# Patient Record
Sex: MALE | Race: WHITE | Employment: FULL TIME | URBAN - METROPOLITAN AREA
[De-identification: names, ages, dates, MRNs, and addresses within clinical notes are randomized per-mention and may not be internally consistent; named-entity substitution may affect disease eponyms.]

---

## 2018-06-18 ENCOUNTER — APPOINTMENT (OUTPATIENT)
Dept: GENERAL RADIOLOGY | Age: 48
End: 2018-06-18
Payer: COMMERCIAL

## 2018-06-18 ENCOUNTER — HOSPITAL ENCOUNTER (EMERGENCY)
Age: 48
Discharge: HOME | End: 2018-06-18
Payer: COMMERCIAL

## 2018-06-18 VITALS
OXYGEN SATURATION: 97 % | DIASTOLIC BLOOD PRESSURE: 107 MMHG | WEIGHT: 225 LBS | SYSTOLIC BLOOD PRESSURE: 147 MMHG | HEART RATE: 83 BPM | RESPIRATION RATE: 16 BRPM | BODY MASS INDEX: 34.21 KG/M2 | TEMPERATURE: 98.6 F

## 2018-06-18 DIAGNOSIS — R20.2 TINGLING: ICD-10-CM

## 2018-06-18 DIAGNOSIS — M79.641 HAND PAIN, RIGHT: Primary | ICD-10-CM

## 2018-06-18 DIAGNOSIS — M25.531 RIGHT WRIST PAIN: ICD-10-CM

## 2018-06-18 PROCEDURE — 73110 X-RAY EXAM OF WRIST: CPT

## 2018-06-18 PROCEDURE — L3931 WHFO NONTORSION JOINT PREFAB: HCPCS

## 2018-06-18 PROCEDURE — 73130 X-RAY EXAM OF HAND: CPT

## 2018-06-18 PROCEDURE — 99212 OFFICE O/P EST SF 10 MIN: CPT

## 2018-06-18 RX ORDER — IBUPROFEN 800 MG/1
800 TABLET ORAL EVERY 8 HOURS PRN
Qty: 21 TABLET | Refills: 0 | Status: SHIPPED | OUTPATIENT
Start: 2018-06-18 | End: 2018-06-25

## 2018-06-18 ASSESSMENT — PAIN DESCRIPTION - LOCATION: LOCATION: WRIST

## 2018-06-18 ASSESSMENT — PAIN SCALES - GENERAL: PAINLEVEL_OUTOF10: 7

## 2018-06-18 ASSESSMENT — PAIN DESCRIPTION - PAIN TYPE: TYPE: ACUTE PAIN

## 2018-06-18 ASSESSMENT — PAIN DESCRIPTION - ORIENTATION: ORIENTATION: RIGHT

## 2018-06-18 ASSESSMENT — PAIN DESCRIPTION - DESCRIPTORS: DESCRIPTORS: ACHING

## 2018-08-20 ENCOUNTER — HOSPITAL ENCOUNTER (OUTPATIENT)
Dept: PSYCHIATRY | Age: 48
Setting detail: THERAPIES SERIES
Discharge: HOME | End: 2018-08-20
Payer: COMMERCIAL

## 2018-08-20 VITALS — DIASTOLIC BLOOD PRESSURE: 88 MMHG | SYSTOLIC BLOOD PRESSURE: 138 MMHG | HEART RATE: 22 BPM

## 2018-08-20 PROCEDURE — 90791 PSYCH DIAGNOSTIC EVALUATION: CPT

## 2018-08-20 ASSESSMENT — LIFESTYLE VARIABLES: HISTORY_ALCOHOL_USE: YES

## 2022-11-13 ENCOUNTER — HOSPITAL ENCOUNTER (INPATIENT)
Age: 52
LOS: 4 days | Discharge: HOME | DRG: 964 | End: 2022-11-17
Attending: EMERGENCY MEDICINE | Admitting: SURGERY
Payer: COMMERCIAL

## 2022-11-13 ENCOUNTER — APPOINTMENT (OUTPATIENT)
Dept: CT IMAGING | Age: 52
DRG: 964 | End: 2022-11-13
Payer: COMMERCIAL

## 2022-11-13 ENCOUNTER — APPOINTMENT (OUTPATIENT)
Dept: GENERAL RADIOLOGY | Age: 52
DRG: 964 | End: 2022-11-13
Payer: COMMERCIAL

## 2022-11-13 DIAGNOSIS — S37.012A HEMATOMA OF LEFT KIDNEY, INITIAL ENCOUNTER: ICD-10-CM

## 2022-11-13 DIAGNOSIS — S22.42XA CLOSED FRACTURE OF MULTIPLE RIBS OF LEFT SIDE, INITIAL ENCOUNTER: Primary | ICD-10-CM

## 2022-11-13 PROBLEM — S22.49XA MULTIPLE RIB FRACTURES INVOLVING FOUR OR MORE RIBS: Status: ACTIVE | Noted: 2022-11-13

## 2022-11-13 LAB
ABO/RH: NORMAL
ANION GAP SERPL CALCULATED.3IONS-SCNC: 13 MMOL/L (ref 7–16)
ANTIBODY SCREEN: NORMAL
BACTERIA: ABNORMAL /HPF
BASOPHILS ABSOLUTE: 0.08 E9/L (ref 0–0.2)
BASOPHILS RELATIVE PERCENT: 1.1 % (ref 0–2)
BILIRUBIN URINE: NEGATIVE
BLOOD, URINE: ABNORMAL
BUN BLDV-MCNC: 14 MG/DL (ref 6–20)
CALCIUM SERPL-MCNC: 9 MG/DL (ref 8.6–10.2)
CHLORIDE BLD-SCNC: 109 MMOL/L (ref 98–107)
CLARITY: CLEAR
CO2: 20 MMOL/L (ref 22–29)
COLOR: YELLOW
CREAT SERPL-MCNC: 1.1 MG/DL (ref 0.7–1.2)
EOSINOPHILS ABSOLUTE: 0.21 E9/L (ref 0.05–0.5)
EOSINOPHILS RELATIVE PERCENT: 2.8 % (ref 0–6)
EPITHELIAL CELLS, UA: ABNORMAL /HPF
GFR SERPL CREATININE-BSD FRML MDRD: >60 ML/MIN/1.73
GLUCOSE BLD-MCNC: 100 MG/DL (ref 74–99)
GLUCOSE URINE: NEGATIVE MG/DL
HCT VFR BLD CALC: 39.3 % (ref 37–54)
HEMOGLOBIN: 12.6 G/DL (ref 12.5–16.5)
IMMATURE GRANULOCYTES #: 0.07 E9/L
IMMATURE GRANULOCYTES %: 0.9 % (ref 0–5)
KETONES, URINE: NEGATIVE MG/DL
LEUKOCYTE ESTERASE, URINE: NEGATIVE
LYMPHOCYTES ABSOLUTE: 2.59 E9/L (ref 1.5–4)
LYMPHOCYTES RELATIVE PERCENT: 35 % (ref 20–42)
MCH RBC QN AUTO: 31.6 PG (ref 26–35)
MCHC RBC AUTO-ENTMCNC: 32.1 % (ref 32–34.5)
MCV RBC AUTO: 98.5 FL (ref 80–99.9)
MONOCYTES ABSOLUTE: 0.5 E9/L (ref 0.1–0.95)
MONOCYTES RELATIVE PERCENT: 6.7 % (ref 2–12)
NEUTROPHILS ABSOLUTE: 3.96 E9/L (ref 1.8–7.3)
NEUTROPHILS RELATIVE PERCENT: 53.5 % (ref 43–80)
NITRITE, URINE: NEGATIVE
PDW BLD-RTO: 13.9 FL (ref 11.5–15)
PH UA: 5.5 (ref 5–9)
PLATELET # BLD: 272 E9/L (ref 130–450)
PMV BLD AUTO: 9.2 FL (ref 7–12)
POTASSIUM SERPL-SCNC: 4 MMOL/L (ref 3.5–5)
PROTEIN UA: ABNORMAL MG/DL
RBC # BLD: 3.99 E12/L (ref 3.8–5.8)
RBC UA: ABNORMAL /HPF (ref 0–2)
SODIUM BLD-SCNC: 142 MMOL/L (ref 132–146)
SPECIFIC GRAVITY UA: 1.01 (ref 1–1.03)
UROBILINOGEN, URINE: 0.2 E.U./DL
WBC # BLD: 7.4 E9/L (ref 4.5–11.5)
WBC UA: ABNORMAL /HPF (ref 0–5)

## 2022-11-13 PROCEDURE — A4216 STERILE WATER/SALINE, 10 ML: HCPCS | Performed by: SURGERY

## 2022-11-13 PROCEDURE — 96374 THER/PROPH/DIAG INJ IV PUSH: CPT

## 2022-11-13 PROCEDURE — 85025 COMPLETE CBC W/AUTO DIFF WBC: CPT

## 2022-11-13 PROCEDURE — 36415 COLL VENOUS BLD VENIPUNCTURE: CPT

## 2022-11-13 PROCEDURE — 71045 X-RAY EXAM CHEST 1 VIEW: CPT

## 2022-11-13 PROCEDURE — 99285 EMERGENCY DEPT VISIT HI MDM: CPT

## 2022-11-13 PROCEDURE — 86850 RBC ANTIBODY SCREEN: CPT

## 2022-11-13 PROCEDURE — 1200000000 HC SEMI PRIVATE

## 2022-11-13 PROCEDURE — 6370000000 HC RX 637 (ALT 250 FOR IP): Performed by: SURGERY

## 2022-11-13 PROCEDURE — 86900 BLOOD TYPING SEROLOGIC ABO: CPT

## 2022-11-13 PROCEDURE — 80048 BASIC METABOLIC PNL TOTAL CA: CPT

## 2022-11-13 PROCEDURE — C9113 INJ PANTOPRAZOLE SODIUM, VIA: HCPCS | Performed by: SURGERY

## 2022-11-13 PROCEDURE — 71260 CT THORAX DX C+: CPT

## 2022-11-13 PROCEDURE — 2580000003 HC RX 258: Performed by: SURGERY

## 2022-11-13 PROCEDURE — 74177 CT ABD & PELVIS W/CONTRAST: CPT

## 2022-11-13 PROCEDURE — 81001 URINALYSIS AUTO W/SCOPE: CPT

## 2022-11-13 PROCEDURE — 6360000004 HC RX CONTRAST MEDICATION: Performed by: RADIOLOGY

## 2022-11-13 PROCEDURE — 86901 BLOOD TYPING SEROLOGIC RH(D): CPT

## 2022-11-13 PROCEDURE — 96375 TX/PRO/DX INJ NEW DRUG ADDON: CPT

## 2022-11-13 PROCEDURE — 6360000002 HC RX W HCPCS: Performed by: SURGERY

## 2022-11-13 PROCEDURE — 6360000002 HC RX W HCPCS: Performed by: EMERGENCY MEDICINE

## 2022-11-13 RX ORDER — MORPHINE SULFATE 10 MG/ML
8 INJECTION, SOLUTION INTRAMUSCULAR; INTRAVENOUS ONCE
Status: COMPLETED | OUTPATIENT
Start: 2022-11-13 | End: 2022-11-13

## 2022-11-13 RX ORDER — SODIUM CHLORIDE 9 MG/ML
INJECTION, SOLUTION INTRAVENOUS PRN
Status: DISCONTINUED | OUTPATIENT
Start: 2022-11-13 | End: 2022-11-17 | Stop reason: HOSPADM

## 2022-11-13 RX ORDER — METHOCARBAMOL 500 MG/1
750 TABLET, FILM COATED ORAL 4 TIMES DAILY
Status: DISCONTINUED | OUTPATIENT
Start: 2022-11-13 | End: 2022-11-15

## 2022-11-13 RX ORDER — LIDOCAINE 4 G/G
1 PATCH TOPICAL DAILY
Status: DISCONTINUED | OUTPATIENT
Start: 2022-11-13 | End: 2022-11-17 | Stop reason: HOSPADM

## 2022-11-13 RX ORDER — OXYCODONE HYDROCHLORIDE 5 MG/1
5 TABLET ORAL EVERY 4 HOURS PRN
Status: DISCONTINUED | OUTPATIENT
Start: 2022-11-13 | End: 2022-11-17 | Stop reason: HOSPADM

## 2022-11-13 RX ORDER — ONDANSETRON 2 MG/ML
4 INJECTION INTRAMUSCULAR; INTRAVENOUS EVERY 6 HOURS PRN
Status: DISCONTINUED | OUTPATIENT
Start: 2022-11-13 | End: 2022-11-17 | Stop reason: HOSPADM

## 2022-11-13 RX ORDER — ONDANSETRON 4 MG/1
4 TABLET, ORALLY DISINTEGRATING ORAL EVERY 8 HOURS PRN
Status: DISCONTINUED | OUTPATIENT
Start: 2022-11-13 | End: 2022-11-17 | Stop reason: HOSPADM

## 2022-11-13 RX ORDER — SODIUM CHLORIDE 9 MG/ML
INJECTION, SOLUTION INTRAVENOUS CONTINUOUS
Status: DISCONTINUED | OUTPATIENT
Start: 2022-11-13 | End: 2022-11-16

## 2022-11-13 RX ORDER — ACETAMINOPHEN 325 MG/1
650 TABLET ORAL EVERY 6 HOURS
Status: DISCONTINUED | OUTPATIENT
Start: 2022-11-13 | End: 2022-11-17 | Stop reason: HOSPADM

## 2022-11-13 RX ORDER — MORPHINE SULFATE 2 MG/ML
2 INJECTION, SOLUTION INTRAMUSCULAR; INTRAVENOUS
Status: DISCONTINUED | OUTPATIENT
Start: 2022-11-13 | End: 2022-11-17 | Stop reason: HOSPADM

## 2022-11-13 RX ORDER — SODIUM CHLORIDE 0.9 % (FLUSH) 0.9 %
5-40 SYRINGE (ML) INJECTION PRN
Status: DISCONTINUED | OUTPATIENT
Start: 2022-11-13 | End: 2022-11-17 | Stop reason: HOSPADM

## 2022-11-13 RX ORDER — ENOXAPARIN SODIUM 100 MG/ML
30 INJECTION SUBCUTANEOUS 2 TIMES DAILY
Status: DISCONTINUED | OUTPATIENT
Start: 2022-11-13 | End: 2022-11-17 | Stop reason: HOSPADM

## 2022-11-13 RX ORDER — FENTANYL CITRATE 50 UG/ML
100 INJECTION, SOLUTION INTRAMUSCULAR; INTRAVENOUS ONCE
Status: COMPLETED | OUTPATIENT
Start: 2022-11-13 | End: 2022-11-13

## 2022-11-13 RX ORDER — MORPHINE SULFATE 4 MG/ML
4 INJECTION, SOLUTION INTRAMUSCULAR; INTRAVENOUS
Status: DISCONTINUED | OUTPATIENT
Start: 2022-11-13 | End: 2022-11-17 | Stop reason: HOSPADM

## 2022-11-13 RX ORDER — SODIUM CHLORIDE 0.9 % (FLUSH) 0.9 %
5-40 SYRINGE (ML) INJECTION EVERY 12 HOURS SCHEDULED
Status: DISCONTINUED | OUTPATIENT
Start: 2022-11-13 | End: 2022-11-17 | Stop reason: HOSPADM

## 2022-11-13 RX ORDER — HYDROMORPHONE HYDROCHLORIDE 1 MG/ML
1 INJECTION, SOLUTION INTRAMUSCULAR; INTRAVENOUS; SUBCUTANEOUS ONCE
Status: COMPLETED | OUTPATIENT
Start: 2022-11-13 | End: 2022-11-13

## 2022-11-13 RX ORDER — OXYCODONE HYDROCHLORIDE 5 MG/1
10 TABLET ORAL EVERY 4 HOURS PRN
Status: DISCONTINUED | OUTPATIENT
Start: 2022-11-13 | End: 2022-11-17 | Stop reason: HOSPADM

## 2022-11-13 RX ADMIN — IOPAMIDOL 75 ML: 755 INJECTION, SOLUTION INTRAVENOUS at 16:15

## 2022-11-13 RX ADMIN — SODIUM CHLORIDE: 9 INJECTION, SOLUTION INTRAVENOUS at 18:56

## 2022-11-13 RX ADMIN — HYDROMORPHONE HYDROCHLORIDE 1 MG: 1 INJECTION, SOLUTION INTRAMUSCULAR; INTRAVENOUS; SUBCUTANEOUS at 17:43

## 2022-11-13 RX ADMIN — ENOXAPARIN SODIUM 30 MG: 100 INJECTION SUBCUTANEOUS at 22:05

## 2022-11-13 RX ADMIN — METHOCARBAMOL 750 MG: 750 TABLET ORAL at 21:47

## 2022-11-13 RX ADMIN — PANTOPRAZOLE SODIUM 40 MG: 40 INJECTION, POWDER, FOR SOLUTION INTRAVENOUS at 18:50

## 2022-11-13 RX ADMIN — FENTANYL CITRATE 100 MCG: 50 INJECTION INTRAMUSCULAR; INTRAVENOUS at 14:55

## 2022-11-13 RX ADMIN — MORPHINE SULFATE 8 MG: 10 INJECTION, SOLUTION INTRAMUSCULAR; INTRAVENOUS at 16:30

## 2022-11-13 RX ADMIN — OXYCODONE 5 MG: 5 TABLET ORAL at 22:18

## 2022-11-13 RX ADMIN — MORPHINE SULFATE 4 MG: 4 INJECTION, SOLUTION INTRAMUSCULAR; INTRAVENOUS at 20:53

## 2022-11-13 RX ADMIN — ACETAMINOPHEN 650 MG: 325 TABLET ORAL at 18:50

## 2022-11-13 RX ADMIN — OXYCODONE 10 MG: 5 TABLET ORAL at 18:53

## 2022-11-13 ASSESSMENT — ENCOUNTER SYMPTOMS
NAUSEA: 0
SHORTNESS OF BREATH: 0
ABDOMINAL PAIN: 0
COLOR CHANGE: 0
COUGH: 0
BACK PAIN: 0

## 2022-11-13 ASSESSMENT — PAIN DESCRIPTION - DESCRIPTORS
DESCRIPTORS: ACHING
DESCRIPTORS: ACHING

## 2022-11-13 ASSESSMENT — PAIN SCALES - GENERAL
PAINLEVEL_OUTOF10: 8
PAINLEVEL_OUTOF10: 10
PAINLEVEL_OUTOF10: 8
PAINLEVEL_OUTOF10: 7
PAINLEVEL_OUTOF10: 5
PAINLEVEL_OUTOF10: 9
PAINLEVEL_OUTOF10: 8

## 2022-11-13 ASSESSMENT — PAIN DESCRIPTION - LOCATION
LOCATION: RIB CAGE

## 2022-11-13 ASSESSMENT — PAIN DESCRIPTION - ORIENTATION
ORIENTATION: LEFT

## 2022-11-13 NOTE — ED PROVIDER NOTES
Presents to the ED for evaluation of a fall that occurred this afternoon. Patient fell down approximately 8-10 steps. When he fell he was landing on the left side of his chest wall. Denies hitting his head. Denies neck or back pain. Complains of left-sided chest wall pain. No loss of consciousness. Patient not on anticoagulants. States pain is worse with any movement and deep breathing. Has not had anything recently for pain control. Pain is severe in severity. Patient denies any abdominal pain. Review of Systems   Constitutional:  Negative for diaphoresis. Eyes:  Negative for visual disturbance. Respiratory:  Negative for cough and shortness of breath. Cardiovascular:  Positive for chest pain. Gastrointestinal:  Negative for abdominal pain and nausea. Musculoskeletal:  Negative for back pain and neck pain. Skin:  Negative for color change, pallor and wound. Neurological:  Negative for dizziness, syncope, light-headedness, numbness and headaches. Hematological:  Does not bruise/bleed easily. Psychiatric/Behavioral:  Negative for confusion. Physical Exam  Vitals and nursing note reviewed. Constitutional:       General: He is not in acute distress. Appearance: He is well-developed. He is not diaphoretic. Comments: Patient appears uncomfortable secondary to pain but is in no distress. HENT:      Head: Normocephalic. Comments: No craniofacial trauma noted. Eyes:      Conjunctiva/sclera: Conjunctivae normal.   Cardiovascular:      Rate and Rhythm: Normal rate and regular rhythm. Heart sounds: Normal heart sounds. No murmur heard. Pulmonary:      Effort: Pulmonary effort is normal. No respiratory distress. Breath sounds: Normal breath sounds. No wheezing or rales. Chest:      Chest wall: Tenderness (She has chest wall tenderness on the left lateral ribs with bony crepitance appreciated. No paradoxical chest movement.) present.    Abdominal: General: Bowel sounds are normal.      Palpations: Abdomen is soft. Tenderness: There is abdominal tenderness (Mild tenderness in the upper abdomen. No overlying erythema or ecchymosis.). There is no guarding or rebound. Musculoskeletal:         General: No tenderness or deformity. Cervical back: Normal range of motion and neck supple. No tenderness (No midline cervical spine tenderness. ). Skin:     General: Skin is warm and dry. Neurological:      Mental Status: He is alert and oriented to person, place, and time. Procedures     MDM  Patient presents to the ED for evaluation of a fall that occurred at home. Fell down approximately 10 stairs. On exam he has significant tenderness along the lateral aspect of the left chest wall. Bony crepitance as well. Breath sounds present on both sides. No craniofacial trauma noted. Labs and imaging were assessed. BMP was unremarkable showing no evidence of electro abnormalities or renal insufficiency. CBC showed no evidence of anemia or leukocytosis. CT of the chest was obtained which showed a grade 1 injury to the left kidney with subcapsular hematoma. Multiple left-sided rib fractures. No significant pneumothorax. Trace left hemothorax with mild pulmonary contusion. Large area of ecchymosis involving the subcutaneous tissue of the left buttock and flank. I did consult trauma surgery and they plan on admitting him for further treatment and evaluation. Patient is achieving pain relief with IV pain medicine in the ED. ED Course as of 11/13/22 1816   Valeria Corral Nov 13, 2022   1627 Patient requesting something more for pain. Additional pain meds ordered and will reassess. [MS]   1710 Has experienced mild improvement in pain with the morphine. States the pain is tolerable. His SMI is approximately 750. He had just received the morphine a few minutes ago. We will give this 1 more time to take effect.   If does not further improve will give additional pain meds and reassess. I have placed a call to trauma surgery. [MS]   [phone] Patient is requesting something more for pain. Will order additional pain meds. [MS]   1816 Patient is much more pain controlled on the Dilaudid. His [address] repeat is 1250. [MS]      ED Course User Index  [MS] Savannah Alonso, DO       --------------------------------------------- PAST HISTORY ---------------------------------------------  Past Medical History:  has a past medical history of Hypertension and Kidney stone. Past Surgical History:  has a past surgical history that includes Gastric bypass surgery. Social History:  reports that he has never smoked. He does not have any smokeless tobacco history on file. He reports that he does not drink alcohol and does not use drugs. Family History: family history is not on file. The patients home medications have been reviewed.     Allergies: Pcn [penicillins]    -------------------------------------------------- RESULTS -------------------------------------------------    LABS:  Results for orders placed or performed during the hospital encounter of 11/13/22   BMP   Result Value Ref Range    Sodium 142 132 - 146 mmol/L    Potassium 4.0 3.5 - 5.0 mmol/L    Chloride 109 (H) 98 - 107 mmol/L    CO2 20 (L) 22 - 29 mmol/L    Anion Gap 13 7 - 16 mmol/L    Glucose 100 (H) 74 - 99 mg/dL    BUN 14 6 - 20 mg/dL    Creatinine 1.1 0.7 - 1.2 mg/dL    Est, Glom Filt Rate >60 >=60 mL/min/1.73    Calcium 9.0 8.6 - 10.2 mg/dL   CBC with Auto Differential   Result Value Ref Range    WBC 7.4 4.5 - 11.5 E9/L    RBC 3.99 3.80 - 5.80 E12/L    Hemoglobin 12.6 12.5 - 16.5 g/dL    Hematocrit 39.3 37.0 - 54.0 %    MCV 98.5 80.0 - 99.9 fL    MCH 31.6 26.0 - 35.0 pg    MCHC 32.1 32.0 - 34.5 %    RDW 13.9 11.5 - 15.0 fL    Platelets 263 224 - 669 E9/L    MPV 9.2 7.0 - 12.0 fL    Neutrophils % 53.5 43.0 - 80.0 %    Immature Granulocytes % 0.9 0.0 - 5.0 %    Lymphocytes % 35.0 20.0 - 42.0 % Monocytes % 6.7 2.0 - 12.0 %    Eosinophils % 2.8 0.0 - 6.0 %    Basophils % 1.1 0.0 - 2.0 %    Neutrophils Absolute 3.96 1.80 - 7.30 E9/L    Immature Granulocytes # 0.07 E9/L    Lymphocytes Absolute 2.59 1.50 - 4.00 E9/L    Monocytes Absolute 0.50 0.10 - 0.95 E9/L    Eosinophils Absolute 0.21 0.05 - 0.50 E9/L    Basophils Absolute 0.08 0.00 - 0.20 E9/L   Urinalysis   Result Value Ref Range    Color, UA Yellow Straw/Yellow    Clarity, UA Clear Clear    Glucose, Ur Negative Negative mg/dL    Bilirubin Urine Negative Negative    Ketones, Urine Negative Negative mg/dL    Specific Gravity, UA 1.010 1.005 - 1.030    Blood, Urine SMALL (A) Negative    pH, UA 5.5 5.0 - 9.0    Protein, UA TRACE Negative mg/dL    Urobilinogen, Urine 0.2 <2.0 E.U./dL    Nitrite, Urine Negative Negative    Leukocyte Esterase, Urine Negative Negative   Microscopic Urinalysis   Result Value Ref Range    WBC, UA 0-1 0 - 5 /HPF    RBC, UA 0-1 0 - 2 /HPF    Epithelial Cells, UA RARE /HPF    Bacteria, UA RARE (A) None Seen /HPF       RADIOLOGY:  CT ABDOMEN PELVIS W IV CONTRAST Additional Contrast? None   Final Result   1. AST grade 1 injury of the left kidney with subcapsular hematoma. There   appears to be some hemorrhage into a pre-existing upper pole cyst, enlarging   the cyst.  No evidence of renal parenchymal laceration. 2.  Multiple left-sided rib fractures at least 1 of the rib fractures is   segmental.  No significant pneumothorax. Trace left hemothorax and mild   pulmonary contusion. 3.  Large area of ecchymosis involving the subcutaneous tissues of the left   buttock and left flank. No underlying pelvic or hip fractures. CT CHEST W CONTRAST   Final Result   1. AST grade 1 injury of the left kidney with subcapsular hematoma. There   appears to be some hemorrhage into a pre-existing upper pole cyst, enlarging   the cyst.  No evidence of renal parenchymal laceration.       2.  Multiple left-sided rib fractures at least 1 of the rib fractures is   segmental.  No significant pneumothorax. Trace left hemothorax and mild   pulmonary contusion. 3.  Large area of ecchymosis involving the subcutaneous tissues of the left   buttock and left flank. No underlying pelvic or hip fractures. XR CHEST PORTABLE   Final Result   No acute process. ------------------------- NURSING NOTES AND VITALS REVIEWED ---------------------------  Date / Time Roomed:  11/13/2022  2:39 PM  ED Bed Assignment:  04/04    The nursing notes within the ED encounter and vital signs as below have been reviewed. Patient Vitals for the past 24 hrs:   BP Temp Temp src Pulse Resp SpO2 Weight   11/13/22 1739 (!) 172/103 -- -- 97 28 93 % --   11/13/22 1645 (!) 174/102 -- -- 100 29 92 % --   11/13/22 1621 (!) 220/112 -- -- 95 28 92 % --   11/13/22 1445 (!) 203/116 97.8 °F (36.6 °C) Infrared 89 26 94 % 225 lb (102.1 kg)       Oxygen Saturation Interpretation: Normal    ------------------------------------------ PROGRESS NOTES ------------------------------------------  Re-evaluation(s):  Refer to ED course above. Counseling:  I have spoken with the patient and discussed todays results, in addition to providing specific details for the plan of care and counseling regarding the diagnosis and prognosis. Their questions are answered at this time and they are agreeable with the plan of admission.    --------------------------------- ADDITIONAL PROVIDER NOTES ---------------------------------  Consultations:  Time: 7052. Spoke with Dr. Suzy Mcmahon (trauma service). Discussed case. He will admit the patient.   This patient's ED course included: a personal history and physicial examination, re-evaluation prior to disposition, multiple bedside re-evaluations, IV medications, cardiac monitoring, continuous pulse oximetry, and complex medical decision making and emergency management      Critical care:  Please note that the withdrawal or failure to initiate urgent interventions for this patient would likely result in a life threatening deterioration or permanent disability. Systems at risk for deterioration include: Multiple rib fractures and kidney hematoma requiring trauma consult and admission for further treatment evaluation. Accordingly this patient received 31 minutes of critical care time, excluding separately billable procedures. This patient has remained hemodynamically stable during their ED course. Diagnosis:  1. Closed fracture of multiple ribs of left side, initial encounter    2. Hematoma of left kidney, initial encounter        Disposition:  Patient's disposition: Admit to med/surg floor  Patient's condition is fair.              Eladio Nielson,   11/13/22 1817

## 2022-11-13 NOTE — Clinical Note
Discharge Plan[de-identified] Other/[facility])   Telemetry/Cardiac Monitoring Required?: No

## 2022-11-14 LAB
ANION GAP SERPL CALCULATED.3IONS-SCNC: 14 MMOL/L (ref 7–16)
BASOPHILS ABSOLUTE: 0 E9/L (ref 0–0.2)
BASOPHILS ABSOLUTE: 0.02 E9/L (ref 0–0.2)
BASOPHILS ABSOLUTE: 0.03 E9/L (ref 0–0.2)
BASOPHILS ABSOLUTE: 0.03 E9/L (ref 0–0.2)
BASOPHILS RELATIVE PERCENT: 0.3 % (ref 0–2)
BASOPHILS RELATIVE PERCENT: 0.3 % (ref 0–2)
BASOPHILS RELATIVE PERCENT: 0.4 % (ref 0–2)
BASOPHILS RELATIVE PERCENT: 0.4 % (ref 0–2)
BUN BLDV-MCNC: 15 MG/DL (ref 6–20)
CALCIUM SERPL-MCNC: 7.7 MG/DL (ref 8.6–10.2)
CHLORIDE BLD-SCNC: 106 MMOL/L (ref 98–107)
CO2: 19 MMOL/L (ref 22–29)
CREAT SERPL-MCNC: 0.8 MG/DL (ref 0.7–1.2)
EOSINOPHILS ABSOLUTE: 0 E9/L (ref 0.05–0.5)
EOSINOPHILS ABSOLUTE: 0.02 E9/L (ref 0.05–0.5)
EOSINOPHILS ABSOLUTE: 0.04 E9/L (ref 0.05–0.5)
EOSINOPHILS ABSOLUTE: 0.09 E9/L (ref 0.05–0.5)
EOSINOPHILS RELATIVE PERCENT: 0 % (ref 0–6)
EOSINOPHILS RELATIVE PERCENT: 0.3 % (ref 0–6)
EOSINOPHILS RELATIVE PERCENT: 0.5 % (ref 0–6)
EOSINOPHILS RELATIVE PERCENT: 0.9 % (ref 0–6)
GFR SERPL CREATININE-BSD FRML MDRD: >60 ML/MIN/1.73
GLUCOSE BLD-MCNC: 84 MG/DL (ref 74–99)
HCT VFR BLD CALC: 32.4 % (ref 37–54)
HCT VFR BLD CALC: 32.7 % (ref 37–54)
HCT VFR BLD CALC: 33.8 % (ref 37–54)
HCT VFR BLD CALC: 33.8 % (ref 37–54)
HEMOGLOBIN: 10.5 G/DL (ref 12.5–16.5)
HEMOGLOBIN: 10.6 G/DL (ref 12.5–16.5)
HEMOGLOBIN: 11 G/DL (ref 12.5–16.5)
HEMOGLOBIN: 11.2 G/DL (ref 12.5–16.5)
IMMATURE GRANULOCYTES #: 0.02 E9/L
IMMATURE GRANULOCYTES #: 0.02 E9/L
IMMATURE GRANULOCYTES #: 0.03 E9/L
IMMATURE GRANULOCYTES %: 0.3 % (ref 0–5)
LYMPHOCYTES ABSOLUTE: 0.5 E9/L (ref 1.5–4)
LYMPHOCYTES ABSOLUTE: 0.73 E9/L (ref 1.5–4)
LYMPHOCYTES ABSOLUTE: 0.75 E9/L (ref 1.5–4)
LYMPHOCYTES ABSOLUTE: 1.04 E9/L (ref 1.5–4)
LYMPHOCYTES RELATIVE PERCENT: 10.9 % (ref 20–42)
LYMPHOCYTES RELATIVE PERCENT: 6.1 % (ref 20–42)
LYMPHOCYTES RELATIVE PERCENT: 9.1 % (ref 20–42)
LYMPHOCYTES RELATIVE PERCENT: 9.7 % (ref 20–42)
MCH RBC QN AUTO: 31.9 PG (ref 26–35)
MCH RBC QN AUTO: 32 PG (ref 26–35)
MCH RBC QN AUTO: 32 PG (ref 26–35)
MCH RBC QN AUTO: 32.2 PG (ref 26–35)
MCHC RBC AUTO-ENTMCNC: 32.1 % (ref 32–34.5)
MCHC RBC AUTO-ENTMCNC: 32.5 % (ref 32–34.5)
MCHC RBC AUTO-ENTMCNC: 32.7 % (ref 32–34.5)
MCHC RBC AUTO-ENTMCNC: 33.1 % (ref 32–34.5)
MCV RBC AUTO: 96.6 FL (ref 80–99.9)
MCV RBC AUTO: 98.3 FL (ref 80–99.9)
MCV RBC AUTO: 98.5 FL (ref 80–99.9)
MCV RBC AUTO: 99.4 FL (ref 80–99.9)
MONOCYTES ABSOLUTE: 0.76 E9/L (ref 0.1–0.95)
MONOCYTES ABSOLUTE: 1 E9/L (ref 0.1–0.95)
MONOCYTES ABSOLUTE: 1.12 E9/L (ref 0.1–0.95)
MONOCYTES ABSOLUTE: 1.22 E9/L (ref 0.1–0.95)
MONOCYTES RELATIVE PERCENT: 12.8 % (ref 2–12)
MONOCYTES RELATIVE PERCENT: 12.9 % (ref 2–12)
MONOCYTES RELATIVE PERCENT: 14 % (ref 2–12)
MONOCYTES RELATIVE PERCENT: 8.8 % (ref 2–12)
NEUTROPHILS ABSOLUTE: 5.92 E9/L (ref 1.8–7.3)
NEUTROPHILS ABSOLUTE: 6.06 E9/L (ref 1.8–7.3)
NEUTROPHILS ABSOLUTE: 7.14 E9/L (ref 1.8–7.3)
NEUTROPHILS ABSOLUTE: 7.15 E9/L (ref 1.8–7.3)
NEUTROPHILS RELATIVE PERCENT: 74.8 % (ref 43–80)
NEUTROPHILS RELATIVE PERCENT: 75.8 % (ref 43–80)
NEUTROPHILS RELATIVE PERCENT: 76.4 % (ref 43–80)
NEUTROPHILS RELATIVE PERCENT: 85.1 % (ref 43–80)
OVALOCYTES: ABNORMAL
PDW BLD-RTO: 13.7 FL (ref 11.5–15)
PDW BLD-RTO: 13.9 FL (ref 11.5–15)
PDW BLD-RTO: 13.9 FL (ref 11.5–15)
PDW BLD-RTO: 14 FL (ref 11.5–15)
PLATELET # BLD: 195 E9/L (ref 130–450)
PLATELET # BLD: 196 E9/L (ref 130–450)
PLATELET # BLD: 198 E9/L (ref 130–450)
PLATELET # BLD: 201 E9/L (ref 130–450)
PMV BLD AUTO: 9.3 FL (ref 7–12)
PMV BLD AUTO: 9.3 FL (ref 7–12)
PMV BLD AUTO: 9.5 FL (ref 7–12)
PMV BLD AUTO: 9.7 FL (ref 7–12)
POIKILOCYTES: ABNORMAL
POLYCHROMASIA: ABNORMAL
POTASSIUM REFLEX MAGNESIUM: 4.1 MMOL/L (ref 3.5–5)
RBC # BLD: 3.29 E12/L (ref 3.8–5.8)
RBC # BLD: 3.29 E12/L (ref 3.8–5.8)
RBC # BLD: 3.44 E12/L (ref 3.8–5.8)
RBC # BLD: 3.5 E12/L (ref 3.8–5.8)
SODIUM BLD-SCNC: 139 MMOL/L (ref 132–146)
TEAR DROP CELLS: ABNORMAL
WBC # BLD: 7.7 E9/L (ref 4.5–11.5)
WBC # BLD: 8 E9/L (ref 4.5–11.5)
WBC # BLD: 8.4 E9/L (ref 4.5–11.5)
WBC # BLD: 9.6 E9/L (ref 4.5–11.5)

## 2022-11-14 PROCEDURE — 2500000003 HC RX 250 WO HCPCS: Performed by: SURGERY

## 2022-11-14 PROCEDURE — 80048 BASIC METABOLIC PNL TOTAL CA: CPT

## 2022-11-14 PROCEDURE — 2580000003 HC RX 258: Performed by: SURGERY

## 2022-11-14 PROCEDURE — 6370000000 HC RX 637 (ALT 250 FOR IP): Performed by: SURGERY

## 2022-11-14 PROCEDURE — A4216 STERILE WATER/SALINE, 10 ML: HCPCS | Performed by: SURGERY

## 2022-11-14 PROCEDURE — 1200000000 HC SEMI PRIVATE

## 2022-11-14 PROCEDURE — C9113 INJ PANTOPRAZOLE SODIUM, VIA: HCPCS | Performed by: SURGERY

## 2022-11-14 PROCEDURE — 6370000000 HC RX 637 (ALT 250 FOR IP): Performed by: INTERNAL MEDICINE

## 2022-11-14 PROCEDURE — 99223 1ST HOSP IP/OBS HIGH 75: CPT | Performed by: SURGERY

## 2022-11-14 PROCEDURE — 6360000002 HC RX W HCPCS: Performed by: SURGERY

## 2022-11-14 PROCEDURE — 36415 COLL VENOUS BLD VENIPUNCTURE: CPT

## 2022-11-14 PROCEDURE — 85025 COMPLETE CBC W/AUTO DIFF WBC: CPT

## 2022-11-14 RX ORDER — LABETALOL HYDROCHLORIDE 5 MG/ML
10 INJECTION, SOLUTION INTRAVENOUS EVERY 30 MIN PRN
Status: DISCONTINUED | OUTPATIENT
Start: 2022-11-14 | End: 2022-11-17 | Stop reason: HOSPADM

## 2022-11-14 RX ORDER — LISINOPRIL 10 MG/1
10 TABLET ORAL DAILY
Status: DISCONTINUED | OUTPATIENT
Start: 2022-11-14 | End: 2022-11-15

## 2022-11-14 RX ORDER — HYDROCHLOROTHIAZIDE 25 MG/1
25 TABLET ORAL DAILY
Status: DISCONTINUED | OUTPATIENT
Start: 2022-11-14 | End: 2022-11-17 | Stop reason: HOSPADM

## 2022-11-14 RX ORDER — AMLODIPINE BESYLATE 5 MG/1
5 TABLET ORAL DAILY
Status: DISCONTINUED | OUTPATIENT
Start: 2022-11-14 | End: 2022-11-17 | Stop reason: HOSPADM

## 2022-11-14 RX ORDER — HYDRALAZINE HYDROCHLORIDE 20 MG/ML
10 INJECTION INTRAMUSCULAR; INTRAVENOUS EVERY 30 MIN PRN
Status: DISCONTINUED | OUTPATIENT
Start: 2022-11-14 | End: 2022-11-17 | Stop reason: HOSPADM

## 2022-11-14 RX ADMIN — HYDROCHLOROTHIAZIDE 25 MG: 25 TABLET ORAL at 15:03

## 2022-11-14 RX ADMIN — METHOCARBAMOL 750 MG: 750 TABLET ORAL at 08:38

## 2022-11-14 RX ADMIN — MORPHINE SULFATE 4 MG: 4 INJECTION, SOLUTION INTRAMUSCULAR; INTRAVENOUS at 15:05

## 2022-11-14 RX ADMIN — SODIUM CHLORIDE: 9 INJECTION, SOLUTION INTRAVENOUS at 02:41

## 2022-11-14 RX ADMIN — ONDANSETRON 4 MG: 4 TABLET, ORALLY DISINTEGRATING ORAL at 17:18

## 2022-11-14 RX ADMIN — MORPHINE SULFATE 4 MG: 4 INJECTION, SOLUTION INTRAMUSCULAR; INTRAVENOUS at 00:47

## 2022-11-14 RX ADMIN — AMLODIPINE BESYLATE 5 MG: 5 TABLET ORAL at 14:59

## 2022-11-14 RX ADMIN — ENOXAPARIN SODIUM 30 MG: 100 INJECTION SUBCUTANEOUS at 08:35

## 2022-11-14 RX ADMIN — MORPHINE SULFATE 2 MG: 2 INJECTION, SOLUTION INTRAMUSCULAR; INTRAVENOUS at 08:53

## 2022-11-14 RX ADMIN — ACETAMINOPHEN 650 MG: 325 TABLET ORAL at 12:37

## 2022-11-14 RX ADMIN — OXYCODONE 10 MG: 5 TABLET ORAL at 11:22

## 2022-11-14 RX ADMIN — SODIUM CHLORIDE: 9 INJECTION, SOLUTION INTRAVENOUS at 08:23

## 2022-11-14 RX ADMIN — MORPHINE SULFATE 4 MG: 4 INJECTION, SOLUTION INTRAMUSCULAR; INTRAVENOUS at 17:13

## 2022-11-14 RX ADMIN — OXYCODONE 10 MG: 5 TABLET ORAL at 20:51

## 2022-11-14 RX ADMIN — LABETALOL HYDROCHLORIDE 10 MG: 5 INJECTION, SOLUTION INTRAVENOUS at 08:05

## 2022-11-14 RX ADMIN — MORPHINE SULFATE 4 MG: 4 INJECTION, SOLUTION INTRAMUSCULAR; INTRAVENOUS at 04:35

## 2022-11-14 RX ADMIN — Medication 10 ML: at 00:20

## 2022-11-14 RX ADMIN — SODIUM CHLORIDE, PRESERVATIVE FREE 10 ML: 5 INJECTION INTRAVENOUS at 15:07

## 2022-11-14 RX ADMIN — LABETALOL HYDROCHLORIDE 10 MG: 5 INJECTION, SOLUTION INTRAVENOUS at 08:56

## 2022-11-14 RX ADMIN — ACETAMINOPHEN 650 MG: 325 TABLET ORAL at 00:19

## 2022-11-14 RX ADMIN — ONDANSETRON 4 MG: 4 TABLET, ORALLY DISINTEGRATING ORAL at 00:51

## 2022-11-14 RX ADMIN — METHOCARBAMOL 750 MG: 750 TABLET ORAL at 12:38

## 2022-11-14 RX ADMIN — OXYCODONE 10 MG: 5 TABLET ORAL at 02:39

## 2022-11-14 RX ADMIN — LABETALOL HYDROCHLORIDE 10 MG: 5 INJECTION, SOLUTION INTRAVENOUS at 07:26

## 2022-11-14 RX ADMIN — PANTOPRAZOLE SODIUM 40 MG: 40 INJECTION, POWDER, FOR SOLUTION INTRAVENOUS at 08:36

## 2022-11-14 RX ADMIN — LABETALOL HYDROCHLORIDE 10 MG: 5 INJECTION, SOLUTION INTRAVENOUS at 06:45

## 2022-11-14 RX ADMIN — LISINOPRIL 10 MG: 10 TABLET ORAL at 15:05

## 2022-11-14 RX ADMIN — ACETAMINOPHEN 650 MG: 325 TABLET ORAL at 06:40

## 2022-11-14 RX ADMIN — OXYCODONE 10 MG: 5 TABLET ORAL at 06:39

## 2022-11-14 RX ADMIN — ENOXAPARIN SODIUM 30 MG: 100 INJECTION SUBCUTANEOUS at 20:52

## 2022-11-14 RX ADMIN — METHOCARBAMOL 750 MG: 750 TABLET ORAL at 22:35

## 2022-11-14 RX ADMIN — Medication 10 ML: at 08:55

## 2022-11-14 RX ADMIN — METHOCARBAMOL 750 MG: 750 TABLET ORAL at 18:33

## 2022-11-14 ASSESSMENT — PAIN - FUNCTIONAL ASSESSMENT
PAIN_FUNCTIONAL_ASSESSMENT: ACTIVITIES ARE NOT PREVENTED
PAIN_FUNCTIONAL_ASSESSMENT: PREVENTS OR INTERFERES SOME ACTIVE ACTIVITIES AND ADLS
PAIN_FUNCTIONAL_ASSESSMENT: ACTIVITIES ARE NOT PREVENTED

## 2022-11-14 ASSESSMENT — PAIN SCALES - GENERAL
PAINLEVEL_OUTOF10: 7
PAINLEVEL_OUTOF10: 8
PAINLEVEL_OUTOF10: 10
PAINLEVEL_OUTOF10: 9
PAINLEVEL_OUTOF10: 8
PAINLEVEL_OUTOF10: 7
PAINLEVEL_OUTOF10: 7
PAINLEVEL_OUTOF10: 10
PAINLEVEL_OUTOF10: 6
PAINLEVEL_OUTOF10: 7
PAINLEVEL_OUTOF10: 5
PAINLEVEL_OUTOF10: 9
PAINLEVEL_OUTOF10: 9

## 2022-11-14 ASSESSMENT — PAIN DESCRIPTION - DESCRIPTORS
DESCRIPTORS: ACHING;CRAMPING
DESCRIPTORS: POUNDING;THROBBING;SHOOTING
DESCRIPTORS: ACHING;DISCOMFORT;SPASM;SHARP
DESCRIPTORS: POUNDING;SHOOTING
DESCRIPTORS: POUNDING;THROBBING;SHOOTING
DESCRIPTORS: ACHING;PRESSURE;SPASM
DESCRIPTORS: ACHING;DISCOMFORT;SORE;SPASM
DESCRIPTORS: ACHING;DISCOMFORT;SORE;SPASM
DESCRIPTORS: ACHING;SHOOTING;SORE;SHARP
DESCRIPTORS: ACHING

## 2022-11-14 ASSESSMENT — PAIN DESCRIPTION - PAIN TYPE: TYPE: ACUTE PAIN

## 2022-11-14 ASSESSMENT — PAIN DESCRIPTION - LOCATION
LOCATION: GENERALIZED;RIB CAGE
LOCATION: RIB CAGE
LOCATION: CHEST;RIB CAGE
LOCATION: GENERALIZED;CHEST;RIB CAGE
LOCATION: GENERALIZED
LOCATION: GENERALIZED;RIB CAGE
LOCATION: GENERALIZED;CHEST;RIB CAGE
LOCATION: GENERALIZED;CHEST

## 2022-11-14 ASSESSMENT — PAIN DESCRIPTION - ORIENTATION
ORIENTATION: LEFT
ORIENTATION: LEFT
ORIENTATION: RIGHT;LEFT
ORIENTATION: LEFT
ORIENTATION: RIGHT;LEFT
ORIENTATION: LEFT

## 2022-11-14 ASSESSMENT — LIFESTYLE VARIABLES
HOW MANY STANDARD DRINKS CONTAINING ALCOHOL DO YOU HAVE ON A TYPICAL DAY: PATIENT DOES NOT DRINK
HOW OFTEN DO YOU HAVE A DRINK CONTAINING ALCOHOL: NEVER

## 2022-11-14 ASSESSMENT — PAIN DESCRIPTION - FREQUENCY: FREQUENCY: INTERMITTENT

## 2022-11-14 ASSESSMENT — PAIN DESCRIPTION - ONSET: ONSET: ON-GOING

## 2022-11-14 ASSESSMENT — PAIN DESCRIPTION - DIRECTION: RADIATING_TOWARDS: RT CHEST

## 2022-11-14 NOTE — ED NOTES
Pt BP elevated over the course of the evening. Decreasing with pain relief. Provider aware and no intervention at this time. Will be re-evaluated. Pt alert and oriented times 4 with only complaint being pain. Interventions with gentle repositioning and medication.       Tonia Carl RN  11/14/22 3633

## 2022-11-14 NOTE — H&P
TRAUMA HISTORY & PHYSICAL  Surgical Resident/Advance Practice Nurse  11/14/2022  7:29 AM    PRIMARY SURVEY    CHIEF COMPLAINT:  Trauma consult. Fall down 10 steps yesterday afternoon. He denied hitting his head or complaining of loss of consciousness. He was found to have some left-sided chest pain. He had a CT scan of her chest which showed 2 through 10 rib fractures along with a grade 1 left kidney injury    Was found to be hypertensive overnight no other new complaints on exam.    AIRWAY:   Airway Normal  EMS ETT Absent  Noisy respirations Absent  Retractions: Absent  Vomiting/bleeding: Absent      BREATHING:    Midaxillary breath sound left:  Normal  Midaxillary breath sound right:  Normal    Cough sound intensity:  fair   FiO2:  Room air    SMI 1000 mL.       CIRCULATION:   Femerol pulse intensity: Strong  Palpebral conjunctiva: Red    Vitals:    11/14/22 0723   BP: (!) 222/120   Pulse: 86   Resp: 20   Temp: 98 °F (36.7 °C)   SpO2: 92%       Vitals:    11/14/22 0202 11/14/22 0303 11/14/22 0501 11/14/22 0723   BP: (!) 199/106 (!) 202/116 (!) 203/108 (!) 222/120   Pulse: 98 97 96 86   Resp: 16 17 17 20   Temp:    98 °F (36.7 °C)   TempSrc:    Oral   SpO2:  90% 91% 92%   Weight:            FAST EXAM: Deferred    Central Nervous System    GCS Initial 15 minutes   Eye  Motor  Verbal 4 - Opens eyes on own  6 - Follows simple motor commands  5 - Alert and oriented 4 - Opens eyes on own  6 - Follows simple motor commands  5 - Alert and oriented     Neuromuscular blockade: No  Pupil size:  Left 4 mm    Right 4 mm  Pupil reaction: Yes    Wiggles fingers: Left Yes Right Yes  Wiggles toes: Left Yes   Right Yes    Hand grasp:   Left  Present      Right  Present  Plantar flexion: Left  Present      Right   Present    Loss of consciousness:  No    History Obtained From:  Patient & EMS  Private Medical Doctor: Hamida Frausto DO      Pre-exisiting Medical History:  yes    Conditions: HTN    Medications: none    Allergies: penicillins? Social History:   Tobacco use:  positive for approximately 1/2 packs per day. Patient advised to quit smoking  Alcohol use:  social drinker  Illicit drug use:  no history of illicit drug use    Past Surgical History:  open gastric bypass    Anticoagulant use: None  Antiplatelet use:   None    NSAID use in last 72 hours: no  Taken PCN in past:  no  Last food/drink: yesterday  Last tetanus: unknown    Family History:   No family history of anesthesia complications    Complaints:   Head:  None  Neck:   None  Chest:   Moderate  Back:   None  Abdomen:   None  Extremities:   None  Comments:     Review of systems:  All negative unless otherwise noted. SECONDARY SURVEY  Head/scalp: Atraumatic    Face: Atraumatic    Eyes/ears/nose: Atraumatic    Pharynx/mouth: Atraumatic    Neck: Atraumatic     Cervical spine tenderness:   Cervical collar not indicated  Pain:  none  ROM:  Full    Chest wall:  L sided chest TTP    Heart:  Regular rate & rhythm, HTN    Abdomen: Atraumatic. Soft ND, mild LUQ tenderness  Tenderness:  none    Pelvis: Atraumatic  Tenderness: none    Thoracolumbar spine: Atraumatic  Tenderness:  none    Extremities:   Sensory normal  Motor normal    Distal Pulses  Left arm normal  Right arm normal  Left leg normal  Right leg normal    Capillary refill  Left arm normal  Right arm normal  Left leg normal  Right leg normal      In the event of Emergency Blood Transfusion:  Due to the critical condition of this patient, I request the immediate release of blood products for emergency transfusion secondary to shock. I understand the increased risks incurred by the lack of complete transfusion testing.       Radiology: Chest Xray , CT Chest , and CT Abdomen     Consultations: Urology, IM    Admission/Diagnosis: fall down steps with rib fx and kidney lac    Plan of Treatment:  Monitor H/H  Labetalol/hydralazine as needed for hypertension  Appreciate medicine assistance with blood pressure control  Appreciate urology recommendations  Diet as tolerated    Plan discussed with Dr. Sheldon Quesada    at 11/14/2022 on 7:29 AM    Electronically signed by Lin Burch MD on 11/14/2022 at 7:29 AM     General Surgery Progress Note  [facility]    Patient's Name/Date of Birth: Escobar Perera / 1970    Date: November 14, 2022     Surgeon: Sheldon Quesada MD    Chief Complaint: Trauma, fall, multiple left-sided rib fractures. Left renal injury. Patient Active Problem List   Diagnosis    Multiple rib fractures involving four or more ribs    Closed fracture of multiple ribs of left side, initial encounter       Subjective: HPI as above. No other complaints today. Objective:  BP (!) 185/92   Pulse 85   Temp 97.9 °F (36.6 °C) (Oral)   Resp 18   Ht 5' 8\" (1.727 m)   Wt 188 lb (85.3 kg)   SpO2 94%   BMI 28.59 kg/m²   Labs:  Recent Labs     11/14/22  0115 11/14/22  0527 11/14/22  1156   WBC 8.4 7.7 8.0   HGB 10.6* 10.5* 11.0*   HCT 32.4* 32.7* 33.8*     Lab Results   Component Value Date    CREATININE 0.8 11/14/2022    BUN 15 11/14/2022     11/14/2022    K 4.1 11/14/2022     11/14/2022    CO2 19 (L) 11/14/2022     No results for input(s): LIPASE, AMYLASE in the last 72 hours.   CBC with Differential:    Lab Results   Component Value Date/Time    WBC 8.0 11/14/2022 11:56 AM    RBC 3.44 11/14/2022 11:56 AM    HGB 11.0 11/14/2022 11:56 AM    HCT 33.8 11/14/2022 11:56 AM     11/14/2022 11:56 AM    MCV 98.3 11/14/2022 11:56 AM    MCH 32.0 11/14/2022 11:56 AM    MCHC 32.5 11/14/2022 11:56 AM    RDW 13.9 11/14/2022 11:56 AM    LYMPHOPCT 9.1 11/14/2022 11:56 AM    MONOPCT 14.0 11/14/2022 11:56 AM    BASOPCT 0.3 11/14/2022 11:56 AM    MONOSABS 1.12 11/14/2022 11:56 AM    LYMPHSABS 0.73 11/14/2022 11:56 AM    EOSABS 0.04 11/14/2022 11:56 AM    BASOSABS 0.02 11/14/2022 11:56 AM     CMP:    Lab Results   Component Value Date/Time     11/14/2022 05:27 AM    K 4.1 11/14/2022 05:27 AM  11/14/2022 05:27 AM    CO2 19 11/14/2022 05:27 AM    BUN 15 11/14/2022 05:27 AM    CREATININE 0.8 11/14/2022 05:27 AM    GFRAA >60 01/21/2015 04:30 AM    LABGLOM >60 11/14/2022 05:27 AM    GLUCOSE 84 11/14/2022 05:27 AM    PROT 7.4 01/21/2015 04:30 AM    LABALBU 4.4 01/21/2015 04:30 AM    CALCIUM 7.7 11/14/2022 05:27 AM    BILITOT 0.4 01/21/2015 04:30 AM    ALKPHOS 63 01/21/2015 04:30 AM    AST 19 01/21/2015 04:30 AM    ALT 17 01/21/2015 04:30 AM       General appearance:  NAD  Head: NCAT, PERRLA, EOMI, red conjunctiva  Neck: supple, no masses  Lungs: Nonlabored, equal chest rise bilateral.  Left chest wall tenderness  Heart: Reg rate  Abdomen: soft, nondistended, tender mild left flank  Skin; no lesions  Gu: no cva tenderness  Extremities: extremities normal, atraumatic, no cyanosis or edema      Assessment/Plan:  Rayna Beaver is a 47 y.o. male status post fall down multiple steps with 2 through 8 left-sided rib fractures, left kidney laceration    Monitor hemoglobin and transfuse as needed  Urology following, recommendations appreciated  Diet as tolerated  Pain control  Deep breathing exercises      Neema Acuña MD  11/14/2022  2:53 PM

## 2022-11-14 NOTE — CONSULTS
11/14/2022 9:52 AM  Pranay Gaines  [id number]     Chief Complaint:    Left renal subcapsular hematoma s/p fall      History of Present Illness: The patient is a 47 y.o. male patient who presented to the hospital yesterday after falling down 10 steps. He was found to have left rib fractures and a left renal subcapsular hematoma for which we were asked to evaluate. General surgery is following at this time as well. Currently complains of left sided rib pain. No gross hematuria. Has a history of kidney stones that have been managed by Dr. Oma Conklin in the past.       Past Medical History:   Diagnosis Date    Hypertension     Kidney stone          Past Surgical History:   Procedure Laterality Date    GASTRIC BYPASS SURGERY         Medications Prior to Admission:    No medications prior to admission. Allergies:    Pcn [penicillins]    Social History:    reports that he has never smoked. He does not have any smokeless tobacco history on file. He reports that he does not drink alcohol and does not use drugs. Family History:   Non-contributory to this Urological problem  family history is not on file. Review of Systems:  Constitutional: No fever or chills   Respiratory: negative for cough and hemoptysis  Cardiovascular: left sided chest pain/rib pain   Gastrointestinal: negative for abdominal pain, diarrhea, nausea and vomiting   Derm: negative for rash and skin lesion(s)  Neurological: negative for seizures and tremors  Musculoskeletal: Negative    Psychiatric: Negative   : As above in the HPI, otherwise negative  All other reviews are negative    Physical Exam:     Vitals:  BP (!) 231/115   Pulse 85   Temp 97.9 °F (36.6 °C) (Oral)   Resp 20   Ht 5' 8\" (1.727 m)   Wt 188 lb (85.3 kg)   SpO2 94%   BMI 28.59 kg/m²     General:  Awake, alert, oriented X 3. No apparent distress. HEENT:  Normocephalic, atraumatic. Lungs:  Respirations symmetric and non-labored.   Abdomen:  soft, nontender, no masses  Extremities:  No clubbing, cyanosis, or edema  Skin:  Warm and dry, no open lesions or rashes  Neuro: There are no motor or sensory deficits in the 4 quadrant extremities   Rectal: deferred  Genitourinary:  no louis     Labs:     Recent Labs     11/13/22  1500 11/14/22  0115 11/14/22  0527   WBC 7.4 8.4 7.7   RBC 3.99 3.29* 3.29*   HGB 12.6 10.6* 10.5*   HCT 39.3 32.4* 32.7*   MCV 98.5 98.5 99.4   MCH 31.6 32.2 31.9   MCHC 32.1 32.7 32.1   RDW 13.9 13.9 14.0    196 201   MPV 9.2 9.3 9.5         Recent Labs     11/13/22  1500 11/14/22  0527   CREATININE 1.1 0.8       No results found for: PSA    Imaging:   Narrative   EXAMINATION:   CT OF THE ABDOMEN AND PELVIS WITH CONTRAST; CT OF THE CHEST WITH CONTRAST   11/13/2022 4:15 pm       TECHNIQUE:   CT of the abdomen and pelvis was performed with the administration of   intravenous contrast. Multiplanar reformatted images are provided for review. Automated exposure control, iterative reconstruction, and/or weight based   adjustment of the mA/kV was utilized to reduce the radiation dose to as low   as reasonably achievable.; CT of the chest was performed with the   administration of intravenous contrast. Multiplanar reformatted images are   provided for review. Automated exposure control, iterative reconstruction,   and/or weight based adjustment of the mA/kV was utilized to reduce the   radiation dose to as low as reasonably achievable. COMPARISON:   None. HISTORY:   ORDERING SYSTEM PROVIDED HISTORY: fall   TECHNOLOGIST PROVIDED HISTORY:   Additional Contrast?->None   Reason for exam:->fall   Decision Support Exception - unselect if not a suspected or confirmed   emergency medical condition->Emergency Medical Condition (MA)       FINDINGS:   CT CHEST:       Mediastinum:  No evidence of mediastinal, hilar or axillary lymphadenopathy. Lungs: No evidence of pneumothorax. Trace left pleural effusion.    Ground-glass opacity in the left lung base may represent pulmonary contusion. Right lung is generally clear. No endobronchial lesions. Bones/soft tissues: Slightly displaced fractures of the lateral 2nd 3rd ribs. Segmental fracture, minimally displaced involving the 4th rib. Mildly   comminuted fractures of the lateral 5th through 8 ribs. Sternum appears   intact. No acute thoracic spine fractures detected. Canal appears patent. CT ABDOMEN AND PELVIS:       Organs: Liver and spleen are normal in size without focal lesion. There is   interposition of bowel anterior to the right hepatic lobe. Postop changes   are present at the GE junction. There findings compatible with gastric   bypass. Pancreas appears normal.  The adrenal glands are normal.  Right   kidney is normal.  Subcapsular and perinephric hematoma on the left. Hemorrhage appears to have extended into a previously visualized upper pole   cyst of the left kidney, enlarging the cyst.  The cystic component with mild   dependent hemorrhage measures 10 x 9 x 10 cm. The subcapsular perinephric   hematoma has a maximal thickness of 18 mm. No evidence of renal parenchymal   fracture. AST grade 1 injury. GI/Bowel: No evidence of bowel obstruction. No evidence of mesenteric edema. No free air or free fluid detected. Normal bowel enhancement. Pelvis: No free pelvic fluid. Urinary bladder and prostate gland appear   normal.  No evidence of muscular or pelvic hematoma. Peritoneum/Retroperitoneum: No retroperitoneal mass or adenopathy. Aorta is   nonaneurysmal.  Aorta appears intact. Bones/Soft Tissues: Prominent ecchymosis involving the subcutaneous tissues   of the left flank and buttock region. No underlying muscular hematoma. No   acute osseous abnormality. Impression   1. AST grade 1 injury of the left kidney with subcapsular hematoma.   There   appears to be some hemorrhage into a pre-existing upper pole cyst, enlarging   the cyst. No evidence of renal parenchymal laceration. 2.  Multiple left-sided rib fractures at least 1 of the rib fractures is   segmental.  No significant pneumothorax. Trace left hemothorax and mild   pulmonary contusion. 3.  Large area of ecchymosis involving the subcutaneous tissues of the left   buttock and left flank. No underlying pelvic or hip fractures       Assessment/plan:  Left subcapsular renal hematoma   Left upper pole renal cyst     Creatinine stable  Continue to watch the hemoglobin   Transfusions per primary   CT imaging reviewed, large upper pole left renal cyst and left renal subcapsular hematoma noted  Hold on acute interventions currently. Should he become hemodynamically unstable or require 4 or more units of PRBCs then embolization of the left kidney could be considered with interventional radiology.  Currently stable with no plans for embolization at this time   Will cont to monitor         Electronically signed by COLTON Jalloh CNP on 11/14/2022 at 9:52 AM  [facility]   Agree wojose alejandro abpve assess,emt amd Lei Soulier

## 2022-11-14 NOTE — CONSULTS
Department of Internal Medicine      HISTORY OF PRESENT ILLNESS:      The patient is a 47 y.o. male who presents with having fallen down about 10 steps at home. Patient denies any problem with any dizziness, chest pain, palpitation or extremity weakness associated with the fall. The fall apparently occurred yesterday afternoon. Patient came to the ED because of left-sided chest pain. Patient has pain in his chest with deep inspiration. Patient having significant amount of pain. Patient's BMP essentially normal with a WBC 7.4 and hemoglobin 12.6 on admission. Urinalysis showed a small amount of blood and rare bacteria. Blood pressure that was elevated 203/116 in the ED initially. Heart rate is stable with O2 sat ranging 94-81% on room air at rest.  CT of the chest and abdomen showed AST grade 1 injury of the left kidney with subcapsular hematoma, multiple left-sided rib fractures with no significant pneumothorax with a trace left hemothorax and mild pulmonary contusion. There is a large amount of ecchymosis in the subcutaneous tissue left buttocks and left flank. Patient was admitted by trauma surgery and consult with urology and IM.     Past Medical History:    Past Medical History:   Diagnosis Date    Hypertension     Kidney stone      Past Surgical History:    Past Surgical History:   Procedure Laterality Date    GASTRIC BYPASS SURGERY         Medications Prior to Admission:    @  Prior to Admission medications    Not on File       Allergies:  Pcn [penicillins]    Social History:   Social History     Socioeconomic History    Marital status:      Spouse name: Not on file    Number of children: Not on file    Years of education: Not on file    Highest education level: Not on file   Occupational History    Not on file   Tobacco Use    Smoking status: Never    Smokeless tobacco: Not on file   Substance and Sexual Activity    Alcohol use: No    Drug use: No    Sexual activity: Not on file   Other Topics Concern    Not on file   Social History Narrative    Not on file     Social Determinants of Health     Financial Resource Strain: Not on file   Food Insecurity: Not on file   Transportation Needs: Not on file   Physical Activity: Not on file   Stress: Not on file   Social Connections: Not on file   Intimate Partner Violence: Not on file   Housing Stability: Not on file       Family History:   History reviewed. No pertinent family history. REVIEW OF SYSTEMS:    Gen: Patient denies any lightheadedness or dizziness. No LOC or syncope. No fevers or chills. HEENT: No earache, sore throat or nasal congestion. Resp: Denies cough, hemoptysis or sputum production. Cardiac: Denies chest pain, SOB, diaphoresis or palpitations. GI: No nausea, vomiting, diarrhea or constipation. No melena or hematochezia. : No urinary complaints, dysuria, hematuria or frequency. MSK: + Left-sided chest and pelvic pain. No extremity weakness, paralysis or paresthesias. PHYSICAL EXAM:    Vitals:  BP (!) 185/92   Pulse 85   Temp 97.9 °F (36.6 °C) (Oral)   Resp 18   Ht 5' 8\" (1.727 m)   Wt 188 lb (85.3 kg)   SpO2 94%   BMI 28.59 kg/m²     General:  This is a 47 y.o. yo male who is alert and oriented in moderate distress secondary to above symptoms. HEENT:  Head is normocephalic and atraumatic, PERRLA, EOMI, mucus membranes moist with no pharyngeal erythema or exudate. Neck:  Supple with no carotid bruits, JVD or thyromegaly.   No cervical adenopathy  CV:  Regular rate and rhythm, no murmurs  Lungs: Coarse breath sounds left chest to auscultation bilaterally with no wheezes, rales or rhonchi  Abdomen:  Soft, nontender, nondistended, bowel sounds present  Extremities:  No edema, peripheral pulses intact bilaterally  Neuro:  Cranial nerves II-XII grossly intact; motor and sensory function intact with no focal deficits  Skin:  + Ecchymosis and edema over left buttocks and left flank and rib      DATA:  CBC with Differential:    Lab Results   Component Value Date/Time    WBC 8.0 11/14/2022 11:56 AM    RBC 3.44 11/14/2022 11:56 AM    HGB 11.0 11/14/2022 11:56 AM    HCT 33.8 11/14/2022 11:56 AM     11/14/2022 11:56 AM    MCV 98.3 11/14/2022 11:56 AM    MCH 32.0 11/14/2022 11:56 AM    MCHC 32.5 11/14/2022 11:56 AM    RDW 13.9 11/14/2022 11:56 AM    LYMPHOPCT 9.1 11/14/2022 11:56 AM    MONOPCT 14.0 11/14/2022 11:56 AM    BASOPCT 0.3 11/14/2022 11:56 AM    MONOSABS 1.12 11/14/2022 11:56 AM    LYMPHSABS 0.73 11/14/2022 11:56 AM    EOSABS 0.04 11/14/2022 11:56 AM    BASOSABS 0.02 11/14/2022 11:56 AM     CMP:    Lab Results   Component Value Date/Time     11/14/2022 05:27 AM    K 4.1 11/14/2022 05:27 AM     11/14/2022 05:27 AM    CO2 19 11/14/2022 05:27 AM    BUN 15 11/14/2022 05:27 AM    CREATININE 0.8 11/14/2022 05:27 AM    GFRAA >60 01/21/2015 04:30 AM    LABGLOM >60 11/14/2022 05:27 AM    GLUCOSE 84 11/14/2022 05:27 AM    PROT 7.4 01/21/2015 04:30 AM    LABALBU 4.4 01/21/2015 04:30 AM    CALCIUM 7.7 11/14/2022 05:27 AM    BILITOT 0.4 01/21/2015 04:30 AM    ALKPHOS 63 01/21/2015 04:30 AM    AST 19 01/21/2015 04:30 AM    ALT 17 01/21/2015 04:30 AM     Magnesium:  No results found for: MG  Phosphorus:  No results found for: PHOS  PT/INR:  No results found for: PROTIME, INR  Troponin:  No results found for: TROPONINI  U/A:    Lab Results   Component Value Date/Time    COLORU Yellow 11/13/2022 05:20 PM    PROTEINU TRACE 11/13/2022 05:20 PM    PHUR 5.5 11/13/2022 05:20 PM    WBCUA 0-1 11/13/2022 05:20 PM    RBCUA 0-1 11/13/2022 05:20 PM    BACTERIA RARE 11/13/2022 05:20 PM    CLARITYU Clear 11/13/2022 05:20 PM    SPECGRAV 1.010 11/13/2022 05:20 PM    LEUKOCYTESUR Negative 11/13/2022 05:20 PM    UROBILINOGEN 0.2 11/13/2022 05:20 PM    BILIRUBINUR Negative 11/13/2022 05:20 PM    BLOODU SMALL 11/13/2022 05:20 PM    GLUCOSEU Negative 11/13/2022 05:20 PM     ABG:  No results found for: PH, PCO2, PO2, HCO3, BE, THGB, TCO2, O2SAT  HgBA1c:  No results found for: LABA1C  FLP:  No results found for: TRIG, HDL, LDLCALC, LDLDIRECT, LABVLDL  TSH:  No results found for: TSH  IRON:  No results found for: IRON  LIPASE:    Lab Results   Component Value Date/Time    LIPASE 78 01/21/2015 04:30 AM       ASSESSMENT AND PLAN:      Patient Active Problem List    Diagnosis Date Noted    Multiple rib fractures involving four or more ribs 11/13/2022    Closed fracture of multiple ribs of left side, initial encounter 11/13/2022     Impression:  1. Grade 1 injury left kidney with subcapsular hematoma  2. Multiple left-sided rib fractures  3. Trace left hemothorax mild pulmonary contusion  4. Hypertension-uncontrolled on admission  5. Hx Fall    Plan:  Patient admitted to telemetry floor per trauma surgery  Home medications reviewed  Monitor heart rate, blood pressure, O2 saturation  Muscular skeletal pain meds for general surgery. Lisinopril 10 mg p.o. daily  Hydrochlorothiazide . mg daily  Labetalol 10 mg IV push every 4 hours as needed for systolic greater than 160 or diastolic greater than 905    CMP, CBC in a.m.     Lori Lawson DO, D.O.  11/14/2022  2:01 PM

## 2022-11-15 LAB
ALBUMIN SERPL-MCNC: 3.7 G/DL (ref 3.5–5.2)
ALP BLD-CCNC: 45 U/L (ref 40–129)
ALT SERPL-CCNC: 40 U/L (ref 0–40)
ANION GAP SERPL CALCULATED.3IONS-SCNC: 14 MMOL/L (ref 7–16)
AST SERPL-CCNC: 36 U/L (ref 0–39)
BASOPHILS ABSOLUTE: 0.02 E9/L (ref 0–0.2)
BASOPHILS ABSOLUTE: 0.02 E9/L (ref 0–0.2)
BASOPHILS ABSOLUTE: 0.03 E9/L (ref 0–0.2)
BASOPHILS ABSOLUTE: 0.03 E9/L (ref 0–0.2)
BASOPHILS RELATIVE PERCENT: 0.2 % (ref 0–2)
BASOPHILS RELATIVE PERCENT: 0.2 % (ref 0–2)
BASOPHILS RELATIVE PERCENT: 0.3 % (ref 0–2)
BASOPHILS RELATIVE PERCENT: 0.3 % (ref 0–2)
BILIRUB SERPL-MCNC: 0.6 MG/DL (ref 0–1.2)
BUN BLDV-MCNC: 9 MG/DL (ref 6–20)
CALCIUM SERPL-MCNC: 8.7 MG/DL (ref 8.6–10.2)
CHLORIDE BLD-SCNC: 99 MMOL/L (ref 98–107)
CO2: 22 MMOL/L (ref 22–29)
CREAT SERPL-MCNC: 0.7 MG/DL (ref 0.7–1.2)
EOSINOPHILS ABSOLUTE: 0.05 E9/L (ref 0.05–0.5)
EOSINOPHILS ABSOLUTE: 0.08 E9/L (ref 0.05–0.5)
EOSINOPHILS ABSOLUTE: 0.09 E9/L (ref 0.05–0.5)
EOSINOPHILS ABSOLUTE: 0.1 E9/L (ref 0.05–0.5)
EOSINOPHILS RELATIVE PERCENT: 0.5 % (ref 0–6)
EOSINOPHILS RELATIVE PERCENT: 0.8 % (ref 0–6)
EOSINOPHILS RELATIVE PERCENT: 1 % (ref 0–6)
EOSINOPHILS RELATIVE PERCENT: 1 % (ref 0–6)
GFR SERPL CREATININE-BSD FRML MDRD: >60 ML/MIN/1.73
GLUCOSE BLD-MCNC: 112 MG/DL (ref 74–99)
HCT VFR BLD CALC: 34.8 % (ref 37–54)
HCT VFR BLD CALC: 35.6 % (ref 37–54)
HCT VFR BLD CALC: 35.7 % (ref 37–54)
HCT VFR BLD CALC: 35.8 % (ref 37–54)
HEMOGLOBIN: 11.5 G/DL (ref 12.5–16.5)
HEMOGLOBIN: 11.5 G/DL (ref 12.5–16.5)
HEMOGLOBIN: 11.6 G/DL (ref 12.5–16.5)
HEMOGLOBIN: 11.8 G/DL (ref 12.5–16.5)
IMMATURE GRANULOCYTES #: 0.04 E9/L
IMMATURE GRANULOCYTES #: 0.05 E9/L
IMMATURE GRANULOCYTES #: 0.05 E9/L
IMMATURE GRANULOCYTES #: 0.06 E9/L
IMMATURE GRANULOCYTES %: 0.4 % (ref 0–5)
IMMATURE GRANULOCYTES %: 0.5 % (ref 0–5)
IMMATURE GRANULOCYTES %: 0.5 % (ref 0–5)
IMMATURE GRANULOCYTES %: 0.6 % (ref 0–5)
LYMPHOCYTES ABSOLUTE: 0.82 E9/L (ref 1.5–4)
LYMPHOCYTES ABSOLUTE: 0.87 E9/L (ref 1.5–4)
LYMPHOCYTES ABSOLUTE: 0.97 E9/L (ref 1.5–4)
LYMPHOCYTES ABSOLUTE: 1.11 E9/L (ref 1.5–4)
LYMPHOCYTES RELATIVE PERCENT: 11.3 % (ref 20–42)
LYMPHOCYTES RELATIVE PERCENT: 8.5 % (ref 20–42)
LYMPHOCYTES RELATIVE PERCENT: 9.2 % (ref 20–42)
LYMPHOCYTES RELATIVE PERCENT: 9.7 % (ref 20–42)
MCH RBC QN AUTO: 31.3 PG (ref 26–35)
MCH RBC QN AUTO: 31.7 PG (ref 26–35)
MCH RBC QN AUTO: 31.8 PG (ref 26–35)
MCH RBC QN AUTO: 31.9 PG (ref 26–35)
MCHC RBC AUTO-ENTMCNC: 32.1 % (ref 32–34.5)
MCHC RBC AUTO-ENTMCNC: 32.5 % (ref 32–34.5)
MCHC RBC AUTO-ENTMCNC: 33 % (ref 32–34.5)
MCHC RBC AUTO-ENTMCNC: 33.1 % (ref 32–34.5)
MCV RBC AUTO: 96 FL (ref 80–99.9)
MCV RBC AUTO: 96.4 FL (ref 80–99.9)
MCV RBC AUTO: 97.3 FL (ref 80–99.9)
MCV RBC AUTO: 97.5 FL (ref 80–99.9)
MONOCYTES ABSOLUTE: 0.99 E9/L (ref 0.1–0.95)
MONOCYTES ABSOLUTE: 1.21 E9/L (ref 0.1–0.95)
MONOCYTES ABSOLUTE: 1.22 E9/L (ref 0.1–0.95)
MONOCYTES ABSOLUTE: 1.4 E9/L (ref 0.1–0.95)
MONOCYTES RELATIVE PERCENT: 10.3 % (ref 2–12)
MONOCYTES RELATIVE PERCENT: 12.1 % (ref 2–12)
MONOCYTES RELATIVE PERCENT: 12.9 % (ref 2–12)
MONOCYTES RELATIVE PERCENT: 14.2 % (ref 2–12)
NEUTROPHILS ABSOLUTE: 7.17 E9/L (ref 1.8–7.3)
NEUTROPHILS ABSOLUTE: 7.17 E9/L (ref 1.8–7.3)
NEUTROPHILS ABSOLUTE: 7.63 E9/L (ref 1.8–7.3)
NEUTROPHILS ABSOLUTE: 7.69 E9/L (ref 1.8–7.3)
NEUTROPHILS RELATIVE PERCENT: 72.9 % (ref 43–80)
NEUTROPHILS RELATIVE PERCENT: 76.1 % (ref 43–80)
NEUTROPHILS RELATIVE PERCENT: 76.7 % (ref 43–80)
NEUTROPHILS RELATIVE PERCENT: 79.8 % (ref 43–80)
PDW BLD-RTO: 13.6 FL (ref 11.5–15)
PDW BLD-RTO: 13.7 FL (ref 11.5–15)
PDW BLD-RTO: 13.7 FL (ref 11.5–15)
PDW BLD-RTO: 13.8 FL (ref 11.5–15)
PLATELET # BLD: 207 E9/L (ref 130–450)
PLATELET # BLD: 211 E9/L (ref 130–450)
PLATELET # BLD: 213 E9/L (ref 130–450)
PLATELET # BLD: 223 E9/L (ref 130–450)
PMV BLD AUTO: 10 FL (ref 7–12)
PMV BLD AUTO: 9.5 FL (ref 7–12)
PMV BLD AUTO: 9.6 FL (ref 7–12)
PMV BLD AUTO: 9.6 FL (ref 7–12)
POTASSIUM SERPL-SCNC: 3.7 MMOL/L (ref 3.5–5)
RBC # BLD: 3.61 E12/L (ref 3.8–5.8)
RBC # BLD: 3.66 E12/L (ref 3.8–5.8)
RBC # BLD: 3.68 E12/L (ref 3.8–5.8)
RBC # BLD: 3.71 E12/L (ref 3.8–5.8)
SODIUM BLD-SCNC: 135 MMOL/L (ref 132–146)
TOTAL PROTEIN: 6.3 G/DL (ref 6.4–8.3)
WBC # BLD: 10 E9/L (ref 4.5–11.5)
WBC # BLD: 9.4 E9/L (ref 4.5–11.5)
WBC # BLD: 9.6 E9/L (ref 4.5–11.5)
WBC # BLD: 9.8 E9/L (ref 4.5–11.5)

## 2022-11-15 PROCEDURE — 2580000003 HC RX 258: Performed by: SURGERY

## 2022-11-15 PROCEDURE — C9113 INJ PANTOPRAZOLE SODIUM, VIA: HCPCS | Performed by: SURGERY

## 2022-11-15 PROCEDURE — 6360000002 HC RX W HCPCS: Performed by: SURGERY

## 2022-11-15 PROCEDURE — 99232 SBSQ HOSP IP/OBS MODERATE 35: CPT | Performed by: SURGERY

## 2022-11-15 PROCEDURE — 6370000000 HC RX 637 (ALT 250 FOR IP): Performed by: SURGERY

## 2022-11-15 PROCEDURE — 2500000003 HC RX 250 WO HCPCS: Performed by: SURGERY

## 2022-11-15 PROCEDURE — 6370000000 HC RX 637 (ALT 250 FOR IP): Performed by: STUDENT IN AN ORGANIZED HEALTH CARE EDUCATION/TRAINING PROGRAM

## 2022-11-15 PROCEDURE — 6370000000 HC RX 637 (ALT 250 FOR IP): Performed by: INTERNAL MEDICINE

## 2022-11-15 PROCEDURE — A4216 STERILE WATER/SALINE, 10 ML: HCPCS | Performed by: SURGERY

## 2022-11-15 PROCEDURE — 85025 COMPLETE CBC W/AUTO DIFF WBC: CPT

## 2022-11-15 PROCEDURE — 94667 MNPJ CHEST WALL 1ST: CPT

## 2022-11-15 PROCEDURE — 36415 COLL VENOUS BLD VENIPUNCTURE: CPT

## 2022-11-15 PROCEDURE — 1200000000 HC SEMI PRIVATE

## 2022-11-15 PROCEDURE — 80053 COMPREHEN METABOLIC PANEL: CPT

## 2022-11-15 RX ORDER — METOPROLOL SUCCINATE 25 MG/1
25 TABLET, EXTENDED RELEASE ORAL DAILY
Status: DISCONTINUED | OUTPATIENT
Start: 2022-11-15 | End: 2022-11-17 | Stop reason: HOSPADM

## 2022-11-15 RX ORDER — METHOCARBAMOL 500 MG/1
1000 TABLET, FILM COATED ORAL 4 TIMES DAILY
Status: DISCONTINUED | OUTPATIENT
Start: 2022-11-15 | End: 2022-11-17 | Stop reason: HOSPADM

## 2022-11-15 RX ORDER — LISINOPRIL 20 MG/1
20 TABLET ORAL DAILY
Status: DISCONTINUED | OUTPATIENT
Start: 2022-11-16 | End: 2022-11-17 | Stop reason: HOSPADM

## 2022-11-15 RX ORDER — IPRATROPIUM BROMIDE AND ALBUTEROL SULFATE 2.5; .5 MG/3ML; MG/3ML
1 SOLUTION RESPIRATORY (INHALATION) EVERY 4 HOURS PRN
Status: DISCONTINUED | OUTPATIENT
Start: 2022-11-15 | End: 2022-11-17 | Stop reason: HOSPADM

## 2022-11-15 RX ADMIN — LABETALOL HYDROCHLORIDE 10 MG: 5 INJECTION, SOLUTION INTRAVENOUS at 00:23

## 2022-11-15 RX ADMIN — OXYCODONE 5 MG: 5 TABLET ORAL at 17:53

## 2022-11-15 RX ADMIN — PANTOPRAZOLE SODIUM 40 MG: 40 INJECTION, POWDER, FOR SOLUTION INTRAVENOUS at 08:12

## 2022-11-15 RX ADMIN — MORPHINE SULFATE 4 MG: 4 INJECTION, SOLUTION INTRAMUSCULAR; INTRAVENOUS at 10:55

## 2022-11-15 RX ADMIN — ACETAMINOPHEN 650 MG: 325 TABLET ORAL at 06:47

## 2022-11-15 RX ADMIN — MORPHINE SULFATE 4 MG: 4 INJECTION, SOLUTION INTRAMUSCULAR; INTRAVENOUS at 20:07

## 2022-11-15 RX ADMIN — OXYCODONE 10 MG: 5 TABLET ORAL at 12:41

## 2022-11-15 RX ADMIN — ACETAMINOPHEN 650 MG: 325 TABLET ORAL at 00:23

## 2022-11-15 RX ADMIN — MORPHINE SULFATE 4 MG: 4 INJECTION, SOLUTION INTRAMUSCULAR; INTRAVENOUS at 14:10

## 2022-11-15 RX ADMIN — METHOCARBAMOL 1000 MG: 750 TABLET ORAL at 17:53

## 2022-11-15 RX ADMIN — MORPHINE SULFATE 4 MG: 4 INJECTION, SOLUTION INTRAMUSCULAR; INTRAVENOUS at 05:34

## 2022-11-15 RX ADMIN — MORPHINE SULFATE 4 MG: 4 INJECTION, SOLUTION INTRAMUSCULAR; INTRAVENOUS at 16:08

## 2022-11-15 RX ADMIN — ENOXAPARIN SODIUM 30 MG: 100 INJECTION SUBCUTANEOUS at 20:06

## 2022-11-15 RX ADMIN — METHOCARBAMOL 1000 MG: 750 TABLET ORAL at 12:41

## 2022-11-15 RX ADMIN — SODIUM CHLORIDE: 9 INJECTION, SOLUTION INTRAVENOUS at 00:25

## 2022-11-15 RX ADMIN — MORPHINE SULFATE 4 MG: 4 INJECTION, SOLUTION INTRAMUSCULAR; INTRAVENOUS at 08:13

## 2022-11-15 RX ADMIN — MORPHINE SULFATE 4 MG: 4 INJECTION, SOLUTION INTRAMUSCULAR; INTRAVENOUS at 00:23

## 2022-11-15 RX ADMIN — LISINOPRIL 10 MG: 10 TABLET ORAL at 08:12

## 2022-11-15 RX ADMIN — HYDROCHLOROTHIAZIDE 25 MG: 25 TABLET ORAL at 08:12

## 2022-11-15 RX ADMIN — METOPROLOL SUCCINATE 25 MG: 25 TABLET, EXTENDED RELEASE ORAL at 11:47

## 2022-11-15 RX ADMIN — Medication 10 ML: at 08:14

## 2022-11-15 RX ADMIN — METHOCARBAMOL 1000 MG: 750 TABLET ORAL at 20:13

## 2022-11-15 RX ADMIN — OXYCODONE 10 MG: 5 TABLET ORAL at 01:33

## 2022-11-15 RX ADMIN — METHOCARBAMOL 1000 MG: 750 TABLET ORAL at 08:17

## 2022-11-15 RX ADMIN — OXYCODONE 10 MG: 5 TABLET ORAL at 22:34

## 2022-11-15 RX ADMIN — OXYCODONE 10 MG: 5 TABLET ORAL at 06:47

## 2022-11-15 RX ADMIN — ENOXAPARIN SODIUM 30 MG: 100 INJECTION SUBCUTANEOUS at 08:12

## 2022-11-15 RX ADMIN — AMLODIPINE BESYLATE 5 MG: 5 TABLET ORAL at 08:12

## 2022-11-15 ASSESSMENT — PAIN DESCRIPTION - ORIENTATION
ORIENTATION: LEFT

## 2022-11-15 ASSESSMENT — PAIN SCALES - GENERAL
PAINLEVEL_OUTOF10: 9
PAINLEVEL_OUTOF10: 8
PAINLEVEL_OUTOF10: 9
PAINLEVEL_OUTOF10: 10
PAINLEVEL_OUTOF10: 8
PAINLEVEL_OUTOF10: 9
PAINLEVEL_OUTOF10: 10
PAINLEVEL_OUTOF10: 8
PAINLEVEL_OUTOF10: 10

## 2022-11-15 ASSESSMENT — PAIN - FUNCTIONAL ASSESSMENT: PAIN_FUNCTIONAL_ASSESSMENT: PREVENTS OR INTERFERES WITH MANY ACTIVE NOT PASSIVE ACTIVITIES

## 2022-11-15 ASSESSMENT — PAIN DESCRIPTION - FREQUENCY: FREQUENCY: CONTINUOUS

## 2022-11-15 ASSESSMENT — PAIN DESCRIPTION - PAIN TYPE
TYPE: ACUTE PAIN
TYPE: ACUTE PAIN

## 2022-11-15 ASSESSMENT — PAIN DESCRIPTION - LOCATION
LOCATION: RIB CAGE
LOCATION: OTHER (COMMENT)
LOCATION: CHEST
LOCATION: RIB CAGE

## 2022-11-15 ASSESSMENT — PAIN DESCRIPTION - DESCRIPTORS
DESCRIPTORS: SHARP;STABBING
DESCRIPTORS: ACHING
DESCRIPTORS: SHARP;PATIENT UNABLE TO DESCRIBE
DESCRIPTORS: THROBBING;STABBING;SHOOTING
DESCRIPTORS: ACHING;THROBBING;SHARP;SHOOTING

## 2022-11-15 ASSESSMENT — PAIN DESCRIPTION - ONSET: ONSET: ON-GOING

## 2022-11-15 NOTE — PROGRESS NOTES
Department of Internal Medicine      HISTORY OF PRESENT ILLNESS:      The patient is a 47 y.o. male who presents with having fallen down about 10 steps at home. Patient denies any problem with any dizziness, chest pain, palpitation or extremity weakness associated with the fall. The fall apparently occurred yesterday afternoon. Patient came to the ED because of left-sided chest pain. Patient has pain in his chest with deep inspiration. Patient having significant amount of pain. Patient's BMP essentially normal with a WBC 7.4 and hemoglobin 12.6 on admission. Urinalysis showed a small amount of blood and rare bacteria. Blood pressure that was elevated 203/116 in the ED initially. Heart rate is stable with O2 sat ranging 94-81% on room air at rest.  CT of the chest and abdomen showed AST grade 1 injury of the left kidney with subcapsular hematoma, multiple left-sided rib fractures with no significant pneumothorax with a trace left hemothorax and mild pulmonary contusion. There is a large amount of ecchymosis in the subcutaneous tissue left buttocks and left flank. Patient was admitted by trauma surgery and consult with urology and IM.    11/15/2022  Patient seen examined on telemetry floor. Patient still complaining of significant rib pain. Patient denies any headaches, blurred vision, paresthesias. BUN/creatinine was 9/0.7 normal electrolytes. Liver enzymes normal WBC 9.4 and hemoglobin 11.6. Temperature 98.7 with heart rate of 92 and blood pressure ranging 160//106. O2 sat 90% on room air at rest.  We are adjusting blood pressure medication which is unsure as a lot of the blood pressure changes are related to the patient's pain. We also checked O2 saturation on room air with activity.     Past Medical History:    Past Medical History:   Diagnosis Date    Hypertension     Kidney stone      Past Surgical History:    Past Surgical History:   Procedure Laterality Date    GASTRIC BYPASS SURGERY Medications Prior to Admission:    @  Prior to Admission medications    Not on File       Allergies:  Pcn [penicillins]    Social History:   Social History     Socioeconomic History    Marital status:      Spouse name: Not on file    Number of children: Not on file    Years of education: Not on file    Highest education level: Not on file   Occupational History    Not on file   Tobacco Use    Smoking status: Never    Smokeless tobacco: Not on file   Substance and Sexual Activity    Alcohol use: No    Drug use: No    Sexual activity: Not on file   Other Topics Concern    Not on file   Social History Narrative    Not on file     Social Determinants of Health     Financial Resource Strain: Not on file   Food Insecurity: Not on file   Transportation Needs: Not on file   Physical Activity: Not on file   Stress: Not on file   Social Connections: Not on file   Intimate Partner Violence: Not on file   Housing Stability: Not on file       Family History:   History reviewed. No pertinent family history. REVIEW OF SYSTEMS:    Gen: Patient denies any lightheadedness or dizziness. No LOC or syncope. No fevers or chills. HEENT: No earache, sore throat or nasal congestion. Resp: Denies cough, hemoptysis or sputum production. Cardiac: Denies chest pain, SOB, diaphoresis or palpitations. GI: No nausea, vomiting, diarrhea or constipation. No melena or hematochezia. : No urinary complaints, dysuria, hematuria or frequency. MSK: + Left-sided chest and pelvic pain. No extremity weakness, paralysis or paresthesias. PHYSICAL EXAM:    Vitals:  BP (!) 175/106   Pulse 92   Temp 98.7 °F (37.1 °C) (Oral)   Resp 22   Ht 5' 8\" (1.727 m)   Wt 188 lb (85.3 kg)   SpO2 90%   BMI 28.59 kg/m²     General:  This is a 47 y.o. yo male who is alert and oriented in moderate distress secondary to above symptoms.   HEENT:  Head is normocephalic and atraumatic, PERRLA, EOMI, mucus membranes moist with no pharyngeal erythema or exudate. Neck:  Supple with no carotid bruits, JVD or thyromegaly.   No cervical adenopathy  CV:  Regular rate and rhythm, no murmurs  Lungs: Coarse breath sounds left chest to auscultation bilaterally with no wheezes, rales or rhonchi  Abdomen:  Soft, nontender, nondistended, bowel sounds present  Extremities:  No edema, peripheral pulses intact bilaterally  Neuro:  Cranial nerves II-XII grossly intact; motor and sensory function intact with no focal deficits  Skin:  + Ecchymosis and edema over left buttocks and left flank and rib      DATA:  CBC with Differential:    Lab Results   Component Value Date/Time    WBC 9.4 11/15/2022 08:19 AM    RBC 3.66 11/15/2022 08:19 AM    HGB 11.6 11/15/2022 08:19 AM    HCT 35.7 11/15/2022 08:19 AM     11/15/2022 08:19 AM    MCV 97.5 11/15/2022 08:19 AM    MCH 31.7 11/15/2022 08:19 AM    MCHC 32.5 11/15/2022 08:19 AM    RDW 13.8 11/15/2022 08:19 AM    LYMPHOPCT 9.2 11/15/2022 08:19 AM    MONOPCT 12.9 11/15/2022 08:19 AM    BASOPCT 0.3 11/15/2022 08:19 AM    MONOSABS 1.22 11/15/2022 08:19 AM    LYMPHSABS 0.87 11/15/2022 08:19 AM    EOSABS 0.09 11/15/2022 08:19 AM    BASOSABS 0.03 11/15/2022 08:19 AM     CMP:    Lab Results   Component Value Date/Time     11/15/2022 08:19 AM    K 3.7 11/15/2022 08:19 AM    K 4.1 11/14/2022 05:27 AM    CL 99 11/15/2022 08:19 AM    CO2 22 11/15/2022 08:19 AM    BUN 9 11/15/2022 08:19 AM    CREATININE 0.7 11/15/2022 08:19 AM    GFRAA >60 01/21/2015 04:30 AM    LABGLOM >60 11/15/2022 08:19 AM    GLUCOSE 112 11/15/2022 08:19 AM    PROT 6.3 11/15/2022 08:19 AM    LABALBU 3.7 11/15/2022 08:19 AM    CALCIUM 8.7 11/15/2022 08:19 AM    BILITOT 0.6 11/15/2022 08:19 AM    ALKPHOS 45 11/15/2022 08:19 AM    AST 36 11/15/2022 08:19 AM    ALT 40 11/15/2022 08:19 AM     Magnesium:  No results found for: MG  Phosphorus:  No results found for: PHOS  PT/INR:  No results found for: PROTIME, INR  Troponin:  No results found for: TROPONINI  U/A:    Lab Results   Component Value Date/Time    COLORU Yellow 11/13/2022 05:20 PM    PROTEINU TRACE 11/13/2022 05:20 PM    PHUR 5.5 11/13/2022 05:20 PM    WBCUA 0-1 11/13/2022 05:20 PM    RBCUA 0-1 11/13/2022 05:20 PM    BACTERIA RARE 11/13/2022 05:20 PM    CLARITYU Clear 11/13/2022 05:20 PM    SPECGRAV 1.010 11/13/2022 05:20 PM    LEUKOCYTESUR Negative 11/13/2022 05:20 PM    UROBILINOGEN 0.2 11/13/2022 05:20 PM    BILIRUBINUR Negative 11/13/2022 05:20 PM    BLOODU SMALL 11/13/2022 05:20 PM    GLUCOSEU Negative 11/13/2022 05:20 PM     ABG:  No results found for: PH, PCO2, PO2, HCO3, BE, THGB, TCO2, O2SAT  HgBA1c:  No results found for: LABA1C  FLP:  No results found for: TRIG, HDL, LDLCALC, LDLDIRECT, LABVLDL  TSH:  No results found for: TSH  IRON:  No results found for: IRON  LIPASE:    Lab Results   Component Value Date/Time    LIPASE 78 01/21/2015 04:30 AM       ASSESSMENT AND PLAN:      Patient Active Problem List    Diagnosis Date Noted    Multiple rib fractures involving four or more ribs 11/13/2022    Closed fracture of multiple ribs of left side, initial encounter 11/13/2022     Impression:  1. Grade 1 injury left kidney with subcapsular hematoma  2. Multiple left-sided rib fractures  3. Trace left hemothorax mild pulmonary contusion  4. Hypertension-uncontrolled on admission  5. Hx Fall    Plan:  Patient admitted to telemetry floor per trauma surgery  Home medications reviewed  Monitor heart rate, blood pressure, O2 saturation  Muscular skeletal pain meds for general surgery. Lisinopril 10 mg p.o. daily  Hydrochlorothiazide 12.5 mg daily  Labetalol 10 mg IV push every 4 hours as needed for systolic greater than 504 or diastolic greater than 230    O2 saturation on room air at rest and with activity  Toprol-XL 25 mg p.o. daily  Amlodipine 5 mg p.o. daily    CMP, CBC in a.m.     Charity Anderson DO, D.O.  11/15/2022  11:17 AM

## 2022-11-15 NOTE — PROGRESS NOTES
GENERAL SURGERY  DAILY PROGRESS NOTE  11/15/2022    Chief Complaint   Patient presents with    Fall     Missed a step, hitting landing at bottom-approx 8 steps deformity left ribs 10/10 pain       Subjective:  Pain control. [address] about 1000. Tolerating diet passing gas no bowel movements. Urine output adequate without any signs of blood    Objective:  BP (!) 175/106   Pulse 92   Temp 98.7 °F (37.1 °C) (Oral)   Resp 22   Ht 5' 8\" (1.727 m)   Wt 188 lb (85.3 kg)   SpO2 90%   BMI 28.59 kg/m²     GENERAL:  Laying in bed, awake, alert, cooperative, no apparent distress  HEAD: Normocephalic  EYES: No sclera icterus, pupils equal  LUNGS: Mild increased work of breathing secondary to pain, SMI 1000  CARDIOVASCULAR:  RR  ABDOMEN:  Soft, non-tender, non-distended  EXTREMITIES: No edema or swelling  SKIN: Warm and dry    Assessment/Plan:  47 y.o. male with a fall down 10 steps with 8 rib fractures and a grade 1 kidney injury    Multimodal pain control  Continue diet  PT OT  Up out of bed and ambulate as able  Continue aggressive pulmonary toileting  Dispo planning    Electronically signed by Olena Fletcher DO on 11/15/2022 at 7:49 AM     TRAUMA HISTORY & PHYSICAL  Surgical Resident/Advance Practice Nurse  11/15/2022  12:56 PM    PRIMARY SURVEY    CHIEF COMPLAINT:  Trauma consult. Fall down 10 steps yesterday afternoon. He denied hitting his head or complaining of loss of consciousness. He was found to have some left-sided chest pain. He had a CT scan of her chest which showed 2 through 10 rib fractures along with a grade 1 left kidney injury    Was found to be hypertensive overnight no other new complaints on exam.    AIRWAY:   Airway Normal  EMS ETT Absent  Noisy respirations Absent  Retractions: Absent  Vomiting/bleeding: Absent      BREATHING:    Midaxillary breath sound left:  Normal  Midaxillary breath sound right:  Normal    Cough sound intensity:  fair   FiO2:  Room air    SMI 1000 mL. CIRCULATION:   Femerol pulse intensity: Strong  Palpebral conjunctiva: Red    Vitals:    11/15/22 1147   BP: (!) 169/86   Pulse: 88   Resp:    Temp:    SpO2:        Vitals:    11/15/22 0534 11/15/22 0604 11/15/22 0715 11/15/22 1147   BP:   (!) 175/106 (!) 169/86   Pulse:   92 88   Resp: 22 22 22    Temp:   98.7 °F (37.1 °C)    TempSrc:   Oral    SpO2:   90%    Weight:       Height:            FAST EXAM: Deferred    Central Nervous System    GCS Initial 15 minutes   Eye  Motor  Verbal 4 - Opens eyes on own  6 - Follows simple motor commands  5 - Alert and oriented 4 - Opens eyes on own  6 - Follows simple motor commands  5 - Alert and oriented     Neuromuscular blockade: No  Pupil size:  Left 4 mm    Right 4 mm  Pupil reaction: Yes    Wiggles fingers: Left Yes Right Yes  Wiggles toes: Left Yes   Right Yes    Hand grasp:   Left  Present      Right  Present  Plantar flexion: Left  Present      Right   Present    Loss of consciousness:  No    History Obtained From:  Patient & EMS  Private Medical Doctor: Brian Posey DO      Pre-exisiting Medical History:  yes    Conditions: HTN    Medications: none    Allergies: penicillins? Social History:   Tobacco use:  positive for approximately 1/2 packs per day. Patient advised to quit smoking  Alcohol use:  social drinker  Illicit drug use:  no history of illicit drug use    Past Surgical History:  open gastric bypass    Anticoagulant use: None  Antiplatelet use:   None    NSAID use in last 72 hours: no  Taken PCN in past:  no  Last food/drink: yesterday  Last tetanus: unknown    Family History:   No family history of anesthesia complications    Complaints:   Head:  None  Neck:   None  Chest:   Moderate  Back:   None  Abdomen:   None  Extremities:   None  Comments:     Review of systems:  All negative unless otherwise noted.         SECONDARY SURVEY  Head/scalp: Atraumatic    Face: Atraumatic    Eyes/ears/nose: Atraumatic    Pharynx/mouth: Atraumatic    Neck: Atraumatic     Cervical spine tenderness:   Cervical collar not indicated  Pain:  none  ROM:  Full    Chest wall:  L sided chest TTP    Heart:  Regular rate & rhythm, HTN    Abdomen: Atraumatic. Soft ND, mild LUQ tenderness  Tenderness:  none    Pelvis: Atraumatic  Tenderness: none    Thoracolumbar spine: Atraumatic  Tenderness:  none    Extremities:   Sensory normal  Motor normal    Distal Pulses  Left arm normal  Right arm normal  Left leg normal  Right leg normal    Capillary refill  Left arm normal  Right arm normal  Left leg normal  Right leg normal      In the event of Emergency Blood Transfusion:  Due to the critical condition of this patient, I request the immediate release of blood products for emergency transfusion secondary to shock. I understand the increased risks incurred by the lack of complete transfusion testing. Radiology: Chest Xray , CT Chest , and CT Abdomen     Consultations: Urology, IM    Admission/Diagnosis: fall down steps with rib fx and kidney lac    Plan of Treatment:  Monitor H/H  Labetalol/hydralazine as needed for hypertension  Appreciate medicine assistance with blood pressure control  Appreciate urology recommendations  Diet as tolerated    Plan discussed with Dr. Violet Shetty    at 11/15/2022 on 12:56 PM    Electronically signed by Sanaz Martinez MD on 11/15/2022 at 12:56 PM     General Surgery Progress Note  [facility]    Patient's Name/Date of Birth: Angela Carreno / 1970  Date: November 15, 2022     Surgeon: Violet Shetty MD    Chief Complaint: Trauma, fall, multiple left-sided rib fractures. Left renal injury. Patient Active Problem List   Diagnosis    Multiple rib fractures involving four or more ribs    Closed fracture of multiple ribs of left side, initial encounter       Subjective: sore today.    Objective:  BP (!) 169/86   Pulse 88   Temp 98.7 °F (37.1 °C) (Oral)   Resp 22   Ht 5' 8\" (1.727 m)   Wt 188 lb (85.3 kg)   SpO2 90%   BMI 28.59 kg/m² Labs:  Recent Labs     11/14/22  1910 11/15/22  0012 11/15/22  0819   WBC 9.6 9.8 9.4   HGB 11.2* 11.8* 11.6*   HCT 33.8* 35.6* 35.7*     Lab Results   Component Value Date    CREATININE 0.7 11/15/2022    BUN 9 11/15/2022     11/15/2022    K 3.7 11/15/2022    CL 99 11/15/2022    CO2 22 11/15/2022     No results for input(s): LIPASE, AMYLASE in the last 72 hours.   CBC with Differential:    Lab Results   Component Value Date/Time    WBC 9.4 11/15/2022 08:19 AM    RBC 3.66 11/15/2022 08:19 AM    HGB 11.6 11/15/2022 08:19 AM    HCT 35.7 11/15/2022 08:19 AM     11/15/2022 08:19 AM    MCV 97.5 11/15/2022 08:19 AM    MCH 31.7 11/15/2022 08:19 AM    MCHC 32.5 11/15/2022 08:19 AM    RDW 13.8 11/15/2022 08:19 AM    LYMPHOPCT 9.2 11/15/2022 08:19 AM    MONOPCT 12.9 11/15/2022 08:19 AM    BASOPCT 0.3 11/15/2022 08:19 AM    MONOSABS 1.22 11/15/2022 08:19 AM    LYMPHSABS 0.87 11/15/2022 08:19 AM    EOSABS 0.09 11/15/2022 08:19 AM    BASOSABS 0.03 11/15/2022 08:19 AM     CMP:    Lab Results   Component Value Date/Time     11/15/2022 08:19 AM    K 3.7 11/15/2022 08:19 AM    K 4.1 11/14/2022 05:27 AM    CL 99 11/15/2022 08:19 AM    CO2 22 11/15/2022 08:19 AM    BUN 9 11/15/2022 08:19 AM    CREATININE 0.7 11/15/2022 08:19 AM    GFRAA >60 01/21/2015 04:30 AM    LABGLOM >60 11/15/2022 08:19 AM    GLUCOSE 112 11/15/2022 08:19 AM    PROT 6.3 11/15/2022 08:19 AM    LABALBU 3.7 11/15/2022 08:19 AM    CALCIUM 8.7 11/15/2022 08:19 AM    BILITOT 0.6 11/15/2022 08:19 AM    ALKPHOS 45 11/15/2022 08:19 AM    AST 36 11/15/2022 08:19 AM    ALT 40 11/15/2022 08:19 AM       General appearance:  NAD  Head: NCAT, PERRLA, EOMI, red conjunctiva  Neck: supple, no masses  Lungs: Nonlabored, equal chest rise bilateral.  Left chest wall tenderness  Heart: Reg rate  Abdomen: soft, nondistended, tender mild left flank  Skin; no lesions  Gu: no cva tenderness  Extremities: extremities normal, atraumatic, no cyanosis or edema      Assessment/Plan:  Marcy Duke is a 47 y.o. male status post fall down multiple steps with 2 through 8 left-sided rib fractures, left kidney laceration    Monitor hemoglobin - Urology following, recommendations appreciated  Diet as tolerated  Pain control  Deep breathing exercises   Lidoderm patch  PT/OT      Sophia Anders MD  11/15/2022  12:56 PM

## 2022-11-15 NOTE — PROGRESS NOTES
11/15/2022 11:16 AM  Bartolo Blount  [id number]    Subjective:    Complains of left sided rib pain   No flank pain   No gross hematuria   No family present     Review of Systems  Constitutional: No fever or chills   Respiratory: negative for cough and hemoptysis  Cardiovascular: negative for chest pain and dyspnea  Gastrointestinal: negative for abdominal pain, diarrhea, nausea and vomiting   : See above  Derm: negative for rash and skin lesion(s)  Neurological: negative for seizures and tremors  Musculoskeletal: Negative    Psychiatric: Negative   All other reviews are negative      Scheduled Meds:   methocarbamol  1,000 mg Oral 4x Daily    lisinopril  10 mg Oral Daily    amLODIPine  5 mg Oral Daily    hydroCHLOROthiazide  25 mg Oral Daily    sodium chloride flush  5-40 mL IntraVENous 2 times per day    enoxaparin  30 mg SubCUTAneous BID    acetaminophen  650 mg Oral Q6H    pantoprazole (PROTONIX) 40 mg injection  40 mg IntraVENous Daily    lidocaine  1 patch TransDERmal Daily       Objective:  Vitals:    11/15/22 0715   BP: (!) 175/106   Pulse: 92   Resp: 22   Temp: 98.7 °F (37.1 °C)   SpO2: 90%         Allergies: Pcn [penicillins]    General Appearance: alert and oriented to person, place and time and in no acute distress  Skin: no rash or erythema  Head: normocephalic and atraumatic  Pulmonary/Chest: normal air movement, no respiratory distress  Abdomen: soft, non-tender, non-distended  Genitourinary: no louis   Extremities: no cyanosis, clubbing or edema         Labs:     Recent Labs     11/15/22  0819      K 3.7   CL 99   CO2 22   BUN 9   CREATININE 0.7   GLUCOSE 112*   CALCIUM 8.7       Lab Results   Component Value Date/Time    HGB 11.6 11/15/2022 08:19 AM    HCT 35.7 11/15/2022 08:19 AM       No results found for: PSA      Assessment/Plan:  Left subcapsular renal hematoma   Left upper pole renal cyst    Hemoglobin remains stable  Has not required blood transfusions   Vitals stable   Hold on any IR procedures such as embolization at this time.  Patient hemodynamically stable   Will need outpatient follow up and repeat imaging in approximately one month   No further acute  interventions at this time   Please call with additional questions or concerns       COLTON Olvera - CNP   MAURICIO  Urology    Agree with above  Seen and examined  Agree with the plan and treatment    [facility], DO

## 2022-11-15 NOTE — CARE COORDINATION
Ss note: 11/15/719124:43 AM No covid testing. Met with pt, he is pleasant, resides with wife Rowe Libman whom works part time. PTA pt works full time, relays he took a fall down steps at his home, fx of ribs. PCP is Dr. Adrien Ariza, pt aware temporary disability forms will need to be filled out by his PCP, he will update his wife on this matter. No hx of HHC or SNF. [facility] in [facility] to obtain medications. Plan is home with wife at discharge.  LISA Lewis

## 2022-11-16 ENCOUNTER — APPOINTMENT (OUTPATIENT)
Dept: GENERAL RADIOLOGY | Age: 52
DRG: 964 | End: 2022-11-16
Payer: COMMERCIAL

## 2022-11-16 LAB
BASOPHILS ABSOLUTE: 0.03 E9/L (ref 0–0.2)
BASOPHILS ABSOLUTE: 0.03 E9/L (ref 0–0.2)
BASOPHILS ABSOLUTE: 0.04 E9/L (ref 0–0.2)
BASOPHILS ABSOLUTE: 0.04 E9/L (ref 0–0.2)
BASOPHILS RELATIVE PERCENT: 0.3 % (ref 0–2)
BASOPHILS RELATIVE PERCENT: 0.4 % (ref 0–2)
EOSINOPHILS ABSOLUTE: 0.06 E9/L (ref 0.05–0.5)
EOSINOPHILS ABSOLUTE: 0.06 E9/L (ref 0.05–0.5)
EOSINOPHILS ABSOLUTE: 0.08 E9/L (ref 0.05–0.5)
EOSINOPHILS ABSOLUTE: 0.1 E9/L (ref 0.05–0.5)
EOSINOPHILS RELATIVE PERCENT: 0.6 % (ref 0–6)
EOSINOPHILS RELATIVE PERCENT: 0.6 % (ref 0–6)
EOSINOPHILS RELATIVE PERCENT: 0.9 % (ref 0–6)
EOSINOPHILS RELATIVE PERCENT: 1.1 % (ref 0–6)
HCT VFR BLD CALC: 34.6 % (ref 37–54)
HCT VFR BLD CALC: 35 % (ref 37–54)
HCT VFR BLD CALC: 35.4 % (ref 37–54)
HCT VFR BLD CALC: 36 % (ref 37–54)
HEMOGLOBIN: 11.3 G/DL (ref 12.5–16.5)
HEMOGLOBIN: 11.4 G/DL (ref 12.5–16.5)
HEMOGLOBIN: 11.5 G/DL (ref 12.5–16.5)
HEMOGLOBIN: 11.9 G/DL (ref 12.5–16.5)
IMMATURE GRANULOCYTES #: 0.04 E9/L
IMMATURE GRANULOCYTES #: 0.07 E9/L
IMMATURE GRANULOCYTES %: 0.4 % (ref 0–5)
IMMATURE GRANULOCYTES %: 0.4 % (ref 0–5)
IMMATURE GRANULOCYTES %: 0.5 % (ref 0–5)
IMMATURE GRANULOCYTES %: 0.7 % (ref 0–5)
LYMPHOCYTES ABSOLUTE: 1.19 E9/L (ref 1.5–4)
LYMPHOCYTES ABSOLUTE: 1.38 E9/L (ref 1.5–4)
LYMPHOCYTES ABSOLUTE: 1.4 E9/L (ref 1.5–4)
LYMPHOCYTES ABSOLUTE: 1.41 E9/L (ref 1.5–4)
LYMPHOCYTES RELATIVE PERCENT: 11.1 % (ref 20–42)
LYMPHOCYTES RELATIVE PERCENT: 13.7 % (ref 20–42)
LYMPHOCYTES RELATIVE PERCENT: 14.9 % (ref 20–42)
LYMPHOCYTES RELATIVE PERCENT: 16.3 % (ref 20–42)
MCH RBC QN AUTO: 31.4 PG (ref 26–35)
MCH RBC QN AUTO: 31.5 PG (ref 26–35)
MCH RBC QN AUTO: 32.1 PG (ref 26–35)
MCH RBC QN AUTO: 32.3 PG (ref 26–35)
MCHC RBC AUTO-ENTMCNC: 32.2 % (ref 32–34.5)
MCHC RBC AUTO-ENTMCNC: 32.3 % (ref 32–34.5)
MCHC RBC AUTO-ENTMCNC: 33.1 % (ref 32–34.5)
MCHC RBC AUTO-ENTMCNC: 33.2 % (ref 32–34.5)
MCV RBC AUTO: 97 FL (ref 80–99.9)
MCV RBC AUTO: 97.2 FL (ref 80–99.9)
MCV RBC AUTO: 97.5 FL (ref 80–99.9)
MCV RBC AUTO: 97.5 FL (ref 80–99.9)
MONOCYTES ABSOLUTE: 1.02 E9/L (ref 0.1–0.95)
MONOCYTES ABSOLUTE: 1.12 E9/L (ref 0.1–0.95)
MONOCYTES ABSOLUTE: 1.25 E9/L (ref 0.1–0.95)
MONOCYTES ABSOLUTE: 1.33 E9/L (ref 0.1–0.95)
MONOCYTES RELATIVE PERCENT: 11.9 % (ref 2–12)
MONOCYTES RELATIVE PERCENT: 12 % (ref 2–12)
MONOCYTES RELATIVE PERCENT: 12.2 % (ref 2–12)
MONOCYTES RELATIVE PERCENT: 12.4 % (ref 2–12)
NEUTROPHILS ABSOLUTE: 5.93 E9/L (ref 1.8–7.3)
NEUTROPHILS ABSOLUTE: 6.69 E9/L (ref 1.8–7.3)
NEUTROPHILS ABSOLUTE: 7.48 E9/L (ref 1.8–7.3)
NEUTROPHILS ABSOLUTE: 8.05 E9/L (ref 1.8–7.3)
NEUTROPHILS RELATIVE PERCENT: 69.9 % (ref 43–80)
NEUTROPHILS RELATIVE PERCENT: 71.3 % (ref 43–80)
NEUTROPHILS RELATIVE PERCENT: 72.8 % (ref 43–80)
NEUTROPHILS RELATIVE PERCENT: 74.8 % (ref 43–80)
PDW BLD-RTO: 13.5 FL (ref 11.5–15)
PDW BLD-RTO: 13.7 FL (ref 11.5–15)
PDW BLD-RTO: 13.7 FL (ref 11.5–15)
PDW BLD-RTO: 13.8 FL (ref 11.5–15)
PLATELET # BLD: 219 E9/L (ref 130–450)
PLATELET # BLD: 225 E9/L (ref 130–450)
PLATELET # BLD: 233 E9/L (ref 130–450)
PLATELET # BLD: 245 E9/L (ref 130–450)
PMV BLD AUTO: 10 FL (ref 7–12)
PMV BLD AUTO: 10.3 FL (ref 7–12)
PMV BLD AUTO: 9.8 FL (ref 7–12)
PMV BLD AUTO: 9.8 FL (ref 7–12)
RBC # BLD: 3.56 E12/L (ref 3.8–5.8)
RBC # BLD: 3.59 E12/L (ref 3.8–5.8)
RBC # BLD: 3.63 E12/L (ref 3.8–5.8)
RBC # BLD: 3.71 E12/L (ref 3.8–5.8)
WBC # BLD: 10.3 E9/L (ref 4.5–11.5)
WBC # BLD: 10.7 E9/L (ref 4.5–11.5)
WBC # BLD: 8.5 E9/L (ref 4.5–11.5)
WBC # BLD: 9.4 E9/L (ref 4.5–11.5)

## 2022-11-16 PROCEDURE — A4216 STERILE WATER/SALINE, 10 ML: HCPCS | Performed by: SURGERY

## 2022-11-16 PROCEDURE — 6370000000 HC RX 637 (ALT 250 FOR IP): Performed by: SURGERY

## 2022-11-16 PROCEDURE — 6360000002 HC RX W HCPCS: Performed by: SURGERY

## 2022-11-16 PROCEDURE — 2500000003 HC RX 250 WO HCPCS: Performed by: SURGERY

## 2022-11-16 PROCEDURE — 1200000000 HC SEMI PRIVATE

## 2022-11-16 PROCEDURE — 6370000000 HC RX 637 (ALT 250 FOR IP): Performed by: INTERNAL MEDICINE

## 2022-11-16 PROCEDURE — 6370000000 HC RX 637 (ALT 250 FOR IP): Performed by: STUDENT IN AN ORGANIZED HEALTH CARE EDUCATION/TRAINING PROGRAM

## 2022-11-16 PROCEDURE — 99232 SBSQ HOSP IP/OBS MODERATE 35: CPT | Performed by: SURGERY

## 2022-11-16 PROCEDURE — 2580000003 HC RX 258: Performed by: SURGERY

## 2022-11-16 PROCEDURE — C9113 INJ PANTOPRAZOLE SODIUM, VIA: HCPCS | Performed by: SURGERY

## 2022-11-16 PROCEDURE — 36415 COLL VENOUS BLD VENIPUNCTURE: CPT

## 2022-11-16 PROCEDURE — 73030 X-RAY EXAM OF SHOULDER: CPT

## 2022-11-16 PROCEDURE — 85025 COMPLETE CBC W/AUTO DIFF WBC: CPT

## 2022-11-16 RX ORDER — POLYETHYLENE GLYCOL 3350 17 G/17G
17 POWDER, FOR SOLUTION ORAL ONCE
Status: COMPLETED | OUTPATIENT
Start: 2022-11-16 | End: 2022-11-16

## 2022-11-16 RX ORDER — DOCUSATE SODIUM 100 MG/1
100 CAPSULE, LIQUID FILLED ORAL 2 TIMES DAILY
Status: DISCONTINUED | OUTPATIENT
Start: 2022-11-16 | End: 2022-11-17 | Stop reason: HOSPADM

## 2022-11-16 RX ORDER — PANTOPRAZOLE SODIUM 40 MG/1
40 TABLET, DELAYED RELEASE ORAL
Status: DISCONTINUED | OUTPATIENT
Start: 2022-11-17 | End: 2022-11-17 | Stop reason: HOSPADM

## 2022-11-16 RX ADMIN — Medication 10 ML: at 08:24

## 2022-11-16 RX ADMIN — ENOXAPARIN SODIUM 30 MG: 100 INJECTION SUBCUTANEOUS at 08:29

## 2022-11-16 RX ADMIN — METOPROLOL SUCCINATE 25 MG: 25 TABLET, EXTENDED RELEASE ORAL at 08:28

## 2022-11-16 RX ADMIN — ACETAMINOPHEN 650 MG: 325 TABLET ORAL at 05:06

## 2022-11-16 RX ADMIN — OXYCODONE 5 MG: 5 TABLET ORAL at 17:50

## 2022-11-16 RX ADMIN — LISINOPRIL 20 MG: 20 TABLET ORAL at 08:30

## 2022-11-16 RX ADMIN — METHOCARBAMOL 1000 MG: 750 TABLET ORAL at 18:06

## 2022-11-16 RX ADMIN — DOCUSATE SODIUM 100 MG: 100 CAPSULE, LIQUID FILLED ORAL at 21:59

## 2022-11-16 RX ADMIN — ACETAMINOPHEN 650 MG: 325 TABLET ORAL at 18:51

## 2022-11-16 RX ADMIN — OXYCODONE 10 MG: 5 TABLET ORAL at 02:50

## 2022-11-16 RX ADMIN — METHOCARBAMOL 1000 MG: 750 TABLET ORAL at 21:59

## 2022-11-16 RX ADMIN — MORPHINE SULFATE 2 MG: 2 INJECTION, SOLUTION INTRAMUSCULAR; INTRAVENOUS at 00:42

## 2022-11-16 RX ADMIN — POLYETHYLENE GLYCOL 3350 17 G: 17 POWDER, FOR SOLUTION ORAL at 14:45

## 2022-11-16 RX ADMIN — ENOXAPARIN SODIUM 30 MG: 100 INJECTION SUBCUTANEOUS at 21:59

## 2022-11-16 RX ADMIN — ACETAMINOPHEN 650 MG: 325 TABLET ORAL at 00:29

## 2022-11-16 RX ADMIN — METHOCARBAMOL 1000 MG: 750 TABLET ORAL at 13:10

## 2022-11-16 RX ADMIN — Medication 10 ML: at 22:00

## 2022-11-16 RX ADMIN — AMLODIPINE BESYLATE 5 MG: 5 TABLET ORAL at 08:30

## 2022-11-16 RX ADMIN — LABETALOL HYDROCHLORIDE 10 MG: 5 INJECTION, SOLUTION INTRAVENOUS at 15:01

## 2022-11-16 RX ADMIN — PANTOPRAZOLE SODIUM 40 MG: 40 INJECTION, POWDER, FOR SOLUTION INTRAVENOUS at 08:24

## 2022-11-16 RX ADMIN — MORPHINE SULFATE 4 MG: 4 INJECTION, SOLUTION INTRAMUSCULAR; INTRAVENOUS at 05:04

## 2022-11-16 RX ADMIN — MORPHINE SULFATE 4 MG: 4 INJECTION, SOLUTION INTRAMUSCULAR; INTRAVENOUS at 08:18

## 2022-11-16 RX ADMIN — METHOCARBAMOL 1000 MG: 750 TABLET ORAL at 08:42

## 2022-11-16 RX ADMIN — DOCUSATE SODIUM 100 MG: 100 CAPSULE, LIQUID FILLED ORAL at 14:45

## 2022-11-16 RX ADMIN — OXYCODONE 10 MG: 5 TABLET ORAL at 13:11

## 2022-11-16 RX ADMIN — HYDRALAZINE HYDROCHLORIDE 10 MG: 20 INJECTION INTRAMUSCULAR; INTRAVENOUS at 00:41

## 2022-11-16 RX ADMIN — HYDROCHLOROTHIAZIDE 25 MG: 25 TABLET ORAL at 08:29

## 2022-11-16 ASSESSMENT — PAIN DESCRIPTION - LOCATION
LOCATION: ARM
LOCATION: RIB CAGE;SHOULDER
LOCATION: SHOULDER;RIB CAGE
LOCATION: RIB CAGE;SHOULDER
LOCATION: BACK;CHEST
LOCATION: CHEST
LOCATION: SHOULDER;RIB CAGE

## 2022-11-16 ASSESSMENT — PAIN - FUNCTIONAL ASSESSMENT
PAIN_FUNCTIONAL_ASSESSMENT: PREVENTS OR INTERFERES SOME ACTIVE ACTIVITIES AND ADLS
PAIN_FUNCTIONAL_ASSESSMENT: PREVENTS OR INTERFERES WITH ALL ACTIVE AND SOME PASSIVE ACTIVITIES
PAIN_FUNCTIONAL_ASSESSMENT: PREVENTS OR INTERFERES WITH ALL ACTIVE AND SOME PASSIVE ACTIVITIES
PAIN_FUNCTIONAL_ASSESSMENT: PREVENTS OR INTERFERES WITH MANY ACTIVE NOT PASSIVE ACTIVITIES
PAIN_FUNCTIONAL_ASSESSMENT: PREVENTS OR INTERFERES SOME ACTIVE ACTIVITIES AND ADLS

## 2022-11-16 ASSESSMENT — PAIN DESCRIPTION - DESCRIPTORS
DESCRIPTORS: SHARP;JABBING
DESCRIPTORS: STABBING
DESCRIPTORS: DISCOMFORT;SHARP;STABBING
DESCRIPTORS: STABBING
DESCRIPTORS: SHARP;STABBING
DESCRIPTORS: STABBING
DESCRIPTORS: STABBING;SHARP;DISCOMFORT
DESCRIPTORS: STABBING

## 2022-11-16 ASSESSMENT — PAIN DESCRIPTION - ONSET
ONSET: ON-GOING
ONSET: ON-GOING

## 2022-11-16 ASSESSMENT — PAIN SCALES - GENERAL
PAINLEVEL_OUTOF10: 5
PAINLEVEL_OUTOF10: 8
PAINLEVEL_OUTOF10: 7
PAINLEVEL_OUTOF10: 9
PAINLEVEL_OUTOF10: 8
PAINLEVEL_OUTOF10: 5
PAINLEVEL_OUTOF10: 7
PAINLEVEL_OUTOF10: 9
PAINLEVEL_OUTOF10: 8

## 2022-11-16 ASSESSMENT — PAIN DESCRIPTION - PAIN TYPE
TYPE: ACUTE PAIN

## 2022-11-16 ASSESSMENT — PAIN DESCRIPTION - FREQUENCY
FREQUENCY: CONTINUOUS

## 2022-11-16 ASSESSMENT — PAIN DESCRIPTION - ORIENTATION
ORIENTATION: LEFT

## 2022-11-16 NOTE — PLAN OF CARE
Problem: Pain  Goal: Verbalizes/displays adequate comfort level or baseline comfort level  Outcome: Progressing  Flowsheets  Taken 11/16/2022 0400 by Funmilayo Saldana RN  Verbalizes/displays adequate comfort level or baseline comfort level:   Encourage patient to monitor pain and request assistance   Assess pain using appropriate pain scale   Administer analgesics based on type and severity of pain and evaluate response   Implement non-pharmacological measures as appropriate and evaluate response   Consider cultural and social influences on pain and pain management   Notify Licensed Independent Practitioner if interventions unsuccessful or patient reports new pain  Taken 11/16/2022 0250 by Funmilayo Saldana RN  Verbalizes/displays adequate comfort level or baseline comfort level:   Encourage patient to monitor pain and request assistance   Assess pain using appropriate pain scale   Administer analgesics based on type and severity of pain and evaluate response   Implement non-pharmacological measures as appropriate and evaluate response   Consider cultural and social influences on pain and pain management   Notify Licensed Independent Practitioner if interventions unsuccessful or patient reports new pain  Taken 11/16/2022 0042 by Funmilayo Saldana RN  Verbalizes/displays adequate comfort level or baseline comfort level:   Encourage patient to monitor pain and request assistance   Assess pain using appropriate pain scale   Administer analgesics based on type and severity of pain and evaluate response   Implement non-pharmacological measures as appropriate and evaluate response   Consider cultural and social influences on pain and pain management   Notify Licensed Independent Practitioner if interventions unsuccessful or patient reports new pain

## 2022-11-16 NOTE — PROGRESS NOTES
Department of Internal Medicine      HISTORY OF PRESENT ILLNESS:      The patient is a 47 y.o. male who presents with having fallen down about 10 steps at home. Patient denies any problem with any dizziness, chest pain, palpitation or extremity weakness associated with the fall. The fall apparently occurred yesterday afternoon. Patient came to the ED because of left-sided chest pain. Patient has pain in his chest with deep inspiration. Patient having significant amount of pain. Patient's BMP essentially normal with a WBC 7.4 and hemoglobin 12.6 on admission. Urinalysis showed a small amount of blood and rare bacteria. Blood pressure that was elevated 203/116 in the ED initially. Heart rate is stable with O2 sat ranging 94-81% on room air at rest.  CT of the chest and abdomen showed AST grade 1 injury of the left kidney with subcapsular hematoma, multiple left-sided rib fractures with no significant pneumothorax with a trace left hemothorax and mild pulmonary contusion. There is a large amount of ecchymosis in the subcutaneous tissue left buttocks and left flank. Patient was admitted by trauma surgery and consult with urology and IM.    11/15/2022  Patient seen examined on telemetry floor. Patient still complaining of significant rib pain. Patient denies any headaches, blurred vision, paresthesias. BUN/creatinine was 9/0.7 normal electrolytes. Liver enzymes normal WBC 9.4 and hemoglobin 11.6. Temperature 98.7 with heart rate of 92 and blood pressure ranging 160//106. O2 sat 90% on room air at rest.  We are adjusting blood pressure medication which is unsure as a lot of the blood pressure changes are related to the patient's pain. We also checked O2 saturation on room air with activity. 11/16/2022  Patient seen examined on telemetry floor. Patient complaining of constipation. Patient still with rib pain. Patient does have exertional dyspnea. WBC is 8.5 and hemoglobin 11.5.   Temperatures 98.9 with heart rate 18 and pulse 91 with blood pressure 138/86. O2 sat 93% on 2 L nasal cannula. Patient to be ambulating the hallway on room air to monitor O2 saturation or desaturation with activity. Past Medical History:    Past Medical History:   Diagnosis Date    Hypertension     Kidney stone      Past Surgical History:    Past Surgical History:   Procedure Laterality Date    GASTRIC BYPASS SURGERY         Medications Prior to Admission:    @  Prior to Admission medications    Not on File       Allergies:  Pcn [penicillins]    Social History:   Social History     Socioeconomic History    Marital status:      Spouse name: Not on file    Number of children: Not on file    Years of education: Not on file    Highest education level: Not on file   Occupational History    Not on file   Tobacco Use    Smoking status: Never    Smokeless tobacco: Not on file   Substance and Sexual Activity    Alcohol use: No    Drug use: No    Sexual activity: Not on file   Other Topics Concern    Not on file   Social History Narrative    Not on file     Social Determinants of Health     Financial Resource Strain: Not on file   Food Insecurity: Not on file   Transportation Needs: Not on file   Physical Activity: Not on file   Stress: Not on file   Social Connections: Not on file   Intimate Partner Violence: Not on file   Housing Stability: Not on file       Family History:   History reviewed. No pertinent family history. REVIEW OF SYSTEMS:    Gen: Patient denies any lightheadedness or dizziness. No LOC or syncope. No fevers or chills. HEENT: No earache, sore throat or nasal congestion. Resp: Denies cough, hemoptysis or sputum production. Cardiac: Denies chest pain, SOB, diaphoresis or palpitations. GI: No nausea, vomiting, diarrhea or constipation. No melena or hematochezia. : No urinary complaints, dysuria, hematuria or frequency. MSK: + Left-sided chest and pelvic pain.   No extremity weakness, paralysis or paresthesias. PHYSICAL EXAM:    Vitals:  /86   Pulse (!) 106   Temp 98.9 °F (37.2 °C) (Oral)   Resp 16   Ht 5' 8\" (1.727 m)   Wt 188 lb (85.3 kg)   SpO2 93%   BMI 28.59 kg/m²     General:  This is a 47 y.o. yo male who is alert and oriented in moderate distress secondary to above symptoms. HEENT:  Head is normocephalic and atraumatic, PERRLA, EOMI, mucus membranes moist with no pharyngeal erythema or exudate. Neck:  Supple with no carotid bruits, JVD or thyromegaly.   No cervical adenopathy  CV:  Regular rate and rhythm, no murmurs  Lungs: Coarse breath sounds left chest to auscultation bilaterally with no wheezes, rales or rhonchi  Abdomen:  Soft, nontender, nondistended, bowel sounds present  Extremities:  No edema, peripheral pulses intact bilaterally  Neuro:  Cranial nerves II-XII grossly intact; motor and sensory function intact with no focal deficits  Skin:  + Ecchymosis and edema over left buttocks and left flank and rib      DATA:  CBC with Differential:    Lab Results   Component Value Date/Time    WBC 8.5 11/16/2022 08:09 AM    RBC 3.56 11/16/2022 08:09 AM    HGB 11.5 11/16/2022 08:09 AM    HCT 34.6 11/16/2022 08:09 AM     11/16/2022 08:09 AM    MCV 97.2 11/16/2022 08:09 AM    MCH 32.3 11/16/2022 08:09 AM    MCHC 33.2 11/16/2022 08:09 AM    RDW 13.7 11/16/2022 08:09 AM    LYMPHOPCT 16.3 11/16/2022 08:09 AM    MONOPCT 12.0 11/16/2022 08:09 AM    BASOPCT 0.4 11/16/2022 08:09 AM    MONOSABS 1.02 11/16/2022 08:09 AM    LYMPHSABS 1.38 11/16/2022 08:09 AM    EOSABS 0.08 11/16/2022 08:09 AM    BASOSABS 0.03 11/16/2022 08:09 AM     CMP:    Lab Results   Component Value Date/Time     11/15/2022 08:19 AM    K 3.7 11/15/2022 08:19 AM    K 4.1 11/14/2022 05:27 AM    CL 99 11/15/2022 08:19 AM    CO2 22 11/15/2022 08:19 AM    BUN 9 11/15/2022 08:19 AM    CREATININE 0.7 11/15/2022 08:19 AM    GFRAA >60 01/21/2015 04:30 AM    LABGLOM >60 11/15/2022 08:19 AM    GLUCOSE 112 11/15/2022 08:19 AM    PROT 6.3 11/15/2022 08:19 AM    LABALBU 3.7 11/15/2022 08:19 AM    CALCIUM 8.7 11/15/2022 08:19 AM    BILITOT 0.6 11/15/2022 08:19 AM    ALKPHOS 45 11/15/2022 08:19 AM    AST 36 11/15/2022 08:19 AM    ALT 40 11/15/2022 08:19 AM     Magnesium:  No results found for: MG  Phosphorus:  No results found for: PHOS  PT/INR:  No results found for: PROTIME, INR  Troponin:  No results found for: TROPONINI  U/A:    Lab Results   Component Value Date/Time    COLORU Yellow 11/13/2022 05:20 PM    PROTEINU TRACE 11/13/2022 05:20 PM    PHUR 5.5 11/13/2022 05:20 PM    WBCUA 0-1 11/13/2022 05:20 PM    RBCUA 0-1 11/13/2022 05:20 PM    BACTERIA RARE 11/13/2022 05:20 PM    CLARITYU Clear 11/13/2022 05:20 PM    SPECGRAV 1.010 11/13/2022 05:20 PM    LEUKOCYTESUR Negative 11/13/2022 05:20 PM    UROBILINOGEN 0.2 11/13/2022 05:20 PM    BILIRUBINUR Negative 11/13/2022 05:20 PM    BLOODU SMALL 11/13/2022 05:20 PM    GLUCOSEU Negative 11/13/2022 05:20 PM     ABG:  No results found for: PH, PCO2, PO2, HCO3, BE, THGB, TCO2, O2SAT  HgBA1c:  No results found for: LABA1C  FLP:  No results found for: TRIG, HDL, LDLCALC, LDLDIRECT, LABVLDL  TSH:  No results found for: TSH  IRON:  No results found for: IRON  LIPASE:    Lab Results   Component Value Date/Time    LIPASE 78 01/21/2015 04:30 AM       ASSESSMENT AND PLAN:      Patient Active Problem List    Diagnosis Date Noted    Multiple rib fractures involving four or more ribs 11/13/2022    Closed fracture of multiple ribs of left side, initial encounter 11/13/2022     Impression:  1. Grade 1 injury left kidney with subcapsular hematoma  2. Multiple left-sided rib fractures  3. Trace left hemothorax mild pulmonary contusion  4. Hypertension-uncontrolled on admission  5.   Hx Fall    Plan:  Patient admitted to telemetry floor per trauma surgery  Home medications reviewed  Monitor heart rate, blood pressure, O2 saturation  Muscular skeletal pain meds for general surgery. Lisinopril 10 mg p.o. daily  Hydrochlorothiazide 12.5 mg daily  Labetalol 10 mg IV push every 4 hours as needed for systolic greater than 917 or diastolic greater than 772    O2 saturation on room air at rest and with activity  Toprol-XL 25 mg p.o. daily  Amlodipine 5 mg p.o. daily    Colace 1 mg twice daily  MiraLAX 17 g p.o. x1 now    CMP, CBC in a.m.     Michael Gatica DO, D.O.  11/16/2022  10:16 AM

## 2022-11-16 NOTE — PROGRESS NOTES
GENERAL SURGERY  DAILY PROGRESS NOTE  11/16/2022    Chief Complaint   Patient presents with    Fall     Missed a step, hitting landing at bottom-approx 8 steps deformity left ribs 10/10 pain       Subjective:  No acute issues overnight. Pain is well controlled. [facility] over 1250. Passing gas tolerating diet. Only complaint is some new left shoulder pain    Objective:  /86   Pulse (!) 106   Temp 98.9 °F (37.2 °C) (Oral)   Resp 16   Ht 5' 8\" (1.727 m)   Wt 188 lb (85.3 kg)   SpO2 93%   BMI 28.59 kg/m²     GENERAL:  Laying in bed, awake, alert, cooperative, no apparent distress  HEAD: Normocephalic  EYES: No sclera icterus, pupils equal  LUNGS: Mild increased work of breathing secondary to pain, SMI 1000  CARDIOVASCULAR:  RR  ABDOMEN:  Soft, non-tender, non-distended  EXTREMITIES: No edema or swelling. Difficulties ambulating left shoulder  SKIN: Warm and dry    Assessment/Plan:  47 y.o. male with a fall down 10 steps with 8 rib fractures and a grade 1 kidney injury    Multimodal pain control  Continue diet  PT OT  Pain with movement of left shoulder most likely due to rib fractures however will get dedicated left shoulder x-rays to rule out trauma to the area  Up out of bed and ambulate as able  Continue aggressive pulmonary toileting  Dispo planning    Electronically signed by Felisha Langford DO on 11/16/2022 at 8:43 AM     GENERAL SURGERY  DAILY PROGRESS NOTE  11/16/2022    Chief Complaint   Patient presents with    Fall     Missed a step, hitting landing at bottom-approx 8 steps deformity left ribs 10/10 pain       Subjective:  Pain control. [facility] about 1000. Tolerating diet passing gas no bowel movements.   Urine output adequate without any signs of blood    Objective:  /68   Pulse 89   Temp 97.9 °F (36.6 °C) (Oral)   Resp 20   Ht 5' 8\" (1.727 m)   Wt 188 lb (85.3 kg)   SpO2 95%   BMI 28.59 kg/m²     GENERAL:  Laying in bed, awake, alert, cooperative, no apparent distress  HEAD: Normocephalic  EYES: No sclera icterus, pupils equal  LUNGS: Mild increased work of breathing secondary to pain, SMI 1000  CARDIOVASCULAR:  RR  ABDOMEN:  Soft, non-tender, non-distended  EXTREMITIES: No edema or swelling  SKIN: Warm and dry    Assessment/Plan:  47 y.o. male with a fall down 10 steps with 8 rib fractures and a grade 1 kidney injury    Multimodal pain control  Continue diet  PT OT  Up out of bed and ambulate as able  Continue aggressive pulmonary toileting  Dispo planning    Electronically signed by Alexandre Painting MD on 11/16/2022 at 6:14 PM     General Surgery Progress Note  [facility]    Patient's Name/Date of Birth: Teresa Torres / 1970  Date: November 16, 2022     Surgeon: Claudia Alejo MD    Chief Complaint: Trauma, fall, multiple left-sided rib fractures. Left renal injury. Patient Active Problem List   Diagnosis    Multiple rib fractures involving four or more ribs    Closed fracture of multiple ribs of left side, initial encounter       Subjective: sore left shoulder      Objective:  /68   Pulse 89   Temp 97.9 °F (36.6 °C) (Oral)   Resp 20   Ht 5' 8\" (1.727 m)   Wt 188 lb (85.3 kg)   SpO2 95%   BMI 28.59 kg/m²   Labs:  Recent Labs     11/16/22  0809 11/16/22  1156 11/16/22  1736   WBC 8.5 10.3 9.4   HGB 11.5* 11.9* 11.3*   HCT 34.6* 36.0* 35.0*     Lab Results   Component Value Date    CREATININE 0.7 11/15/2022    BUN 9 11/15/2022     11/15/2022    K 3.7 11/15/2022    CL 99 11/15/2022    CO2 22 11/15/2022     No results for input(s): LIPASE, AMYLASE in the last 72 hours.   CBC with Differential:    Lab Results   Component Value Date/Time    WBC 9.4 11/16/2022 05:36 PM    RBC 3.59 11/16/2022 05:36 PM    HGB 11.3 11/16/2022 05:36 PM    HCT 35.0 11/16/2022 05:36 PM     11/16/2022 05:36 PM    MCV 97.5 11/16/2022 05:36 PM    MCH 31.5 11/16/2022 05:36 PM    MCHC 32.3 11/16/2022 05:36 PM    RDW 13.5 11/16/2022 05:36 PM    LYMPHOPCT 14.9 11/16/2022 05:36 PM MONOPCT 11.9 11/16/2022 05:36 PM    BASOPCT 0.4 11/16/2022 05:36 PM    MONOSABS 1.12 11/16/2022 05:36 PM    LYMPHSABS 1.40 11/16/2022 05:36 PM    EOSABS 0.10 11/16/2022 05:36 PM    BASOSABS 0.04 11/16/2022 05:36 PM     CMP:    Lab Results   Component Value Date/Time     11/15/2022 08:19 AM    K 3.7 11/15/2022 08:19 AM    K 4.1 11/14/2022 05:27 AM    CL 99 11/15/2022 08:19 AM    CO2 22 11/15/2022 08:19 AM    BUN 9 11/15/2022 08:19 AM    CREATININE 0.7 11/15/2022 08:19 AM    GFRAA >60 01/21/2015 04:30 AM    LABGLOM >60 11/15/2022 08:19 AM    GLUCOSE 112 11/15/2022 08:19 AM    PROT 6.3 11/15/2022 08:19 AM    LABALBU 3.7 11/15/2022 08:19 AM    CALCIUM 8.7 11/15/2022 08:19 AM    BILITOT 0.6 11/15/2022 08:19 AM    ALKPHOS 45 11/15/2022 08:19 AM    AST 36 11/15/2022 08:19 AM    ALT 40 11/15/2022 08:19 AM       General appearance:  NAD  Head: NCAT, PERRLA, EOMI, red conjunctiva  Neck: supple, no masses  Lungs: Nonlabored, equal chest rise bilateral.  Left chest wall tenderness  Heart: Reg rate  Abdomen: soft, nondistended, tender mild left flank  Skin; no lesions  Gu: no cva tenderness  Extremities: Left shoulder tenderness      Assessment/Plan:  Escobar Perera is a 47 y.o. male status post fall down multiple steps with 2 through 8 left-sided rib fractures, left kidney laceration    Left shoulder x-ray noted. Negative. Hemoglobin stable.   Urology follow-up as outpatient  PT OT  Diet as tolerated  Multimodal pain control  Discharge planning      Sharrell Ormond, MD  11/16/2022  6:14 PM

## 2022-11-16 NOTE — PLAN OF CARE
Problem: Pain  Goal: Verbalizes/displays adequate comfort level or baseline comfort level  11/15/2022 2230 by Samaria Buck RN  Outcome: Progressing     Problem: Safety - Adult  Goal: Free from fall injury  11/15/2022 2230 by Samaria Buck RN  Outcome: Progressing     Problem: ABCDS Injury Assessment  Goal: Absence of physical injury  11/15/2022 2230 by Samaria Buck RN  Outcome: Progressing

## 2022-11-17 VITALS
BODY MASS INDEX: 28.49 KG/M2 | HEART RATE: 90 BPM | TEMPERATURE: 98.8 F | OXYGEN SATURATION: 92 % | SYSTOLIC BLOOD PRESSURE: 168 MMHG | DIASTOLIC BLOOD PRESSURE: 95 MMHG | HEIGHT: 68 IN | RESPIRATION RATE: 18 BRPM | WEIGHT: 188 LBS

## 2022-11-17 PROCEDURE — 6370000000 HC RX 637 (ALT 250 FOR IP): Performed by: SURGERY

## 2022-11-17 PROCEDURE — 6370000000 HC RX 637 (ALT 250 FOR IP): Performed by: STUDENT IN AN ORGANIZED HEALTH CARE EDUCATION/TRAINING PROGRAM

## 2022-11-17 PROCEDURE — 2500000003 HC RX 250 WO HCPCS: Performed by: SURGERY

## 2022-11-17 PROCEDURE — 6370000000 HC RX 637 (ALT 250 FOR IP): Performed by: INTERNAL MEDICINE

## 2022-11-17 PROCEDURE — 6360000002 HC RX W HCPCS: Performed by: SURGERY

## 2022-11-17 PROCEDURE — 2580000003 HC RX 258: Performed by: SURGERY

## 2022-11-17 RX ORDER — METHOCARBAMOL 500 MG/1
750 TABLET, FILM COATED ORAL 4 TIMES DAILY
Qty: 60 TABLET | Refills: 0 | Status: SHIPPED | OUTPATIENT
Start: 2022-11-17 | End: 2022-11-27

## 2022-11-17 RX ORDER — METHOCARBAMOL 500 MG/1
750 TABLET, FILM COATED ORAL 4 TIMES DAILY
Qty: 60 TABLET | Refills: 0 | Status: SHIPPED | OUTPATIENT
Start: 2022-11-17 | End: 2022-11-17 | Stop reason: SDUPTHER

## 2022-11-17 RX ORDER — METOPROLOL SUCCINATE 25 MG/1
25 TABLET, EXTENDED RELEASE ORAL DAILY
Qty: 90 TABLET | Refills: 3 | Status: SHIPPED | OUTPATIENT
Start: 2022-11-18

## 2022-11-17 RX ORDER — OXYCODONE HYDROCHLORIDE 5 MG/1
5 TABLET ORAL EVERY 4 HOURS PRN
Qty: 28 TABLET | Refills: 0 | Status: SHIPPED | OUTPATIENT
Start: 2022-11-17 | End: 2022-11-17 | Stop reason: SDUPTHER

## 2022-11-17 RX ORDER — LISINOPRIL AND HYDROCHLOROTHIAZIDE 25; 20 MG/1; MG/1
1 TABLET ORAL DAILY
Qty: 90 TABLET | Refills: 1 | Status: SHIPPED | OUTPATIENT
Start: 2022-11-17

## 2022-11-17 RX ORDER — PSEUDOEPHEDRINE HCL 30 MG
100 TABLET ORAL 2 TIMES DAILY
Qty: 41 CAPSULE | Refills: 0 | Status: SHIPPED | OUTPATIENT
Start: 2022-11-17 | End: 2022-12-08

## 2022-11-17 RX ORDER — LISINOPRIL AND HYDROCHLOROTHIAZIDE 25; 20 MG/1; MG/1
1 TABLET ORAL DAILY
Qty: 90 TABLET | Refills: 1 | Status: SHIPPED | OUTPATIENT
Start: 2022-11-17 | End: 2022-11-17 | Stop reason: SDUPTHER

## 2022-11-17 RX ORDER — PSEUDOEPHEDRINE HCL 30 MG
100 TABLET ORAL 2 TIMES DAILY
Qty: 41 CAPSULE | Refills: 0 | Status: SHIPPED | OUTPATIENT
Start: 2022-11-17 | End: 2022-11-17 | Stop reason: SDUPTHER

## 2022-11-17 RX ORDER — OXYCODONE HYDROCHLORIDE 5 MG/1
5 TABLET ORAL EVERY 4 HOURS PRN
Qty: 28 TABLET | Refills: 0 | Status: SHIPPED | OUTPATIENT
Start: 2022-11-17 | End: 2022-11-24

## 2022-11-17 RX ORDER — METOPROLOL SUCCINATE 25 MG/1
25 TABLET, EXTENDED RELEASE ORAL DAILY
Qty: 90 TABLET | Refills: 3 | Status: SHIPPED | OUTPATIENT
Start: 2022-11-18 | End: 2022-11-17 | Stop reason: SDUPTHER

## 2022-11-17 RX ADMIN — HYDROCHLOROTHIAZIDE 25 MG: 25 TABLET ORAL at 08:16

## 2022-11-17 RX ADMIN — LISINOPRIL 20 MG: 20 TABLET ORAL at 08:16

## 2022-11-17 RX ADMIN — Medication 10 ML: at 08:28

## 2022-11-17 RX ADMIN — METOPROLOL SUCCINATE 25 MG: 25 TABLET, EXTENDED RELEASE ORAL at 08:16

## 2022-11-17 RX ADMIN — OXYCODONE 5 MG: 5 TABLET ORAL at 06:11

## 2022-11-17 RX ADMIN — ACETAMINOPHEN 650 MG: 325 TABLET ORAL at 00:54

## 2022-11-17 RX ADMIN — DOCUSATE SODIUM 100 MG: 100 CAPSULE, LIQUID FILLED ORAL at 08:16

## 2022-11-17 RX ADMIN — AMLODIPINE BESYLATE 5 MG: 5 TABLET ORAL at 08:16

## 2022-11-17 RX ADMIN — OXYCODONE 10 MG: 5 TABLET ORAL at 00:54

## 2022-11-17 RX ADMIN — ACETAMINOPHEN 650 MG: 325 TABLET ORAL at 12:31

## 2022-11-17 RX ADMIN — ACETAMINOPHEN 650 MG: 325 TABLET ORAL at 06:11

## 2022-11-17 RX ADMIN — LABETALOL HYDROCHLORIDE 10 MG: 5 INJECTION, SOLUTION INTRAVENOUS at 01:00

## 2022-11-17 RX ADMIN — ENOXAPARIN SODIUM 30 MG: 100 INJECTION SUBCUTANEOUS at 08:16

## 2022-11-17 RX ADMIN — PANTOPRAZOLE SODIUM 40 MG: 40 TABLET, DELAYED RELEASE ORAL at 06:11

## 2022-11-17 RX ADMIN — OXYCODONE 10 MG: 5 TABLET ORAL at 11:03

## 2022-11-17 RX ADMIN — METHOCARBAMOL 1000 MG: 750 TABLET ORAL at 08:25

## 2022-11-17 RX ADMIN — METHOCARBAMOL 1000 MG: 750 TABLET ORAL at 12:31

## 2022-11-17 ASSESSMENT — PAIN DESCRIPTION - DESCRIPTORS
DESCRIPTORS: ACHING;DISCOMFORT;NAGGING

## 2022-11-17 ASSESSMENT — PAIN - FUNCTIONAL ASSESSMENT: PAIN_FUNCTIONAL_ASSESSMENT: PREVENTS OR INTERFERES SOME ACTIVE ACTIVITIES AND ADLS

## 2022-11-17 ASSESSMENT — PAIN SCALES - GENERAL
PAINLEVEL_OUTOF10: 7
PAINLEVEL_OUTOF10: 9
PAINLEVEL_OUTOF10: 7
PAINLEVEL_OUTOF10: 8

## 2022-11-17 ASSESSMENT — PAIN DESCRIPTION - ORIENTATION
ORIENTATION: LEFT

## 2022-11-17 ASSESSMENT — PAIN DESCRIPTION - LOCATION
LOCATION: RIB CAGE
LOCATION: CHEST
LOCATION: RIB CAGE
LOCATION: CHEST;RIB CAGE

## 2022-11-17 NOTE — PROGRESS NOTES
This nurse ambulated patient on room arm. Patient was 91% on room air at rest. Upon ambulation pt SpO2 increased to 95- 98%. Patient main complaint is pain in left mid chest in ribs.

## 2022-11-17 NOTE — DISCHARGE SUMMARY
Physician Discharge Summary     Patient ID:  Natividad Nieves  [id number]  47 y.o.  1970    Admit date: 11/13/2022    Discharge date and time: No discharge date for patient encounter. Admitting Physician: Fritzi Frankel, MD     Admission Diagnoses: Hematoma of left kidney, initial encounter [S37.012A]  Closed fracture of multiple ribs of left side, initial encounter [S22.42XA]  Multiple rib fractures involving four or more ribs [S22.49XA]    Discharge Diagnoses: Principal Problem:    Multiple rib fractures involving four or more ribs  Active Problems:    Closed fracture of multiple ribs of left side, initial encounter  Resolved Problems:    * No resolved hospital problems. *      Admission Condition: fair    Discharged Condition: stable    Indication for Admission: trauma, fall     Hospital Course/Procedures/Operation/treatments:   Trauma consult. Fall down 10 steps yesterday afternoon. He denied hitting his head or complaining of loss of consciousness. He was found to have some left-sided chest pain. He had a CT scan of her chest which showed 2 through 10 rib fractures along with a grade 1 left kidney injury. Urology evaluated and will see patient outpatient. Pain has improved and SMI improved to be above 1500cc. Continues to have BM and tolerating diet. No issues with UOP. Okay for discharge today 3 days post trauma              Consults:   IP CONSULT TO HOSPITALIST  IP CONSULT TO UROLOGY    Significant Diagnostic Studies:   CT CHEST W CONTRAST    Result Date: 11/13/2022  EXAMINATION: CT OF THE ABDOMEN AND PELVIS WITH CONTRAST; CT OF THE CHEST WITH CONTRAST 11/13/2022 4:15 pm TECHNIQUE: CT of the abdomen and pelvis was performed with the administration of intravenous contrast. Multiplanar reformatted images are provided for review. Automated exposure control, iterative reconstruction, and/or weight based adjustment of the mA/kV was utilized to reduce the radiation dose to as low as reasonably achievable. ; CT of the chest was performed with the administration of intravenous contrast. Multiplanar reformatted images are provided for review. Automated exposure control, iterative reconstruction, and/or weight based adjustment of the mA/kV was utilized to reduce the radiation dose to as low as reasonably achievable. COMPARISON: None. HISTORY: ORDERING SYSTEM PROVIDED HISTORY: fall TECHNOLOGIST PROVIDED HISTORY: Additional Contrast?->None Reason for exam:->fall Decision Support Exception - unselect if not a suspected or confirmed emergency medical condition->Emergency Medical Condition (MA) FINDINGS: CT CHEST: Mediastinum:  No evidence of mediastinal, hilar or axillary lymphadenopathy. Lungs: No evidence of pneumothorax. Trace left pleural effusion. Ground-glass opacity in the left lung base may represent pulmonary contusion. Right lung is generally clear. No endobronchial lesions. Bones/soft tissues: Slightly displaced fractures of the lateral 2nd 3rd ribs. Segmental fracture, minimally displaced involving the 4th rib. Mildly comminuted fractures of the lateral 5th through 8 ribs. Sternum appears intact. No acute thoracic spine fractures detected. Canal appears patent. CT ABDOMEN AND PELVIS: Organs: Liver and spleen are normal in size without focal lesion. There is interposition of bowel anterior to the right hepatic lobe. Postop changes are present at the GE junction. There findings compatible with gastric bypass. Pancreas appears normal.  The adrenal glands are normal.  Right kidney is normal.  Subcapsular and perinephric hematoma on the left. Hemorrhage appears to have extended into a previously visualized upper pole cyst of the left kidney, enlarging the cyst.  The cystic component with mild dependent hemorrhage measures 10 x 9 x 10 cm. The subcapsular perinephric hematoma has a maximal thickness of 18 mm. No evidence of renal parenchymal fracture. AST grade 1 injury.  GI/Bowel: No evidence of bowel obstruction. No evidence of mesenteric edema. No free air or free fluid detected. Normal bowel enhancement. Pelvis: No free pelvic fluid. Urinary bladder and prostate gland appear normal.  No evidence of muscular or pelvic hematoma. Peritoneum/Retroperitoneum: No retroperitoneal mass or adenopathy. Aorta is nonaneurysmal.  Aorta appears intact. Bones/Soft Tissues: Prominent ecchymosis involving the subcutaneous tissues of the left flank and buttock region. No underlying muscular hematoma. No acute osseous abnormality. 1.  AST grade 1 injury of the left kidney with subcapsular hematoma. There appears to be some hemorrhage into a pre-existing upper pole cyst, enlarging the cyst.  No evidence of renal parenchymal laceration. 2.  Multiple left-sided rib fractures at least 1 of the rib fractures is segmental.  No significant pneumothorax. Trace left hemothorax and mild pulmonary contusion. 3.  Large area of ecchymosis involving the subcutaneous tissues of the left buttock and left flank. No underlying pelvic or hip fractures. CT ABDOMEN PELVIS W IV CONTRAST Additional Contrast? None    Result Date: 11/13/2022  EXAMINATION: CT OF THE ABDOMEN AND PELVIS WITH CONTRAST; CT OF THE CHEST WITH CONTRAST 11/13/2022 4:15 pm TECHNIQUE: CT of the abdomen and pelvis was performed with the administration of intravenous contrast. Multiplanar reformatted images are provided for review. Automated exposure control, iterative reconstruction, and/or weight based adjustment of the mA/kV was utilized to reduce the radiation dose to as low as reasonably achievable.; CT of the chest was performed with the administration of intravenous contrast. Multiplanar reformatted images are provided for review. Automated exposure control, iterative reconstruction, and/or weight based adjustment of the mA/kV was utilized to reduce the radiation dose to as low as reasonably achievable. COMPARISON: None.  HISTORY: ORDERING SYSTEM PROVIDED HISTORY: fall TECHNOLOGIST PROVIDED HISTORY: Additional Contrast?->None Reason for exam:->fall Decision Support Exception - unselect if not a suspected or confirmed emergency medical condition->Emergency Medical Condition (MA) FINDINGS: CT CHEST: Mediastinum:  No evidence of mediastinal, hilar or axillary lymphadenopathy. Lungs: No evidence of pneumothorax. Trace left pleural effusion. Ground-glass opacity in the left lung base may represent pulmonary contusion. Right lung is generally clear. No endobronchial lesions. Bones/soft tissues: Slightly displaced fractures of the lateral 2nd 3rd ribs. Segmental fracture, minimally displaced involving the 4th rib. Mildly comminuted fractures of the lateral 5th through 8 ribs. Sternum appears intact. No acute thoracic spine fractures detected. Canal appears patent. CT ABDOMEN AND PELVIS: Organs: Liver and spleen are normal in size without focal lesion. There is interposition of bowel anterior to the right hepatic lobe. Postop changes are present at the GE junction. There findings compatible with gastric bypass. Pancreas appears normal.  The adrenal glands are normal.  Right kidney is normal.  Subcapsular and perinephric hematoma on the left. Hemorrhage appears to have extended into a previously visualized upper pole cyst of the left kidney, enlarging the cyst.  The cystic component with mild dependent hemorrhage measures 10 x 9 x 10 cm. The subcapsular perinephric hematoma has a maximal thickness of 18 mm. No evidence of renal parenchymal fracture. AST grade 1 injury. GI/Bowel: No evidence of bowel obstruction. No evidence of mesenteric edema. No free air or free fluid detected. Normal bowel enhancement. Pelvis: No free pelvic fluid. Urinary bladder and prostate gland appear normal.  No evidence of muscular or pelvic hematoma. Peritoneum/Retroperitoneum: No retroperitoneal mass or adenopathy. Aorta is nonaneurysmal.  Aorta appears intact.  Bones/Soft Tissues: Prominent ecchymosis involving the subcutaneous tissues of the left flank and buttock region. No underlying muscular hematoma. No acute osseous abnormality. 1.  AST grade 1 injury of the left kidney with subcapsular hematoma. There appears to be some hemorrhage into a pre-existing upper pole cyst, enlarging the cyst.  No evidence of renal parenchymal laceration. 2.  Multiple left-sided rib fractures at least 1 of the rib fractures is segmental.  No significant pneumothorax. Trace left hemothorax and mild pulmonary contusion. 3.  Large area of ecchymosis involving the subcutaneous tissues of the left buttock and left flank. No underlying pelvic or hip fractures. XR CHEST PORTABLE    Result Date: 11/13/2022  EXAMINATION: ONE XRAY VIEW OF THE CHEST 11/13/2022 2:59 pm COMPARISON: Previous CT of the chest of 09/29/2006 HISTORY: ORDERING SYSTEM PROVIDED HISTORY: fall left sided pain TECHNOLOGIST PROVIDED HISTORY: Reason for exam:->fall left sided pain FINDINGS: The lungs are without acute focal process. There is no effusion or pneumothorax. The cardiomediastinal silhouette is without acute process. The osseous structures are without acute process. No acute process. Discharge Exam:  GENERAL:  Laying in bed, awake, alert, cooperative, no apparent distress  HEAD: Normocephalic  EYES: No sclera icterus, pupils equal  LUNGS: Mild increased work of breathing secondary to pain, SMI 1000  CARDIOVASCULAR:  RR  ABDOMEN:  Soft, non-tender, non-distended  EXTREMITIES: No edema or swelling. Difficulties ambulating left shoulder  SKIN: Warm and dry    Disposition: home    In process/preliminary results:  Outstanding Order Results       No orders found from 10/15/2022 to 11/14/2022.             Patient Instructions:   Current Discharge Medication List             Details   metoprolol succinate (TOPROL XL) 25 MG extended release tablet Take 1 tablet by mouth daily  Qty: 90 tablet, Refills: 3 lisinopril-hydroCHLOROthiazide (PRINZIDE;ZESTORETIC) 20-25 MG per tablet Take 1 tablet by mouth daily  Qty: 90 tablet, Refills: 1      oxyCODONE (ROXICODONE) 5 MG immediate release tablet Take 1 tablet by mouth every 4 hours as needed for Pain for up to 7 days. Qty: 28 tablet, Refills: 0    Comments: Reduce doses taken as pain becomes manageable  Associated Diagnoses: Closed fracture of multiple ribs of left side, initial encounter      docusate sodium (COLACE, DULCOLAX) 100 MG CAPS Take 100 mg by mouth 2 times daily for 21 days  Qty: 41 capsule, Refills: 0      methocarbamol (ROBAXIN) 500 MG tablet Take 1.5 tablets by mouth 4 times daily for 10 days  Qty: 60 tablet, Refills: 0             Call upon discharge to schedule a follow-up visit with Pedro Harrington/Melia/Brittnee ([facility]) at [address] [place] [address] may be drowsy or lightheaded after receiving sedation or anesthesia. A responsible person should be with you for the next 24 hours. FOLLOW UP: Call office to schedule follow-up appointment in 2 weeks. DIET: Advance your diet as tolerated. If you experience nausea or repeated episodes of vomiting which persist beyond 12-24 hours, notify your doctor. ACTIVITY: Rest today. Increase activity gradually. Recommend walking every day to help with return of bowel function and healing. No heavy lifting or strenuous activity for 4 weeks     SHOWER/BATHING: Okay to shower    WOUND CARE: none    MEDICATIONS: Take as prescribed. Pain from the incision(s) is normal. The pain will vary from day to day and with any changes in your activity level, but it should gradually decrease over time. If you were given a prescription for a narcotic pain medication (percocet, norco, etc) you should NOT drink alcohol, drive, or operate any machinery. Do not take the narcotic medication if you are not having pain.  If your pain is mild, you may take over-the-counter ibuprofen or tylenol for pain as directed, limit total amount of acetaminophen (tylenol) to 3 grams per 24-hour period and please note that NSAIDs (ibuprofen) can cause bleeding or stomach upset. You may experience constipation while taking pain medication, strongly recommended to take over-the-counter stool softeners (Docusate/Colace or Senokot-S) while using pain medications - use as directed on bottle. Okay to resume anticoagulant medication after 24hrs.         SPECIAL INSTRUCTIONS:   Call physician if you have any questions, to schedule a follow-up appointment, and if you notice any of the following:  Fever (temperature over 101ºF) or chills  Persistent nausea, vomiting, or bloating  Severe pain that is not relieved by the prescribed pain medication  Swelling or redness around your incision(s)  No bowel movement and feeling uncomfortable  Significant change in urination or no urine output for 8 hours  Excess bleeding (from the rectum or incision) - more than ½ cup that does not stop      Follow up:   Otf Echevarria MD  [facility]  [address] [facility]    Schedule an appointment as soon as possible for a visit  For routine followup       Signed:  Rosmery Fields DO  11/17/2022  12:38 PM

## 2022-11-17 NOTE — PROGRESS NOTES
Department of Internal Medicine      HISTORY OF PRESENT ILLNESS:      The patient is a 47 y.o. male who presents with having fallen down about 10 steps at home. Patient denies any problem with any dizziness, chest pain, palpitation or extremity weakness associated with the fall. The fall apparently occurred yesterday afternoon. Patient came to the ED because of left-sided chest pain. Patient has pain in his chest with deep inspiration. Patient having significant amount of pain. Patient's BMP essentially normal with a WBC 7.4 and hemoglobin 12.6 on admission. Urinalysis showed a small amount of blood and rare bacteria. Blood pressure that was elevated 203/116 in the ED initially. Heart rate is stable with O2 sat ranging 94-81% on room air at rest.  CT of the chest and abdomen showed AST grade 1 injury of the left kidney with subcapsular hematoma, multiple left-sided rib fractures with no significant pneumothorax with a trace left hemothorax and mild pulmonary contusion. There is a large amount of ecchymosis in the subcutaneous tissue left buttocks and left flank. Patient was admitted by trauma surgery and consult with urology and IM.    11/15/2022  Patient seen examined on telemetry floor. Patient still complaining of significant rib pain. Patient denies any headaches, blurred vision, paresthesias. BUN/creatinine was 9/0.7 normal electrolytes. Liver enzymes normal WBC 9.4 and hemoglobin 11.6. Temperature 98.7 with heart rate of 92 and blood pressure ranging 160//106. O2 sat 90% on room air at rest.  We are adjusting blood pressure medication which is unsure as a lot of the blood pressure changes are related to the patient's pain. We also checked O2 saturation on room air with activity. 11/16/2022  Patient seen examined on telemetry floor. Patient complaining of constipation. Patient still with rib pain. Patient does have exertional dyspnea. WBC is 8.5 and hemoglobin 11.5.   Temperatures 98.9 with heart rate 18 and pulse 91 with blood pressure 138/86. O2 sat 93% on 2 L nasal cannula. Patient to be ambulating the hallway on room air to monitor O2 saturation or desaturation with activity. 11/17/2022  Patient seen examined on telemetry floor. Patient states he feels little bit better today. Patient's appetite is improving. Patient still with significant rib pain. CBC last night had a WBC 9.4 and hemoglobin 11.3. Temperature is 98.8 with heart rate in 90 and blood pressure ranges 129//95. Urine output is fairly good. Patient states he feels comfortable going home. Patient will be discharged home today. Past Medical History:    Past Medical History:   Diagnosis Date    Hypertension     Kidney stone      Past Surgical History:    Past Surgical History:   Procedure Laterality Date    GASTRIC BYPASS SURGERY         Medications Prior to Admission:    @  Prior to Admission medications    Not on File       Allergies:  Pcn [penicillins]    Social History:   Social History     Socioeconomic History    Marital status:      Spouse name: Not on file    Number of children: Not on file    Years of education: Not on file    Highest education level: Not on file   Occupational History    Not on file   Tobacco Use    Smoking status: Never    Smokeless tobacco: Not on file   Substance and Sexual Activity    Alcohol use: No    Drug use: No    Sexual activity: Not on file   Other Topics Concern    Not on file   Social History Narrative    Not on file     Social Determinants of Health     Financial Resource Strain: Not on file   Food Insecurity: Not on file   Transportation Needs: Not on file   Physical Activity: Not on file   Stress: Not on file   Social Connections: Not on file   Intimate Partner Violence: Not on file   Housing Stability: Not on file       Family History:   History reviewed. No pertinent family history.     REVIEW OF SYSTEMS:    Gen: Patient denies any lightheadedness or dizziness. No LOC or syncope. No fevers or chills. HEENT: No earache, sore throat or nasal congestion. Resp: Denies cough, hemoptysis or sputum production. Cardiac: Denies chest pain, SOB, diaphoresis or palpitations. GI: No nausea, vomiting, diarrhea or constipation. No melena or hematochezia. : No urinary complaints, dysuria, hematuria or frequency. MSK: + Left-sided chest and pelvic pain. No extremity weakness, paralysis or paresthesias. PHYSICAL EXAM:    Vitals:  BP (!) 168/95   Pulse 90   Temp 98.8 °F (37.1 °C) (Oral)   Resp 18   Ht 5' 8\" (1.727 m)   Wt 188 lb (85.3 kg)   SpO2 92%   BMI 28.59 kg/m²     General:  This is a 47 y.o. yo male who is alert and oriented in moderate distress secondary to above symptoms. HEENT:  Head is normocephalic and atraumatic, PERRLA, EOMI, mucus membranes moist with no pharyngeal erythema or exudate. Neck:  Supple with no carotid bruits, JVD or thyromegaly.   No cervical adenopathy  CV:  Regular rate and rhythm, no murmurs  Lungs: Coarse breath sounds left chest to auscultation bilaterally with no wheezes, rales or rhonchi  Abdomen:  Soft, nontender, nondistended, bowel sounds present  Extremities:  No edema, peripheral pulses intact bilaterally  Neuro:  Cranial nerves II-XII grossly intact; motor and sensory function intact with no focal deficits  Skin:  + Ecchymosis and edema over left buttocks and left flank and rib      DATA:  CBC with Differential:    Lab Results   Component Value Date/Time    WBC 9.4 11/16/2022 05:36 PM    RBC 3.59 11/16/2022 05:36 PM    HGB 11.3 11/16/2022 05:36 PM    HCT 35.0 11/16/2022 05:36 PM     11/16/2022 05:36 PM    MCV 97.5 11/16/2022 05:36 PM    MCH 31.5 11/16/2022 05:36 PM    MCHC 32.3 11/16/2022 05:36 PM    RDW 13.5 11/16/2022 05:36 PM    LYMPHOPCT 14.9 11/16/2022 05:36 PM    MONOPCT 11.9 11/16/2022 05:36 PM    BASOPCT 0.4 11/16/2022 05:36 PM    MONOSABS 1.12 11/16/2022 05:36 PM    LYMPHSABS 1.40 11/16/2022 05:36 PM    EOSABS 0.10 11/16/2022 05:36 PM    BASOSABS 0.04 11/16/2022 05:36 PM     CMP:    Lab Results   Component Value Date/Time     11/15/2022 08:19 AM    K 3.7 11/15/2022 08:19 AM    K 4.1 11/14/2022 05:27 AM    CL 99 11/15/2022 08:19 AM    CO2 22 11/15/2022 08:19 AM    BUN 9 11/15/2022 08:19 AM    CREATININE 0.7 11/15/2022 08:19 AM    GFRAA >60 01/21/2015 04:30 AM    LABGLOM >60 11/15/2022 08:19 AM    GLUCOSE 112 11/15/2022 08:19 AM    PROT 6.3 11/15/2022 08:19 AM    LABALBU 3.7 11/15/2022 08:19 AM    CALCIUM 8.7 11/15/2022 08:19 AM    BILITOT 0.6 11/15/2022 08:19 AM    ALKPHOS 45 11/15/2022 08:19 AM    AST 36 11/15/2022 08:19 AM    ALT 40 11/15/2022 08:19 AM     Magnesium:  No results found for: MG  Phosphorus:  No results found for: PHOS  PT/INR:  No results found for: PROTIME, INR  Troponin:  No results found for: TROPONINI  U/A:    Lab Results   Component Value Date/Time    COLORU Yellow 11/13/2022 05:20 PM    PROTEINU TRACE 11/13/2022 05:20 PM    PHUR 5.5 11/13/2022 05:20 PM    WBCUA 0-1 11/13/2022 05:20 PM    RBCUA 0-1 11/13/2022 05:20 PM    BACTERIA RARE 11/13/2022 05:20 PM    CLARITYU Clear 11/13/2022 05:20 PM    SPECGRAV 1.010 11/13/2022 05:20 PM    LEUKOCYTESUR Negative 11/13/2022 05:20 PM    UROBILINOGEN 0.2 11/13/2022 05:20 PM    BILIRUBINUR Negative 11/13/2022 05:20 PM    BLOODU SMALL 11/13/2022 05:20 PM    GLUCOSEU Negative 11/13/2022 05:20 PM     ABG:  No results found for: PH, PCO2, PO2, HCO3, BE, THGB, TCO2, O2SAT  HgBA1c:  No results found for: LABA1C  FLP:  No results found for: TRIG, HDL, LDLCALC, LDLDIRECT, LABVLDL  TSH:  No results found for: TSH  IRON:  No results found for: IRON  LIPASE:    Lab Results   Component Value Date/Time    LIPASE 78 01/21/2015 04:30 AM       ASSESSMENT AND PLAN:      Patient Active Problem List    Diagnosis Date Noted    Multiple rib fractures involving four or more ribs 11/13/2022    Closed fracture of multiple ribs of left side, initial encounter 11/13/2022     Impression:  1. Grade 1 injury left kidney with subcapsular hematoma  2. Multiple left-sided rib fractures  3. Trace left hemothorax mild pulmonary contusion  4. Hypertension-uncontrolled on admission  5. Hx Fall  6.   Mild normocytic anemia secondary to #1 and 2    Plan:  Patient is being discharged home today    Prescription for Toprol-XL 25 mg daily  Prescription for Prinzide 20/25 1 daily    Further medications for pain per general surgery    Patient to follow-up with primary care physician in 1 week or as directed    Recommend BMP and H&H in 1 to 2 weeks  Lori Lawson DO, D.O.  11/17/2022  11:18 AM

## 2022-11-17 NOTE — PLAN OF CARE
Problem: Pain  Goal: Verbalizes/displays adequate comfort level or baseline comfort level  11/16/2022 2111 by Marvel Graham RN  Outcome: Progressing     Problem: Safety - Adult  Goal: Free from fall injury  11/16/2022 2111 by Marvel Graham RN  Outcome: Progressing     Problem: ABCDS Injury Assessment  Goal: Absence of physical injury  11/16/2022 2111 by Marvel Graham RN  Outcome: Progressing

## 2022-11-17 NOTE — PROGRESS NOTES
CLINICAL PHARMACY NOTE: MEDS TO BEDS    Total # of Prescriptions Filled: 1   The following medications were delivered to the patient:  Oxy 5    Additional Documentation:   Picked up in pharmacy, Pt. declined  lisinopril-hctz 20-25  Metoprolol succinate er 25 mg  Methocarbamol 500 mg  Docusate sodium 100 mg

## 2022-11-17 NOTE — PROGRESS NOTES
Pt requesting L arm sling this morning. Pt states that the less he moves his left arm, the less his ribs hurts.

## 2022-11-17 NOTE — CARE COORDINATION
Ss note:11/17/202212:27 PM No covid testing. Resides with wife, presents from home after a fall down steps, fx of ribs pt is on room air. Plan is home at discharge.  LISA Larose

## 2022-12-12 ENCOUNTER — HOSPITAL ENCOUNTER (EMERGENCY)
Age: 52
Discharge: HOME | End: 2022-12-12
Payer: COMMERCIAL

## 2022-12-12 ENCOUNTER — APPOINTMENT (OUTPATIENT)
Dept: CT IMAGING | Age: 52
End: 2022-12-12
Payer: COMMERCIAL

## 2022-12-12 VITALS
HEART RATE: 95 BPM | RESPIRATION RATE: 20 BRPM | DIASTOLIC BLOOD PRESSURE: 92 MMHG | SYSTOLIC BLOOD PRESSURE: 152 MMHG | TEMPERATURE: 98 F | OXYGEN SATURATION: 95 %

## 2022-12-12 DIAGNOSIS — S37.012A HEMATOMA OF LEFT KIDNEY, INITIAL ENCOUNTER: ICD-10-CM

## 2022-12-12 DIAGNOSIS — Z87.81 HISTORY OF RIB FRACTURE: ICD-10-CM

## 2022-12-12 DIAGNOSIS — J90 PLEURAL EFFUSION: ICD-10-CM

## 2022-12-12 DIAGNOSIS — R07.89 CHEST WALL PAIN: Primary | ICD-10-CM

## 2022-12-12 DIAGNOSIS — K80.20 CALCULUS OF GALLBLADDER WITHOUT CHOLECYSTITIS WITHOUT OBSTRUCTION: ICD-10-CM

## 2022-12-12 LAB
ALBUMIN SERPL-MCNC: 4.6 G/DL (ref 3.5–5.2)
ALP BLD-CCNC: 83 U/L (ref 40–129)
ALT SERPL-CCNC: 18 U/L (ref 0–40)
ANION GAP SERPL CALCULATED.3IONS-SCNC: 18 MMOL/L (ref 7–16)
AST SERPL-CCNC: 23 U/L (ref 0–39)
BACTERIA: NORMAL /HPF
BASOPHILS ABSOLUTE: 0.04 E9/L (ref 0–0.2)
BASOPHILS RELATIVE PERCENT: 0.5 % (ref 0–2)
BILIRUB SERPL-MCNC: 0.5 MG/DL (ref 0–1.2)
BILIRUBIN URINE: NEGATIVE
BLOOD, URINE: NEGATIVE
BUN BLDV-MCNC: 9 MG/DL (ref 6–20)
CALCIUM SERPL-MCNC: 10.1 MG/DL (ref 8.6–10.2)
CHLORIDE BLD-SCNC: 103 MMOL/L (ref 98–107)
CLARITY: CLEAR
CO2: 19 MMOL/L (ref 22–29)
COLOR: NORMAL
CREAT SERPL-MCNC: 0.8 MG/DL (ref 0.7–1.2)
EOSINOPHILS ABSOLUTE: 0.21 E9/L (ref 0.05–0.5)
EOSINOPHILS RELATIVE PERCENT: 2.4 % (ref 0–6)
GFR SERPL CREATININE-BSD FRML MDRD: >60 ML/MIN/1.73
GLUCOSE BLD-MCNC: 93 MG/DL (ref 74–99)
GLUCOSE URINE: NEGATIVE MG/DL
HCT VFR BLD CALC: 44.3 % (ref 37–54)
HEMOGLOBIN: 14.5 G/DL (ref 12.5–16.5)
IMMATURE GRANULOCYTES #: 0.03 E9/L
IMMATURE GRANULOCYTES %: 0.3 % (ref 0–5)
KETONES, URINE: NEGATIVE MG/DL
LEUKOCYTE ESTERASE, URINE: NEGATIVE
LIPASE: 62 U/L (ref 13–60)
LYMPHOCYTES ABSOLUTE: 2.44 E9/L (ref 1.5–4)
LYMPHOCYTES RELATIVE PERCENT: 28.4 % (ref 20–42)
MCH RBC QN AUTO: 31.3 PG (ref 26–35)
MCHC RBC AUTO-ENTMCNC: 32.7 % (ref 32–34.5)
MCV RBC AUTO: 95.7 FL (ref 80–99.9)
MONOCYTES ABSOLUTE: 0.69 E9/L (ref 0.1–0.95)
MONOCYTES RELATIVE PERCENT: 8 % (ref 2–12)
NEUTROPHILS ABSOLUTE: 5.19 E9/L (ref 1.8–7.3)
NEUTROPHILS RELATIVE PERCENT: 60.4 % (ref 43–80)
NITRITE, URINE: NEGATIVE
PDW BLD-RTO: 13.3 FL (ref 11.5–15)
PH UA: 6 (ref 5–9)
PLATELET # BLD: 333 E9/L (ref 130–450)
PMV BLD AUTO: 9.6 FL (ref 7–12)
POTASSIUM SERPL-SCNC: 4.2 MMOL/L (ref 3.5–5)
PROTEIN UA: NEGATIVE MG/DL
RBC # BLD: 4.63 E12/L (ref 3.8–5.8)
RBC UA: NORMAL /HPF (ref 0–2)
SODIUM BLD-SCNC: 140 MMOL/L (ref 132–146)
SPECIFIC GRAVITY UA: <=1.005 (ref 1–1.03)
TOTAL PROTEIN: 7.9 G/DL (ref 6.4–8.3)
TROPONIN, HIGH SENSITIVITY: 12 NG/L (ref 0–11)
TROPONIN, HIGH SENSITIVITY: 13 NG/L (ref 0–11)
UROBILINOGEN, URINE: 0.2 E.U./DL
WBC # BLD: 8.6 E9/L (ref 4.5–11.5)
WBC UA: NORMAL /HPF (ref 0–5)

## 2022-12-12 PROCEDURE — 6360000002 HC RX W HCPCS: Performed by: PHYSICIAN ASSISTANT

## 2022-12-12 PROCEDURE — 85025 COMPLETE CBC W/AUTO DIFF WBC: CPT

## 2022-12-12 PROCEDURE — 36415 COLL VENOUS BLD VENIPUNCTURE: CPT

## 2022-12-12 PROCEDURE — 81001 URINALYSIS AUTO W/SCOPE: CPT

## 2022-12-12 PROCEDURE — 83690 ASSAY OF LIPASE: CPT

## 2022-12-12 PROCEDURE — 99285 EMERGENCY DEPT VISIT HI MDM: CPT

## 2022-12-12 PROCEDURE — 2580000003 HC RX 258: Performed by: PHYSICIAN ASSISTANT

## 2022-12-12 PROCEDURE — 71275 CT ANGIOGRAPHY CHEST: CPT

## 2022-12-12 PROCEDURE — 96374 THER/PROPH/DIAG INJ IV PUSH: CPT

## 2022-12-12 PROCEDURE — 80053 COMPREHEN METABOLIC PANEL: CPT

## 2022-12-12 PROCEDURE — 74177 CT ABD & PELVIS W/CONTRAST: CPT

## 2022-12-12 PROCEDURE — 6360000004 HC RX CONTRAST MEDICATION: Performed by: RADIOLOGY

## 2022-12-12 PROCEDURE — 84484 ASSAY OF TROPONIN QUANT: CPT

## 2022-12-12 RX ORDER — 0.9 % SODIUM CHLORIDE 0.9 %
1000 INTRAVENOUS SOLUTION INTRAVENOUS ONCE
Status: COMPLETED | OUTPATIENT
Start: 2022-12-12 | End: 2022-12-12

## 2022-12-12 RX ORDER — MORPHINE SULFATE 10 MG/ML
8 INJECTION, SOLUTION INTRAMUSCULAR; INTRAVENOUS ONCE
Status: COMPLETED | OUTPATIENT
Start: 2022-12-12 | End: 2022-12-12

## 2022-12-12 RX ADMIN — IOPAMIDOL 75 ML: 755 INJECTION, SOLUTION INTRAVENOUS at 15:04

## 2022-12-12 RX ADMIN — SODIUM CHLORIDE 1000 ML: 9 INJECTION, SOLUTION INTRAVENOUS at 15:45

## 2022-12-12 RX ADMIN — MORPHINE SULFATE 8 MG: 10 INJECTION, SOLUTION INTRAMUSCULAR; INTRAVENOUS at 15:48

## 2022-12-12 ASSESSMENT — PAIN DESCRIPTION - LOCATION: LOCATION: GENERALIZED

## 2022-12-12 ASSESSMENT — PAIN DESCRIPTION - ORIENTATION: ORIENTATION: LEFT

## 2022-12-12 ASSESSMENT — PAIN SCALES - GENERAL: PAINLEVEL_OUTOF10: 6

## 2022-12-12 NOTE — ED NOTES
Radiology Procedure Waiver   Name: Escobar Perera  : 1970  MRN: [id number]    Date:  22    Time: 2:46 PM EST    Benefits of immediately proceeding with Radiology exam(s) without pre-testing outweigh the risks or are not indicated as specified below and therefore the following is/are being waived:    [] Pregnancy test   [] Patients LMP on-time and regular.   [] Patient had Tubal Ligation or has other Contraception Device. [] Patient  is Menopausal or Premenarcheal.    [] Patient had Full or Partial Hysterectomy. [] Protocol for Iodine allergy    [] MRI Questionnaire     [x] BUN/Creatinine   [] Patient age w/no hx of renal dysfunction. [] Patient on Dialysis. [x] Recent Normal Labs.   Electronically signed by Steve Olivia PA-C on 22 at 2:46 PM EST               Steve Olivia PA-C  22 [id number]

## 2022-12-12 NOTE — ED PROVIDER NOTES
[facility]      Department of Emergency Medicine   ED  Encounter Note  Admit Date/RoomTime: 2022  2:44 PM  ED Room:     NAME: Charles Albarado  : 1970  MRN: [id number]     Chief Complaint:  Other (Came straight from dr Larson Stall office and rule out spleen rupture. Reported to fall down 13 stairs )    History of Present Illness        Charles Albarado is a 47 y.o. old male who presents to the emergency department by private vehicle, for ongoing cramping, sharp pain left sided chest/LUQ with radiation to the back and left shoulder which began 1 month(s) prior to arrival.  There has been no similar episodes in the past.  Patient states that he was seen 1 month ago after he fell down 13 steps and fractures multiple ribs. Since onset the symptoms have been remaining constant. The pain is associated with no additional symptoms. The pain is aggravated by certain movements and pressure on area of discomfort and relieved by nothing. There has been NO shortness of breath, fever, chills, sweating, nausea, vomiting, anorexia, weight loss, dark/black stools, blood in stool, blood in emesis, cloudy urine, urinary frequency, dysuria, hematuria, urinary urgency, urinary incontinence, penile discharge, or scrotal pain. ROS   Pertinent positives and negatives are stated within HPI, all other systems reviewed and are negative. Past Medical History:  has a past medical history of Hypertension and Kidney stone. Surgical History:  has a past surgical history that includes Gastric bypass surgery. Social History:  reports that he has never smoked. He does not have any smokeless tobacco history on file. He reports that he does not drink alcohol and does not use drugs. Family History: family history is not on file.      Allergies: Pcn [penicillins]    Physical Exam   Oxygen Saturation Interpretation: Normal.        ED Triage Vitals [22 1433]   BP Temp Temp Source Heart Rate Resp SpO2 Height Weight   (!) 152/92 98 °F (36.7 °C) Infrared 95 22 95 % -- --       Physical Exam  General Appearance/Constitutional:  Alert, development consistent with age. HEENT:  NC/NT. Airway patent. Neck:  Supple. No lymphadenopathy. Respiratory: Lungs Clear to auscultation and breath sounds equal.  CV:  Regular rate and rhythm. Chest: severe tenderness left sided chest wall  GI:  normal appearing, non-distended with no visible hernias. Bowel sounds: normal bowel sounds. Tenderness: No abdominal tenderness, guarding, rebound, rigidity or pulsatile mass. .           Liver: non-tender. Spleen:  non-tender. Back: CVA Tenderness: No CVA tenderness. Integument:  Normal turgor. Warm, dry, without visible rash, unless noted elsewhere. Neurological:  Orientation age-appropriate. Motor functions intact.     Lab / Imaging Results   (All laboratory and radiology results have been personally reviewed by myself)  Labs:  Results for orders placed or performed during the hospital encounter of 12/12/22   Comprehensive Metabolic Panel   Result Value Ref Range    Sodium 140 132 - 146 mmol/L    Potassium 4.2 3.5 - 5.0 mmol/L    Chloride 103 98 - 107 mmol/L    CO2 19 (L) 22 - 29 mmol/L    Anion Gap 18 (H) 7 - 16 mmol/L    Glucose 93 74 - 99 mg/dL    BUN 9 6 - 20 mg/dL    Creatinine 0.8 0.7 - 1.2 mg/dL    Est, Glom Filt Rate >60 >=60 mL/min/1.73    Calcium 10.1 8.6 - 10.2 mg/dL    Total Protein 7.9 6.4 - 8.3 g/dL    Albumin 4.6 3.5 - 5.2 g/dL    Total Bilirubin 0.5 0.0 - 1.2 mg/dL    Alkaline Phosphatase 83 40 - 129 U/L    ALT 18 0 - 40 U/L    AST 23 0 - 39 U/L   CBC with Auto Differential   Result Value Ref Range    WBC 8.6 4.5 - 11.5 E9/L    RBC 4.63 3.80 - 5.80 E12/L    Hemoglobin 14.5 12.5 - 16.5 g/dL    Hematocrit 44.3 37.0 - 54.0 %    MCV 95.7 80.0 - 99.9 fL    MCH 31.3 26.0 - 35.0 pg    MCHC 32.7 32.0 - 34.5 %    RDW 13.3 11.5 - 15.0 fL    Platelets 446 188 - 677 E9/L    MPV 9.6 7.0 - 12.0 fL Neutrophils % 60.4 43.0 - 80.0 %    Immature Granulocytes % 0.3 0.0 - 5.0 %    Lymphocytes % 28.4 20.0 - 42.0 %    Monocytes % 8.0 2.0 - 12.0 %    Eosinophils % 2.4 0.0 - 6.0 %    Basophils % 0.5 0.0 - 2.0 %    Neutrophils Absolute 5.19 1.80 - 7.30 E9/L    Immature Granulocytes # 0.03 E9/L    Lymphocytes Absolute 2.44 1.50 - 4.00 E9/L    Monocytes Absolute 0.69 0.10 - 0.95 E9/L    Eosinophils Absolute 0.21 0.05 - 0.50 E9/L    Basophils Absolute 0.04 0.00 - 0.20 E9/L   Lipase   Result Value Ref Range    Lipase 62 (H) 13 - 60 U/L   Urinalysis with Microscopic   Result Value Ref Range    Color, UA Straw Straw/Yellow    Clarity, UA Clear Clear    Glucose, Ur Negative Negative mg/dL    Bilirubin Urine Negative Negative    Ketones, Urine Negative Negative mg/dL    Specific Gravity, UA <=1.005 1.005 - 1.030    Blood, Urine Negative Negative    pH, UA 6.0 5.0 - 9.0    Protein, UA Negative Negative mg/dL    Urobilinogen, Urine 0.2 <2.0 E.U./dL    Nitrite, Urine Negative Negative    Leukocyte Esterase, Urine Negative Negative    WBC, UA NONE 0 - 5 /HPF    RBC, UA NONE 0 - 2 /HPF    Bacteria, UA NONE SEEN None Seen /HPF   Troponin   Result Value Ref Range    Troponin, High Sensitivity 12 (H) 0 - 11 ng/L   Troponin   Result Value Ref Range    Troponin, High Sensitivity 13 (H) 0 - 11 ng/L     Imaging: All Radiology results interpreted by Radiologist unless otherwise noted. CT ABDOMEN PELVIS W IV CONTRAST Additional Contrast? None   Final Result   1. No evidence of pulmonary embolism   2. Multiple left rib fractures with trace left pleural effusion that could   represent hemothorax. 3. Dependent opacities in the bilateral lungs may represent atelectasis,   scarring, or contusion. 4. Little change in left renal subcapsular hematoma and upper pole cyst.   5. Cholelithiasis without evidence of acute cholecystitis. CTA CHEST W CONTRAST   Final Result   1. No evidence of pulmonary embolism   2.  Multiple left rib fractures with trace left pleural effusion that could   represent hemothorax. 3. Dependent opacities in the bilateral lungs may represent atelectasis,   scarring, or contusion. 4. Little change in left renal subcapsular hematoma and upper pole cyst.   5. Cholelithiasis without evidence of acute cholecystitis. ED Course / Medical Decision Making     Medications   iopamidol (ISOVUE-370) 76 % injection 75 mL (75 mLs IntraVENous Given 12/12/22 1504)   morphine (PF) injection 8 mg (8 mg IntraVENous Given 12/12/22 1548)   0.9 % sodium chloride bolus (0 mLs IntraVENous Stopped 12/12/22 1620)      Re-Evaluations:  12/12/22      Time: 1600    Patients condition is improving. Results discussed. Will repeat troponin. Time: 1700  Results discussed. Patient is ready to go home. Consultations:             None    Procedures:   none    MDM: Patient presents to the emergency room as recommended by his primary care provider to rule out splenic rupture. CTA chest and CT abdomen pelvis are obtained and did show multiple rib fractures with likely hemothorax and also his left renal hematoma. These are all unchanged from his recent admission. CT also with dependent opacities in bilateral lungs and cholelithiasis. Patient educated on all new results. Patient to follow-up with his general surgeon, Dr. Champ Garcia, and his PCP. Patient states that he does not need anything more for pain other than what he already has. States that he is recovering well following his injury. Strict return precautions were discussed and he should come back immediately for new or worsening symptoms. Plan of Care/Counseling:  Honey Jensen PA-C  reviewed today's visit with the patient in addition to providing specific details for the plan of care and counseling regarding the diagnosis and prognosis. Questions are answered at this time and are agreeable with the plan. Assessment      1. Chest wall pain    2.  History of rib fracture    3. Pleural effusion    4. Hematoma of left kidney, initial encounter    5. Calculus of gallbladder without cholecystitis without obstruction      This patient's ED course included: a personal history and physicial examination, re-evaluation prior to disposition, multiple bedside re-evaluations, and IV medications  This patient has remained hemodynamically stable, remained unchanged, and been closely monitored during their ED course. Plan   Discharged home  Patient condition is good. New Medications     Discharge Medication List as of 12/12/2022  5:09 PM        Electronically signed by Jonathan Ness PA-C   DD: 12/12/22  **This report was transcribed using voice recognition software. Every effort was made to ensure accuracy; however, inadvertent computerized transcription errors may be present.   END OF PROVIDER NOTE        Jonathan Ness PA-C  12/12/22 [id number]

## 2022-12-12 NOTE — Clinical Note
Karly Koroma was seen and treated in our emergency department on 12/12/2022. He may return to work on 12/14/2022. If you have any questions or concerns, please don't hesitate to call.       Brittani Bray PA-C

## 2022-12-28 ENCOUNTER — TELEPHONE (OUTPATIENT)
Dept: SURGERY | Age: 52
End: 2022-12-28

## 2022-12-28 NOTE — TELEPHONE ENCOUNTER
Call placed to schedule follow up with Dr. Esha Crawley. Patient scheduled for 1.3.2023.   Electronically signed by Henrik Concepcion on 12/28/22 at 9:05 AM EST

## 2022-12-28 NOTE — TELEPHONE ENCOUNTER
----- Message from Arjun Sepulveda MD sent at 12/13/2022  4:47 AM EST -----  ER f/u gallbladder  ----- Message -----  From: Automatic Discharge Provider  Sent: 12/12/2022   5:12 PM EST  To: Arjun Sepulveda MD

## 2023-01-11 ENCOUNTER — HOSPITAL ENCOUNTER (OUTPATIENT)
Dept: GENERAL RADIOLOGY | Age: 53
Discharge: HOME | End: 2023-01-13
Payer: COMMERCIAL

## 2023-01-11 ENCOUNTER — HOSPITAL ENCOUNTER (OUTPATIENT)
Age: 53
Discharge: HOME | End: 2023-01-13
Payer: COMMERCIAL

## 2023-01-11 DIAGNOSIS — M84.48XA PATHOLOGIC RIB FRACTURE, INITIAL ENCOUNTER: ICD-10-CM

## 2023-01-11 PROCEDURE — 71100 X-RAY EXAM RIBS UNI 2 VIEWS: CPT

## 2023-01-11 PROCEDURE — 71046 X-RAY EXAM CHEST 2 VIEWS: CPT

## 2024-03-27 ENCOUNTER — HOSPITAL ENCOUNTER (INPATIENT)
Age: 54
LOS: 3 days | Discharge: HOME | End: 2024-03-30
Attending: EMERGENCY MEDICINE | Admitting: SURGERY
Payer: MEDICAID

## 2024-03-27 ENCOUNTER — APPOINTMENT (OUTPATIENT)
Dept: CT IMAGING | Age: 54
End: 2024-03-27
Payer: MEDICAID

## 2024-03-27 ENCOUNTER — APPOINTMENT (OUTPATIENT)
Dept: GENERAL RADIOLOGY | Age: 54
End: 2024-03-27
Payer: MEDICAID

## 2024-03-27 ENCOUNTER — HOSPITAL ENCOUNTER (EMERGENCY)
Age: 54
Discharge: ANOTHER HOSPITAL | End: 2024-03-27
Attending: EMERGENCY MEDICINE
Payer: MEDICAID

## 2024-03-27 VITALS
DIASTOLIC BLOOD PRESSURE: 106 MMHG | RESPIRATION RATE: 28 BRPM | SYSTOLIC BLOOD PRESSURE: 168 MMHG | TEMPERATURE: 99 F | OXYGEN SATURATION: 99 % | HEART RATE: 103 BPM

## 2024-03-27 DIAGNOSIS — I60.9 SUBARACHNOID BLEED (HCC): ICD-10-CM

## 2024-03-27 DIAGNOSIS — I10 ESSENTIAL HYPERTENSION: ICD-10-CM

## 2024-03-27 DIAGNOSIS — F10.929 ACUTE ALCOHOLIC INTOXICATION WITH COMPLICATION (HCC): ICD-10-CM

## 2024-03-27 DIAGNOSIS — S06.4XAA EPIDURAL HEMATOMA (HCC): ICD-10-CM

## 2024-03-27 DIAGNOSIS — S06.5XAA SUBDURAL HEMATOMA (HCC): ICD-10-CM

## 2024-03-27 DIAGNOSIS — S02.91XA CLOSED FRACTURE OF SKULL, UNSPECIFIED BONE, INITIAL ENCOUNTER (HCC): ICD-10-CM
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
## 2024-03-27 DIAGNOSIS — S02.0XXA CLOSED FRACTURE OF PARIETAL BONE, INITIAL ENCOUNTER (HCC): ICD-10-CM

## 2024-03-27 DIAGNOSIS — I60.9 SAH (SUBARACHNOID HEMORRHAGE) (HCC): ICD-10-CM

## 2024-03-27 DIAGNOSIS — S06.4XAA EPIDURAL HEMATOMA (HCC): Primary | ICD-10-CM

## 2024-03-27 PROBLEM — I62.9 INTRACRANIAL BLEED (HCC): Status: ACTIVE | Noted: 2024-03-27

## 2024-03-27 PROBLEM — S06.2X0S: Status: ACTIVE | Noted: 2024-03-27

## 2024-03-27 PROBLEM — S06.2XAA: Status: ACTIVE | Noted: 2024-03-27

## 2024-03-27 PROBLEM — S06.A0XA: Status: ACTIVE | Noted: 2024-03-27

## 2024-03-27 LAB
ALBUMIN SERPL-MCNC: 3.8 G/DL (ref 3.5–5.2)
ALBUMIN SERPL-MCNC: 4.2 G/DL (ref 3.5–5.2)
ALP SERPL-CCNC: 58 U/L (ref 40–129)
ALP SERPL-CCNC: 61 U/L (ref 40–129)
ALT SERPL-CCNC: 27 U/L (ref 0–40)
ALT SERPL-CCNC: 29 U/L (ref 0–40)
AMPHET UR QL SCN: NEGATIVE
ANION GAP SERPL CALCULATED.3IONS-SCNC: 16 MMOL/L (ref 7–16)
ANION GAP SERPL CALCULATED.3IONS-SCNC: 16 MMOL/L (ref 7–16)
APAP SERPL-MCNC: <5 UG/ML (ref 10–30)
AST SERPL-CCNC: 34 U/L (ref 0–39)
AST SERPL-CCNC: 35 U/L (ref 0–39)
BARBITURATES UR QL SCN: NEGATIVE
BASOPHILS # BLD: 0.05 K/UL (ref 0–0.2)
BASOPHILS # BLD: 0.06 K/UL (ref 0–0.2)
BASOPHILS NFR BLD: 1 % (ref 0–2)
BASOPHILS NFR BLD: 1 % (ref 0–2)
BENZODIAZ UR QL: NEGATIVE
BILIRUB DIRECT SERPL-MCNC: <0.2 MG/DL (ref 0–0.3)
BILIRUB INDIRECT SERPL-MCNC: NORMAL MG/DL (ref 0–1)
BILIRUB SERPL-MCNC: 0.2 MG/DL (ref 0–1.2)
BILIRUB SERPL-MCNC: 0.3 MG/DL (ref 0–1.2)
BILIRUB UR QL STRIP: NEGATIVE
BUN SERPL-MCNC: 10 MG/DL (ref 6–20)
BUN SERPL-MCNC: 10 MG/DL (ref 6–20)
BUPRENORPHINE UR QL: NEGATIVE
CALCIUM SERPL-MCNC: 8.3 MG/DL (ref 8.6–10.2)
CALCIUM SERPL-MCNC: 8.8 MG/DL (ref 8.6–10.2)
CANNABINOIDS UR QL SCN: NEGATIVE
CHLORIDE SERPL-SCNC: 105 MMOL/L (ref 98–107)
CHLORIDE SERPL-SCNC: 107 MMOL/L (ref 98–107)
CK SERPL-CCNC: 173 U/L (ref 20–200)
CLARITY UR: CLEAR
CLOT ANGLE.KAOLIN INDUCED BLD RES TEG: 77.4 DEG (ref 53–70)
CO2 SERPL-SCNC: 19 MMOL/L (ref 22–29)
CO2 SERPL-SCNC: 19 MMOL/L (ref 22–29)
COCAINE UR QL SCN: NEGATIVE
COLOR UR: YELLOW
COMMENT: NORMAL
CREAT SERPL-MCNC: 0.9 MG/DL (ref 0.7–1.2)
CREAT SERPL-MCNC: 0.9 MG/DL (ref 0.7–1.2)
EOSINOPHIL # BLD: 0.07 K/UL (ref 0.05–0.5)
EOSINOPHIL # BLD: 0.12 K/UL (ref 0.05–0.5)
EOSINOPHILS RELATIVE PERCENT: 1 % (ref 0–6)
EOSINOPHILS RELATIVE PERCENT: 2 % (ref 0–6)
EPL-TEG: 1.4 % (ref 0–15)
ERYTHROCYTE [DISTWIDTH] IN BLOOD BY AUTOMATED COUNT: 14.6 % (ref 11.5–15)
ERYTHROCYTE [DISTWIDTH] IN BLOOD BY AUTOMATED COUNT: 14.6 % (ref 11.5–15)
ETHANOLAMINE SERPL-MCNC: 345 MG/DL
FENTANYL UR QL: NEGATIVE
G-TEG: 11.3 KDYN/CM2 (ref 4.5–11)
GFR SERPL CREATININE-BSD FRML MDRD: >90 ML/MIN/1.73M2
GFR SERPL CREATININE-BSD FRML MDRD: >90 ML/MIN/1.73M2
GLUCOSE BLD-MCNC: 91 MG/DL (ref 74–99)
GLUCOSE SERPL-MCNC: 88 MG/DL (ref 74–99)
GLUCOSE SERPL-MCNC: 93 MG/DL (ref 74–99)
GLUCOSE UR STRIP-MCNC: NEGATIVE MG/DL
HCT VFR BLD AUTO: 37.7 % (ref 37–54)
HCT VFR BLD AUTO: 41.8 % (ref 37–54)
HGB BLD-MCNC: 12.7 G/DL (ref 12.5–16.5)
HGB BLD-MCNC: 14.2 G/DL (ref 12.5–16.5)
HGB UR QL STRIP.AUTO: NEGATIVE
IMM GRANULOCYTES # BLD AUTO: 0.03 K/UL (ref 0–0.58)
IMM GRANULOCYTES # BLD AUTO: 0.03 K/UL (ref 0–0.58)
IMM GRANULOCYTES NFR BLD: 0 % (ref 0–5)
IMM GRANULOCYTES NFR BLD: 0 % (ref 0–5)
INR PPP: 1
KETONES UR STRIP-MCNC: NEGATIVE MG/DL
KINETICS TEG: 0.9 MIN (ref 1–3)
LEUKOCYTE ESTERASE UR QL STRIP: NEGATIVE
LIPASE SERPL-CCNC: 57 U/L (ref 13–60)
LY30 (LYSIS) TEG: 1.4 % (ref 0–8)
LYMPHOCYTES NFR BLD: 1.58 K/UL (ref 1.5–4)
LYMPHOCYTES NFR BLD: 1.82 K/UL (ref 1.5–4)
LYMPHOCYTES RELATIVE PERCENT: 17 % (ref 20–42)
LYMPHOCYTES RELATIVE PERCENT: 26 % (ref 20–42)
MA (MAX CLOT) TEG: 69.3 MM (ref 50–70)
MAGNESIUM SERPL-MCNC: 2.4 MG/DL (ref 1.6–2.6)
MCH RBC QN AUTO: 30.8 PG (ref 26–35)
MCH RBC QN AUTO: 31.2 PG (ref 26–35)
MCHC RBC AUTO-ENTMCNC: 33.7 G/DL (ref 32–34.5)
MCHC RBC AUTO-ENTMCNC: 34 G/DL (ref 32–34.5)
MCV RBC AUTO: 91.3 FL (ref 80–99.9)
MCV RBC AUTO: 91.9 FL (ref 80–99.9)
METHADONE UR QL: NEGATIVE
MONOCYTES NFR BLD: 0.5 K/UL (ref 0.1–0.95)
MONOCYTES NFR BLD: 0.66 K/UL (ref 0.1–0.95)
MONOCYTES NFR BLD: 7 % (ref 2–12)
MONOCYTES NFR BLD: 7 % (ref 2–12)
NEUTROPHILS NFR BLD: 65 % (ref 43–80)
NEUTROPHILS NFR BLD: 74 % (ref 43–80)
NEUTS SEG NFR BLD: 4.58 K/UL (ref 1.8–7.3)
NEUTS SEG NFR BLD: 6.77 K/UL (ref 1.8–7.3)
NITRITE UR QL STRIP: NEGATIVE
OPIATES UR QL SCN: NEGATIVE
OXYCODONE UR QL SCN: NEGATIVE
PCP UR QL SCN: NEGATIVE
PH UR STRIP: 5.5 [PH] (ref 5–9)
PLATELET # BLD AUTO: 247 K/UL (ref 130–450)
PLATELET # BLD AUTO: 253 K/UL (ref 130–450)
PMV BLD AUTO: 9.6 FL (ref 7–12)
PMV BLD AUTO: 9.8 FL (ref 7–12)
POTASSIUM SERPL-SCNC: 4 MMOL/L (ref 3.5–5)
POTASSIUM SERPL-SCNC: 4.1 MMOL/L (ref 3.5–5)
PROT SERPL-MCNC: 6.7 G/DL (ref 6.4–8.3)
PROT SERPL-MCNC: 7.2 G/DL (ref 6.4–8.3)
PROT UR STRIP-MCNC: NEGATIVE MG/DL
PROTHROMBIN TIME: 10.7 SEC (ref 9.3–12.4)
RBC # BLD AUTO: 4.13 M/UL (ref 3.8–5.8)
RBC # BLD AUTO: 4.55 M/UL (ref 3.8–5.8)
REACTION TIME TEG: 3 MIN (ref 5–10)
SALICYLATES SERPL-MCNC: <0.3 MG/DL (ref 0–30)
SODIUM SERPL-SCNC: 140 MMOL/L (ref 132–146)
SODIUM SERPL-SCNC: 142 MMOL/L (ref 132–146)
SP GR UR STRIP: 1.01 (ref 1–1.03)
TEST INFORMATION: NORMAL
TROPONIN I SERPL HS-MCNC: 18 NG/L (ref 0–11)
UROBILINOGEN UR STRIP-ACNC: 0.2 EU/DL (ref 0–1)
WBC OTHER # BLD: 7.1 K/UL (ref 4.5–11.5)
WBC OTHER # BLD: 9.2 K/UL (ref 4.5–11.5)

## 2024-03-27 PROCEDURE — 85576 BLOOD PLATELET AGGREGATION: CPT

## 2024-03-27 PROCEDURE — 85610 PROTHROMBIN TIME: CPT

## 2024-03-27 PROCEDURE — 82550 ASSAY OF CK (CPK): CPT

## 2024-03-27 PROCEDURE — 6370000000 HC RX 637 (ALT 250 FOR IP)

## 2024-03-27 PROCEDURE — 6360000002 HC RX W HCPCS: Performed by: STUDENT IN AN ORGANIZED HEALTH CARE EDUCATION/TRAINING PROGRAM

## 2024-03-27 PROCEDURE — 85025 COMPLETE CBC W/AUTO DIFF WBC: CPT

## 2024-03-27 PROCEDURE — 82962 GLUCOSE BLOOD TEST: CPT

## 2024-03-27 PROCEDURE — 80143 DRUG ASSAY ACETAMINOPHEN: CPT

## 2024-03-27 PROCEDURE — 72170 X-RAY EXAM OF PELVIS: CPT

## 2024-03-27 PROCEDURE — 2060000000 HC ICU INTERMEDIATE R&B

## 2024-03-27 PROCEDURE — 70450 CT HEAD/BRAIN W/O DYE: CPT

## 2024-03-27 PROCEDURE — 70498 CT ANGIOGRAPHY NECK: CPT

## 2024-03-27 PROCEDURE — 96375 TX/PRO/DX INJ NEW DRUG ADDON: CPT

## 2024-03-27 PROCEDURE — 83735 ASSAY OF MAGNESIUM: CPT

## 2024-03-27 PROCEDURE — 80307 DRUG TEST PRSMV CHEM ANLYZR: CPT

## 2024-03-27 PROCEDURE — 99285 EMERGENCY DEPT VISIT HI MDM: CPT

## 2024-03-27 PROCEDURE — 96365 THER/PROPH/DIAG IV INF INIT: CPT

## 2024-03-27 PROCEDURE — 72125 CT NECK SPINE W/O DYE: CPT

## 2024-03-27 PROCEDURE — 80053 COMPREHEN METABOLIC PANEL: CPT

## 2024-03-27 PROCEDURE — 96366 THER/PROPH/DIAG IV INF ADDON: CPT

## 2024-03-27 PROCEDURE — 85347 COAGULATION TIME ACTIVATED: CPT

## 2024-03-27 PROCEDURE — 80179 DRUG ASSAY SALICYLATE: CPT

## 2024-03-27 PROCEDURE — 93005 ELECTROCARDIOGRAM TRACING: CPT | Performed by: EMERGENCY MEDICINE

## 2024-03-27 PROCEDURE — 85384 FIBRINOGEN ACTIVITY: CPT

## 2024-03-27 PROCEDURE — 0042T CT BRAIN PERFUSION: CPT

## 2024-03-27 PROCEDURE — 84484 ASSAY OF TROPONIN QUANT: CPT

## 2024-03-27 PROCEDURE — 2580000003 HC RX 258: Performed by: EMERGENCY MEDICINE

## 2024-03-27 PROCEDURE — 70496 CT ANGIOGRAPHY HEAD: CPT

## 2024-03-27 PROCEDURE — 71045 X-RAY EXAM CHEST 1 VIEW: CPT

## 2024-03-27 PROCEDURE — 85390 FIBRINOLYSINS SCREEN I&R: CPT

## 2024-03-27 PROCEDURE — 99284 EMERGENCY DEPT VISIT MOD MDM: CPT | Performed by: PSYCHIATRY & NEUROLOGY

## 2024-03-27 PROCEDURE — 6360000004 HC RX CONTRAST MEDICATION: Performed by: RADIOLOGY

## 2024-03-27 PROCEDURE — 81003 URINALYSIS AUTO W/O SCOPE: CPT

## 2024-03-27 PROCEDURE — 6360000002 HC RX W HCPCS: Performed by: EMERGENCY MEDICINE

## 2024-03-27 PROCEDURE — 83690 ASSAY OF LIPASE: CPT

## 2024-03-27 PROCEDURE — G0480 DRUG TEST DEF 1-7 CLASSES: HCPCS

## 2024-03-27 PROCEDURE — 82248 BILIRUBIN DIRECT: CPT

## 2024-03-27 PROCEDURE — 2580000003 HC RX 258

## 2024-03-27 RX ORDER — ACETAMINOPHEN 325 MG/1
650 TABLET ORAL EVERY 6 HOURS
Status: DISCONTINUED | OUTPATIENT
Start: 2024-03-27 | End: 2024-03-29

## 2024-03-27 RX ORDER — ACETAMINOPHEN 325 MG/1
650 TABLET ORAL EVERY 6 HOURS
Status: DISCONTINUED | OUTPATIENT
Start: 2024-03-27 | End: 2024-03-27 | Stop reason: SDUPTHER

## 2024-03-27 RX ORDER — LEVETIRACETAM 500 MG/5ML
1000 INJECTION, SOLUTION, CONCENTRATE INTRAVENOUS ONCE
Status: COMPLETED | OUTPATIENT
Start: 2024-03-27 | End: 2024-03-27

## 2024-03-27 RX ORDER — LEVETIRACETAM 500 MG/1
500 TABLET ORAL 2 TIMES DAILY
Status: DISCONTINUED | OUTPATIENT
Start: 2024-03-28 | End: 2024-03-30 | Stop reason: HOSPADM

## 2024-03-27 RX ORDER — POLYETHYLENE GLYCOL 3350 17 G/17G
17 POWDER, FOR SOLUTION ORAL DAILY
Status: DISCONTINUED | OUTPATIENT
Start: 2024-03-28 | End: 2024-03-30 | Stop reason: HOSPADM

## 2024-03-27 RX ORDER — MULTIVITAMIN WITH IRON
1 TABLET ORAL DAILY
Status: COMPLETED | OUTPATIENT
Start: 2024-03-28 | End: 2024-03-30

## 2024-03-27 RX ORDER — LANOLIN ALCOHOL/MO/W.PET/CERES
100 CREAM (GRAM) TOPICAL DAILY
Status: COMPLETED | OUTPATIENT
Start: 2024-03-28 | End: 2024-03-30

## 2024-03-27 RX ORDER — SODIUM CHLORIDE 0.9 % (FLUSH) 0.9 %
10 SYRINGE (ML) INJECTION PRN
Status: DISCONTINUED | OUTPATIENT
Start: 2024-03-27 | End: 2024-03-30 | Stop reason: HOSPADM

## 2024-03-27 RX ORDER — HYDRALAZINE HYDROCHLORIDE 20 MG/ML
10 INJECTION INTRAMUSCULAR; INTRAVENOUS EVERY 4 HOURS PRN
Status: DISCONTINUED | OUTPATIENT
Start: 2024-03-27 | End: 2024-03-27

## 2024-03-27 RX ORDER — HYDRALAZINE HYDROCHLORIDE 20 MG/ML
10 INJECTION INTRAMUSCULAR; INTRAVENOUS EVERY 30 MIN PRN
Status: DISCONTINUED | OUTPATIENT
Start: 2024-03-27 | End: 2024-03-29

## 2024-03-27 RX ORDER — SODIUM CHLORIDE 9 MG/ML
INJECTION, SOLUTION INTRAVENOUS CONTINUOUS
Status: DISCONTINUED | OUTPATIENT
Start: 2024-03-27 | End: 2024-03-28

## 2024-03-27 RX ORDER — ONDANSETRON 4 MG/1
4 TABLET, ORALLY DISINTEGRATING ORAL EVERY 8 HOURS PRN
Status: DISCONTINUED | OUTPATIENT
Start: 2024-03-27 | End: 2024-03-30 | Stop reason: HOSPADM

## 2024-03-27 RX ORDER — LABETALOL HYDROCHLORIDE 5 MG/ML
10 INJECTION, SOLUTION INTRAVENOUS EVERY 30 MIN PRN
Status: DISCONTINUED | OUTPATIENT
Start: 2024-03-27 | End: 2024-03-29

## 2024-03-27 RX ORDER — NICOTINE 21 MG/24HR
1 PATCH, TRANSDERMAL 24 HOURS TRANSDERMAL DAILY
Status: DISCONTINUED | OUTPATIENT
Start: 2024-03-28 | End: 2024-03-30 | Stop reason: HOSPADM

## 2024-03-27 RX ORDER — LABETALOL HYDROCHLORIDE 5 MG/ML
10 INJECTION, SOLUTION INTRAVENOUS EVERY 4 HOURS PRN
Status: DISCONTINUED | OUTPATIENT
Start: 2024-03-27 | End: 2024-03-27

## 2024-03-27 RX ORDER — FOLIC ACID 1 MG/1
1 TABLET ORAL DAILY
Status: COMPLETED | OUTPATIENT
Start: 2024-03-28 | End: 2024-03-30

## 2024-03-27 RX ORDER — CHLORHEXIDINE GLUCONATE ORAL RINSE 1.2 MG/ML
15 SOLUTION DENTAL 2 TIMES DAILY
Status: DISCONTINUED | OUTPATIENT
Start: 2024-03-27 | End: 2024-03-30 | Stop reason: HOSPADM

## 2024-03-27 RX ORDER — SODIUM CHLORIDE 9 MG/ML
INJECTION, SOLUTION INTRAVENOUS PRN
Status: DISCONTINUED | OUTPATIENT
Start: 2024-03-27 | End: 2024-03-30 | Stop reason: HOSPADM

## 2024-03-27 RX ORDER — CLINDAMYCIN PHOSPHATE 600 MG/50ML
600 INJECTION, SOLUTION INTRAVENOUS ONCE
Status: COMPLETED | OUTPATIENT
Start: 2024-03-27 | End: 2024-03-27

## 2024-03-27 RX ORDER — ONDANSETRON 2 MG/ML
4 INJECTION INTRAMUSCULAR; INTRAVENOUS EVERY 6 HOURS PRN
Status: DISCONTINUED | OUTPATIENT
Start: 2024-03-27 | End: 2024-03-30 | Stop reason: HOSPADM

## 2024-03-27 RX ORDER — SODIUM CHLORIDE 0.9 % (FLUSH) 0.9 %
10 SYRINGE (ML) INJECTION EVERY 12 HOURS SCHEDULED
Status: DISCONTINUED | OUTPATIENT
Start: 2024-03-27 | End: 2024-03-30 | Stop reason: HOSPADM

## 2024-03-27 RX ADMIN — SODIUM CHLORIDE 5 MG/HR: 9 INJECTION, SOLUTION INTRAVENOUS at 17:02

## 2024-03-27 RX ADMIN — SODIUM CHLORIDE: 9 INJECTION, SOLUTION INTRAVENOUS at 23:23

## 2024-03-27 RX ADMIN — CLINDAMYCIN PHOSPHATE 600 MG: 600 INJECTION, SOLUTION INTRAVENOUS at 17:19

## 2024-03-27 RX ADMIN — LEVETIRACETAM 1000 MG: 100 INJECTION INTRAVENOUS at 16:57

## 2024-03-27 RX ADMIN — ACETAMINOPHEN 650 MG: 325 TABLET ORAL at 23:18

## 2024-03-27 RX ADMIN — LABETALOL HYDROCHLORIDE 10 MG: 5 INJECTION INTRAVENOUS at 23:19

## 2024-03-27 RX ADMIN — IOPAMIDOL 110 ML: 755 INJECTION, SOLUTION INTRAVENOUS at 15:50

## 2024-03-27 ASSESSMENT — LIFESTYLE VARIABLES
HOW MANY STANDARD DRINKS CONTAINING ALCOHOL DO YOU HAVE ON A TYPICAL DAY: 3 OR 4
HOW OFTEN DO YOU HAVE A DRINK CONTAINING ALCOHOL: 2-3 TIMES A WEEK

## 2024-03-27 NOTE — ED PROVIDER NOTES
with Dr. An at 4:30 p.m. on   03/27/2024.               ------------------------- NURSING NOTES AND VITALS REVIEWED ---------------------------  Date / Time Roomed:  3/27/2024  3:22 PM  ED Bed Assignment:  12/12    The nursing notes within the ED encounter and vital signs as below have been reviewed.   Patient Vitals for the past 24 hrs:   BP Temp Pulse Resp SpO2   03/27/24 1732 (!) 146/79 -- (!) 108 14 --   03/27/24 1725 (!) 175/79 -- (!) 109 23 --   03/27/24 1717 -- -- (!) 110 22 --   03/27/24 1716 (!) 181/105 -- (!) 114 23 --   03/27/24 1715 (!) 184/115 -- (!) 117 15 --   03/27/24 1701 (!) 179/113 -- (!) 108 25 --   03/27/24 1607 (!) 187/100 -- (!) 103 -- 97 %   03/27/24 1536 (!) 191/108 99 °F (37.2 °C) (!) 104 16 94 %       Oxygen Saturation Interpretation: Normal      ------------------------------------------ PROGRESS NOTES ------------------------------------------  Re-evaluation(s):    I have spoken with the patient and discussed today’s results, in addition to providing specific details for the plan of care and counseling regarding the diagnosis and prognosis.  Their questions are answered at this time and they are agreeable with the plan.      --------------------------------- ADDITIONAL PROVIDER NOTES ---------------------------------  Consultations:  4:58 PM EDT  Spoke with Dr. Willams at [facility],  They accept the patient on.    This patient's ED course included: a personal history and physicial examination, re-evaluation prior to disposition, multiple bedside re-evaluations, IV medications, cardiac monitoring, continuous pulse oximetry, and complex medical decision making and emergency management    This patient has remained hemodynamically stable, remained unchanged, and been closely monitored during their ED course.    Please note that the withdrawal or failure to initiate urgent interventions for this patient would likely result in a life threatening deterioration or permanent  disability.      Accordingly this patient received 35 minutes of critical care time, excluding separately billable procedures. Systems at risk for deterioration include: Neuro.      Clinical Impression  1. Epidural hematoma (HCC)    2. Subdural hematoma (HCC)    3. Subarachnoid bleed (HCC)    4. Essential hypertension    5. Closed fracture of parietal bone, initial encounter (HCC)    6. Acute alcoholic intoxication with complication (HCC)          Disposition  Patient's disposition: Transfer to [facility] to ER/trauma  Patient's condition is stable.       Frederick An,   03/27/24 1659       Frederick An,   03/27/24 1748

## 2024-03-27 NOTE — VIRTUAL HEALTH
Kirsty Thurston, was evaluated through a synchronous (real-time) audio-video encounter. The patient (and/or guardian if applicable) is aware that this is a billable service, which includes applicable co-pays. This virtual visit was conducted with patient's (and/or legal guardian's) consent. Patient identification was verified, and a caregiver was present when appropriate.  The patient was located at Facility (Appt Department): [facility] EMERGENCY DEPARTMENT  [address] [place] [id number]  Loc: [phone]  The provider was located at Home (City/State): [place], [place]    Consults     Total time spent on this encounter:  35 mins    --Isacc Crystal MD on 3/27/2024 at 3:47 PM    An electronic signature was used to authenticate this note.        [facility] Stroke and Telestroke Consult for  [facility] Stroke Alert through [facility] @3:46 PM  3/27/2024 3:47 PM    Pt Name: Kirsty Thurston  MRN: [id number]  YOB: 1970  Date of evaluation: 3/27/2024  Primary Care Physician: Junaid Wilson DO  Reason for Evaluation: Stroke Evaluation with Discussion with Ed or primary team with Telemedicine and stroke evaluation with Review of imaging and labs    Kirsty Thurston is a 53 y.o. male with PMH of HTN, who presents after being found down for unknown amount of time.  Patient was brought in as a stroke alert and was noted to have left upper extremity and left facial weakness along with ataxia in the left upper extremity.  Initial NIH 3.  at arrival.    LKW: Unknown  NIH:  3    Allergies  is allergic to pcn [penicillins].  Medications  Prior to Admission medications    Medication Sig Start Date End Date Taking? Authorizing Provider   lisinopril-hydroCHLOROthiazide (PRINZIDE;ZESTORETIC) 20-25 MG per tablet Take 1 tablet by mouth daily 11/17/22   Harjeet Moses DO   metoprolol succinate (TOPROL XL) 25 MG extended release tablet Take 1 tablet
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
## 2024-03-28 PROBLEM — S02.91XA CLOSED SKULL FRACTURE (HCC): Status: ACTIVE | Noted: 2024-03-28

## 2024-03-28 LAB
ANION GAP SERPL CALCULATED.3IONS-SCNC: 16 MMOL/L (ref 7–16)
BASOPHILS # BLD: 0.05 K/UL (ref 0–0.2)
BASOPHILS NFR BLD: 1 % (ref 0–2)
BUN SERPL-MCNC: 11 MG/DL (ref 6–20)
CALCIUM SERPL-MCNC: 8.9 MG/DL (ref 8.6–10.2)
CHLORIDE SERPL-SCNC: 107 MMOL/L (ref 98–107)
CO2 SERPL-SCNC: 18 MMOL/L (ref 22–29)
CREAT SERPL-MCNC: 0.8 MG/DL (ref 0.7–1.2)
EKG ATRIAL RATE: 100 BPM
EKG ATRIAL RATE: 90 BPM
EKG P AXIS: 20 DEGREES
EKG P AXIS: 43 DEGREES
EKG P-R INTERVAL: 144 MS
EKG P-R INTERVAL: 154 MS
EKG Q-T INTERVAL: 358 MS
EKG Q-T INTERVAL: 388 MS
EKG QRS DURATION: 84 MS
EKG QRS DURATION: 90 MS
EKG QTC CALCULATION (BAZETT): 461 MS
EKG QTC CALCULATION (BAZETT): 474 MS
EKG R AXIS: -14 DEGREES
EKG R AXIS: 10 DEGREES
EKG T AXIS: -3 DEGREES
EKG T AXIS: 37 DEGREES
EKG VENTRICULAR RATE: 100 BPM
EKG VENTRICULAR RATE: 90 BPM
EOSINOPHIL # BLD: 0.06 K/UL (ref 0.05–0.5)
EOSINOPHILS RELATIVE PERCENT: 1 % (ref 0–6)
ERYTHROCYTE [DISTWIDTH] IN BLOOD BY AUTOMATED COUNT: 14.7 % (ref 11.5–15)
GFR SERPL CREATININE-BSD FRML MDRD: >90 ML/MIN/1.73M2
GLUCOSE SERPL-MCNC: 106 MG/DL (ref 74–99)
HCT VFR BLD AUTO: 37.7 % (ref 37–54)
HGB BLD-MCNC: 12.4 G/DL (ref 12.5–16.5)
IMM GRANULOCYTES # BLD AUTO: <0.03 K/UL (ref 0–0.58)
IMM GRANULOCYTES NFR BLD: 0 % (ref 0–5)
LYMPHOCYTES NFR BLD: 1.3 K/UL (ref 1.5–4)
LYMPHOCYTES RELATIVE PERCENT: 15 % (ref 20–42)
MCH RBC QN AUTO: 30.2 PG (ref 26–35)
MCHC RBC AUTO-ENTMCNC: 32.9 G/DL (ref 32–34.5)
MCV RBC AUTO: 92 FL (ref 80–99.9)
MONOCYTES NFR BLD: 0.92 K/UL (ref 0.1–0.95)
MONOCYTES NFR BLD: 11 % (ref 2–12)
NEUTROPHILS NFR BLD: 73 % (ref 43–80)
NEUTS SEG NFR BLD: 6.32 K/UL (ref 1.8–7.3)
PLATELET # BLD AUTO: 224 K/UL (ref 130–450)
PMV BLD AUTO: 10.1 FL (ref 7–12)
POTASSIUM SERPL-SCNC: 4.3 MMOL/L (ref 3.5–5)
RBC # BLD AUTO: 4.1 M/UL (ref 3.8–5.8)
SODIUM SERPL-SCNC: 141 MMOL/L (ref 132–146)
WBC OTHER # BLD: 8.7 K/UL (ref 4.5–11.5)

## 2024-03-28 PROCEDURE — 93005 ELECTROCARDIOGRAM TRACING: CPT

## 2024-03-28 PROCEDURE — 6360000002 HC RX W HCPCS

## 2024-03-28 PROCEDURE — 6360000002 HC RX W HCPCS: Performed by: SURGERY

## 2024-03-28 PROCEDURE — 2060000000 HC ICU INTERMEDIATE R&B

## 2024-03-28 PROCEDURE — 85025 COMPLETE CBC W/AUTO DIFF WBC: CPT

## 2024-03-28 PROCEDURE — 93010 ELECTROCARDIOGRAM REPORT: CPT | Performed by: INTERNAL MEDICINE

## 2024-03-28 PROCEDURE — 36415 COLL VENOUS BLD VENIPUNCTURE: CPT

## 2024-03-28 PROCEDURE — 6370000000 HC RX 637 (ALT 250 FOR IP)

## 2024-03-28 PROCEDURE — 6360000002 HC RX W HCPCS: Performed by: STUDENT IN AN ORGANIZED HEALTH CARE EDUCATION/TRAINING PROGRAM

## 2024-03-28 PROCEDURE — 99223 1ST HOSP IP/OBS HIGH 75: CPT | Performed by: SURGERY

## 2024-03-28 PROCEDURE — 2580000003 HC RX 258

## 2024-03-28 PROCEDURE — 80048 BASIC METABOLIC PNL TOTAL CA: CPT

## 2024-03-28 PROCEDURE — 99222 1ST HOSP IP/OBS MODERATE 55: CPT | Performed by: NEUROLOGICAL SURGERY

## 2024-03-28 RX ORDER — METOPROLOL SUCCINATE 25 MG/1
25 TABLET, EXTENDED RELEASE ORAL DAILY
Status: DISCONTINUED | OUTPATIENT
Start: 2024-03-28 | End: 2024-03-28

## 2024-03-28 RX ORDER — OXYCODONE HYDROCHLORIDE 5 MG/1
5 TABLET ORAL EVERY 4 HOURS PRN
Status: DISCONTINUED | OUTPATIENT
Start: 2024-03-28 | End: 2024-03-30 | Stop reason: HOSPADM

## 2024-03-28 RX ORDER — UBIDECARENONE 75 MG
50 CAPSULE ORAL DAILY
COMMUNITY

## 2024-03-28 RX ORDER — METOPROLOL SUCCINATE 25 MG/1
25 TABLET, EXTENDED RELEASE ORAL DAILY
Status: DISCONTINUED | OUTPATIENT
Start: 2024-03-28 | End: 2024-03-30 | Stop reason: HOSPADM

## 2024-03-28 RX ORDER — MORPHINE SULFATE 2 MG/ML
2 INJECTION, SOLUTION INTRAMUSCULAR; INTRAVENOUS
Status: DISCONTINUED | OUTPATIENT
Start: 2024-03-28 | End: 2024-03-30 | Stop reason: HOSPADM

## 2024-03-28 RX ORDER — OXYCODONE HYDROCHLORIDE 10 MG/1
10 TABLET ORAL EVERY 4 HOURS PRN
Status: DISCONTINUED | OUTPATIENT
Start: 2024-03-28 | End: 2024-03-30 | Stop reason: HOSPADM

## 2024-03-28 RX ADMIN — ACETAMINOPHEN 650 MG: 325 TABLET ORAL at 23:35

## 2024-03-28 RX ADMIN — LEVETIRACETAM 500 MG: 500 TABLET, FILM COATED ORAL at 08:22

## 2024-03-28 RX ADMIN — FOLIC ACID 1 MG: 1 TABLET ORAL at 08:22

## 2024-03-28 RX ADMIN — LABETALOL HYDROCHLORIDE 10 MG: 5 INJECTION INTRAVENOUS at 20:03

## 2024-03-28 RX ADMIN — ACETAMINOPHEN 650 MG: 325 TABLET ORAL at 11:39

## 2024-03-28 RX ADMIN — CHLORHEXIDINE GLUCONATE, 0.12% ORAL RINSE 15 ML: 1.2 SOLUTION DENTAL at 08:23

## 2024-03-28 RX ADMIN — CHLORHEXIDINE GLUCONATE, 0.12% ORAL RINSE 15 ML: 1.2 SOLUTION DENTAL at 20:03

## 2024-03-28 RX ADMIN — ACETAMINOPHEN 650 MG: 325 TABLET ORAL at 04:39

## 2024-03-28 RX ADMIN — SODIUM CHLORIDE, PRESERVATIVE FREE 10 ML: 5 INJECTION INTRAVENOUS at 20:03

## 2024-03-28 RX ADMIN — OXYCODONE 5 MG: 5 TABLET ORAL at 05:33

## 2024-03-28 RX ADMIN — LEVETIRACETAM 500 MG: 500 TABLET, FILM COATED ORAL at 20:02

## 2024-03-28 RX ADMIN — MULTIVITAMIN TABLET 1 TABLET: TABLET at 08:22

## 2024-03-28 RX ADMIN — SODIUM CHLORIDE, PRESERVATIVE FREE 10 ML: 5 INJECTION INTRAVENOUS at 08:23

## 2024-03-28 RX ADMIN — METOPROLOL SUCCINATE 25 MG: 25 TABLET, EXTENDED RELEASE ORAL at 05:35

## 2024-03-28 RX ADMIN — OXYCODONE HYDROCHLORIDE 10 MG: 10 TABLET ORAL at 20:02

## 2024-03-28 RX ADMIN — POLYETHYLENE GLYCOL 3350 17 G: 17 POWDER, FOR SOLUTION ORAL at 08:21

## 2024-03-28 RX ADMIN — MORPHINE SULFATE 2 MG: 2 INJECTION, SOLUTION INTRAMUSCULAR; INTRAVENOUS at 14:31

## 2024-03-28 RX ADMIN — LABETALOL HYDROCHLORIDE 10 MG: 5 INJECTION INTRAVENOUS at 04:42

## 2024-03-28 RX ADMIN — ACETAMINOPHEN 650 MG: 325 TABLET ORAL at 17:10

## 2024-03-28 RX ADMIN — LABETALOL HYDROCHLORIDE 10 MG: 5 INJECTION INTRAVENOUS at 23:35

## 2024-03-28 RX ADMIN — OXYCODONE 5 MG: 5 TABLET ORAL at 10:22

## 2024-03-28 RX ADMIN — Medication 100 MG: at 08:22

## 2024-03-28 RX ADMIN — ONDANSETRON 4 MG: 2 INJECTION INTRAMUSCULAR; INTRAVENOUS at 06:22

## 2024-03-28 ASSESSMENT — PAIN DESCRIPTION - DESCRIPTORS
DESCRIPTORS: ACHING

## 2024-03-28 ASSESSMENT — PAIN DESCRIPTION - LOCATION
LOCATION: HEAD

## 2024-03-28 ASSESSMENT — PAIN SCALES - GENERAL
PAINLEVEL_OUTOF10: 6
PAINLEVEL_OUTOF10: 10
PAINLEVEL_OUTOF10: 10
PAINLEVEL_OUTOF10: 7
PAINLEVEL_OUTOF10: 6
PAINLEVEL_OUTOF10: 8

## 2024-03-28 ASSESSMENT — LIFESTYLE VARIABLES
HOW OFTEN DO YOU HAVE A DRINK CONTAINING ALCOHOL: 2-4 TIMES A MONTH
HOW MANY STANDARD DRINKS CONTAINING ALCOHOL DO YOU HAVE ON A TYPICAL DAY: 1 OR 2

## 2024-03-28 ASSESSMENT — PAIN - FUNCTIONAL ASSESSMENT
PAIN_FUNCTIONAL_ASSESSMENT: ACTIVITIES ARE NOT PREVENTED
PAIN_FUNCTIONAL_ASSESSMENT: ACTIVITIES ARE NOT PREVENTED

## 2024-03-28 ASSESSMENT — PAIN DESCRIPTION - ORIENTATION
ORIENTATION: POSTERIOR
ORIENTATION: POSTERIOR
ORIENTATION: ANTERIOR

## 2024-03-28 NOTE — PROGRESS NOTES
4 Eyes Skin Assessment     NAME:  Kirsty Thurston  YOB: 1970  MEDICAL RECORD NUMBER:  [id number]    The patient is being assessed for  Admission    I agree that at least one RN has performed a thorough Head to Toe Skin Assessment on the patient. ALL assessment sites listed below have been assessed.      Areas assessed by both nurses:    Head, Face, Ears, Shoulders, Back, Chest, Arms, Elbows, Hands, Sacrum. Buttock, Coccyx, Ischium, Legs. Feet and Heels, and Under Medical Devices         Does the Patient have a Wound? No noted wound(s)       Refugio Prevention initiated by RN: Yes  Wound Care Orders initiated by RN: No    Pressure Injury (Stage 3,4, Unstageable, DTI, NWPT, and Complex wounds) if present, place Wound referral order by RN under : No    New Ostomies, if present place, Ostomy referral order under : No     Nurse 1 eSignature: Electronically signed by Misty Garg RN on 3/28/24 at 1:40 AM EDT    **SHARE this note so that the co-signing nurse can place an eSignature**    Nurse 2 eSignature: Electronically signed by Julisa Dolan RN on 3/28/24 at 1:43 AM EDT

## 2024-03-28 NOTE — FLOWSHEET NOTE
03/28/24 0630   Vital Signs   Blood Pressure Lying 150/83   Pulse Lying 86 PER MINUTE   Blood Pressure Sitting 153/88   Pulse Sitting 87 PER MINUTE   Blood Pressure Standing 126/82   Pulse Standing 100 PER MINUTE

## 2024-03-28 NOTE — CARE COORDINATION
Transferred from [facility]. Found down found to have multiple ICH. Including a right SDH and SAH, and a left SDH. He underwent stroke/sponteanous ICH workup which were all negative. His CT cervical was negative. Neurosurgery consult.PT/OT. SBI completed. He has no health insurance and per [employer] in Public benefits- PT is eligible for HCAP with meds upon discharge. Applying for [place] Medicaid. Met with patient  to discuss role/transition of care. He lives with his mom in 2 story home with 1 st floor set up.His PCP is Dr AGUSTINA Wilson Sr. And he uses either [facility] or [facility] in [place]. No DME, HHC or FELIPE history. His plan is home and his mom will pick him up.cm/sw to follow.Electronically signed by Judith Alcantara RN on 3/28/2024 at 10:43 AM
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
## 2024-03-28 NOTE — DISCHARGE INSTRUCTIONS
TRAUMA SERVICES DISCHARGE INSTRUCTIONS    Call [phone], option 2, for any questions/concerns and for follow-up appointment in 2 week(s).    Please follow the instructions checked below:  Please follow-up with your primary care provider.    ACTIVITY INSTRUCTIONS  Increase activity as tolerated  No heavy lifting or strenuous activity  Take your incentive spirometer home and use 4-6 times/day   [x]  No driving until cleared by trauma, neurosurgery    WOUND/DRESSING INSTRUCTIONS:  You may shower.  No sitting in bath tub, hot tub or swimming until cleared by physician.  Ice to areas of pain for first 24 hours.  Heat to areas of pain after that.  Wash areas of lacerations/abrasions with soap & water.  Rinse well.  Pat dry with clean towel.  Apply thin layer of Bacitracin, Neosporin, or triple antibiotic cream to affected area 2-3 times per day.  Keep wounds clean and dry.    MEDICATION INSTRUCTIONS  Take medication as prescribed.  When taking pain medications, you may experience dizziness or drowsiness.  Do not drink alcohol or drive when taking these medications.  You may experience constipation while taking pain medication.  You may take over the counter stool softeners such as docusate (Colace), sennosides S (Senokot-S), or Miralax.   []  You may take Ibuprofen (over the counter) as directed for mild pain.     --You may take up to 800mg every 8 hours for pain, please take with food or milk.   [x]  You may take acetaminophen (Tylenol) products.  Do NOT take more than 4000mg of Tylenol in 24h.   []  Do not take any other acetaminophen (Tylenol) products if you are taking Percocet or Norco, as these contain Tylenol.   --Do NOT take more than 4000mg of Tylenol in 24h.    OPIOID MEDICATION INSTRUCTIONS  Read the medication guide that is included with your prescription.  Take your medication exactly as prescribed.  Store medication away from children and in a safe place.  Do NOT share your medication with others.  Do

## 2024-03-28 NOTE — PROGRESS NOTES
Trauma Tertiary Survey    Admit Date: 3/27/2024  Hospital day 0    CC:  Fall     Alcohol pre-screening:  Men: How many times in the past year have you had 5 or more drinks in a day?  None  How much do you drink on a daily basis? Patient nwo says he only has 5-6 weekly     Drug Pre-screening:    How many times in the past year have you used a recreational drug or used a prescription medication for non medical reasons?  none    Mood Prescreening:    During the past two weeks, have you been bothered by little interest or pleasure doing things?  No  During the past two weeks, have you been bothered by feeling down, depressed or hopeless?  No      Scheduled Meds:   metoprolol succinate  25 mg Oral Daily    levETIRAcetam  500 mg Oral BID    folic acid  1 mg Oral Daily    multivitamin  1 tablet Oral Daily    thiamine  100 mg Oral Daily    sodium chloride flush  10 mL IntraVENous 2 times per day    acetaminophen  650 mg Oral Q6H    polyethylene glycol  17 g Oral Daily    nicotine  1 patch TransDERmal Daily    chlorhexidine  15 mL Mouth/Throat BID     Continuous Infusions:   sodium chloride       PRN Meds:oxyCODONE **OR** oxyCODONE, hydrALAZINE, labetalol, sodium chloride flush, sodium chloride, ondansetron **OR** ondansetron    Subjective:     Doing well overall. Only complain of posterior head pain. Says he only drink 5-6 regular size beers. Said he used to drink a pack weekly but is cutting down. Denies that he gets withdrawal symptoms when he stops drinking.     Objective:   Patient Vitals for the past 8 hrs:   BP Temp Temp src Pulse Resp SpO2   03/28/24 0535 -- -- -- 95 -- --   03/28/24 0515 (!) 141/77 -- -- -- -- --   03/28/24 0430 (!) 159/91 98 °F (36.7 °C) Temporal (!) 104 -- 96 %   03/28/24 0100 122/76 -- -- -- -- --   03/28/24 0050 (!) 161/89 99.2 °F (37.3 °C) Tympanic 95 22 --   03/28/24 0015 (!) 143/90 -- -- 93 21 93 %   03/28/24 0000 (!) 146/86 -- -- 94 23 95 %   03/27/24 9425 (!) 148/87 -- -- 94 20 94 %
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
## 2024-03-28 NOTE — PROGRESS NOTES
Physical Therapy    PT evaluation orders received and chart review completed. Spoke to RN prior to evaluation, RN cleared pt for mobility. Met with pt in room. BP taken prior to mobility read: 158/93. Will hold PT evaluation due to hypertension. Per trauma, SBP is to remain <140.     Will follow and re-attempt as appropriate. Thank you.    Aby Edwrads, SPT   Flori Cardoso PT, DPT  [id number]
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
## 2024-03-28 NOTE — PROGRESS NOTES
OT SESSION ATTEMPT     Date:3/28/2024  Patient Name: Kirsty Thurston  MRN: [id number]  : 1970  Room: [address] [place] [address]-A     Occupational therapy orders received/chart review completed and OT session attempted this date:   Will hold OT eval and treat this AM 2/2 /93 HR 88 on attempt. Per trauma BP <140       Will reattempt OT at a later time/date.  Thank you,   Justino Tolentino OTR/L [id number]

## 2024-03-29 DIAGNOSIS — S06.5XAA SUBDURAL HEMATOMA (HCC): Primary | ICD-10-CM

## 2024-03-29 DIAGNOSIS — S06.A0XA: ICD-10-CM

## 2024-03-29 DIAGNOSIS — S06.2X0S: ICD-10-CM

## 2024-03-29 LAB
ANION GAP SERPL CALCULATED.3IONS-SCNC: 13 MMOL/L (ref 7–16)
BASOPHILS # BLD: 0.03 K/UL (ref 0–0.2)
BASOPHILS NFR BLD: 1 % (ref 0–2)
BUN SERPL-MCNC: 12 MG/DL (ref 6–20)
CALCIUM SERPL-MCNC: 9.1 MG/DL (ref 8.6–10.2)
CHLORIDE SERPL-SCNC: 102 MMOL/L (ref 98–107)
CO2 SERPL-SCNC: 22 MMOL/L (ref 22–29)
CREAT SERPL-MCNC: 0.8 MG/DL (ref 0.7–1.2)
EOSINOPHIL # BLD: 0.12 K/UL (ref 0.05–0.5)
EOSINOPHILS RELATIVE PERCENT: 2 % (ref 0–6)
ERYTHROCYTE [DISTWIDTH] IN BLOOD BY AUTOMATED COUNT: 14.6 % (ref 11.5–15)
GFR SERPL CREATININE-BSD FRML MDRD: >90 ML/MIN/1.73M2
GLUCOSE SERPL-MCNC: 88 MG/DL (ref 74–99)
HCT VFR BLD AUTO: 37.9 % (ref 37–54)
HGB BLD-MCNC: 12.5 G/DL (ref 12.5–16.5)
IMM GRANULOCYTES # BLD AUTO: <0.03 K/UL (ref 0–0.58)
IMM GRANULOCYTES NFR BLD: 0 % (ref 0–5)
LYMPHOCYTES NFR BLD: 1.64 K/UL (ref 1.5–4)
LYMPHOCYTES RELATIVE PERCENT: 26 % (ref 20–42)
MCH RBC QN AUTO: 30.6 PG (ref 26–35)
MCHC RBC AUTO-ENTMCNC: 33 G/DL (ref 32–34.5)
MCV RBC AUTO: 92.9 FL (ref 80–99.9)
MONOCYTES NFR BLD: 0.81 K/UL (ref 0.1–0.95)
MONOCYTES NFR BLD: 13 % (ref 2–12)
NEUTROPHILS NFR BLD: 59 % (ref 43–80)
NEUTS SEG NFR BLD: 3.74 K/UL (ref 1.8–7.3)
PLATELET # BLD AUTO: 186 K/UL (ref 130–450)
PMV BLD AUTO: 10.4 FL (ref 7–12)
POTASSIUM SERPL-SCNC: 4 MMOL/L (ref 3.5–5)
RBC # BLD AUTO: 4.08 M/UL (ref 3.8–5.8)
SODIUM SERPL-SCNC: 137 MMOL/L (ref 132–146)
WBC OTHER # BLD: 6.4 K/UL (ref 4.5–11.5)

## 2024-03-29 PROCEDURE — 2580000003 HC RX 258

## 2024-03-29 PROCEDURE — 85025 COMPLETE CBC W/AUTO DIFF WBC: CPT

## 2024-03-29 PROCEDURE — 6370000000 HC RX 637 (ALT 250 FOR IP)

## 2024-03-29 PROCEDURE — 99232 SBSQ HOSP IP/OBS MODERATE 35: CPT | Performed by: NEUROLOGICAL SURGERY

## 2024-03-29 PROCEDURE — 6360000002 HC RX W HCPCS

## 2024-03-29 PROCEDURE — 80048 BASIC METABOLIC PNL TOTAL CA: CPT

## 2024-03-29 PROCEDURE — 6360000002 HC RX W HCPCS: Performed by: STUDENT IN AN ORGANIZED HEALTH CARE EDUCATION/TRAINING PROGRAM

## 2024-03-29 PROCEDURE — 36415 COLL VENOUS BLD VENIPUNCTURE: CPT

## 2024-03-29 PROCEDURE — 99232 SBSQ HOSP IP/OBS MODERATE 35: CPT | Performed by: SURGERY

## 2024-03-29 PROCEDURE — 2060000000 HC ICU INTERMEDIATE R&B

## 2024-03-29 RX ORDER — BUTALBITAL, ACETAMINOPHEN AND CAFFEINE 50; 325; 40 MG/1; MG/1; MG/1
1 TABLET ORAL EVERY 6 HOURS
Status: DISCONTINUED | OUTPATIENT
Start: 2024-03-29 | End: 2024-03-30 | Stop reason: HOSPADM

## 2024-03-29 RX ORDER — ENOXAPARIN SODIUM 100 MG/ML
30 INJECTION SUBCUTANEOUS 2 TIMES DAILY
Status: DISCONTINUED | OUTPATIENT
Start: 2024-03-29 | End: 2024-03-30 | Stop reason: HOSPADM

## 2024-03-29 RX ORDER — LABETALOL HYDROCHLORIDE 5 MG/ML
10 INJECTION, SOLUTION INTRAVENOUS EVERY 4 HOURS PRN
Status: DISCONTINUED | OUTPATIENT
Start: 2024-03-29 | End: 2024-03-30 | Stop reason: HOSPADM

## 2024-03-29 RX ORDER — AMLODIPINE BESYLATE 5 MG/1
5 TABLET ORAL DAILY
Status: DISCONTINUED | OUTPATIENT
Start: 2024-03-29 | End: 2024-03-30 | Stop reason: HOSPADM

## 2024-03-29 RX ORDER — HYDRALAZINE HYDROCHLORIDE 20 MG/ML
10 INJECTION INTRAMUSCULAR; INTRAVENOUS EVERY 4 HOURS PRN
Status: DISCONTINUED | OUTPATIENT
Start: 2024-03-29 | End: 2024-03-30 | Stop reason: HOSPADM

## 2024-03-29 RX ADMIN — ENOXAPARIN SODIUM 30 MG: 100 INJECTION SUBCUTANEOUS at 20:34

## 2024-03-29 RX ADMIN — Medication 100 MG: at 08:35

## 2024-03-29 RX ADMIN — HYDRALAZINE HYDROCHLORIDE 10 MG: 20 INJECTION, SOLUTION INTRAMUSCULAR; INTRAVENOUS at 10:58

## 2024-03-29 RX ADMIN — BUTALBITAL, ACETAMINOPHEN, AND CAFFEINE 1 TABLET: 50; 325; 40 TABLET ORAL at 08:35

## 2024-03-29 RX ADMIN — LEVETIRACETAM 500 MG: 500 TABLET, FILM COATED ORAL at 08:35

## 2024-03-29 RX ADMIN — SODIUM CHLORIDE, PRESERVATIVE FREE 10 ML: 5 INJECTION INTRAVENOUS at 20:34

## 2024-03-29 RX ADMIN — OXYCODONE HYDROCHLORIDE 10 MG: 10 TABLET ORAL at 22:40

## 2024-03-29 RX ADMIN — LEVETIRACETAM 500 MG: 500 TABLET, FILM COATED ORAL at 20:34

## 2024-03-29 RX ADMIN — FOLIC ACID 1 MG: 1 TABLET ORAL at 08:35

## 2024-03-29 RX ADMIN — CHLORHEXIDINE GLUCONATE, 0.12% ORAL RINSE 15 ML: 1.2 SOLUTION DENTAL at 20:34

## 2024-03-29 RX ADMIN — BUTALBITAL, ACETAMINOPHEN, AND CAFFEINE 1 TABLET: 50; 325; 40 TABLET ORAL at 20:34

## 2024-03-29 RX ADMIN — BUTALBITAL, ACETAMINOPHEN, AND CAFFEINE 1 TABLET: 50; 325; 40 TABLET ORAL at 13:55

## 2024-03-29 RX ADMIN — ACETAMINOPHEN 650 MG: 325 TABLET ORAL at 05:20

## 2024-03-29 RX ADMIN — METOPROLOL SUCCINATE 25 MG: 25 TABLET, EXTENDED RELEASE ORAL at 08:35

## 2024-03-29 RX ADMIN — OXYCODONE HYDROCHLORIDE 10 MG: 10 TABLET ORAL at 16:51

## 2024-03-29 RX ADMIN — LABETALOL HYDROCHLORIDE 10 MG: 5 INJECTION INTRAVENOUS at 05:20

## 2024-03-29 RX ADMIN — SODIUM CHLORIDE, PRESERVATIVE FREE 10 ML: 5 INJECTION INTRAVENOUS at 08:36

## 2024-03-29 RX ADMIN — CHLORHEXIDINE GLUCONATE, 0.12% ORAL RINSE 15 ML: 1.2 SOLUTION DENTAL at 08:35

## 2024-03-29 RX ADMIN — MULTIVITAMIN TABLET 1 TABLET: TABLET at 10:58

## 2024-03-29 RX ADMIN — AMLODIPINE BESYLATE 5 MG: 5 TABLET ORAL at 16:04

## 2024-03-29 RX ADMIN — LABETALOL HYDROCHLORIDE 10 MG: 5 INJECTION INTRAVENOUS at 15:28

## 2024-03-29 ASSESSMENT — PAIN SCALES - GENERAL
PAINLEVEL_OUTOF10: 10
PAINLEVEL_OUTOF10: 5

## 2024-03-29 ASSESSMENT — PAIN DESCRIPTION - LOCATION
LOCATION: HEAD
LOCATION: HEAD

## 2024-03-29 NOTE — PROGRESS NOTES
Department of Neurosurgery  Progress Note    CHIEF COMPLAINT: seen for left parietal epidural hematoma, right frontal subdural hematoma and subarachnoid hemorrhage, and parietal skull fracture     SUBJECTIVE:  C/o headache this AM    REVIEW OF SYSTEMS :  Constitutional: Negative for chills and fever.    Neurological: Negative for dizziness, tremors and speech change.   CCVS: negative for chest pain  Resp: negative for SOB    OBJECTIVE:   VITALS:  BP (!) 151/83   Pulse 87   Temp 98 °F (36.7 °C) (Temporal)   Resp 18   Ht 1.727 m (5' 8\")   Wt 83 kg (183 lb)   SpO2 96%   BMI 27.83 kg/m²     PHYSICAL:  Neurologic: Alert and oriented x3; PERRL  Motor Exam:  Motor exam is symmetrical 5 out of 5 all extremities bilaterally  Sensory:  Sensory intact  Skin is warm  Abdomen is soft    DATA:  CBC:   Lab Results   Component Value Date/Time    WBC 6.4 03/29/2024 05:44 AM    RBC 4.08 03/29/2024 05:44 AM    HGB 12.5 03/29/2024 05:44 AM    HCT 37.9 03/29/2024 05:44 AM    MCV 92.9 03/29/2024 05:44 AM    MCH 30.6 03/29/2024 05:44 AM    MCHC 33.0 03/29/2024 05:44 AM    RDW 14.6 03/29/2024 05:44 AM     03/29/2024 05:44 AM    MPV 10.4 03/29/2024 05:44 AM     BMP:    Lab Results   Component Value Date/Time     03/29/2024 05:44 AM    K 4.0 03/29/2024 05:44 AM    K 4.1 11/14/2022 05:27 AM     03/29/2024 05:44 AM    CO2 22 03/29/2024 05:44 AM    BUN 12 03/29/2024 05:44 AM    LABALBU 4.2 03/27/2024 08:55 PM    CREATININE 0.8 03/29/2024 05:44 AM    CALCIUM 9.1 03/29/2024 05:44 AM    GFRAA >60 01/21/2015 04:30 AM    LABGLOM >90 03/29/2024 05:44 AM    GLUCOSE 88 03/29/2024 05:44 AM     PT/INR:    Lab Results   Component Value Date/Time    PROTIME 10.7 03/27/2024 08:55 PM    INR 1.0 03/27/2024 08:55 PM     PTT:  No results found for: \"APTT\", \"PTT\"[APTT}    Current Inpatient Medications  Current Facility-Administered Medications: enoxaparin Sodium (LOVENOX) injection 30 mg, 30 mg, SubCUTAneous, BID  hydrALAZINE

## 2024-03-29 NOTE — PROGRESS NOTES
TRAUMA  DAILY PROGRESS NOTE  3/29/2024    CHIEF COMPLAINT:  Chief Complaint   Patient presents with    Transfer     Pt transferred from [facility] for brain bleeds, epidural hematoma, frontal SDH, and parietal.  +ETOH       SUBJECTIVE:  Feeling great this morning. Head feeling a little sore but headaches have mostly subsided.     OBJECTIVE:  BP (!) 177/94   Pulse 82   Temp 97.8 °F (36.6 °C)   Resp 20   Ht 1.727 m (5' 8\")   Wt 83 kg (183 lb)   SpO2 95%   BMI 27.83 kg/m²     GENERAL:  NAD. A&Ox3. Abrasion to posterior head.   LUNGS:  No increased work of breathing. On room air.   CARDIOVASCULAR: RR  ABDOMEN:  Soft, non-distended, non-tender. No guarding, rigidity, rebound.    ASSESSMENT/PLAN:  53 y.o. male s/p fall with nondisplaced L parietal skull fx, L epidural hematoma, R parietal + frontal SDH, R frontal SAH.     Keppra 500bid 7d  SBP <140  NSGY consulted, recs appreciated  PT/OT, discharge planning    Mat Grande,   Surgery Resident PGY-2  3/29/2024  6:19 AM     Attending Attestation   I saw and examined the patient, I agree with resident note      Hx taken from patient and chart    I personally reviewed the  NS notes no surgical plans     52 yo male s/p fall hx ETOH use, who has acute SDH, SAH and epidural hematoma, and non displaced L parietal skull fracture.  Acute headache this morning,      I am managing prescription drugs,  keppra, morphine oxycodone, add Fioricet.     Mat Guillaume MD FACS

## 2024-03-29 NOTE — PROGRESS NOTES
Physical Therapy    PT evaluation orders received and chart review completed. Pt continues to have HTN with SBP > 140. Will hold PT evaluation at this time for pt safety.     Will follow and re-attempt as appropriate. Thank you.    Flori Cardoso PT, DPT  [id number]

## 2024-03-29 NOTE — CONSULTS
[facility]              [address] [place] [address]                              CONSULTATION      PATIENT NAME: MICHAEL ATWOOD                  : 1970  MED REC NO: [id number]                        ROOM: 8510  ACCOUNT NO: [id number]                       ADMIT DATE: 2024  PROVIDER: Gloria Swanson MD    NEUROSURGERY CONSULT    CONSULT DATE:  2024      REASON FOR CONSULT:  Left parietal epidural hematoma, right frontal subdural hematoma and subarachnoid hemorrhage, and parietal skull fracture.    HISTORY OF PRESENT ILLNESS:  The patient is a 53-year-old gentleman who apparently got lightheaded and dizzy and fell while at the grocery store.  He was initially taken to [facility], where he had a CT scan of his head that showed subdural hematoma and epidural hematoma and skull fracture.  He was ultimately transported to [facility] in [place] for further evaluation and management.  At this time, he complains of a headache, states that there was amnesia for the event.  Denies any new numbness, tingling, weakness, or loss of control of bowel or bladder function.    PAST MEDICAL HISTORY:  Positive for hypertension and kidney stone.    PAST SURGICAL HISTORY:  Positive for gastric bypass surgery.    FAMILY HISTORY:  Noncontributory.    SOCIAL HISTORY:  Positive for social consumption of alcoholic beverages, and he does smoke.    ALLERGIES:  INCLUDE PENICILLIN.      REVIEW OF SYSTEMS:  HEENT:  Positive for headache.  Negative for double vision or blurred vision.  CARDIOVASCULAR:  Negative for chest pain, arrhythmia, or palpitations.  RESPIRATORY:  Negative for shortness of breath, asthma, bronchitis, or pneumonia.  GASTROINTESTINAL:  Negative for heartburn, nausea, vomiting, diarrhea, or constipation.  GENITOURINARY:  Negative for hematuria or dysuria.  HEMATOLOGIC:  Negative for easy bleeding or bruising.  INFECTIOUS:  Negative

## 2024-03-29 NOTE — CARE COORDINATION
Transferred from [facility].for brain bleeds, epidural hematoma, frontal SDH, and parietal. +ETOH   Per nursing blood pressures running high.171 /95 He has no health insurance and per [employer] in Public benefits- PT is eligible for HCAP with meds upon discharge. Applying for [place] Medicaid.His plan is home and his mom will pick him up.cm/sw to follow. Electronically signed by Judith Alcantara RN on 3/29/2024 at 12:38 PM

## 2024-03-30 VITALS
HEART RATE: 73 BPM | OXYGEN SATURATION: 96 % | WEIGHT: 183 LBS | BODY MASS INDEX: 27.74 KG/M2 | SYSTOLIC BLOOD PRESSURE: 108 MMHG | DIASTOLIC BLOOD PRESSURE: 75 MMHG | TEMPERATURE: 97.7 F | RESPIRATION RATE: 18 BRPM | HEIGHT: 68 IN

## 2024-03-30 LAB
ANION GAP SERPL CALCULATED.3IONS-SCNC: 11 MMOL/L (ref 7–16)
BASOPHILS # BLD: 0.03 K/UL (ref 0–0.2)
BASOPHILS NFR BLD: 1 % (ref 0–2)
BUN SERPL-MCNC: 8 MG/DL (ref 6–20)
CALCIUM SERPL-MCNC: 9.3 MG/DL (ref 8.6–10.2)
CHLORIDE SERPL-SCNC: 103 MMOL/L (ref 98–107)
CO2 SERPL-SCNC: 25 MMOL/L (ref 22–29)
CREAT SERPL-MCNC: 0.8 MG/DL (ref 0.7–1.2)
EOSINOPHIL # BLD: 0.17 K/UL (ref 0.05–0.5)
EOSINOPHILS RELATIVE PERCENT: 3 % (ref 0–6)
ERYTHROCYTE [DISTWIDTH] IN BLOOD BY AUTOMATED COUNT: 14.5 % (ref 11.5–15)
GFR SERPL CREATININE-BSD FRML MDRD: >90 ML/MIN/1.73M2
GLUCOSE SERPL-MCNC: 94 MG/DL (ref 74–99)
HCT VFR BLD AUTO: 38.5 % (ref 37–54)
HGB BLD-MCNC: 12.9 G/DL (ref 12.5–16.5)
IMM GRANULOCYTES # BLD AUTO: <0.03 K/UL (ref 0–0.58)
IMM GRANULOCYTES NFR BLD: 0 % (ref 0–5)
LYMPHOCYTES NFR BLD: 1.49 K/UL (ref 1.5–4)
LYMPHOCYTES RELATIVE PERCENT: 25 % (ref 20–42)
MCH RBC QN AUTO: 31.2 PG (ref 26–35)
MCHC RBC AUTO-ENTMCNC: 33.5 G/DL (ref 32–34.5)
MCV RBC AUTO: 93.2 FL (ref 80–99.9)
MONOCYTES NFR BLD: 0.86 K/UL (ref 0.1–0.95)
MONOCYTES NFR BLD: 14 % (ref 2–12)
NEUTROPHILS NFR BLD: 58 % (ref 43–80)
NEUTS SEG NFR BLD: 3.47 K/UL (ref 1.8–7.3)
PLATELET # BLD AUTO: 191 K/UL (ref 130–450)
PMV BLD AUTO: 10.3 FL (ref 7–12)
POTASSIUM SERPL-SCNC: 3.8 MMOL/L (ref 3.5–5)
RBC # BLD AUTO: 4.13 M/UL (ref 3.8–5.8)
SODIUM SERPL-SCNC: 139 MMOL/L (ref 132–146)
WBC OTHER # BLD: 6 K/UL (ref 4.5–11.5)

## 2024-03-30 PROCEDURE — 97165 OT EVAL LOW COMPLEX 30 MIN: CPT

## 2024-03-30 PROCEDURE — 80048 BASIC METABOLIC PNL TOTAL CA: CPT

## 2024-03-30 PROCEDURE — 6360000002 HC RX W HCPCS

## 2024-03-30 PROCEDURE — 97161 PT EVAL LOW COMPLEX 20 MIN: CPT

## 2024-03-30 PROCEDURE — 97530 THERAPEUTIC ACTIVITIES: CPT

## 2024-03-30 PROCEDURE — 6370000000 HC RX 637 (ALT 250 FOR IP)

## 2024-03-30 PROCEDURE — 6370000000 HC RX 637 (ALT 250 FOR IP): Performed by: SURGERY

## 2024-03-30 PROCEDURE — 99232 SBSQ HOSP IP/OBS MODERATE 35: CPT | Performed by: SURGERY

## 2024-03-30 PROCEDURE — 85025 COMPLETE CBC W/AUTO DIFF WBC: CPT

## 2024-03-30 PROCEDURE — 36415 COLL VENOUS BLD VENIPUNCTURE: CPT

## 2024-03-30 RX ORDER — DOCUSATE SODIUM 100 MG/1
100 CAPSULE, LIQUID FILLED ORAL 2 TIMES DAILY
Qty: 60 CAPSULE | Refills: 0 | Status: SHIPPED | OUTPATIENT
Start: 2024-03-30 | End: 2024-03-30

## 2024-03-30 RX ORDER — LEVETIRACETAM 500 MG/1
500 TABLET ORAL 2 TIMES DAILY
Qty: 9 TABLET | Refills: 0 | Status: SHIPPED | OUTPATIENT
Start: 2024-03-30 | End: 2024-04-04

## 2024-03-30 RX ORDER — LISINOPRIL 10 MG/1
20 TABLET ORAL DAILY
Status: DISCONTINUED | OUTPATIENT
Start: 2024-03-30 | End: 2024-03-30 | Stop reason: HOSPADM

## 2024-03-30 RX ORDER — LEVETIRACETAM 500 MG/1
500 TABLET ORAL 2 TIMES DAILY
Qty: 9 TABLET | Refills: 0 | Status: SHIPPED | OUTPATIENT
Start: 2024-03-30 | End: 2024-03-30

## 2024-03-30 RX ORDER — DOCUSATE SODIUM 100 MG/1
100 CAPSULE, LIQUID FILLED ORAL 2 TIMES DAILY
Qty: 60 CAPSULE | Refills: 0 | Status: SHIPPED | OUTPATIENT
Start: 2024-03-30 | End: 2024-04-29

## 2024-03-30 RX ORDER — BUTALBITAL, ACETAMINOPHEN AND CAFFEINE 50; 325; 40 MG/1; MG/1; MG/1
1 TABLET ORAL EVERY 6 HOURS
Qty: 180 TABLET | Refills: 3 | OUTPATIENT
Start: 2024-03-30

## 2024-03-30 RX ORDER — LEVETIRACETAM 500 MG/1
500 TABLET ORAL 2 TIMES DAILY
Qty: 10 TABLET | Refills: 0 | OUTPATIENT
Start: 2024-03-30 | End: 2024-04-04

## 2024-03-30 RX ORDER — LISINOPRIL AND HYDROCHLOROTHIAZIDE 25; 20 MG/1; MG/1
1 TABLET ORAL DAILY
Status: DISCONTINUED | OUTPATIENT
Start: 2024-03-30 | End: 2024-03-30

## 2024-03-30 RX ORDER — OXYCODONE HYDROCHLORIDE 10 MG/1
10 TABLET ORAL EVERY 6 HOURS PRN
Qty: 28 TABLET | Refills: 0 | Status: SHIPPED | OUTPATIENT
Start: 2024-03-30 | End: 2024-03-30

## 2024-03-30 RX ORDER — HYDROCHLOROTHIAZIDE 25 MG/1
25 TABLET ORAL DAILY
Status: DISCONTINUED | OUTPATIENT
Start: 2024-03-30 | End: 2024-03-30 | Stop reason: HOSPADM

## 2024-03-30 RX ORDER — AMLODIPINE BESYLATE 5 MG/1
5 TABLET ORAL DAILY
Qty: 30 TABLET | Refills: 3 | OUTPATIENT
Start: 2024-03-30

## 2024-03-30 RX ORDER — OXYCODONE HYDROCHLORIDE 10 MG/1
10 TABLET ORAL EVERY 8 HOURS PRN
Qty: 21 TABLET | Refills: 0 | Status: SHIPPED | OUTPATIENT
Start: 2024-03-30 | End: 2024-04-06

## 2024-03-30 RX ADMIN — METOPROLOL SUCCINATE 25 MG: 25 TABLET, EXTENDED RELEASE ORAL at 08:26

## 2024-03-30 RX ADMIN — LABETALOL HYDROCHLORIDE 10 MG: 5 INJECTION INTRAVENOUS at 05:04

## 2024-03-30 RX ADMIN — OXYCODONE HYDROCHLORIDE 10 MG: 10 TABLET ORAL at 10:27

## 2024-03-30 RX ADMIN — MULTIVITAMIN TABLET 1 TABLET: TABLET at 08:26

## 2024-03-30 RX ADMIN — ENOXAPARIN SODIUM 30 MG: 100 INJECTION SUBCUTANEOUS at 08:27

## 2024-03-30 RX ADMIN — LEVETIRACETAM 500 MG: 500 TABLET, FILM COATED ORAL at 08:26

## 2024-03-30 RX ADMIN — HYDROCHLOROTHIAZIDE 25 MG: 25 TABLET ORAL at 08:27

## 2024-03-30 RX ADMIN — Medication 100 MG: at 08:27

## 2024-03-30 RX ADMIN — AMLODIPINE BESYLATE 5 MG: 5 TABLET ORAL at 08:26

## 2024-03-30 RX ADMIN — OXYCODONE HYDROCHLORIDE 10 MG: 10 TABLET ORAL at 05:04

## 2024-03-30 RX ADMIN — BUTALBITAL, ACETAMINOPHEN, AND CAFFEINE 1 TABLET: 50; 325; 40 TABLET ORAL at 08:27

## 2024-03-30 RX ADMIN — FOLIC ACID 1 MG: 1 TABLET ORAL at 08:26

## 2024-03-30 RX ADMIN — BUTALBITAL, ACETAMINOPHEN, AND CAFFEINE 1 TABLET: 50; 325; 40 TABLET ORAL at 02:20

## 2024-03-30 RX ADMIN — LISINOPRIL 20 MG: 10 TABLET ORAL at 08:26

## 2024-03-30 ASSESSMENT — PAIN SCALES - GENERAL
PAINLEVEL_OUTOF10: 10
PAINLEVEL_OUTOF10: 7
PAINLEVEL_OUTOF10: 7

## 2024-03-30 ASSESSMENT — PAIN DESCRIPTION - LOCATION
LOCATION: HEAD
LOCATION: HEAD

## 2024-03-30 ASSESSMENT — PAIN DESCRIPTION - PAIN TYPE
TYPE: ACUTE PAIN
TYPE: ACUTE PAIN

## 2024-03-30 ASSESSMENT — PAIN DESCRIPTION - DESCRIPTORS
DESCRIPTORS: ACHING;DISCOMFORT;SORE
DESCRIPTORS: ACHING;DISCOMFORT;THROBBING

## 2024-03-30 NOTE — PROGRESS NOTES
OCCUPATIONAL THERAPY INITIAL EVALUATION    [facility]  [address], [place]      Date:3/30/2024                                                               Patient Name: Kirsty Thurston  MRN: [id number]  : 1970  Room: [address] [place] [address]    Evaluating OT: Sondra Faulkner, OTR/L [id number]    Referring Provider:   Gi Coates DO      Specific Provider Orders/Date: OT eval and treat (3/27/24)    Diagnosis: Subdural hematoma (HCC) [S06.5XAA]  SAH (subarachnoid hemorrhage) (HCC) [I60.9]  Epidural hematoma (HCC) [S06.4XAA]  Intracranial bleed (HCC) [I62.9]  Fall, initial encounter [W19.XXXA]  Closed fracture of skull, unspecified bone, initial encounter (HCC) [S02.91XA]     Reason for admission: 53-year-old gentleman who apparently got lightheaded and dizzy and fell while at the grocery store. He was initially taken to [facility], where he had a CT scan of his head that showed subdural hematoma and epidural hematoma and skull fracture.     Surgery/Procedures: none     Pertinent Medical History:    Past Medical History:   Diagnosis Date    Hypertension     Kidney stone     ETOH  *Precautions:  Fall Risk, alarma    Assessment of current deficits   [x] Functional mobility  [x]ADLs  [x] Strength               []Cognition   [x] Functional transfers   [x] IADLs         [x] Safety Awareness   [x]Endurance   [] Fine Coordination        [] ROM     [] Vision/perception   []Sensation    []Gross Motor Coordination [x] Balance   [] Delirium                  []Motor Control     [] Communication    OT PLAN OF CARE   OT POC based on physician orders, patient diagnosis and results of clinical assessment.       Frequency/Duration: 1-3 days/wk for 1-2 weeks PRN    Specific OT Treatment to include:   ADL retraining/adapted techniques and AE recommendations to increase functional independence within precautions                    Energy  conservation techniques to improve tolerance for selfcare routine   Functional transfer/mobility training/DME recommendations for increased independence, safety and fall prevention         Patient/family education to increase safety and functional independence within precautions              Environmental modifications for safe mobility and completion of ADLs                           Therapeutic activity to improve functional performance during ADLs/IADLs                                         Therapeutic exercise to improve tolerance and functional strength for ADLs   Balance retraining exercises/tasks for facilitation of postural control with dynamic challenges during ADLs .  Positioning to improve functional independence  Neuromuscular re-education: facilitation of righting/equilibrium reactions, normalization muscle tone/facilitation active functional movement           Recommended Adaptive Equipment: TBA: shower seat as needed for fall prevention               Home Living: Pt lives with mother  in a 2 floor plan with 4 step(s) to enter and 1 rail(s); bed on first floor; shower in basement: flight with rail  Bathroom setup: walk in shower; standard height commode  Equipment owned: no DME    Prior Level of Function: IND with ADLs;  IND with IADLs.   No AD for ambulation.   Driving: yes  Occupation: \"in between work\"    Pain Level: pt c/o 7/10 headache pain  this session    Cognition: A&O: 3-4/4 ( min cues for correct month)  Follows 1-2 step commands appropriately.   Memory: good   Comprehension good   Problem solving: fair+   Judgement/safety: fair+ (unsteady)               Communication skills: WFL           Vision: WFL; denies vision changes (c/o headache due to not having glasses available to him)               Glasses:yes                                                    Hearing: WFL    UE Assessment:  Hand Dominance: Right [x]  Left []     ROM Strength   RUE  WFL 4/5   LUE WFL 4/5     Sensation: No c/o

## 2024-03-30 NOTE — PROGRESS NOTES
TRAUMA  DAILY PROGRESS NOTE  3/30/2024    CHIEF COMPLAINT:  Chief Complaint   Patient presents with    Transfer     Pt transferred from [facility] for brain bleeds, epidural hematoma, frontal SDH, and parietal.  +ETOH       SUBJECTIVE:  Feeling great this morning. C/o intermittent headaches controlled with oral meds    OBJECTIVE:  BP (!) 151/94 Comment: med given  Pulse 88   Temp 97.9 °F (36.6 °C) (Temporal)   Resp 18   Ht 1.727 m (5' 8\")   Wt 83 kg (183 lb)   SpO2 95%   BMI 27.83 kg/m²     GENERAL:  NAD. A&Ox3. Abrasion to posterior head.   LUNGS:  No increased work of breathing. On room air.   CARDIOVASCULAR: RR  ABDOMEN:  Soft, non-distended, non-tender. No guarding, rigidity, rebound.    ASSESSMENT/PLAN:  53 y.o. male s/p fall with nondisplaced L parietal skull fx, L epidural hematoma, R parietal + frontal SDH, R frontal SAH.     Keppra 500bid 7d  SBP <140  Fiorcet for HAs  NSGY consulted, recs appreciated.  F/u outpatient  PT/OT  Hopefully DC later today    Sari Ratliff, DO  Surgery Resident PGY-5  3/30/2024  6:42 AM       Attending Attestation   I saw and examined the patient, I agree with resident note      Hx taken from patient and chart     54 yo male s/p fall hx ETOH use, who has acute SDH, SAH and epidural hematoma, and non displaced L parietal skull fracture.  his headache from yesterday is improving      I am managing prescription drugs,  keppra, morphine oxycodone,  Fioricet.       Mat Guillaume MD FACS

## 2024-03-30 NOTE — PROGRESS NOTES
Physical Therapy  Physical Therapy Initial Assessment     Name: Kirsty Thurston  : 1970  MRN: [id number]      Date of Service: 3/30/2024    Evaluating PT:  Manas Tarango PT, DPT [id number]    Room #:  8510/8510-A  Diagnosis:  Subdural hematoma (HCC) [S06.5XAA]  SAH (subarachnoid hemorrhage) (HCC) [I60.9]  Epidural hematoma (HCC) [S06.4XAA]  Intracranial bleed (HCC) [I62.9]  Fall, initial encounter [W19.XXXA]  Closed fracture of skull, unspecified bone, initial encounter (HCC) [S02.91XA]  PMHx/PSHx:    Past Medical History:   Diagnosis Date    Hypertension     Kidney stone      Procedure/Surgery:  none  Precautions:  Falls, bed alarm  Equipment Needs:  TBD    SUBJECTIVE:    Pt lives with mother in a 2 story home with 4 step(s) to enter and 1 rail(s).   First floor bedroom, shower in basement with a full flight of steps and 1 rail.  Pt ambulated without device and was independent PTA.    OBJECTIVE:   Initial Evaluation  Date: 3/30/24 Treatment Short Term/ Long Term   Goals   AM-PAC 6 Clicks      Was pt agreeable to Eval/treatment? Yes     Does pt have pain? 7/10 HA     Bed Mobility  Rolling: NT  Supine to sit: SBA with HOB elevated   Sit to supine: NT  Scooting: SBA  Mod Independent   Transfers Sit to stand: SBA  Stand to sit: SBA  Stand pivot: Elbert no device  Independent   Ambulation   75 feet with Elbert no device  >400 feet Independently   Stair negotiation: ascended and descended NT  >4 steps with 1 rail Mod Independent   ROM BUE:  WFL  BLE:  WFL     Strength BUE:  WFL  BLE:  4+/5  Increase by 1/3 MMT grade   Balance Sitting EOB:  SBA  Dynamic Standing:  Elbert no device  Sitting EOB:  Independent  Dynamic Standing:  Independent     Pt is A & O x 4  Sensation:  no reported paresthesias  Edema:  none    Therapeutic Exercises:  sit to stand x 2 reps    Patient education  Pt educated on safety    Patient response to education:   Pt verbalized understanding Pt demonstrated skill Pt requires further education in  this area   yes yes yes     ASSESSMENT:    Conditions Requiring Skilled Therapeutic Intervention:    [x]Decreased strength     []Decreased ROM  [x]Decreased functional mobility  [x]Decreased balance   []Decreased endurance   [x]Decreased posture  []Decreased sensation  []Decreased coordination   []Decreased vision  [x]Decreased safety awareness   [x]Increased pain       Comments:  Pt was in bed upon arrival, agreeable to initial evaluation.  Pt exhibited mild unsteadiness while ambulating but no LOBs noted.  Worsening HA reported and limited ambulation distance.  Pt initially returned to chair and tolerated sitting for roughly 3 minutes before requesting to return to bed.  Pt was left in bed with all needs met and call light in reach.  Bed alarm on.  RN notified of pt's request for pain medication.    Treatment:  Patient practiced and was instructed in the following treatment:    Bed mobility training - pt given verbal cues to facilitate proper sequencing and safety during supine>sit.  Sitting EOB for >3 minutes for upright tolerance, postural awareness and BLE ROM  Transfer training - pt was given verbal and tactile cues to facilitate proper hand placement, technique and safety during sit to stand, stand to sit and stand pivot transfers as well as provided with physical assistance.   Gait training- pt was given verbal and tactile cues to facilitate safety and balance during ambulation as well as provided with physical assistance.    Pt's/ family goals   1. Return home    Prognosis is good for reaching above PT goals.    Patient and or family understand(s) diagnosis, prognosis, and plan of care:   [x] Yes [] No      PHYSICAL THERAPY PLAN OF CARE:    PT POC is established based on physician order and patient diagnosis     Referring provider/PT Order:  Gi Rosas,   /03/27/24 2315  PT eval and treat  Diagnosis:  Subdural hematoma (HCC) [S06.5XAA]  SAH (subarachnoid hemorrhage) (HCC) [I60.9]  Epidural hematoma

## 2024-03-31 ENCOUNTER — APPOINTMENT (OUTPATIENT)
Dept: CT IMAGING | Age: 54
End: 2024-03-31
Payer: MEDICAID

## 2024-03-31 ENCOUNTER — HOSPITAL ENCOUNTER (EMERGENCY)
Age: 54
Discharge: HOME | End: 2024-03-31
Attending: EMERGENCY MEDICINE
Payer: MEDICAID

## 2024-03-31 VITALS
HEART RATE: 86 BPM | BODY MASS INDEX: 28.13 KG/M2 | RESPIRATION RATE: 18 BRPM | WEIGHT: 185 LBS | OXYGEN SATURATION: 93 % | DIASTOLIC BLOOD PRESSURE: 80 MMHG | SYSTOLIC BLOOD PRESSURE: 127 MMHG | TEMPERATURE: 99.1 F

## 2024-03-31 DIAGNOSIS — S06.36AD TRAUMATIC INTRACEREBRAL HEMORRHAGE WITH UNKNOWN LOSS OF CONSCIOUSNESS STATUS, UNSPECIFIED LATERALITY, SUBSEQUENT ENCOUNTER: ICD-10-CM

## 2024-03-31 DIAGNOSIS — R51.9 NONINTRACTABLE HEADACHE, UNSPECIFIED CHRONICITY PATTERN, UNSPECIFIED HEADACHE TYPE: Primary | ICD-10-CM

## 2024-03-31 LAB
ALBUMIN SERPL-MCNC: 4.1 G/DL (ref 3.5–5.2)
ALP SERPL-CCNC: 65 U/L (ref 40–129)
ALT SERPL-CCNC: 20 U/L (ref 0–40)
ANION GAP SERPL CALCULATED.3IONS-SCNC: 15 MMOL/L (ref 7–16)
AST SERPL-CCNC: 30 U/L (ref 0–39)
BASOPHILS # BLD: 0.04 K/UL (ref 0–0.2)
BASOPHILS NFR BLD: 0 % (ref 0–2)
BILIRUB SERPL-MCNC: 0.2 MG/DL (ref 0–1.2)
BUN SERPL-MCNC: 14 MG/DL (ref 6–20)
CALCIUM SERPL-MCNC: 9.4 MG/DL (ref 8.6–10.2)
CHLORIDE SERPL-SCNC: 98 MMOL/L (ref 98–107)
CO2 SERPL-SCNC: 21 MMOL/L (ref 22–29)
CREAT SERPL-MCNC: 0.9 MG/DL (ref 0.7–1.2)
EOSINOPHIL # BLD: 0.25 K/UL (ref 0.05–0.5)
EOSINOPHILS RELATIVE PERCENT: 2 % (ref 0–6)
ERYTHROCYTE [DISTWIDTH] IN BLOOD BY AUTOMATED COUNT: 14.2 % (ref 11.5–15)
GFR SERPL CREATININE-BSD FRML MDRD: >90 ML/MIN/1.73M2
GLUCOSE SERPL-MCNC: 93 MG/DL (ref 74–99)
HCT VFR BLD AUTO: 42 % (ref 37–54)
HGB BLD-MCNC: 14 G/DL (ref 12.5–16.5)
IMM GRANULOCYTES # BLD AUTO: 0.05 K/UL (ref 0–0.58)
IMM GRANULOCYTES NFR BLD: 1 % (ref 0–5)
LYMPHOCYTES NFR BLD: 2.21 K/UL (ref 1.5–4)
LYMPHOCYTES RELATIVE PERCENT: 21 % (ref 20–42)
MCH RBC QN AUTO: 30.9 PG (ref 26–35)
MCHC RBC AUTO-ENTMCNC: 33.3 G/DL (ref 32–34.5)
MCV RBC AUTO: 92.7 FL (ref 80–99.9)
MONOCYTES NFR BLD: 0.87 K/UL (ref 0.1–0.95)
MONOCYTES NFR BLD: 8 % (ref 2–12)
NEUTROPHILS NFR BLD: 68 % (ref 43–80)
NEUTS SEG NFR BLD: 7.26 K/UL (ref 1.8–7.3)
PLATELET CONFIRMATION: NORMAL
PLATELET, FLUORESCENCE: 210 K/UL (ref 130–450)
PMV BLD AUTO: 10.4 FL (ref 7–12)
POTASSIUM SERPL-SCNC: 4.8 MMOL/L (ref 3.5–5)
PROT SERPL-MCNC: 7.6 G/DL (ref 6.4–8.3)
RBC # BLD AUTO: 4.53 M/UL (ref 3.8–5.8)
RBC # BLD: NORMAL 10*6/UL
SODIUM SERPL-SCNC: 134 MMOL/L (ref 132–146)
WBC OTHER # BLD: 10.7 K/UL (ref 4.5–11.5)

## 2024-03-31 PROCEDURE — 80053 COMPREHEN METABOLIC PANEL: CPT

## 2024-03-31 PROCEDURE — 6370000000 HC RX 637 (ALT 250 FOR IP)

## 2024-03-31 PROCEDURE — 99284 EMERGENCY DEPT VISIT MOD MDM: CPT

## 2024-03-31 PROCEDURE — 85025 COMPLETE CBC W/AUTO DIFF WBC: CPT

## 2024-03-31 PROCEDURE — 70450 CT HEAD/BRAIN W/O DYE: CPT

## 2024-03-31 RX ORDER — LEVETIRACETAM 500 MG/1
500 TABLET ORAL ONCE
Status: COMPLETED | OUTPATIENT
Start: 2024-03-31 | End: 2024-03-31

## 2024-03-31 RX ADMIN — LEVETIRACETAM 500 MG: 500 TABLET, FILM COATED ORAL at 17:06

## 2024-03-31 ASSESSMENT — LIFESTYLE VARIABLES
HOW OFTEN DO YOU HAVE A DRINK CONTAINING ALCOHOL: MONTHLY OR LESS
HOW MANY STANDARD DRINKS CONTAINING ALCOHOL DO YOU HAVE ON A TYPICAL DAY: 1 OR 2

## 2024-03-31 NOTE — ED PROVIDER NOTES
hematoma, now measuring up to 3 mm in thickness.  Right frontal subdural hematoma measures up to 12 mm in thickness, similar to the prior examination. Concurrent subarachnoid hemorrhage in this region.  There is no evidence of acute infarct. No new intracranial hemorrhage.  No significant midline shift. There are nonspecific hypoattenuating foci in the subcortical and periventricular white matter that most likely represent chronic microangiopathic ischemic changes in a patient of this age. ORBITS: The visualized portion of the orbits demonstrate no acute abnormality. SINUSES: The visualized paranasal sinuses and mastoid air cells demonstrate no acute abnormality. SOFT TISSUES/SKULL:  Nondisplaced left parietal skull fracture.     1. No significant change in size of the left parietal epidural hematoma. 2. No change right frontal subdural hematoma with subarachnoid hemorrhage. 3. No new intracranial hemorrhage. 4. Nondisplaced left parietal skull fracture.     XR PELVIS (1-2 VIEWS)    Result Date: 3/27/2024  EXAMINATION: ONE XRAY VIEW OF THE PELVIS 3/27/2024 8:59 pm COMPARISON: None. HISTORY: ORDERING SYSTEM PROVIDED HISTORY: trauma TECHNOLOGIST PROVIDED HISTORY: Reason for exam:->trauma What reading provider will be dictating this exam?->CRC FINDINGS: There is no definite acute fracture or dislocation identified by single-view evaluation.     No acute pelvic fracture is identified.     XR CHEST PORTABLE    Result Date: 3/27/2024  EXAMINATION: ONE XRAY VIEW OF THE CHEST 3/27/2024 8:58 pm COMPARISON: None. HISTORY: ORDERING SYSTEM PROVIDED HISTORY: fall TECHNOLOGIST PROVIDED HISTORY: Reason for exam:->fall What reading provider will be dictating this exam?->CRC FINDINGS: Cardiomediastinal silhouette: No cardiomegaly or obvious acute process is identified. Lungs/pleura: No acute pulmonary infiltrate, pleural effusion, or pneumothorax is identified. Other: No displaced fracture.    Multiple left rib deformities are

## 2024-03-31 NOTE — DISCHARGE INSTRUCTIONS
Return to the emergency department if you have any new or worsening symptoms such as fever, excessive bleeding, chest pain, or intractable vomiting. Please look into applying for Medicaid.    Learning About a Closed Head Injury  What is a closed head injury?     A closed head injury happens when your head gets hit hard. The strong force of the blow causes your brain to shake in your skull. This movement can cause the brain to bruise, swell, or tear. Sometimes nerves or blood vessels also get damaged. This can cause bleeding in or around the brain.  A concussion is a type of closed head injury.  What are the symptoms?  If you have a mild concussion, you may have a mild headache or feel \"not quite right.\" These symptoms are common. They usually go away over a few days to 4 weeks.  But sometimes after a concussion, you feel like you can't function as well as before the injury. And you have new symptoms. This is called postconcussive syndrome. You may:  Find it harder to solve problems, think, concentrate, or remember.  Have headaches.  Have changes in your sleep patterns, such as not being able to sleep or sleeping all the time.  Have changes in your personality.  Not be interested in your usual activities.  Feel angry or anxious without a clear reason.  Lose your sense of taste or smell.  Be dizzy, lightheaded, or unsteady. It may be hard to stand or walk.  How is a closed head injury treated?  Any person who may have a concussion needs to see a doctor. Some people have to stay in the hospital to be watched. Others can go home safely. But if you go home, make sure to have someone watch you closely.  Rest is the best treatment. Get plenty of sleep at night. And try to rest during the day.  Avoid activities that are physically or mentally demanding. These include housework, exercise, and schoolwork. And don't play video games, send text messages, or use the computer. You may need to change your school or work schedule

## 2024-04-01 NOTE — PROGRESS NOTES
PCP is Junaid Wilson DO  Office notified of admission.      Electronically signed by Lisa Hudson RN on 4/1/2024 at 9:28 AM

## 2024-04-10 ENCOUNTER — APPOINTMENT (OUTPATIENT)
Dept: MRI IMAGING | Age: 54
DRG: 083 | End: 2024-04-10
Payer: MEDICAID

## 2024-04-10 ENCOUNTER — HOSPITAL ENCOUNTER (INPATIENT)
Age: 54
LOS: 1 days | Discharge: HOME | DRG: 083 | End: 2024-04-11
Attending: STUDENT IN AN ORGANIZED HEALTH CARE EDUCATION/TRAINING PROGRAM | Admitting: SURGERY
Payer: MEDICAID

## 2024-04-10 ENCOUNTER — APPOINTMENT (OUTPATIENT)
Dept: CT IMAGING | Age: 54
DRG: 083 | End: 2024-04-10
Payer: MEDICAID

## 2024-04-10 ENCOUNTER — APPOINTMENT (OUTPATIENT)
Dept: GENERAL RADIOLOGY | Age: 54
DRG: 083 | End: 2024-04-10
Payer: MEDICAID

## 2024-04-10 DIAGNOSIS — S06.5XAA SUBDURAL HEMATOMA (HCC): ICD-10-CM
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
## 2024-04-10 DIAGNOSIS — I60.9 SUBARACHNOID HEMORRHAGE (HCC): Primary | ICD-10-CM

## 2024-04-10 LAB
ANION GAP SERPL CALCULATED.3IONS-SCNC: 18 MMOL/L (ref 7–16)
BASOPHILS # BLD: 0.03 K/UL (ref 0–0.2)
BASOPHILS NFR BLD: 0 % (ref 0–2)
BUN SERPL-MCNC: 7 MG/DL (ref 6–20)
CALCIUM SERPL-MCNC: 8.5 MG/DL (ref 8.6–10.2)
CHLORIDE SERPL-SCNC: 102 MMOL/L (ref 98–107)
CO2 SERPL-SCNC: 17 MMOL/L (ref 22–29)
CREAT SERPL-MCNC: 0.8 MG/DL (ref 0.7–1.2)
EOSINOPHIL # BLD: 0.13 K/UL (ref 0.05–0.5)
EOSINOPHILS RELATIVE PERCENT: 2 % (ref 0–6)
ERYTHROCYTE [DISTWIDTH] IN BLOOD BY AUTOMATED COUNT: 14.4 % (ref 11.5–15)
ETHANOLAMINE SERPL-MCNC: 149 MG/DL
GFR SERPL CREATININE-BSD FRML MDRD: >90 ML/MIN/1.73M2
GLUCOSE SERPL-MCNC: 86 MG/DL (ref 74–99)
HCT VFR BLD AUTO: 32.5 % (ref 37–54)
HGB BLD-MCNC: 10.8 G/DL (ref 12.5–16.5)
IMM GRANULOCYTES # BLD AUTO: <0.03 K/UL (ref 0–0.58)
IMM GRANULOCYTES NFR BLD: 0 % (ref 0–5)
INR PPP: 1
LYMPHOCYTES NFR BLD: 1.5 K/UL (ref 1.5–4)
LYMPHOCYTES RELATIVE PERCENT: 20 % (ref 20–42)
MCH RBC QN AUTO: 30.4 PG (ref 26–35)
MCHC RBC AUTO-ENTMCNC: 33.2 G/DL (ref 32–34.5)
MCV RBC AUTO: 91.5 FL (ref 80–99.9)
MONOCYTES NFR BLD: 0.53 K/UL (ref 0.1–0.95)
MONOCYTES NFR BLD: 7 % (ref 2–12)
NEUTROPHILS NFR BLD: 71 % (ref 43–80)
NEUTS SEG NFR BLD: 5.35 K/UL (ref 1.8–7.3)
PLATELET # BLD AUTO: 221 K/UL (ref 130–450)
PMV BLD AUTO: 9.5 FL (ref 7–12)
POTASSIUM SERPL-SCNC: 4.2 MMOL/L (ref 3.5–5)
PROTHROMBIN TIME: 10.8 SEC (ref 9.3–12.4)
RBC # BLD AUTO: 3.55 M/UL (ref 3.8–5.8)
SODIUM SERPL-SCNC: 137 MMOL/L (ref 132–146)
WBC OTHER # BLD: 7.6 K/UL (ref 4.5–11.5)

## 2024-04-10 PROCEDURE — 70450 CT HEAD/BRAIN W/O DYE: CPT

## 2024-04-10 PROCEDURE — 2060000000 HC ICU INTERMEDIATE R&B

## 2024-04-10 PROCEDURE — 6360000002 HC RX W HCPCS: Performed by: SURGERY

## 2024-04-10 PROCEDURE — 6370000000 HC RX 637 (ALT 250 FOR IP)

## 2024-04-10 PROCEDURE — 80048 BASIC METABOLIC PNL TOTAL CA: CPT

## 2024-04-10 PROCEDURE — 85025 COMPLETE CBC W/AUTO DIFF WBC: CPT

## 2024-04-10 PROCEDURE — 99223 1ST HOSP IP/OBS HIGH 75: CPT | Performed by: SURGERY

## 2024-04-10 PROCEDURE — 96374 THER/PROPH/DIAG INJ IV PUSH: CPT

## 2024-04-10 PROCEDURE — 2580000003 HC RX 258: Performed by: STUDENT IN AN ORGANIZED HEALTH CARE EDUCATION/TRAINING PROGRAM

## 2024-04-10 PROCEDURE — G0480 DRUG TEST DEF 1-7 CLASSES: HCPCS

## 2024-04-10 PROCEDURE — 6370000000 HC RX 637 (ALT 250 FOR IP): Performed by: STUDENT IN AN ORGANIZED HEALTH CARE EDUCATION/TRAINING PROGRAM

## 2024-04-10 PROCEDURE — 6360000002 HC RX W HCPCS

## 2024-04-10 PROCEDURE — 72170 X-RAY EXAM OF PELVIS: CPT

## 2024-04-10 PROCEDURE — 6360000002 HC RX W HCPCS: Performed by: STUDENT IN AN ORGANIZED HEALTH CARE EDUCATION/TRAINING PROGRAM

## 2024-04-10 PROCEDURE — 85610 PROTHROMBIN TIME: CPT

## 2024-04-10 PROCEDURE — 72141 MRI NECK SPINE W/O DYE: CPT

## 2024-04-10 PROCEDURE — 99222 1ST HOSP IP/OBS MODERATE 55: CPT | Performed by: NEUROLOGICAL SURGERY

## 2024-04-10 PROCEDURE — 99285 EMERGENCY DEPT VISIT HI MDM: CPT

## 2024-04-10 RX ORDER — POLYETHYLENE GLYCOL 3350 17 G/17G
17 POWDER, FOR SOLUTION ORAL DAILY
Status: DISCONTINUED | OUTPATIENT
Start: 2024-04-10 | End: 2024-04-11 | Stop reason: HOSPADM

## 2024-04-10 RX ORDER — OXYCODONE HYDROCHLORIDE 10 MG/1
10 TABLET ORAL EVERY 8 HOURS PRN
COMMUNITY

## 2024-04-10 RX ORDER — METOPROLOL SUCCINATE 25 MG/1
25 TABLET, EXTENDED RELEASE ORAL DAILY
COMMUNITY

## 2024-04-10 RX ORDER — HYDRALAZINE HYDROCHLORIDE 20 MG/ML
10 INJECTION INTRAMUSCULAR; INTRAVENOUS
Status: DISCONTINUED | OUTPATIENT
Start: 2024-04-10 | End: 2024-04-11 | Stop reason: HOSPADM

## 2024-04-10 RX ORDER — HYDRALAZINE HYDROCHLORIDE 20 MG/ML
10 INJECTION INTRAMUSCULAR; INTRAVENOUS EVERY 4 HOURS PRN
Status: DISCONTINUED | OUTPATIENT
Start: 2024-04-10 | End: 2024-04-10

## 2024-04-10 RX ORDER — ACETAMINOPHEN 325 MG/1
650 TABLET ORAL EVERY 6 HOURS
Status: DISCONTINUED | OUTPATIENT
Start: 2024-04-10 | End: 2024-04-11 | Stop reason: HOSPADM

## 2024-04-10 RX ORDER — LABETALOL HYDROCHLORIDE 5 MG/ML
10 INJECTION, SOLUTION INTRAVENOUS EVERY 4 HOURS PRN
Status: DISCONTINUED | OUTPATIENT
Start: 2024-04-10 | End: 2024-04-10

## 2024-04-10 RX ORDER — ONDANSETRON 4 MG/1
4 TABLET, ORALLY DISINTEGRATING ORAL EVERY 8 HOURS PRN
Status: DISCONTINUED | OUTPATIENT
Start: 2024-04-10 | End: 2024-04-11 | Stop reason: HOSPADM

## 2024-04-10 RX ORDER — SODIUM CHLORIDE 9 MG/ML
INJECTION, SOLUTION INTRAVENOUS PRN
Status: DISCONTINUED | OUTPATIENT
Start: 2024-04-10 | End: 2024-04-11 | Stop reason: HOSPADM

## 2024-04-10 RX ORDER — LEVETIRACETAM 500 MG/5ML
500 INJECTION, SOLUTION, CONCENTRATE INTRAVENOUS EVERY 12 HOURS
Status: DISCONTINUED | OUTPATIENT
Start: 2024-04-10 | End: 2024-04-11 | Stop reason: HOSPADM

## 2024-04-10 RX ORDER — PHENOBARBITAL 32.4 MG/1
64.8 TABLET ORAL EVERY 6 HOURS
Status: DISCONTINUED | OUTPATIENT
Start: 2024-04-10 | End: 2024-04-11 | Stop reason: HOSPADM

## 2024-04-10 RX ORDER — LISINOPRIL AND HYDROCHLOROTHIAZIDE 25; 20 MG/1; MG/1
1 TABLET ORAL DAILY
Status: ON HOLD | COMMUNITY
End: 2024-04-11 | Stop reason: HOSPADM

## 2024-04-10 RX ORDER — SODIUM CHLORIDE 0.9 % (FLUSH) 0.9 %
10 SYRINGE (ML) INJECTION EVERY 12 HOURS SCHEDULED
Status: DISCONTINUED | OUTPATIENT
Start: 2024-04-10 | End: 2024-04-11 | Stop reason: HOSPADM

## 2024-04-10 RX ORDER — LABETALOL HYDROCHLORIDE 5 MG/ML
10 INJECTION, SOLUTION INTRAVENOUS
Status: DISCONTINUED | OUTPATIENT
Start: 2024-04-10 | End: 2024-04-11 | Stop reason: HOSPADM

## 2024-04-10 RX ORDER — LABETALOL HYDROCHLORIDE 5 MG/ML
20 INJECTION, SOLUTION INTRAVENOUS EVERY 4 HOURS PRN
Status: DISCONTINUED | OUTPATIENT
Start: 2024-04-10 | End: 2024-04-10

## 2024-04-10 RX ORDER — SODIUM CHLORIDE 0.9 % (FLUSH) 0.9 %
10 SYRINGE (ML) INJECTION PRN
Status: DISCONTINUED | OUTPATIENT
Start: 2024-04-10 | End: 2024-04-11 | Stop reason: HOSPADM

## 2024-04-10 RX ORDER — ONDANSETRON 2 MG/ML
4 INJECTION INTRAMUSCULAR; INTRAVENOUS EVERY 6 HOURS PRN
Status: DISCONTINUED | OUTPATIENT
Start: 2024-04-10 | End: 2024-04-11 | Stop reason: HOSPADM

## 2024-04-10 RX ORDER — MORPHINE SULFATE 2 MG/ML
2 INJECTION, SOLUTION INTRAMUSCULAR; INTRAVENOUS
Status: DISCONTINUED | OUTPATIENT
Start: 2024-04-10 | End: 2024-04-11 | Stop reason: HOSPADM

## 2024-04-10 RX ADMIN — LABETALOL HYDROCHLORIDE 10 MG: 5 INJECTION INTRAVENOUS at 18:06

## 2024-04-10 RX ADMIN — LEVETIRACETAM 500 MG: 100 INJECTION, SOLUTION INTRAVENOUS at 15:51

## 2024-04-10 RX ADMIN — ACETAMINOPHEN 650 MG: 325 TABLET ORAL at 08:06

## 2024-04-10 RX ADMIN — MORPHINE SULFATE 2 MG: 2 INJECTION, SOLUTION INTRAMUSCULAR; INTRAVENOUS at 11:24

## 2024-04-10 RX ADMIN — LEVETIRACETAM 500 MG: 100 INJECTION, SOLUTION INTRAVENOUS at 04:33

## 2024-04-10 RX ADMIN — PHENOBARBITAL 64.8 MG: 32.4 TABLET ORAL at 14:32

## 2024-04-10 RX ADMIN — MORPHINE SULFATE 2 MG: 2 INJECTION, SOLUTION INTRAMUSCULAR; INTRAVENOUS at 21:36

## 2024-04-10 RX ADMIN — ONDANSETRON 4 MG: 2 INJECTION INTRAMUSCULAR; INTRAVENOUS at 08:07

## 2024-04-10 RX ADMIN — MORPHINE SULFATE 2 MG: 2 INJECTION, SOLUTION INTRAMUSCULAR; INTRAVENOUS at 14:32

## 2024-04-10 RX ADMIN — ACETAMINOPHEN 650 MG: 325 TABLET ORAL at 18:03

## 2024-04-10 RX ADMIN — SODIUM CHLORIDE, PRESERVATIVE FREE 10 ML: 5 INJECTION INTRAVENOUS at 22:08

## 2024-04-10 RX ADMIN — SODIUM CHLORIDE, PRESERVATIVE FREE 10 ML: 5 INJECTION INTRAVENOUS at 08:07

## 2024-04-10 RX ADMIN — PHENOBARBITAL 64.8 MG: 32.4 TABLET ORAL at 21:28

## 2024-04-10 RX ADMIN — MORPHINE SULFATE 2 MG: 2 INJECTION, SOLUTION INTRAMUSCULAR; INTRAVENOUS at 18:03

## 2024-04-10 ASSESSMENT — PAIN DESCRIPTION - DESCRIPTORS
DESCRIPTORS: DISCOMFORT
DESCRIPTORS: ACHING;POUNDING;THROBBING
DESCRIPTORS: DISCOMFORT
DESCRIPTORS: POUNDING;ACHING;DISCOMFORT

## 2024-04-10 ASSESSMENT — LIFESTYLE VARIABLES: HOW OFTEN DO YOU HAVE A DRINK CONTAINING ALCOHOL: MONTHLY OR LESS

## 2024-04-10 ASSESSMENT — PAIN DESCRIPTION - LOCATION
LOCATION: HEAD

## 2024-04-10 ASSESSMENT — PAIN - FUNCTIONAL ASSESSMENT: PAIN_FUNCTIONAL_ASSESSMENT: 0-10

## 2024-04-10 ASSESSMENT — PAIN SCALES - GENERAL
PAINLEVEL_OUTOF10: 6
PAINLEVEL_OUTOF10: 6
PAINLEVEL_OUTOF10: 4
PAINLEVEL_OUTOF10: 7
PAINLEVEL_OUTOF10: 3
PAINLEVEL_OUTOF10: 8

## 2024-04-10 ASSESSMENT — PAIN DESCRIPTION - PAIN TYPE: TYPE: ACUTE PAIN

## 2024-04-10 NOTE — ED PROVIDER NOTES
Department of Emergency Medicine   ED Provider Note  Admit Date/RoomTime: 4/10/2024  2:37 AM  ED Room: 20/20          History of Present Illness:  4/10/24, Time: 3:06 AM EDT       Kirsty Thurston is a 53 y.o. male presenting to the ED for fall.  He is a transfer from [place] after he fell down 12 stairs and was found to have a subdural hematoma.  Patient notes he recently was admitted after being found to have a subdural hematoma from a fall.  Notes he went home and fell again today.  He notes he has been having issues with his balance and lost his balance while he was on the stairs.  Unsure if he passed out.  Denies any chest pain, shortness of breath, abdominal pain, nausea, vomiting, lightheaded or dizziness, blurry or double vision, numbness or tingling.    Review of Systems:     Pertinent positives and negatives are stated within HPI.      --------------------------------------------- PAST HISTORY ---------------------------------------------  Past Medical History:  has a past medical history of Hypertension and Kidney stone.    Past Surgical History:  has a past surgical history that includes Gastric bypass surgery.    Social History:  reports that he has never smoked. He does not have any smokeless tobacco history on file. He reports current alcohol use. He reports that he does not use drugs.    Family History: family history is not on file.     The patient’s home medications have been reviewed.    Allergies: Pcn [penicillins]      ---------------------------------------------------PHYSICAL EXAM--------------------------------------    Physical Exam  Vitals and nursing note reviewed.   Constitutional:       General: He is not in acute distress.     Appearance: Normal appearance.   HENT:      Head: Normocephalic and atraumatic.      Mouth/Throat:      Mouth: Mucous membranes are moist.      Pharynx: Oropharynx is clear.   Eyes:      Extraocular Movements: Extraocular movements intact.      Conjunctiva/sclera:

## 2024-04-10 NOTE — ED NOTES
Report given to receiving, RN from 85.   Report method by phone   The following was reviewed with receiving RN:   Current vital signs:  BP (!) 168/101   Pulse 93   Temp 98.1 °F (36.7 °C)   Resp 18   Ht 1.727 m (5' 8\")   Wt 83 kg (183 lb)   SpO2 95%   BMI 27.83 kg/m²                MEWS Score: 1     Any medication or safety alerts were reviewed. Any pending diagnostics and notifications were also reviewed, as well as any safety concerns or issues, abnormal labs, abnormal imaging, and abnormal assessment findings. Questions were answered.

## 2024-04-10 NOTE — DISCHARGE SUMMARY
Physician Discharge Summary     Patient ID:  Kirsty Thurston  [id number]  53 y.o.  1970    Admit date: 4/10/2024    Discharge date and time: 4/11/24    Admitting Physician: Mat Guillaume MD     Admission Diagnoses: Subarachnoid hemorrhage (HCC) [I60.9]  Subdural hematoma (HCC) [S06.5XAA]  Fall, initial encounter [W19.XXXA]  Fall from standing, initial encounter [W19.XXXA]    Discharge Diagnoses: Principal Problem:    Fall from standing, initial encounter  Active Problems:    Subarachnoid hemorrhage (HCC)  Resolved Problems:    * No resolved hospital problems. *      Admission Condition: stable    Discharged Condition: stable    Indication for Admission: s/p fall    Hospital Course/Procedures/Operation/treatments:   4/10:  Patient initially presented to an outside hospital following a fall.  CT scan from outside hospital with a subdural hemorrhage seen along the midline left falx measuring 2.9 mm in thickness.  Patient was transferred to [facility] for further evaluation.  Patient states he was walking down his mother's basement stairs when he missed a step striking his head.  Reports loss consciousness.  States he is not any blood thinning medication.  Patient is GCS 15 complains of some cervical spine pain.  Denies any numbness or tingling in extremities.  Reports social alcohol use.  States he is never gone through alcohol withdrawal before.  Patient was recently discharged from the trauma surgery service with a R SDH, SAH, and L parietal epidural hematoma following being found down in a grocery. No new findings on tertiary  4/11: 15/24. Pt wanting to go home. Will discharge home.        Consults:   IP CONSULT TO TRAUMA SURGERY  IP CONSULT TO NEUROSURGERY  IP CONSULT TO SOCIAL WORK    Significant Diagnostic Studies:   CT HEAD WO CONTRAST    Result Date: 4/10/2024  EXAMINATION: CT OF THE HEAD WITHOUT CONTRAST  4/10/2024 10:36 am TECHNIQUE: CT of the head was performed without the administration of intravenous contrast.

## 2024-04-10 NOTE — H&P
TRAUMA HISTORY & PHYSICAL  Attending/Surgical Resident/Advance Practice Nurse  4/10/2024  7:38 AM    PRIMARY SURVEY    CHIEF COMPLAINT:  Trauma consult.    Injury occurred just prior to arrival.  Patient initially presented to an outside hospital following a fall.  CT scan from outside hospital with a subdural hemorrhage seen along the midline left falx measuring 2.9 mm in thickness.  Patient was transferred to [facility] for further evaluation.  Patient states he was walking down his mother's basement stairs when he missed a step striking his head.  Reports loss consciousness.  States he is not any blood thinning medication.  Patient is GCS 15 complains of some cervical spine pain.  Denies any numbness or tingling in extremities.  Reports social alcohol use.  States he is never gone through alcohol withdrawal before.  Patient was recently discharged from the trauma surgery service with a R SDH, SAH, and L parietal epidural hematoma following being found down in a grocery.     AIRWAY:   Airway Normal    EMS ETT Absent  Noisy respirations Absent  Retractions: Absent  Vomiting/bleeding: Absent    BREATHING:    Midaxillary breath sound left:  Normal  Midaxillary breath sound right:  Normal    Cough sound intensity:  good    FiO2:  Room air    SMI 1500 mL.     CIRCULATION:   Femerol pulse intensity: Strong  Palpebral conjunctiva: Red    Vitals:    04/10/24 0238   BP: (!) 181/112   Pulse: 86   Resp: 18   Temp: 98.1 °F (36.7 °C)   SpO2: 98%       Vitals:    04/10/24 0238   BP: (!) 181/112   Pulse: 86   Resp: 18   Temp: 98.1 °F (36.7 °C)   SpO2: 98%   Weight: 83 kg (183 lb)   Height: 1.727 m (5' 8\")        FAST EXAM: Deferred    Central Nervous System    GCS Initial 15 minutes   Eye  Motor  Verbal 4 - Opens eyes on own  6 - Follows simple motor commands  5 - Alert and oriented 4 - Opens eyes on own  6 - Follows simple motor commands  5 - Alert and oriented     Neuromuscular blockade: No  Pupil size:  Left 3 mm    Right 3

## 2024-04-10 NOTE — CONSULTS
[facility]              [address] [place] [address]                              CONSULTATION      PATIENT NAME: MICHAEL ATWOOD                  : 1970  MED REC NO: [id number]                        ROOM: 20  ACCOUNT NO: [id number]                       ADMIT DATE: 04/10/2024  PROVIDER: Gloria Swanson MD    NEUROSURGERY CONSULT    CONSULT DATE:  04/10/2024      REASON FOR CONSULT:  Traumatic subarachnoid hemorrhage and subdural hematoma.    HISTORY OF PRESENT ILLNESS:  The patient is a 53-year-old gentleman who was seen approximately 2 weeks ago after he passed out at the grocery store.  He apparently was going down some steps yesterday when he missed the last 2 steps, fell and hit his head.  There was no loss of consciousness.  He was ultimately brought to [facility] for further evaluation and management.  CT scan of his head showed new subarachnoid hemorrhage and interhemispheric subdural hematoma.  Neurosurgery Service was reconsulted.    PAST MEDICAL HISTORY:  Positive for hypertension and kidney stones.    PAST SURGICAL HISTORY:  Positive for gastric bypass surgery.    FAMILY HISTORY:  Noncontributory.    SOCIAL HISTORY:  Positive for social consumption of alcoholic beverages, and he does smoke.    ALLERGIES:  INCLUDE PENICILLIN.      REVIEW OF SYSTEMS:  HEENT:  Positive for headache.  Negative for double vision or blurry vision.  CARDIOVASCULAR:  Negative for chest pain, arrhythmia, or palpitations.  RESPIRATORY:  Negative for shortness of breath, asthma, bronchitis, or pneumonia.  GASTROINTESTINAL:  Negative for heartburn, nausea, vomiting, diarrhea, or constipation.  GENITOURINARY:  Negative for hematuria or dysuria.  HEMATOLOGIC:  Negative for easy bleeding or bruising.  INFECTIOUS:  Negative for any recent infection.  MUSCULOSKELETAL:  Positive for right upper extremity pain.  PSYCHIATRIC:  Positive for anxiety or

## 2024-04-11 VITALS
HEIGHT: 68 IN | DIASTOLIC BLOOD PRESSURE: 90 MMHG | HEART RATE: 84 BPM | RESPIRATION RATE: 16 BRPM | BODY MASS INDEX: 27.74 KG/M2 | OXYGEN SATURATION: 96 % | SYSTOLIC BLOOD PRESSURE: 131 MMHG | WEIGHT: 183 LBS | TEMPERATURE: 98.2 F

## 2024-04-11 LAB
ANION GAP SERPL CALCULATED.3IONS-SCNC: 13 MMOL/L (ref 7–16)
BASOPHILS # BLD: 0.03 K/UL (ref 0–0.2)
BASOPHILS NFR BLD: 0 % (ref 0–2)
BUN SERPL-MCNC: 15 MG/DL (ref 6–20)
CALCIUM SERPL-MCNC: 9.1 MG/DL (ref 8.6–10.2)
CHLORIDE SERPL-SCNC: 102 MMOL/L (ref 98–107)
CO2 SERPL-SCNC: 22 MMOL/L (ref 22–29)
CREAT SERPL-MCNC: 0.9 MG/DL (ref 0.7–1.2)
EOSINOPHIL # BLD: 0.22 K/UL (ref 0.05–0.5)
EOSINOPHILS RELATIVE PERCENT: 3 % (ref 0–6)
ERYTHROCYTE [DISTWIDTH] IN BLOOD BY AUTOMATED COUNT: 14.9 % (ref 11.5–15)
GFR SERPL CREATININE-BSD FRML MDRD: >90 ML/MIN/1.73M2
GLUCOSE SERPL-MCNC: 84 MG/DL (ref 74–99)
HCT VFR BLD AUTO: 36.2 % (ref 37–54)
HGB BLD-MCNC: 11.9 G/DL (ref 12.5–16.5)
IMM GRANULOCYTES # BLD AUTO: 0.03 K/UL (ref 0–0.58)
IMM GRANULOCYTES NFR BLD: 0 % (ref 0–5)
LYMPHOCYTES NFR BLD: 1.5 K/UL (ref 1.5–4)
LYMPHOCYTES RELATIVE PERCENT: 20 % (ref 20–42)
MCH RBC QN AUTO: 30.7 PG (ref 26–35)
MCHC RBC AUTO-ENTMCNC: 32.9 G/DL (ref 32–34.5)
MCV RBC AUTO: 93.5 FL (ref 80–99.9)
MONOCYTES NFR BLD: 0.6 K/UL (ref 0.1–0.95)
MONOCYTES NFR BLD: 8 % (ref 2–12)
NEUTROPHILS NFR BLD: 69 % (ref 43–80)
NEUTS SEG NFR BLD: 5.28 K/UL (ref 1.8–7.3)
PLATELET # BLD AUTO: 204 K/UL (ref 130–450)
PMV BLD AUTO: 10 FL (ref 7–12)
POTASSIUM SERPL-SCNC: 3.7 MMOL/L (ref 3.5–5)
RBC # BLD AUTO: 3.87 M/UL (ref 3.8–5.8)
SODIUM SERPL-SCNC: 137 MMOL/L (ref 132–146)
WBC OTHER # BLD: 7.7 K/UL (ref 4.5–11.5)

## 2024-04-11 PROCEDURE — 6360000002 HC RX W HCPCS: Performed by: STUDENT IN AN ORGANIZED HEALTH CARE EDUCATION/TRAINING PROGRAM

## 2024-04-11 PROCEDURE — 85025 COMPLETE CBC W/AUTO DIFF WBC: CPT

## 2024-04-11 PROCEDURE — 97535 SELF CARE MNGMENT TRAINING: CPT

## 2024-04-11 PROCEDURE — 80048 BASIC METABOLIC PNL TOTAL CA: CPT

## 2024-04-11 PROCEDURE — 99232 SBSQ HOSP IP/OBS MODERATE 35: CPT | Performed by: SURGERY

## 2024-04-11 PROCEDURE — 6370000000 HC RX 637 (ALT 250 FOR IP): Performed by: STUDENT IN AN ORGANIZED HEALTH CARE EDUCATION/TRAINING PROGRAM

## 2024-04-11 PROCEDURE — 6360000002 HC RX W HCPCS: Performed by: SURGERY

## 2024-04-11 PROCEDURE — 36415 COLL VENOUS BLD VENIPUNCTURE: CPT

## 2024-04-11 PROCEDURE — 2580000003 HC RX 258: Performed by: STUDENT IN AN ORGANIZED HEALTH CARE EDUCATION/TRAINING PROGRAM

## 2024-04-11 PROCEDURE — 97165 OT EVAL LOW COMPLEX 30 MIN: CPT

## 2024-04-11 PROCEDURE — 6370000000 HC RX 637 (ALT 250 FOR IP)

## 2024-04-11 PROCEDURE — 97161 PT EVAL LOW COMPLEX 20 MIN: CPT

## 2024-04-11 PROCEDURE — 97530 THERAPEUTIC ACTIVITIES: CPT

## 2024-04-11 PROCEDURE — 6370000000 HC RX 637 (ALT 250 FOR IP): Performed by: CLINICAL NURSE SPECIALIST

## 2024-04-11 RX ORDER — LEVETIRACETAM 500 MG/1
500 TABLET ORAL 2 TIMES DAILY
Qty: 10 TABLET | Refills: 0 | Status: SHIPPED | OUTPATIENT
Start: 2024-04-11 | End: 2024-04-16

## 2024-04-11 RX ORDER — LISINOPRIL 10 MG/1
10 TABLET ORAL DAILY
Status: DISCONTINUED | OUTPATIENT
Start: 2024-04-11 | End: 2024-04-11 | Stop reason: HOSPADM

## 2024-04-11 RX ORDER — POTASSIUM CHLORIDE 20 MEQ/1
20 TABLET, EXTENDED RELEASE ORAL ONCE
Status: COMPLETED | OUTPATIENT
Start: 2024-04-11 | End: 2024-04-11

## 2024-04-11 RX ORDER — ENOXAPARIN SODIUM 100 MG/ML
30 INJECTION SUBCUTANEOUS 2 TIMES DAILY
Status: DISCONTINUED | OUTPATIENT
Start: 2024-04-11 | End: 2024-04-11 | Stop reason: HOSPADM

## 2024-04-11 RX ORDER — NICOTINE 21 MG/24HR
1 PATCH, TRANSDERMAL 24 HOURS TRANSDERMAL DAILY
Status: DISCONTINUED | OUTPATIENT
Start: 2024-04-11 | End: 2024-04-11 | Stop reason: HOSPADM

## 2024-04-11 RX ADMIN — MORPHINE SULFATE 2 MG: 2 INJECTION, SOLUTION INTRAMUSCULAR; INTRAVENOUS at 15:51

## 2024-04-11 RX ADMIN — ACETAMINOPHEN 650 MG: 325 TABLET ORAL at 00:05

## 2024-04-11 RX ADMIN — ACETAMINOPHEN 650 MG: 325 TABLET ORAL at 06:11

## 2024-04-11 RX ADMIN — LEVETIRACETAM 500 MG: 100 INJECTION, SOLUTION INTRAVENOUS at 15:45

## 2024-04-11 RX ADMIN — LEVETIRACETAM 500 MG: 100 INJECTION, SOLUTION INTRAVENOUS at 04:14

## 2024-04-11 RX ADMIN — ACETAMINOPHEN 650 MG: 325 TABLET ORAL at 17:14

## 2024-04-11 RX ADMIN — MORPHINE SULFATE 2 MG: 2 INJECTION, SOLUTION INTRAMUSCULAR; INTRAVENOUS at 00:32

## 2024-04-11 RX ADMIN — PHENOBARBITAL 64.8 MG: 32.4 TABLET ORAL at 14:40

## 2024-04-11 RX ADMIN — LISINOPRIL 10 MG: 10 TABLET ORAL at 08:31

## 2024-04-11 RX ADMIN — MORPHINE SULFATE 2 MG: 2 INJECTION, SOLUTION INTRAMUSCULAR; INTRAVENOUS at 11:01

## 2024-04-11 RX ADMIN — ENOXAPARIN SODIUM 30 MG: 100 INJECTION SUBCUTANEOUS at 11:02

## 2024-04-11 RX ADMIN — PHENOBARBITAL 64.8 MG: 32.4 TABLET ORAL at 08:31

## 2024-04-11 RX ADMIN — ACETAMINOPHEN 650 MG: 325 TABLET ORAL at 12:33

## 2024-04-11 RX ADMIN — SODIUM CHLORIDE, PRESERVATIVE FREE 10 ML: 5 INJECTION INTRAVENOUS at 08:31

## 2024-04-11 RX ADMIN — MORPHINE SULFATE 2 MG: 2 INJECTION, SOLUTION INTRAMUSCULAR; INTRAVENOUS at 04:14

## 2024-04-11 RX ADMIN — PHENOBARBITAL 64.8 MG: 32.4 TABLET ORAL at 04:14

## 2024-04-11 RX ADMIN — POTASSIUM CHLORIDE 20 MEQ: 1500 TABLET, EXTENDED RELEASE ORAL at 12:34

## 2024-04-11 RX ADMIN — MORPHINE SULFATE 2 MG: 2 INJECTION, SOLUTION INTRAMUSCULAR; INTRAVENOUS at 07:33

## 2024-04-11 ASSESSMENT — PAIN DESCRIPTION - DESCRIPTORS
DESCRIPTORS: ACHING;THROBBING
DESCRIPTORS: DISCOMFORT
DESCRIPTORS: ACHING;THROBBING
DESCRIPTORS: DISCOMFORT
DESCRIPTORS: DISCOMFORT
DESCRIPTORS: ACHING;THROBBING
DESCRIPTORS: ACHING;THROBBING
DESCRIPTORS: DISCOMFORT
DESCRIPTORS: ACHING;THROBBING

## 2024-04-11 ASSESSMENT — LIFESTYLE VARIABLES
HOW MANY STANDARD DRINKS CONTAINING ALCOHOL DO YOU HAVE ON A TYPICAL DAY: 1 OR 2
HOW OFTEN DO YOU HAVE A DRINK CONTAINING ALCOHOL: MONTHLY OR LESS

## 2024-04-11 ASSESSMENT — PAIN SCALES - GENERAL
PAINLEVEL_OUTOF10: 7
PAINLEVEL_OUTOF10: 7
PAINLEVEL_OUTOF10: 6
PAINLEVEL_OUTOF10: 7
PAINLEVEL_OUTOF10: 7
PAINLEVEL_OUTOF10: 6
PAINLEVEL_OUTOF10: 5
PAINLEVEL_OUTOF10: 6
PAINLEVEL_OUTOF10: 5

## 2024-04-11 ASSESSMENT — PAIN DESCRIPTION - LOCATION
LOCATION: HEAD;NECK
LOCATION: HEAD
LOCATION: NECK;HEAD
LOCATION: HEAD
LOCATION: HEAD
LOCATION: HEAD;NECK
LOCATION: HEAD;NECK
LOCATION: HEAD
LOCATION: HEAD;NECK

## 2024-04-11 ASSESSMENT — PAIN DESCRIPTION - ORIENTATION
ORIENTATION: POSTERIOR

## 2024-04-11 NOTE — CARE COORDINATION
Pt lives with his mother who is semi retired and works in the [employer] field. Pt is filing for disability and not working. Pta pt was independent with all adl's and drives. He has 2 grown children that he said legally he is not to have contact with right now. He said he recent got his second divorce. Pt lives in a 2 story home with only using the first floor and has a full bathroom with walk in shower in the basement. He enters thru the back door with no steps up but there are 4 steps to get to the kitchen level. No hhc or dme pta. His pcp is dr. Junaid wilson sr. And saw him over 1 year ago due to not having insurance. Pt said he is medicaid pending. Pt is not a . OT rec: no needs for home. PT  to eval. Plan per pt is home with no needs. Neurosurgery signed off. Sbirt was completed and pt declined any rehab information. Ethanol level was 149. Pt is on iv keppra. Plan is home with his mother pick him up. BP was 164/94 this a.m.  LISA Lim  4/11/2024  I met with pt after the PT eval was placed in [facility] and offered hhc vs outpatient and pt declined.  Charge rn is seeking discharge today. LISA Lim  .4/11/2024      Case Management Assessment  Initial Evaluation    Date/Time of Evaluation: 4/11/2024 11:09 AM  Assessment Completed by: LISA Lim    If patient is discharged prior to next notation, then this note serves as note for discharge by case management.    Patient Name: Kirsty Thurston                   YOB: 1970  Diagnosis: Subarachnoid hemorrhage (HCC) [I60.9]  Subdural hematoma (HCC) [S06.5XAA]  Fall, initial encounter [W19.XXXA]  Fall from standing, initial encounter [W19.XXXA]                   Date / Time: 4/10/2024  2:37 AM    Patient Admission Status: Inpatient   Readmission Risk (Low < 19, Mod (19-27), High > 27): Readmission Risk Score: 10    Current PCP: Junaid Wilson, DO  PCP verified by CM? Yes    Chart Reviewed: Yes      History Provided by: Patient  Patient

## 2024-04-11 NOTE — ACP (ADVANCE CARE PLANNING)
Advance Care Planning   Healthcare Decision Maker:      Click here to complete Healthcare Decision Makers including selection of the Healthcare Decision Maker Relationship (ie \"Primary\").          declined advance directives. .LISA Lim  .4/11/2024

## 2024-04-11 NOTE — PROGRESS NOTES
PCP is Junaid Wilson DO  Office notified of admission.      Electronically signed by Lisa Hudson RN on 4/11/2024 at 10:54 AM      
4 Eyes Skin Assessment     The patient is being assess for   Admission    I agree that 2 RN's have performed a thorough Head to Toe Skin Assessment on the patient. ALL assessment sites listed below have been assessed.      Areas assessed by both nurses:   [x]   Head, Face, and Ears   [x]   Shoulders, Back, and Chest, Abdomen  [x]   Arms, Elbows, and Hands   [x]   Coccyx, Sacrum, and Ischium  [x]   Legs, Feet, and Heels        Abrasions BLE     **SHARE this note so that the co-signing nurse is able to place an eSignature**    Co-signer eSignature: Electronically signed by Lauro Diaz RN on 4/10/24 at 6:39 PM EDT    Does the Patient have Skin Breakdown?  No          Refugio Prevention initiated:  No   Wound Care Orders initiated:  No      C nurse consulted for Pressure Injury (Stage 3,4, Unstageable, DTI, NWPT, Complex wounds)and New or Established Ostomies:  No      Primary Nurse eSignature: Electronically signed by Uyen San RN on 4/10/24 at 6:38 PM EDT   
Department of Neurosurgery  Progress Note    CHIEF COMPLAINT: seen for ICH, neck pain.     SUBJECTIVE:  Sitting up in bed, still with neck pain and numbness in his right fingers.     REVIEW OF SYSTEMS :  Constitutional: Negative for chills and fever.    Neurological: Negative for dizziness, tremors and speech change.     OBJECTIVE:   VITALS:  BP (!) 164/94   Pulse 78   Temp 98.4 °F (36.9 °C) (Oral)   Resp 16   Ht 1.727 m (5' 8\")   Wt 83 kg (183 lb)   SpO2 96%   BMI 27.83 kg/m²     PHYSICAL:  Alert, oriented  Appears stated age  PERRL  EOMI  Strength full  Sensation intact to light touch  In C-Collar     DATA:  CBC:   Lab Results   Component Value Date/Time    WBC 7.7 04/11/2024 05:26 AM    RBC 3.87 04/11/2024 05:26 AM    HGB 11.9 04/11/2024 05:26 AM    HCT 36.2 04/11/2024 05:26 AM    MCV 93.5 04/11/2024 05:26 AM    MCH 30.7 04/11/2024 05:26 AM    MCHC 32.9 04/11/2024 05:26 AM    RDW 14.9 04/11/2024 05:26 AM     04/11/2024 05:26 AM    MPV 10.0 04/11/2024 05:26 AM     BMP:    Lab Results   Component Value Date/Time     04/11/2024 05:26 AM    K 3.7 04/11/2024 05:26 AM    K 4.1 11/14/2022 05:27 AM     04/11/2024 05:26 AM    CO2 22 04/11/2024 05:26 AM    BUN 15 04/11/2024 05:26 AM    LABALBU 4.1 03/31/2024 04:49 PM    CREATININE 0.9 04/11/2024 05:26 AM    CALCIUM 9.1 04/11/2024 05:26 AM    GFRAA >60 01/21/2015 04:30 AM    LABGLOM >90 04/11/2024 05:26 AM    GLUCOSE 84 04/11/2024 05:26 AM     PT/INR:    Lab Results   Component Value Date/Time    PROTIME 10.8 04/10/2024 03:36 AM    INR 1.0 04/10/2024 03:36 AM     PTT:  No results found for: \"APTT\", \"PTT\"[APTT}    Current Inpatient Medications  Current Facility-Administered Medications: lisinopril (PRINIVIL;ZESTRIL) tablet 10 mg, 10 mg, Oral, Daily  enoxaparin Sodium (LOVENOX) injection 30 mg, 30 mg, SubCUTAneous, BID  levETIRAcetam (KEPPRA) injection 500 mg, 500 mg, IntraVENous, Q12H  sodium chloride flush 0.9 % injection 10 mL, 10 mL, IntraVENous, 
GENERAL SURGERY  DAILY PROGRESS NOTE    Patient's Name/Date of Birth: Kirsty Thurston / 1970    Date: 2024     Chief Complaint   Patient presents with    Fall     Transfer from [facility] pt fell down steps had sdh        Subjective:  No acute events overnight. The patient rates pain 6/10 at occiput and posterior neck. He is wearing Aspen collar. He has good appetite. Last BM was yesterday before his trauma.    Objective:  Last 24Hrs  Temp  Av °F (36.7 °C)  Min: 97.6 °F (36.4 °C)  Max: 98.4 °F (36.9 °C)  Resp  Av.5  Min: 16  Max: 21  Pulse  Av.9  Min: 78  Max: 108  Systolic (24hrs), Av , Min:145 , Max:182     Diastolic (24hrs), Av, Min:91, Max:101    SpO2  Av.8 %  Min: 95 %  Max: 97 %    No intake/output data recorded.      General: In no acute distress, alert and oriented x4  Cardiovascular: Warm throughout, no edema  Respiratory: no respiratory distress, equal chest rise  Abdomen: soft,  nontender, nondistended  Skin: no obvious rashes or lesions appreciated, no jaundice  Extremities: paresthesia at left ulnar nerve distribution, pulses intact, no open wounds  Spine: wearing Aspen collar   Psych: normal mood, no tremors      CBC  Recent Labs     04/10/24  0336 24  0526   WBC 7.6 7.7   RBC 3.55* 3.87   HGB 10.8* 11.9*   HCT 32.5* 36.2*   MCV 91.5 93.5   MCH 30.4 30.7   MCHC 33.2 32.9   RDW 14.4 14.9    204   MPV 9.5 10.0       CMP  Recent Labs     04/10/24  0336 24  0526    137   K 4.2 3.7    102   CO2 17* 22   BUN 7 15   CREATININE 0.8 0.9   GLUCOSE 86 84   CALCIUM 8.5* 9.1         Assessment/Plan:    Patient Active Problem List   Diagnosis    Multiple rib fractures involving four or more ribs    Closed fracture of multiple ribs of left side, initial encounter    Epidural hematoma (HCC)    Subdural hematoma (HCC)    Traumatic hematoma of brain with compression and midline shift of brain (HCC)    Closed fracture of parietal bone of skull (HCC)    
Messaged [facility] to see if patient able to discharge  
Messaged NS regarding collar.     Per Hazel Swartz, collar as needed for comfort     Okay to discharge from NS standpoint  
OCCUPATIONAL THERAPY INITIAL EVALUATION      [facility] [address] [place], [place]       Date:2024                                                  Patient Name: Kirsty Thurston  MRN: [id number]  : 1970  Room: [address] [place] [address]    Evaluating OT: Pattishanna Barton, OTR/L #[id number]    Referring Provider::  Sarkis Martinez MD     Specific Provider Orders/Date: OT evaluation and treatment 4/10/24    Diagnosis:  Subarachnoid hemorrhage (HCC) [I60.9]  Subdural hematoma (HCC) [S06.5XAA]  Fall, initial encounter [W19.XXXA]  Fall from standing, initial encounter [W19.XXXA]      Pertinent Medical History:  has a past medical history of Hypertension and Kidney stone.       Precautions:  Fall Risk, cervical collar, spinal neutrality.     Assessment of current deficits   [x] Functional mobility   [x]ADLs  [x] Strength               []Cognition   [x] Functional transfers   [] IADLs         [x] Safety Awareness   [x]Endurance   [] Fine Coordination              [x] Balance      [] Vision/perception   []Sensation    []Gross Motor Coordination  [] ROM  [] Delirium                   [] Motor Control     OT PLAN OF CARE   OT POC based on physician orders, patient diagnosis and results of clinical assessment    Frequency/Duration 1-3 days/wk for 2 weeks PRN   Specific OT Treatment to include:   * Instruction/training on adapted ADL techniques and AE recommendations to increase functional independence within precautions       * Functional transfer/mobility training/DME recommendations for increased independence, safety, and fall prevention  * Patient/Family education to increase follow through with safety techniques and functional independence  * Sensory re-education to improve body/limb awareness, maintain/improve skin integrity, and improve hand/UE motor function  * Therapeutic exercise to improve motor endurance, ROM, and functional strength for ADLs/functional 
Patient admitted to room 8502A from ED for a fall .  Patient oriented to room, call light, bed rails, phone, lights and bathroom.  Fall risk band placed on patient wrist, bed alarm in place, patient aware of placement and reason.  Telemetry box in place, patient aware of placement and reason.  Bed locked, in lowest position, side rails up 2/4, call light within reach.  Spoke with Resident Fadi on resuming home b/p meds,   
Physical Therapy Initial Evaluation    Name: Kirsty Thurston  : 1970  MRN: [id number]      Date of Service: 2024    Evaluating PT:  Shekhar Bergeron, PT [id number]    Referring provider/PT Order:  PT Eval and Treat   04/10/24 0615  PT evaluation and treat  Start:  04/10/24 0615,   End:  04/10/24 0615,   ONE TIME,   Standing Count:  1 Occurrences,   R        Last continued at transfer on Wed Apr 10, 2024  7:59 AM    Sarkis Martinez MD     Room #:  8502/8502-A  Diagnosis:  Subarachnoid hemorrhage (HCC) [I60.9]  Subdural hematoma (HCC) [S06.5XAA]  Fall, initial encounter [W19.XXXA]  Fall from standing, initial encounter [W19.XXXA]  PMHx/PSHx:     has a past medical history of Hypertension and Kidney stone.    has a past surgical history that includes Gastric bypass surgery.     Procedure/Surgery:  none  Precautions:  Falls,  FWB (full weight bearing), cervical collar, spinal neutral mechanics.   Equipment Needs: To be determined,    SUBJECTIVE:    Patient lives with Mother   in a two story home resides first  with 4 steps to enter with 1Rail  Bed is on 1 floor and bath is on 1 floor.  Patient ambulated independently  PTA. Equipment owned: None,      OBJECTIVE:   Initial Evaluation  Date: 24 Treatment Short Term/ Long Term   Goals   AM-PAC 6 Clicks 15/24     Was pt agreeable to Eval/treatment? Yes      Does pt have pain? Yes reporting discomfort behind R eye     Bed Mobility  Rolling: Min  Supine to sit:   Min   Sit to supine: Min   Scooting: Min   Rolling: Ind  Supine to sit: Ind  Sit to supine: Ind  Scooting: Ind   Transfers Sit to stand: Min   Stand to sit: Min  Stand pivot: Min   Sit to stand: Ind   Stand to sit: Ind   Stand pivot: Ind    Ambulation    5 feet   Min slight unsteady.   100+ feet with Ind    Stair negotiation: ascended and descended  NT  4 steps with Ind      Strength/ROM:   See OT note for BUE ROM and strength  RLE grossly 4+/5  LLE grossly 4+/5  RLE AROM WFL  LLE AROM WFL    Balance: 
TRAUMA SURGERY  ATTENDING PROGRESS NOTE      CC: Fall     S:   Doing well still with some neck pain and headache,     O:   @BP (!) 164/94   Pulse 78   Temp 98.4 °F (36.9 °C) (Oral)   Resp 16   Ht 1.727 m (5' 8\")   Wt 83 kg (183 lb)   SpO2 96%   BMI 27.83 kg/m² @    Gen - no apparent distress   Neuro - Awake, alert, attentive    HEENT - PERRL 3mm conj pink   Lungs - non labored, BS clear    Heart - RR    Abdomen - Soft non tender   Spine -   C collar in place mild moderate tenderness   Ext- rom wnl all ext NVI paresthesias L ulnar nerve distribution.     A/P: s/p fall with ICH, suspect heavy ETOH abuse     Principal Problem:    Fall from standing, initial encounter  Active Problems:    Subarachnoid hemorrhage (HCC)  Resolved Problems:    * No resolved hospital problems. *      - ICH, keppra, NS following non op  - neck pain, MRI is negative collar for comfort  - ETOH abuse, phenobarbital for etoh withdrawal prophylaxis,   - HTN, start lisinopril   - PTOT dc planning     DVT prophylaxis: SCDs, Lovenox      Medical Decision Making     Hx taken from patient     I am managing prescription drugs, phenobarbital, morphine         Mat Guillaume MD FACS     
left parafalcine subdural hematoma.   2. Stable right anterior temporal subarachnoid hemorrhage.   3. Stable right frontal epidural hematoma with adjacent frontal lobe   encephalomalacia.   4. No new areas of parenchymal or extra-axial hemorrhage.   5. Extensive periventricular and subcortical white matter hypodensity is   stable and likely due to chronic small vessel ischemic changes.   6. Moderate cerebral atrophy, unchanged.         CT HEAD WO CONTRAST   Final Result   New areas of hemorrhage involving a small subarachnoid hemorrhage in the   right temporal region.  There is a new tiny subdural hematoma identified   along the falx with no mass effect or midline shift.  Stable evolving   epidural hematoma in the right frontal region with low-attenuation areas seen   within the right frontal lobe stable and unchanged.      Findings were discussed with Dr. Rosenberg on 04/10/2024 at 5:50 a.m.         XR PELVIS (1-2 VIEWS)   Final Result   No acute bony abnormality.         MRI CERVICAL SPINE WO CONTRAST    (Results Pending)       PHYSICAL EXAM:     Central Nervous System  Loss of consciousness:  No    GCS:    Eye:  4 - Opens eyes on own  Motor:  6 - Follows simple motor commands  Verbal:  5 - Alert and oriented    Neuromuscular blockade: No  Pupil size:  Left 3 mm    Right 3 mm  Pupil reaction: Yes    Wiggles fingers: Left Yes Right Yes  Wiggles toes: Left Yes   Right Yes    Hand grasp:   Left  Present      Right  Present  Plantar flexion: Left  Present      Right   Present    PHYSICAL EXAM  General: No apparent distress, comfortable   HEENT: Trachea midline, no masses, Pupils equal round  Chest: Respiratory effort was normal with no retractions or use of accessory muscles.   Cardiovascular: Extremities warm, well perfused  Abdomen:  Soft and non distended.  No tenderness, guarding, rebound, or rigidity   Extremities: Moves all 4 extremeties, No pedal edema     Spine:   Spine Tenderness ROM   Cervical 6/10 Not

## 2024-04-11 NOTE — PLAN OF CARE
Problem: Discharge Planning  Goal: Discharge to home or other facility with appropriate resources  4/11/2024 0026 by Juliann Moses, RN  Outcome: Progressing  4/10/2024 1844 by Uyen San, RN  Outcome: Progressing     Problem: Pain  Goal: Verbalizes/displays adequate comfort level or baseline comfort level  Outcome: Progressing     Problem: Safety - Adult  Goal: Free from fall injury  Outcome: Progressing

## 2024-04-11 NOTE — DISCHARGE INSTRUCTIONS
TRAUMA SERVICES DISCHARGE INSTRUCTIONS    Call [phone], option 2, for any questions/concerns and for follow-up appointment in 1 week(s).    Please follow the instructions checked below:    Please follow-up with your primary care provider.    ACTIVITY INSTRUCTIONS  Increase activity as tolerated  No heavy lifting or strenuous activity  Take your incentive spirometer home and use 4-6 times/day   [x]  No driving until cleared by Neurosurgery    WOUND/DRESSING INSTRUCTIONS:  You may shower.  No sitting in bath tub, hot tub or swimming until cleared by physician.  Ice to areas of pain for first 24 hours.  Heat to areas of pain after that.  Wash areas of lacerations/abrasions with soap & water.  Rinse well.  Pat dry with clean towel.  Apply thin layer of Bacitracin, Neosporin, or triple antibiotic cream to affected area 2-3 times per day.  Keep wounds clean and dry.      MEDICATION INSTRUCTIONS  Take medication as prescribed.  When taking pain medications, you may experience dizziness or drowsiness.  Do not drink alcohol or drive when taking these medications.  You may experience constipation while taking pain medication.  You may take over the counter stool softeners such as docusate (Colace), sennosides S (Senokot-S), or Miralax.   [x]  You may take Ibuprofen (over the counter) as directed for mild pain.     --You may take up to 800mg every 8 hours for pain, please take with food or milk.   [x]  You may take acetaminophen (Tylenol) products.  Do NOT take more than 4000mg of Tylenol in 24h.   []  Do not take any other acetaminophen (Tylenol) products if you are taking Percocet or Norco, as these contain Tylenol.   --Do NOT take more than 4000mg of Tylenol in 24h.    OPIOID MEDICATION INSTRUCTIONS  Read the medication guide that is included with your prescription.  Take your medication exactly as prescribed.  Store medication away from children and in a safe place.  Do NOT share your medication with others.  Do NOT

## 2024-04-13 ENCOUNTER — APPOINTMENT (OUTPATIENT)
Dept: CT IMAGING | Age: 54
End: 2024-04-13
Payer: MEDICAID

## 2024-04-13 ENCOUNTER — HOSPITAL ENCOUNTER (EMERGENCY)
Age: 54
Discharge: HOME | End: 2024-04-14
Attending: EMERGENCY MEDICINE
Payer: MEDICAID

## 2024-04-13 ENCOUNTER — APPOINTMENT (OUTPATIENT)
Dept: GENERAL RADIOLOGY | Age: 54
End: 2024-04-13
Payer: MEDICAID

## 2024-04-13 DIAGNOSIS — D64.9 ANEMIA, UNSPECIFIED TYPE: ICD-10-CM

## 2024-04-13 DIAGNOSIS — R51.9 NONINTRACTABLE HEADACHE, UNSPECIFIED CHRONICITY PATTERN, UNSPECIFIED HEADACHE TYPE: Primary | ICD-10-CM

## 2024-04-13 DIAGNOSIS — I62.03 CHRONIC SUBDURAL HEMATOMA (HCC): ICD-10-CM

## 2024-04-13 LAB
ALBUMIN SERPL-MCNC: 3.6 G/DL (ref 3.5–5.2)
ALP SERPL-CCNC: 54 U/L (ref 40–129)
ALT SERPL-CCNC: 16 U/L (ref 0–40)
ANION GAP SERPL CALCULATED.3IONS-SCNC: 13 MMOL/L (ref 7–16)
AST SERPL-CCNC: 20 U/L (ref 0–39)
BASOPHILS # BLD: 0.02 K/UL (ref 0–0.2)
BASOPHILS NFR BLD: 0 % (ref 0–2)
BILIRUB SERPL-MCNC: 0.3 MG/DL (ref 0–1.2)
BUN SERPL-MCNC: 10 MG/DL (ref 6–20)
CALCIUM SERPL-MCNC: 8.9 MG/DL (ref 8.6–10.2)
CHLORIDE SERPL-SCNC: 104 MMOL/L (ref 98–107)
CO2 SERPL-SCNC: 21 MMOL/L (ref 22–29)
CREAT SERPL-MCNC: 0.8 MG/DL (ref 0.7–1.2)
EOSINOPHIL # BLD: 0.17 K/UL (ref 0.05–0.5)
EOSINOPHILS RELATIVE PERCENT: 2 % (ref 0–6)
ERYTHROCYTE [DISTWIDTH] IN BLOOD BY AUTOMATED COUNT: 14.3 % (ref 11.5–15)
GFR SERPL CREATININE-BSD FRML MDRD: >90 ML/MIN/1.73M2
GLUCOSE SERPL-MCNC: 93 MG/DL (ref 74–99)
HCT VFR BLD AUTO: 34.9 % (ref 37–54)
HGB BLD-MCNC: 11.9 G/DL (ref 12.5–16.5)
IMM GRANULOCYTES # BLD AUTO: <0.03 K/UL (ref 0–0.58)
IMM GRANULOCYTES NFR BLD: 0 % (ref 0–5)
LYMPHOCYTES NFR BLD: 1.67 K/UL (ref 1.5–4)
LYMPHOCYTES RELATIVE PERCENT: 24 % (ref 20–42)
MCH RBC QN AUTO: 30.9 PG (ref 26–35)
MCHC RBC AUTO-ENTMCNC: 34.1 G/DL (ref 32–34.5)
MCV RBC AUTO: 90.6 FL (ref 80–99.9)
MONOCYTES NFR BLD: 0.84 K/UL (ref 0.1–0.95)
MONOCYTES NFR BLD: 12 % (ref 2–12)
NEUTROPHILS NFR BLD: 61 % (ref 43–80)
NEUTS SEG NFR BLD: 4.25 K/UL (ref 1.8–7.3)
PLATELET CONFIRMATION: NORMAL
PLATELET, FLUORESCENCE: 209 K/UL (ref 130–450)
PMV BLD AUTO: 10 FL (ref 7–12)
POTASSIUM SERPL-SCNC: 4 MMOL/L (ref 3.5–5)
PROT SERPL-MCNC: 6.4 G/DL (ref 6.4–8.3)
RBC # BLD AUTO: 3.85 M/UL (ref 3.8–5.8)
RBC # BLD: NORMAL 10*6/UL
SODIUM SERPL-SCNC: 138 MMOL/L (ref 132–146)
TROPONIN I SERPL HS-MCNC: 10 NG/L (ref 0–11)
WBC OTHER # BLD: 7 K/UL (ref 4.5–11.5)

## 2024-04-13 PROCEDURE — 96376 TX/PRO/DX INJ SAME DRUG ADON: CPT

## 2024-04-13 PROCEDURE — 99285 EMERGENCY DEPT VISIT HI MDM: CPT

## 2024-04-13 PROCEDURE — 93005 ELECTROCARDIOGRAM TRACING: CPT

## 2024-04-13 PROCEDURE — 6360000002 HC RX W HCPCS

## 2024-04-13 PROCEDURE — 84484 ASSAY OF TROPONIN QUANT: CPT

## 2024-04-13 PROCEDURE — 2580000003 HC RX 258

## 2024-04-13 PROCEDURE — 70450 CT HEAD/BRAIN W/O DYE: CPT

## 2024-04-13 PROCEDURE — 80053 COMPREHEN METABOLIC PANEL: CPT

## 2024-04-13 PROCEDURE — 85025 COMPLETE CBC W/AUTO DIFF WBC: CPT

## 2024-04-13 PROCEDURE — 96374 THER/PROPH/DIAG INJ IV PUSH: CPT

## 2024-04-13 PROCEDURE — 71045 X-RAY EXAM CHEST 1 VIEW: CPT

## 2024-04-13 PROCEDURE — 72125 CT NECK SPINE W/O DYE: CPT

## 2024-04-13 PROCEDURE — 96375 TX/PRO/DX INJ NEW DRUG ADDON: CPT

## 2024-04-13 RX ORDER — 0.9 % SODIUM CHLORIDE 0.9 %
500 INTRAVENOUS SOLUTION INTRAVENOUS ONCE
Status: COMPLETED | OUTPATIENT
Start: 2024-04-13 | End: 2024-04-14

## 2024-04-13 RX ORDER — METOCLOPRAMIDE HYDROCHLORIDE 5 MG/ML
10 INJECTION INTRAMUSCULAR; INTRAVENOUS ONCE
Status: COMPLETED | OUTPATIENT
Start: 2024-04-13 | End: 2024-04-13

## 2024-04-13 RX ORDER — HYDRALAZINE HYDROCHLORIDE 20 MG/ML
5 INJECTION INTRAMUSCULAR; INTRAVENOUS ONCE
Status: COMPLETED | OUTPATIENT
Start: 2024-04-13 | End: 2024-04-13

## 2024-04-13 RX ORDER — DIPHENHYDRAMINE HYDROCHLORIDE 50 MG/ML
25 INJECTION INTRAMUSCULAR; INTRAVENOUS ONCE
Status: COMPLETED | OUTPATIENT
Start: 2024-04-13 | End: 2024-04-13

## 2024-04-13 RX ORDER — FENTANYL CITRATE 0.05 MG/ML
25 INJECTION, SOLUTION INTRAMUSCULAR; INTRAVENOUS ONCE
Status: COMPLETED | OUTPATIENT
Start: 2024-04-13 | End: 2024-04-13

## 2024-04-13 RX ORDER — SODIUM CHLORIDE 9 MG/ML
INJECTION, SOLUTION INTRAVENOUS
Status: COMPLETED
Start: 2024-04-13 | End: 2024-04-14

## 2024-04-13 RX ADMIN — SODIUM CHLORIDE 500 ML: 9 INJECTION, SOLUTION INTRAVENOUS at 23:24

## 2024-04-13 RX ADMIN — HYDRALAZINE HYDROCHLORIDE 5 MG: 20 INJECTION INTRAMUSCULAR; INTRAVENOUS at 21:29

## 2024-04-13 RX ADMIN — HYDRALAZINE HYDROCHLORIDE 5 MG: 20 INJECTION INTRAMUSCULAR; INTRAVENOUS at 23:06

## 2024-04-13 RX ADMIN — METOCLOPRAMIDE 10 MG: 5 INJECTION, SOLUTION INTRAMUSCULAR; INTRAVENOUS at 23:06

## 2024-04-13 RX ADMIN — DIPHENHYDRAMINE HYDROCHLORIDE 25 MG: 50 INJECTION, SOLUTION INTRAMUSCULAR; INTRAVENOUS at 23:06

## 2024-04-13 RX ADMIN — Medication 500 ML: at 23:24

## 2024-04-13 RX ADMIN — FENTANYL CITRATE 25 MCG: 0.05 INJECTION, SOLUTION INTRAMUSCULAR; INTRAVENOUS at 23:05

## 2024-04-13 ASSESSMENT — PAIN SCALES - GENERAL: PAINLEVEL_OUTOF10: 7

## 2024-04-13 ASSESSMENT — LIFESTYLE VARIABLES
HOW MANY STANDARD DRINKS CONTAINING ALCOHOL DO YOU HAVE ON A TYPICAL DAY: 5 OR 6
HOW OFTEN DO YOU HAVE A DRINK CONTAINING ALCOHOL: 2-4 TIMES A MONTH

## 2024-04-14 VITALS
BODY MASS INDEX: 27.83 KG/M2 | DIASTOLIC BLOOD PRESSURE: 89 MMHG | TEMPERATURE: 98.4 F | RESPIRATION RATE: 18 BRPM | OXYGEN SATURATION: 100 % | WEIGHT: 183 LBS | HEART RATE: 97 BPM | SYSTOLIC BLOOD PRESSURE: 170 MMHG

## 2024-04-14 LAB
EKG ATRIAL RATE: 79 BPM
EKG P AXIS: 28 DEGREES
EKG P-R INTERVAL: 146 MS
EKG Q-T INTERVAL: 404 MS
EKG QRS DURATION: 90 MS
EKG QTC CALCULATION (BAZETT): 463 MS
EKG R AXIS: 8 DEGREES
EKG T AXIS: 23 DEGREES
EKG VENTRICULAR RATE: 79 BPM

## 2024-04-14 PROCEDURE — 93010 ELECTROCARDIOGRAM REPORT: CPT | Performed by: INTERNAL MEDICINE

## 2024-04-15 ENCOUNTER — HOSPITAL ENCOUNTER (EMERGENCY)
Age: 54
Discharge: HOME | End: 2024-04-15
Attending: EMERGENCY MEDICINE

## 2024-04-15 ENCOUNTER — APPOINTMENT (OUTPATIENT)
Dept: CT IMAGING | Age: 54
End: 2024-04-15

## 2024-04-15 VITALS
SYSTOLIC BLOOD PRESSURE: 168 MMHG | RESPIRATION RATE: 16 BRPM | TEMPERATURE: 98 F | DIASTOLIC BLOOD PRESSURE: 90 MMHG | OXYGEN SATURATION: 100 % | HEART RATE: 85 BPM

## 2024-04-15 DIAGNOSIS — G44.309 POST-TRAUMATIC HEADACHE, NOT INTRACTABLE, UNSPECIFIED CHRONICITY PATTERN: Primary | ICD-10-CM

## 2024-04-15 PROCEDURE — 96374 THER/PROPH/DIAG INJ IV PUSH: CPT

## 2024-04-15 PROCEDURE — 70450 CT HEAD/BRAIN W/O DYE: CPT

## 2024-04-15 PROCEDURE — 6360000002 HC RX W HCPCS: Performed by: EMERGENCY MEDICINE

## 2024-04-15 PROCEDURE — 96375 TX/PRO/DX INJ NEW DRUG ADDON: CPT

## 2024-04-15 PROCEDURE — 6370000000 HC RX 637 (ALT 250 FOR IP): Performed by: EMERGENCY MEDICINE

## 2024-04-15 PROCEDURE — 99284 EMERGENCY DEPT VISIT MOD MDM: CPT

## 2024-04-15 RX ORDER — LEVETIRACETAM 500 MG/1
500 TABLET ORAL ONCE
Status: COMPLETED | OUTPATIENT
Start: 2024-04-15 | End: 2024-04-15

## 2024-04-15 RX ORDER — PROCHLORPERAZINE EDISYLATE 5 MG/ML
10 INJECTION INTRAMUSCULAR; INTRAVENOUS ONCE
Status: COMPLETED | OUTPATIENT
Start: 2024-04-15 | End: 2024-04-15

## 2024-04-15 RX ORDER — DIPHENHYDRAMINE HYDROCHLORIDE 50 MG/ML
25 INJECTION INTRAMUSCULAR; INTRAVENOUS ONCE
Status: COMPLETED | OUTPATIENT
Start: 2024-04-15 | End: 2024-04-15

## 2024-04-15 RX ORDER — SODIUM CHLORIDE 9 MG/ML
INJECTION, SOLUTION INTRAVENOUS
Status: DISCONTINUED
Start: 2024-04-15 | End: 2024-04-15 | Stop reason: HOSPADM

## 2024-04-15 RX ORDER — SUMATRIPTAN 100 MG/1
100 TABLET, FILM COATED ORAL
Qty: 9 TABLET | Refills: 0 | Status: SHIPPED | OUTPATIENT
Start: 2024-04-15 | End: 2024-04-20

## 2024-04-15 RX ADMIN — LEVETIRACETAM 500 MG: 500 TABLET, FILM COATED ORAL at 18:25

## 2024-04-15 RX ADMIN — DIPHENHYDRAMINE HYDROCHLORIDE 25 MG: 50 INJECTION, SOLUTION INTRAMUSCULAR; INTRAVENOUS at 18:26

## 2024-04-15 RX ADMIN — PROCHLORPERAZINE EDISYLATE 10 MG: 5 INJECTION INTRAMUSCULAR; INTRAVENOUS at 18:27

## 2024-04-15 ASSESSMENT — ENCOUNTER SYMPTOMS
NAUSEA: 0
VOMITING: 0
DIARRHEA: 0
WHEEZING: 0
CHEST TIGHTNESS: 0
SORE THROAT: 0
SHORTNESS OF BREATH: 0
ABDOMINAL PAIN: 0
PHOTOPHOBIA: 0
BACK PAIN: 0
EYE PAIN: 0
COUGH: 0

## 2024-04-15 NOTE — ED PROVIDER NOTES
Sensory: Sensation is intact.      Motor: Motor function is intact.      Coordination: Coordination is intact. Coordination normal.      Comments: No focal neurologic deficit   Psychiatric:         Behavior: Behavior is cooperative.          Procedures     MDM            History provided by: The patient    Patient here complaint of a headache.  Recent head injury with skull fracture and brain bleeds.  Has had follow-up CTs and visits for headache and CTs of shown improving bleeding.  States he still has his headache and admits that he has not filled any of his medicines including pain medications for at home and just filled his seizure medications and has not started taking them yet.  Still has a headache so he came back in by ambulance to the ER for another evaluation.  Physical exam is unremarkable, no focal neurologic deficit.  No sign of acute head or face injury.  Scalp with no erythema or signs of infection.  See above for full exam.      Social factors affecting care: None  Chronic conditions affecting care: None  Chart reviewed: Multiple recent CTs reviewed confirming recent injury with skull fracture multiple different brain bleeds with improving CTs on follow-up.    Differential includes but not limited to: Worsening brain bleed, headache    Work up includes with interpretations: CT head read by radiology shows stable subdural hemorrhage with subacute nondisplaced fractures and some posttraumatic encephalomalacia edema.    Advanced directive discussion: None    Treatment in ER:Benadryl, Compazine, Keppra    Consultations in ER: None    Diagnosis and disposition: Posttraumatic headache.  Stable for discharge and outpatient follow-up.  Prescription for Imitrex given.      Is this patient to be included in the SEP-1 core measure? No Exclusion criteria - the patient is NOT to be included for SEP-1 Core Measure due to: Infection is not suspected         --------------------------------------------- PAST HISTORY

## 2024-04-16 ENCOUNTER — APPOINTMENT (OUTPATIENT)
Dept: CT IMAGING | Age: 54
End: 2024-04-16

## 2024-04-16 ENCOUNTER — HOSPITAL ENCOUNTER (EMERGENCY)
Age: 54
Discharge: HOME | End: 2024-04-17
Attending: EMERGENCY MEDICINE

## 2024-04-16 DIAGNOSIS — I62.03 CHRONIC SUBDURAL HEMATOMA (HCC): ICD-10-CM

## 2024-04-16 DIAGNOSIS — S09.90XA INJURY OF HEAD, INITIAL ENCOUNTER: Primary | ICD-10-CM

## 2024-04-16 DIAGNOSIS — R07.89 CHEST WALL PAIN: ICD-10-CM

## 2024-04-16 LAB
ALBUMIN SERPL-MCNC: 4.2 G/DL (ref 3.5–5.2)
ALP SERPL-CCNC: 59 U/L (ref 40–129)
ALT SERPL-CCNC: 21 U/L (ref 0–40)
ANION GAP SERPL CALCULATED.3IONS-SCNC: 15 MMOL/L (ref 7–16)
AST SERPL-CCNC: 31 U/L (ref 0–39)
BASOPHILS # BLD: 0.05 K/UL (ref 0–0.2)
BASOPHILS NFR BLD: 1 % (ref 0–2)
BILIRUB SERPL-MCNC: 0.2 MG/DL (ref 0–1.2)
BUN SERPL-MCNC: 10 MG/DL (ref 6–20)
CALCIUM SERPL-MCNC: 9.3 MG/DL (ref 8.6–10.2)
CHLORIDE SERPL-SCNC: 107 MMOL/L (ref 98–107)
CO2 SERPL-SCNC: 20 MMOL/L (ref 22–29)
CREAT SERPL-MCNC: 0.9 MG/DL (ref 0.7–1.2)
EOSINOPHIL # BLD: 0.09 K/UL (ref 0.05–0.5)
EOSINOPHILS RELATIVE PERCENT: 1 % (ref 0–6)
ERYTHROCYTE [DISTWIDTH] IN BLOOD BY AUTOMATED COUNT: 14.9 % (ref 11.5–15)
GFR SERPL CREATININE-BSD FRML MDRD: >90 ML/MIN/1.73M2
GLUCOSE SERPL-MCNC: 89 MG/DL (ref 74–99)
HCT VFR BLD AUTO: 40.5 % (ref 37–54)
HGB BLD-MCNC: 12.9 G/DL (ref 12.5–16.5)
IMM GRANULOCYTES # BLD AUTO: 0.03 K/UL (ref 0–0.58)
IMM GRANULOCYTES NFR BLD: 1 % (ref 0–5)
LYMPHOCYTES NFR BLD: 2 K/UL (ref 1.5–4)
LYMPHOCYTES RELATIVE PERCENT: 30 % (ref 20–42)
MCH RBC QN AUTO: 30.3 PG (ref 26–35)
MCHC RBC AUTO-ENTMCNC: 31.9 G/DL (ref 32–34.5)
MCV RBC AUTO: 95.1 FL (ref 80–99.9)
MONOCYTES NFR BLD: 0.68 K/UL (ref 0.1–0.95)
MONOCYTES NFR BLD: 10 % (ref 2–12)
NEUTROPHILS NFR BLD: 57 % (ref 43–80)
NEUTS SEG NFR BLD: 3.77 K/UL (ref 1.8–7.3)
PLATELET # BLD AUTO: 286 K/UL (ref 130–450)
PMV BLD AUTO: 9.6 FL (ref 7–12)
POTASSIUM SERPL-SCNC: 4.6 MMOL/L (ref 3.5–5)
PROT SERPL-MCNC: 7.5 G/DL (ref 6.4–8.3)
RBC # BLD AUTO: 4.26 M/UL (ref 3.8–5.8)
SODIUM SERPL-SCNC: 142 MMOL/L (ref 132–146)
WBC OTHER # BLD: 6.6 K/UL (ref 4.5–11.5)

## 2024-04-16 PROCEDURE — 6360000002 HC RX W HCPCS: Performed by: EMERGENCY MEDICINE

## 2024-04-16 PROCEDURE — 96375 TX/PRO/DX INJ NEW DRUG ADDON: CPT

## 2024-04-16 PROCEDURE — 70450 CT HEAD/BRAIN W/O DYE: CPT

## 2024-04-16 PROCEDURE — 72125 CT NECK SPINE W/O DYE: CPT

## 2024-04-16 PROCEDURE — 80053 COMPREHEN METABOLIC PANEL: CPT

## 2024-04-16 PROCEDURE — 71250 CT THORAX DX C-: CPT

## 2024-04-16 PROCEDURE — 96374 THER/PROPH/DIAG INJ IV PUSH: CPT

## 2024-04-16 PROCEDURE — 99284 EMERGENCY DEPT VISIT MOD MDM: CPT

## 2024-04-16 PROCEDURE — 85025 COMPLETE CBC W/AUTO DIFF WBC: CPT

## 2024-04-16 RX ORDER — METOCLOPRAMIDE HYDROCHLORIDE 5 MG/ML
10 INJECTION INTRAMUSCULAR; INTRAVENOUS ONCE
Status: COMPLETED | OUTPATIENT
Start: 2024-04-16 | End: 2024-04-16

## 2024-04-16 RX ORDER — DIPHENHYDRAMINE HYDROCHLORIDE 50 MG/ML
25 INJECTION INTRAMUSCULAR; INTRAVENOUS ONCE
Status: COMPLETED | OUTPATIENT
Start: 2024-04-16 | End: 2024-04-16

## 2024-04-16 RX ADMIN — DIPHENHYDRAMINE HYDROCHLORIDE 25 MG: 50 INJECTION, SOLUTION INTRAMUSCULAR; INTRAVENOUS at 22:01

## 2024-04-16 RX ADMIN — METOCLOPRAMIDE 10 MG: 5 INJECTION, SOLUTION INTRAMUSCULAR; INTRAVENOUS at 22:01

## 2024-04-16 ASSESSMENT — PAIN DESCRIPTION - DESCRIPTORS: DESCRIPTORS: THROBBING

## 2024-04-16 ASSESSMENT — PAIN - FUNCTIONAL ASSESSMENT: PAIN_FUNCTIONAL_ASSESSMENT: 0-10

## 2024-04-16 ASSESSMENT — PAIN DESCRIPTION - LOCATION: LOCATION: HEAD

## 2024-04-16 ASSESSMENT — PAIN SCALES - GENERAL: PAINLEVEL_OUTOF10: 8

## 2024-04-17 ENCOUNTER — TELEPHONE (OUTPATIENT)
Dept: NEUROSURGERY | Age: 54
End: 2024-04-17

## 2024-04-17 VITALS
HEART RATE: 85 BPM | SYSTOLIC BLOOD PRESSURE: 196 MMHG | WEIGHT: 183 LBS | TEMPERATURE: 97.8 F | HEIGHT: 68 IN | BODY MASS INDEX: 27.74 KG/M2 | RESPIRATION RATE: 16 BRPM | DIASTOLIC BLOOD PRESSURE: 100 MMHG | OXYGEN SATURATION: 98 %

## 2024-04-17 NOTE — TELEPHONE ENCOUNTER
Patient has a subdural hematoma. He can have headaches due to the blood in his head, this is normal. I also see that he is consuming alcohol from the 4/10/24 ED visit. It is strongly advised to stop consuming alcohol at this time because it can act as a blood thinner and make the bleed worse. We can send a script for Fioricet for headaches if needed.

## 2024-04-17 NOTE — TELEPHONE ENCOUNTER
Patient called and states that he was just seen in ED for headaches-he is scheduled for 5/10/24 for one month follow up, please advise.    Patient was called and voicemail message left.

## 2024-04-17 NOTE — ED PROVIDER NOTES
HPI:  4/16/24, Time: 9:02 PM EDT         Kirsty Thurston is a 53 y.o. male history of hypertension history kidney stone with history of subdural history of skull fracture presenting to the ED for headache, beginning short time ago.  The complaint has been persistent, moderate in severity, and worsened by nothing.  Patient reports that he has prior history of intracranial hemorrhage with skull fracture.  Patient reports has been dealing with headaches for days since he was treated released today though he slipped and fell that he did strike his head there is no history of LOC does complain of headache as well as nausea vomiting patient reporting also having some left left-sided rib pain he reports no abdominal pain he reports no new numbness he reports numbness to his fourth and fifth digit on his left hand which is from prior fall.  Patient reporting no hip pain or lower extremity pain.    ROS:   Pertinent positives and negatives are stated within HPI, all other systems reviewed and are negative.  --------------------------------------------- PAST HISTORY ---------------------------------------------  Past Medical History:  has a past medical history of Hypertension and Kidney stone.    Past Surgical History:  has a past surgical history that includes Gastric bypass surgery.    Social History:  reports that he has been smoking cigarettes. He does not have any smokeless tobacco history on file. He reports current alcohol use. He reports that he does not use drugs.    Family History: family history is not on file.     The patient’s home medications have been reviewed.    Allergies: Pcn [penicillins]    ---------------------------------------------------PHYSICAL EXAM--------------------------------------    Constitutional/General: Alert and oriented x3,   Head: Normocephalic contusion abrasions to right side of forehead  Eyes: PERRL, EOMI  Mouth: Oropharynx clear, handling secretions, no trismus  Neck: Supple, full ROM,

## 2024-04-18 ENCOUNTER — APPOINTMENT (OUTPATIENT)
Dept: CT IMAGING | Age: 54
End: 2024-04-18
Payer: COMMERCIAL

## 2024-04-18 ENCOUNTER — HOSPITAL ENCOUNTER (EMERGENCY)
Age: 54
Discharge: HOME | End: 2024-04-18
Attending: EMERGENCY MEDICINE
Payer: COMMERCIAL

## 2024-04-18 VITALS
RESPIRATION RATE: 16 BRPM | TEMPERATURE: 98 F | HEART RATE: 73 BPM | OXYGEN SATURATION: 98 % | SYSTOLIC BLOOD PRESSURE: 176 MMHG | WEIGHT: 183 LBS | BODY MASS INDEX: 27.83 KG/M2 | DIASTOLIC BLOOD PRESSURE: 108 MMHG

## 2024-04-18 DIAGNOSIS — Z53.21 ELOPED FROM EMERGENCY DEPARTMENT: ICD-10-CM

## 2024-04-18 DIAGNOSIS — H53.2 DIPLOPIA: ICD-10-CM

## 2024-04-18 DIAGNOSIS — R51.9 NONINTRACTABLE HEADACHE, UNSPECIFIED CHRONICITY PATTERN, UNSPECIFIED HEADACHE TYPE: ICD-10-CM
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
## 2024-04-18 DIAGNOSIS — S06.5XAA SUBDURAL HEMATOMA (HCC): Primary | ICD-10-CM

## 2024-04-18 DIAGNOSIS — S02.91XA CLOSED FRACTURE OF SKULL, UNSPECIFIED BONE, INITIAL ENCOUNTER (HCC): ICD-10-CM
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
## 2024-04-18 LAB
ALBUMIN SERPL-MCNC: 4.2 G/DL (ref 3.5–5.2)
ALP SERPL-CCNC: 65 U/L (ref 40–129)
ALT SERPL-CCNC: 22 U/L (ref 0–40)
AMPHET UR QL SCN: NEGATIVE
ANION GAP SERPL CALCULATED.3IONS-SCNC: 17 MMOL/L (ref 7–16)
APAP SERPL-MCNC: <5 UG/ML (ref 10–30)
AST SERPL-CCNC: 29 U/L (ref 0–39)
BARBITURATES UR QL SCN: POSITIVE
BASOPHILS # BLD: 0.04 K/UL (ref 0–0.2)
BASOPHILS NFR BLD: 1 % (ref 0–2)
BENZODIAZ UR QL: NEGATIVE
BILIRUB SERPL-MCNC: 0.4 MG/DL (ref 0–1.2)
BILIRUB UR QL STRIP: NEGATIVE
BUN SERPL-MCNC: 6 MG/DL (ref 6–20)
BUPRENORPHINE UR QL: NEGATIVE
CALCIUM SERPL-MCNC: 9.4 MG/DL (ref 8.6–10.2)
CANNABINOIDS UR QL SCN: NEGATIVE
CHLORIDE SERPL-SCNC: 104 MMOL/L (ref 98–107)
CLARITY UR: CLEAR
CO2 SERPL-SCNC: 20 MMOL/L (ref 22–29)
COCAINE UR QL SCN: NEGATIVE
COLOR UR: YELLOW
COMMENT: ABNORMAL
CREAT SERPL-MCNC: 0.8 MG/DL (ref 0.7–1.2)
EKG ATRIAL RATE: 80 BPM
EKG P AXIS: 35 DEGREES
EKG P-R INTERVAL: 152 MS
EKG Q-T INTERVAL: 412 MS
EKG QRS DURATION: 86 MS
EKG QTC CALCULATION (BAZETT): 475 MS
EKG R AXIS: 16 DEGREES
EKG T AXIS: 34 DEGREES
EKG VENTRICULAR RATE: 80 BPM
EOSINOPHIL # BLD: 0.08 K/UL (ref 0.05–0.5)
EOSINOPHILS RELATIVE PERCENT: 1 % (ref 0–6)
ERYTHROCYTE [DISTWIDTH] IN BLOOD BY AUTOMATED COUNT: 14.6 % (ref 11.5–15)
ETHANOLAMINE SERPL-MCNC: 239 MG/DL
FENTANYL UR QL: NEGATIVE
GFR SERPL CREATININE-BSD FRML MDRD: >90 ML/MIN/1.73M2
GLUCOSE SERPL-MCNC: 90 MG/DL (ref 74–99)
GLUCOSE UR STRIP-MCNC: NEGATIVE MG/DL
HCT VFR BLD AUTO: 41.4 % (ref 37–54)
HGB BLD-MCNC: 13.4 G/DL (ref 12.5–16.5)
HGB UR QL STRIP.AUTO: NEGATIVE
IMM GRANULOCYTES # BLD AUTO: <0.03 K/UL (ref 0–0.58)
IMM GRANULOCYTES NFR BLD: 0 % (ref 0–5)
KETONES UR STRIP-MCNC: NEGATIVE MG/DL
LEUKOCYTE ESTERASE UR QL STRIP: NEGATIVE
LYMPHOCYTES NFR BLD: 1.8 K/UL (ref 1.5–4)
LYMPHOCYTES RELATIVE PERCENT: 30 % (ref 20–42)
MAGNESIUM SERPL-MCNC: 2.4 MG/DL (ref 1.6–2.6)
MCH RBC QN AUTO: 30.4 PG (ref 26–35)
MCHC RBC AUTO-ENTMCNC: 32.4 G/DL (ref 32–34.5)
MCV RBC AUTO: 93.9 FL (ref 80–99.9)
METHADONE UR QL: NEGATIVE
MONOCYTES NFR BLD: 0.53 K/UL (ref 0.1–0.95)
MONOCYTES NFR BLD: 9 % (ref 2–12)
NEUTROPHILS NFR BLD: 59 % (ref 43–80)
NEUTS SEG NFR BLD: 3.57 K/UL (ref 1.8–7.3)
NITRITE UR QL STRIP: NEGATIVE
OPIATES UR QL SCN: NEGATIVE
OXYCODONE UR QL SCN: NEGATIVE
PCP UR QL SCN: NEGATIVE
PH UR STRIP: 5 [PH] (ref 5–9)
PLATELET # BLD AUTO: 308 K/UL (ref 130–450)
PMV BLD AUTO: 9 FL (ref 7–12)
POTASSIUM SERPL-SCNC: 4.1 MMOL/L (ref 3.5–5)
PROT SERPL-MCNC: 7.4 G/DL (ref 6.4–8.3)
PROT UR STRIP-MCNC: NEGATIVE MG/DL
RBC # BLD AUTO: 4.41 M/UL (ref 3.8–5.8)
SALICYLATES SERPL-MCNC: <0.3 MG/DL (ref 0–30)
SODIUM SERPL-SCNC: 141 MMOL/L (ref 132–146)
SP GR UR STRIP: <1.005 (ref 1–1.03)
TEST INFORMATION: ABNORMAL
TOXIC TRICYCLIC SC,BLOOD: NEGATIVE
UROBILINOGEN UR STRIP-ACNC: 0.2 EU/DL (ref 0–1)
WBC OTHER # BLD: 6 K/UL (ref 4.5–11.5)

## 2024-04-18 PROCEDURE — G0480 DRUG TEST DEF 1-7 CLASSES: HCPCS

## 2024-04-18 PROCEDURE — 96374 THER/PROPH/DIAG INJ IV PUSH: CPT

## 2024-04-18 PROCEDURE — 72125 CT NECK SPINE W/O DYE: CPT

## 2024-04-18 PROCEDURE — 71250 CT THORAX DX C-: CPT

## 2024-04-18 PROCEDURE — 80053 COMPREHEN METABOLIC PANEL: CPT

## 2024-04-18 PROCEDURE — 93010 ELECTROCARDIOGRAM REPORT: CPT | Performed by: INTERNAL MEDICINE

## 2024-04-18 PROCEDURE — 6360000002 HC RX W HCPCS: Performed by: EMERGENCY MEDICINE

## 2024-04-18 PROCEDURE — 93005 ELECTROCARDIOGRAM TRACING: CPT | Performed by: EMERGENCY MEDICINE

## 2024-04-18 PROCEDURE — 99284 EMERGENCY DEPT VISIT MOD MDM: CPT

## 2024-04-18 PROCEDURE — 80143 DRUG ASSAY ACETAMINOPHEN: CPT

## 2024-04-18 PROCEDURE — 81003 URINALYSIS AUTO W/O SCOPE: CPT

## 2024-04-18 PROCEDURE — 96375 TX/PRO/DX INJ NEW DRUG ADDON: CPT

## 2024-04-18 PROCEDURE — 80179 DRUG ASSAY SALICYLATE: CPT

## 2024-04-18 PROCEDURE — 85025 COMPLETE CBC W/AUTO DIFF WBC: CPT

## 2024-04-18 PROCEDURE — 83735 ASSAY OF MAGNESIUM: CPT

## 2024-04-18 PROCEDURE — 80307 DRUG TEST PRSMV CHEM ANLYZR: CPT

## 2024-04-18 PROCEDURE — 70450 CT HEAD/BRAIN W/O DYE: CPT

## 2024-04-18 RX ORDER — DIPHENHYDRAMINE HYDROCHLORIDE 50 MG/ML
10 INJECTION INTRAMUSCULAR; INTRAVENOUS ONCE
Status: COMPLETED | OUTPATIENT
Start: 2024-04-18 | End: 2024-04-18

## 2024-04-18 RX ORDER — HYDRALAZINE HYDROCHLORIDE 20 MG/ML
10 INJECTION INTRAMUSCULAR; INTRAVENOUS ONCE
Status: DISCONTINUED | OUTPATIENT
Start: 2024-04-18 | End: 2024-04-18 | Stop reason: HOSPADM

## 2024-04-18 RX ORDER — FENTANYL CITRATE 50 UG/ML
25 INJECTION, SOLUTION INTRAMUSCULAR; INTRAVENOUS ONCE
Status: COMPLETED | OUTPATIENT
Start: 2024-04-18 | End: 2024-04-18

## 2024-04-18 RX ORDER — METOCLOPRAMIDE HYDROCHLORIDE 5 MG/ML
5 INJECTION INTRAMUSCULAR; INTRAVENOUS ONCE
Status: COMPLETED | OUTPATIENT
Start: 2024-04-18 | End: 2024-04-18

## 2024-04-18 RX ADMIN — DIPHENHYDRAMINE HYDROCHLORIDE 10 MG: 50 INJECTION, SOLUTION INTRAMUSCULAR; INTRAVENOUS at 13:08

## 2024-04-18 RX ADMIN — FENTANYL CITRATE 25 MCG: 50 INJECTION INTRAMUSCULAR; INTRAVENOUS at 13:08

## 2024-04-18 RX ADMIN — METOCLOPRAMIDE 5 MG: 5 INJECTION, SOLUTION INTRAMUSCULAR; INTRAVENOUS at 13:08

## 2024-04-18 ASSESSMENT — PAIN DESCRIPTION - LOCATION: LOCATION: HEAD

## 2024-04-18 ASSESSMENT — PAIN SCALES - GENERAL: PAINLEVEL_OUTOF10: 9

## 2024-04-18 ASSESSMENT — PAIN DESCRIPTION - ORIENTATION: ORIENTATION: POSTERIOR

## 2024-04-18 NOTE — CONSULTS
TRAUMA CONSULT  Attending/Surgical Resident/Advance Practice Nurse  4/18/2024  3:22 PM    PRIMARY SURVEY    CHIEF COMPLAINT:  Trauma consult.    Patient well known to the trauma service. Patient is a 54 yo male that was first seen on 3/27/24 after being found down in a grocery store. Found to have multiple ICH as well as a non-displaced left parietal skull fracture. Patient was able to be discharged home safely but then returned 3/31. 4/9, 4/10 (was seen and admitted by trauma team). Patient discharged 4/11. Returned to ED 4/13, 4/15, 4/16 and today. Each visit has been for headache and recent fall. Each fall seems to be down his steps at this [facility]. Patient has had a total of 6 CT head since the end of March all showing stable if not improved ICH. Today's CT head shows stable left parafalcine SDH. CT cervical spine negative. CT chest with healed left sided rib fx. No new injuries.     Patient presented to the ED this afternoon stating that he has a headache that worsened after a fall last night. Reports he fell down his steps and hit the back of his head. Denied LOC. Denies any blurred vision currently. Denies any nausea or vomiting.   Of note, patient denies any hx of alcohol abuse or withdrawal. Reports only drinking one beer a day. Patient etoh level have been 345, 149 and 239 each admission. Patient currently on Keppra and has been prescribed Sumatriptan for his headaches.     AIRWAY:   Airway Normal    EMS ETT Absent  Noisy respirations Absent  Retractions: Absent  Vomiting/bleeding: Absent    BREATHING:    Midaxillary breath sound left:  Normal  Midaxillary breath sound right:  Normal    Cough sound intensity:  fair    FiO2:  Room air    SMI 2500 mL.     CIRCULATION:   Femerol pulse intensity: Strong  Palpebral conjunctiva: Red    Vitals:    04/18/24 1323   BP: (!) 176/108   Pulse: 73   Resp:    Temp:    SpO2:        Vitals:    04/18/24 1207 04/18/24 1219 04/18/24 1220 04/18/24 1323   BP:   (!)

## 2024-04-18 NOTE — ED NOTES
Patient unable to be found in room. Patient had previously said approx ten minutes ago that he needed to leave to  a paycheck. This nurse told patient that he could not leave unless he had a sober ride home. Patient verbalized that he had drove himself here. Patient verbalized that he understood he could not drive himself. When this nurse went back to room at this time, patient was not in room. Monitor leads were removed and patient's bagged belongings were gone. No evidence of patient's IV being removed prior to eloping. [employer] notified of patient eloping with IV, possibly driving self, and being under the influence. Looked for patient with police in parking lot and unable to locate patient. Charge RN and Attending notified.

## 2024-04-18 NOTE — ED PROVIDER NOTES
[facility] EMERGENCY DEPARTMENT  EMERGENCY DEPARTMENT ENCOUNTER        Pt Name: Kirsty Thurston  MRN: [id number]  Birthdate 1970  Date of evaluation: 4/18/2024  Provider: Olvin Duarte DO  PCP: uJnaid Wilson DO  Note Started: 12:29 PM EDT 4/18/24    CHIEF COMPLAINT       Chief Complaint   Patient presents with    Fall     Pt states he fell this am, hitting the back of his head. States he was told he had a skull fracture and bleed from a fall 3 weeks ago. At this time the patient is slow to respond to questions and having a headache.        HISTORY OF PRESENT ILLNESS: 1 or more Elements   History From: Patient, EMR     Limitations to history : Acuity of illness    Kirsty Thurston is a 53 y.o. male who presents after mechanical fall.  She significant for recent skull fracture and bleed from few weeks ago, has had several recent ED visits.  Reports he was getting ready for his day today, slid down approximately 7 steps hit the back of his head, has new and different headache but has had before.  Did take his Keppra prior to arrival.  Drove himself but states he should not of his he was having double vision.  Complains of nausea without vomiting.    Nursing Notes were all reviewed and agreed with or any disagreements were addressed in the HPI.      REVIEW OF EXTERNAL NOTE :       Trauma services discharge summary from 4/11/2024, [place] [address] for subdural hematoma    ED visits on April 13, 2024, April 10, 2024, April 15, 2024, April 16, 2024    REVIEW OF SYSTEMS :         ENCOUNTER LIMITATION:    Please note that the HPI, ROS, Past History, and Physical Examination are limited due to this patient’s acuity of illness.        Review of Systems:   A complete review of systems was unable to be performed secondary to the limitations noted above      SURGICAL HISTORY     Past Surgical History:   Procedure Laterality Date    GASTRIC BYPASS SURGERY    obtained during this visit were within normal range or not returned as of this dictation.      EKG Interpretation  Interpreted by emergency department physician, Olvin Duarte DO      EKG @ 1246:  This EKG is signed and interpreted by Dr Olvin Duarte DO.    Rate: 80  Rhythm: Sinus  Interpretation: Sinus rhythm, normal axis, UT is 152, cures 8 6, QTc 475, does not meet STEMI criteria, no other acute findings no prior for comparison  Comparison: no previous EKG available          RADIOLOGY:   Non-plain film images such as CT, Ultrasound and MRI are read by the radiologist. Plain radiographic images are visualized and preliminarily interpreted by the ED Provider with the below findings:         Interpretation per the Radiologist below, if available at the time of this note:    CT HEAD WO CONTRAST   Final Result   Tiny left parafalcine subdural hematoma and nondisplaced fracture of the left   parietal bone, grossly unchanged from 04/16/2024.      No acute fracture or traumatic malalignment within the cervical spine.         CT CERVICAL SPINE WO CONTRAST   Final Result   Tiny left parafalcine subdural hematoma and nondisplaced fracture of the left   parietal bone, grossly unchanged from 04/16/2024.      No acute fracture or traumatic malalignment within the cervical spine.         CT CHEST WO CONTRAST   Final Result   1. No acute traumatic findings of the chest   2. Multiple healed left-sided rib fractures.   3. Hepatic steatosis.   4. Partially imaged cystic lesion in the left suprarenal region likely   exophytic renal cyst measuring up to 10 cm in dimension without complex   features.           CT CERVICAL SPINE WO CONTRAST    Result Date: 4/16/2024  EXAMINATION: CT OF THE CERVICAL SPINE WITHOUT CONTRAST 4/16/2024 10:22 pm TECHNIQUE: CT of the cervical spine was performed without the administration of intravenous contrast. Multiplanar reformatted images are provided for review. Automated exposure control,

## 2024-04-20 ENCOUNTER — APPOINTMENT (OUTPATIENT)
Dept: GENERAL RADIOLOGY | Age: 54
End: 2024-04-20
Payer: COMMERCIAL

## 2024-04-20 ENCOUNTER — APPOINTMENT (OUTPATIENT)
Dept: CT IMAGING | Age: 54
End: 2024-04-20
Payer: COMMERCIAL

## 2024-04-20 ENCOUNTER — HOSPITAL ENCOUNTER (INPATIENT)
Age: 54
LOS: 5 days | Discharge: NURSING, REHAB OR LONG-TERM CARE | End: 2024-04-25
Attending: EMERGENCY MEDICINE | Admitting: INTERNAL MEDICINE
Payer: COMMERCIAL

## 2024-04-20 DIAGNOSIS — S09.90XA CLOSED HEAD INJURY, INITIAL ENCOUNTER: ICD-10-CM

## 2024-04-20 DIAGNOSIS — R55 SYNCOPE AND COLLAPSE: ICD-10-CM

## 2024-04-20 DIAGNOSIS — R29.90 STROKE-LIKE SYMPTOMS: Primary | ICD-10-CM
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
## 2024-04-20 DIAGNOSIS — F10.920 ACUTE ALCOHOLIC INTOXICATION WITHOUT COMPLICATION (HCC): ICD-10-CM

## 2024-04-20 LAB
ALBUMIN SERPL-MCNC: 4.1 G/DL (ref 3.5–5.2)
ALP SERPL-CCNC: 64 U/L (ref 40–129)
ALT SERPL-CCNC: 23 U/L (ref 0–40)
AMMONIA PLAS-SCNC: <10 UMOL/L (ref 16–60)
AMPHET UR QL SCN: NEGATIVE
ANION GAP SERPL CALCULATED.3IONS-SCNC: 18 MMOL/L (ref 7–16)
APAP SERPL-MCNC: <5 UG/ML (ref 10–30)
AST SERPL-CCNC: 32 U/L (ref 0–39)
BARBITURATES UR QL SCN: POSITIVE
BASOPHILS # BLD: 0.02 K/UL (ref 0–0.2)
BASOPHILS NFR BLD: 0 % (ref 0–2)
BENZODIAZ UR QL: NEGATIVE
BILIRUB SERPL-MCNC: 0.3 MG/DL (ref 0–1.2)
BUN SERPL-MCNC: 8 MG/DL (ref 6–20)
BUPRENORPHINE UR QL: NEGATIVE
CALCIUM SERPL-MCNC: 8.7 MG/DL (ref 8.6–10.2)
CANNABINOIDS UR QL SCN: NEGATIVE
CHLORIDE SERPL-SCNC: 100 MMOL/L (ref 98–107)
CO2 SERPL-SCNC: 17 MMOL/L (ref 22–29)
COCAINE UR QL SCN: NEGATIVE
CREAT SERPL-MCNC: 0.8 MG/DL (ref 0.7–1.2)
EOSINOPHIL # BLD: 0 K/UL (ref 0.05–0.5)
EOSINOPHILS RELATIVE PERCENT: 0 % (ref 0–6)
ERYTHROCYTE [DISTWIDTH] IN BLOOD BY AUTOMATED COUNT: 14.6 % (ref 11.5–15)
ETHANOLAMINE SERPL-MCNC: 197 MG/DL
FENTANYL UR QL: NEGATIVE
GFR SERPL CREATININE-BSD FRML MDRD: >90 ML/MIN/1.73M2
GLUCOSE SERPL-MCNC: 84 MG/DL (ref 74–99)
HCT VFR BLD AUTO: 37.4 % (ref 37–54)
HGB BLD-MCNC: 12.5 G/DL (ref 12.5–16.5)
IMM GRANULOCYTES # BLD AUTO: 0.03 K/UL (ref 0–0.58)
IMM GRANULOCYTES NFR BLD: 0 % (ref 0–5)
LYMPHOCYTES NFR BLD: 0.72 K/UL (ref 1.5–4)
LYMPHOCYTES RELATIVE PERCENT: 9 % (ref 20–42)
MCH RBC QN AUTO: 30.7 PG (ref 26–35)
MCHC RBC AUTO-ENTMCNC: 33.4 G/DL (ref 32–34.5)
MCV RBC AUTO: 91.9 FL (ref 80–99.9)
METHADONE UR QL: NEGATIVE
MONOCYTES NFR BLD: 0.57 K/UL (ref 0.1–0.95)
MONOCYTES NFR BLD: 7 % (ref 2–12)
NEUTROPHILS NFR BLD: 83 % (ref 43–80)
NEUTS SEG NFR BLD: 6.5 K/UL (ref 1.8–7.3)
OPIATES UR QL SCN: NEGATIVE
OXYCODONE UR QL SCN: NEGATIVE
PCP UR QL SCN: NEGATIVE
PLATELET # BLD AUTO: 268 K/UL (ref 130–450)
PMV BLD AUTO: 9.1 FL (ref 7–12)
POTASSIUM SERPL-SCNC: 4.4 MMOL/L (ref 3.5–5)
PROT SERPL-MCNC: 6.8 G/DL (ref 6.4–8.3)
RBC # BLD AUTO: 4.07 M/UL (ref 3.8–5.8)
SALICYLATES SERPL-MCNC: <0.3 MG/DL (ref 0–30)
SODIUM SERPL-SCNC: 135 MMOL/L (ref 132–146)
TEST INFORMATION: ABNORMAL
TOXIC TRICYCLIC SC,BLOOD: NEGATIVE
TROPONIN I SERPL HS-MCNC: 15 NG/L (ref 0–11)
TROPONIN I SERPL HS-MCNC: 16 NG/L (ref 0–11)
WBC OTHER # BLD: 7.8 K/UL (ref 4.5–11.5)

## 2024-04-20 PROCEDURE — 80143 DRUG ASSAY ACETAMINOPHEN: CPT

## 2024-04-20 PROCEDURE — 72125 CT NECK SPINE W/O DYE: CPT

## 2024-04-20 PROCEDURE — 6360000004 HC RX CONTRAST MEDICATION: Performed by: RADIOLOGY

## 2024-04-20 PROCEDURE — 99285 EMERGENCY DEPT VISIT HI MDM: CPT

## 2024-04-20 PROCEDURE — 72170 X-RAY EXAM OF PELVIS: CPT

## 2024-04-20 PROCEDURE — G0480 DRUG TEST DEF 1-7 CLASSES: HCPCS

## 2024-04-20 PROCEDURE — 70450 CT HEAD/BRAIN W/O DYE: CPT

## 2024-04-20 PROCEDURE — 71045 X-RAY EXAM CHEST 1 VIEW: CPT

## 2024-04-20 PROCEDURE — 85025 COMPLETE CBC W/AUTO DIFF WBC: CPT

## 2024-04-20 PROCEDURE — 80307 DRUG TEST PRSMV CHEM ANLYZR: CPT

## 2024-04-20 PROCEDURE — 80053 COMPREHEN METABOLIC PANEL: CPT

## 2024-04-20 PROCEDURE — 6360000002 HC RX W HCPCS

## 2024-04-20 PROCEDURE — 82140 ASSAY OF AMMONIA: CPT

## 2024-04-20 PROCEDURE — 2060000000 HC ICU INTERMEDIATE R&B

## 2024-04-20 PROCEDURE — 70496 CT ANGIOGRAPHY HEAD: CPT

## 2024-04-20 PROCEDURE — 80179 DRUG ASSAY SALICYLATE: CPT

## 2024-04-20 PROCEDURE — 84484 ASSAY OF TROPONIN QUANT: CPT

## 2024-04-20 PROCEDURE — 93005 ELECTROCARDIOGRAM TRACING: CPT | Performed by: EMERGENCY MEDICINE

## 2024-04-20 PROCEDURE — 70498 CT ANGIOGRAPHY NECK: CPT

## 2024-04-20 RX ORDER — SODIUM CHLORIDE 9 MG/ML
INJECTION, SOLUTION INTRAVENOUS PRN
Status: DISCONTINUED | OUTPATIENT
Start: 2024-04-20 | End: 2024-04-25 | Stop reason: HOSPADM

## 2024-04-20 RX ORDER — ASPIRIN 300 MG/1
300 SUPPOSITORY RECTAL DAILY
Status: DISCONTINUED | OUTPATIENT
Start: 2024-04-21 | End: 2024-04-25 | Stop reason: HOSPADM

## 2024-04-20 RX ORDER — ONDANSETRON 4 MG/1
4 TABLET, ORALLY DISINTEGRATING ORAL EVERY 8 HOURS PRN
Status: DISCONTINUED | OUTPATIENT
Start: 2024-04-20 | End: 2024-04-25 | Stop reason: HOSPADM

## 2024-04-20 RX ORDER — METOPROLOL SUCCINATE 25 MG/1
25 TABLET, EXTENDED RELEASE ORAL DAILY
Status: DISCONTINUED | OUTPATIENT
Start: 2024-04-21 | End: 2024-04-25 | Stop reason: HOSPADM

## 2024-04-20 RX ORDER — ASPIRIN 81 MG/1
81 TABLET, CHEWABLE ORAL DAILY
Status: DISCONTINUED | OUTPATIENT
Start: 2024-04-21 | End: 2024-04-25 | Stop reason: HOSPADM

## 2024-04-20 RX ORDER — ATORVASTATIN CALCIUM 40 MG/1
40 TABLET, FILM COATED ORAL NIGHTLY
Status: DISCONTINUED | OUTPATIENT
Start: 2024-04-20 | End: 2024-04-25 | Stop reason: HOSPADM

## 2024-04-20 RX ORDER — SODIUM CHLORIDE 0.9 % (FLUSH) 0.9 %
5-40 SYRINGE (ML) INJECTION EVERY 12 HOURS SCHEDULED
Status: DISCONTINUED | OUTPATIENT
Start: 2024-04-20 | End: 2024-04-25 | Stop reason: HOSPADM

## 2024-04-20 RX ORDER — SODIUM CHLORIDE 0.9 % (FLUSH) 0.9 %
5-40 SYRINGE (ML) INJECTION PRN
Status: DISCONTINUED | OUTPATIENT
Start: 2024-04-20 | End: 2024-04-25 | Stop reason: HOSPADM

## 2024-04-20 RX ORDER — ENOXAPARIN SODIUM 100 MG/ML
40 INJECTION SUBCUTANEOUS DAILY
Status: DISCONTINUED | OUTPATIENT
Start: 2024-04-21 | End: 2024-04-25 | Stop reason: HOSPADM

## 2024-04-20 RX ORDER — POLYETHYLENE GLYCOL 3350 17 G/17G
17 POWDER, FOR SOLUTION ORAL DAILY PRN
Status: DISCONTINUED | OUTPATIENT
Start: 2024-04-20 | End: 2024-04-25 | Stop reason: HOSPADM

## 2024-04-20 RX ORDER — HYDRALAZINE HYDROCHLORIDE 20 MG/ML
INJECTION INTRAMUSCULAR; INTRAVENOUS
Status: COMPLETED
Start: 2024-04-20 | End: 2024-04-20

## 2024-04-20 RX ORDER — HYDRALAZINE HYDROCHLORIDE 20 MG/ML
5 INJECTION INTRAMUSCULAR; INTRAVENOUS EVERY 4 HOURS PRN
Status: DISCONTINUED | OUTPATIENT
Start: 2024-04-20 | End: 2024-04-22

## 2024-04-20 RX ORDER — ONDANSETRON 2 MG/ML
4 INJECTION INTRAMUSCULAR; INTRAVENOUS EVERY 6 HOURS PRN
Status: DISCONTINUED | OUTPATIENT
Start: 2024-04-20 | End: 2024-04-25 | Stop reason: HOSPADM

## 2024-04-20 RX ADMIN — HYDRALAZINE HYDROCHLORIDE 5 MG: 20 INJECTION INTRAMUSCULAR; INTRAVENOUS at 21:49

## 2024-04-20 RX ADMIN — HYDRALAZINE HYDROCHLORIDE 5 MG: 20 INJECTION, SOLUTION INTRAMUSCULAR; INTRAVENOUS at 21:49

## 2024-04-20 RX ADMIN — IOPAMIDOL 60 ML: 755 INJECTION, SOLUTION INTRAVENOUS at 13:00

## 2024-04-20 ASSESSMENT — PAIN - FUNCTIONAL ASSESSMENT: PAIN_FUNCTIONAL_ASSESSMENT: ACTIVITIES ARE NOT PREVENTED

## 2024-04-20 ASSESSMENT — PAIN SCALES - GENERAL: PAINLEVEL_OUTOF10: 9

## 2024-04-20 ASSESSMENT — PAIN DESCRIPTION - DESCRIPTORS: DESCRIPTORS: ACHING

## 2024-04-20 ASSESSMENT — PAIN DESCRIPTION - LOCATION: LOCATION: HEAD

## 2024-04-20 ASSESSMENT — PAIN DESCRIPTION - PAIN TYPE: TYPE: CHRONIC PAIN

## 2024-04-20 ASSESSMENT — PAIN DESCRIPTION - ORIENTATION: ORIENTATION: POSTERIOR

## 2024-04-20 NOTE — ED NOTES
Radiology Procedure Waiver   Name: Kirsty Thurston  : 1970  MRN: [id number]    Date:  24    Time: 12:33 PM EDT    Benefits of immediately proceeding with Radiology exam(s) without pre-testing outweigh the risks or are not indicated as specified below and therefore the following is/are being waived:    [] Pregnancy test   [] Patients LMP on-time and regular.   [] Patient had Tubal Ligation or has other Contraception Device.   [] Patient  is Menopausal or Premenarcheal.    [] Patient had Full or Partial Hysterectomy.    [] Protocol for Iodine allergy    [] MRI Questionnaire     [x] BUN/Creatinine   [] Patient age w/no hx of renal dysfunction.   [] Patient on Dialysis.   [] Recent Normal Labs.  Electronically signed by Zoey Lemons MD on 24 at 12:33 PM EDT          EMERGENT     Zoey Lemons MD  24 [id number]

## 2024-04-20 NOTE — ED PROVIDER NOTES
HPI:  4/20/24, Time: 12:35 PM EDT         Kirsty Thurston is a 53 y.o. male presenting to the ED for fall.  Patient presents via EMS.  EMS helping to provide history.  Patient had a fall at the gas station just prior to arrival.  States he was feeling fine prior to the fall.  States he got dizzy and fell.  Unknown LOC.  He is not anticoagulated.  Last known well was 11:55 AM.  Patient noted to have left arm weakness which is new.  Denies prior history of stroke.  I reviewed the patient's chart.  Recently seen in the ED after a fall with alcohol intoxication and was found to have a left subdural hematoma.  Left AMA.    --------------------------------------------- PAST HISTORY ---------------------------------------------  Past Medical History:  has a past medical history of Hypertension and Kidney stone.    Past Surgical History:  has a past surgical history that includes Gastric bypass surgery.    Social History:  reports that he has been smoking cigarettes. He does not have any smokeless tobacco history on file. He reports current alcohol use. He reports that he does not use drugs.    Family History: family history is not on file.     The patient’s home medications have been reviewed.    Allergies: Pcn [penicillins]    -------------------------------------------------- RESULTS -------------------------------------------------  All laboratory and radiology results have been personally reviewed by myself   LABS:  Results for orders placed or performed during the hospital encounter of 04/20/24   URINE DRUG SCREEN   Result Value Ref Range    Amphetamine Screen, Ur NEGATIVE NEGATIVE    Barbiturate Screen, Ur POSITIVE (A) NEGATIVE    Benzodiazepine Screen, Urine NEGATIVE NEGATIVE    Cocaine Metabolite, Urine NEGATIVE NEGATIVE    Methadone Screen, Urine NEGATIVE NEGATIVE    Opiates, Urine NEGATIVE NEGATIVE    Phencyclidine, Urine NEGATIVE NEGATIVE    Cannabinoid Scrn, Ur NEGATIVE NEGATIVE    Oxycodone Screen, Ur NEGATIVE  most recent EKG   [JA]   1438 I reevaluated the patient.  NIH remains 3.  Discussed findings and plan for admission.  Patient is agreeable [JA]   1439 Cervical spine cleared [JA]   1440 Spoke with Dr. Boone. No LVO. No acute treatment at this time. [JA]   1504 Hospitalist was consulted. Lela accepted the patient for admission under Dr. De Jesus. [JA]      ED Course User Index  [JA] Zoey Lemons MD       Patient remained hemodynamically stable throughout ED course.     New Prescriptions    No medications on file         Critical Care:  Please note that the withdrawal or failure to initiate urgent interventions for this patient would likely result in a life threatening deterioration or permanent disability.      Accordingly this patient received 33 minutes of critical care time, excluding separately billable procedures.      Counseling:   The emergency provider has spoken with the patient and discussed today’s results, in addition to providing specific details for the plan of care and counseling regarding the diagnosis and prognosis.  Questions are answered at this time and they are agreeable with the plan.      --------------------------------- IMPRESSION AND DISPOSITION ---------------------------------    IMPRESSION  1. Stroke-like symptoms    2. Closed head injury, initial encounter    3. Acute alcoholic intoxication without complication (HCC)        DISPOSITION  Disposition: Admit to telemetry  Patient condition is stable      NOTE: This report was transcribed using voice recognition software. Every effort was made to ensure accuracy; however, inadvertent computerized transcription errors may be present    Zoey AMAYA MD, am the primary provider of this record       Zoey Lemons MD  04/20/24 2031

## 2024-04-20 NOTE — ED NOTES
Stroke/ Shanita Alert Time: 1230      Time Neurologist called: 1232  :    Time CT Notified: 1230      BRAIN alert time: (If applicable)

## 2024-04-21 LAB
ANION GAP SERPL CALCULATED.3IONS-SCNC: 19 MMOL/L (ref 7–16)
BUN SERPL-MCNC: 10 MG/DL (ref 6–20)
CALCIUM SERPL-MCNC: 9.2 MG/DL (ref 8.6–10.2)
CHLORIDE SERPL-SCNC: 100 MMOL/L (ref 98–107)
CHOLEST SERPL-MCNC: 158 MG/DL
CO2 SERPL-SCNC: 20 MMOL/L (ref 22–29)
CREAT SERPL-MCNC: 0.8 MG/DL (ref 0.7–1.2)
EKG ATRIAL RATE: 93 BPM
EKG P AXIS: 31 DEGREES
EKG P-R INTERVAL: 146 MS
EKG Q-T INTERVAL: 388 MS
EKG QRS DURATION: 90 MS
EKG QTC CALCULATION (BAZETT): 482 MS
EKG R AXIS: 6 DEGREES
EKG T AXIS: 29 DEGREES
EKG VENTRICULAR RATE: 93 BPM
ERYTHROCYTE [DISTWIDTH] IN BLOOD BY AUTOMATED COUNT: 14.8 % (ref 11.5–15)
GFR SERPL CREATININE-BSD FRML MDRD: >90 ML/MIN/1.73M2
GLUCOSE SERPL-MCNC: 76 MG/DL (ref 74–99)
HBA1C MFR BLD: 5.3 % (ref 4–5.6)
HCT VFR BLD AUTO: 38 % (ref 37–54)
HDLC SERPL-MCNC: 86 MG/DL
HGB BLD-MCNC: 12.6 G/DL (ref 12.5–16.5)
LDLC SERPL CALC-MCNC: 61 MG/DL
MCH RBC QN AUTO: 30.7 PG (ref 26–35)
MCHC RBC AUTO-ENTMCNC: 33.2 G/DL (ref 32–34.5)
MCV RBC AUTO: 92.7 FL (ref 80–99.9)
PLATELET # BLD AUTO: 279 K/UL (ref 130–450)
PMV BLD AUTO: 9.9 FL (ref 7–12)
POTASSIUM SERPL-SCNC: 4.3 MMOL/L (ref 3.5–5)
RBC # BLD AUTO: 4.1 M/UL (ref 3.8–5.8)
SODIUM SERPL-SCNC: 139 MMOL/L (ref 132–146)
TRIGL SERPL-MCNC: 54 MG/DL
VLDLC SERPL CALC-MCNC: 11 MG/DL
WBC OTHER # BLD: 8.3 K/UL (ref 4.5–11.5)

## 2024-04-21 PROCEDURE — 80061 LIPID PANEL: CPT

## 2024-04-21 PROCEDURE — 80048 BASIC METABOLIC PNL TOTAL CA: CPT

## 2024-04-21 PROCEDURE — 6370000000 HC RX 637 (ALT 250 FOR IP): Performed by: NURSE PRACTITIONER

## 2024-04-21 PROCEDURE — 36415 COLL VENOUS BLD VENIPUNCTURE: CPT

## 2024-04-21 PROCEDURE — 6360000002 HC RX W HCPCS: Performed by: NURSE PRACTITIONER

## 2024-04-21 PROCEDURE — 2580000003 HC RX 258: Performed by: NURSE PRACTITIONER

## 2024-04-21 PROCEDURE — 97161 PT EVAL LOW COMPLEX 20 MIN: CPT

## 2024-04-21 PROCEDURE — 85027 COMPLETE CBC AUTOMATED: CPT

## 2024-04-21 PROCEDURE — 97165 OT EVAL LOW COMPLEX 30 MIN: CPT

## 2024-04-21 PROCEDURE — 83036 HEMOGLOBIN GLYCOSYLATED A1C: CPT

## 2024-04-21 PROCEDURE — 2060000000 HC ICU INTERMEDIATE R&B

## 2024-04-21 PROCEDURE — 6370000000 HC RX 637 (ALT 250 FOR IP): Performed by: INTERNAL MEDICINE

## 2024-04-21 PROCEDURE — 93010 ELECTROCARDIOGRAM REPORT: CPT | Performed by: INTERNAL MEDICINE

## 2024-04-21 RX ORDER — HYDROCODONE BITARTRATE AND ACETAMINOPHEN 5; 325 MG/1; MG/1
1 TABLET ORAL EVERY 6 HOURS PRN
Status: DISCONTINUED | OUTPATIENT
Start: 2024-04-21 | End: 2024-04-25 | Stop reason: HOSPADM

## 2024-04-21 RX ORDER — PHENOBARBITAL 32.4 MG/1
32.4 TABLET ORAL 2 TIMES DAILY
Status: DISCONTINUED | OUTPATIENT
Start: 2024-04-21 | End: 2024-04-25 | Stop reason: HOSPADM

## 2024-04-21 RX ORDER — METOCLOPRAMIDE HYDROCHLORIDE 5 MG/ML
10 INJECTION INTRAMUSCULAR; INTRAVENOUS EVERY 6 HOURS
Status: DISCONTINUED | OUTPATIENT
Start: 2024-04-21 | End: 2024-04-22 | Stop reason: SDUPTHER

## 2024-04-21 RX ORDER — DIPHENHYDRAMINE HYDROCHLORIDE 50 MG/ML
25 INJECTION INTRAMUSCULAR; INTRAVENOUS EVERY 6 HOURS PRN
Status: DISCONTINUED | OUTPATIENT
Start: 2024-04-21 | End: 2024-04-25 | Stop reason: HOSPADM

## 2024-04-21 RX ORDER — NICOTINE 21 MG/24HR
1 PATCH, TRANSDERMAL 24 HOURS TRANSDERMAL DAILY
Status: DISCONTINUED | OUTPATIENT
Start: 2024-04-21 | End: 2024-04-25 | Stop reason: HOSPADM

## 2024-04-21 RX ADMIN — DIPHENHYDRAMINE HYDROCHLORIDE 25 MG: 50 INJECTION, SOLUTION INTRAMUSCULAR; INTRAVENOUS at 00:51

## 2024-04-21 RX ADMIN — PHENOBARBITAL 32.4 MG: 32.4 TABLET ORAL at 19:40

## 2024-04-21 RX ADMIN — HYDRALAZINE HYDROCHLORIDE 5 MG: 20 INJECTION, SOLUTION INTRAMUSCULAR; INTRAVENOUS at 19:40

## 2024-04-21 RX ADMIN — SODIUM CHLORIDE, PRESERVATIVE FREE 10 ML: 5 INJECTION INTRAVENOUS at 08:12

## 2024-04-21 RX ADMIN — METOPROLOL SUCCINATE 25 MG: 25 TABLET, EXTENDED RELEASE ORAL at 08:12

## 2024-04-21 RX ADMIN — HYDRALAZINE HYDROCHLORIDE 5 MG: 20 INJECTION, SOLUTION INTRAMUSCULAR; INTRAVENOUS at 04:54

## 2024-04-21 RX ADMIN — DIPHENHYDRAMINE HYDROCHLORIDE 25 MG: 50 INJECTION, SOLUTION INTRAMUSCULAR; INTRAVENOUS at 15:20

## 2024-04-21 RX ADMIN — METOCLOPRAMIDE HYDROCHLORIDE 10 MG: 5 INJECTION INTRAMUSCULAR; INTRAVENOUS at 15:20

## 2024-04-21 RX ADMIN — PHENOBARBITAL 32.4 MG: 32.4 TABLET ORAL at 10:12

## 2024-04-21 RX ADMIN — HYDRALAZINE HYDROCHLORIDE 5 MG: 20 INJECTION, SOLUTION INTRAMUSCULAR; INTRAVENOUS at 12:00

## 2024-04-21 RX ADMIN — METOCLOPRAMIDE HYDROCHLORIDE 10 MG: 5 INJECTION INTRAMUSCULAR; INTRAVENOUS at 19:40

## 2024-04-21 RX ADMIN — METOCLOPRAMIDE HYDROCHLORIDE 10 MG: 5 INJECTION INTRAMUSCULAR; INTRAVENOUS at 08:12

## 2024-04-21 RX ADMIN — HYDROCODONE BITARTRATE AND ACETAMINOPHEN 1 TABLET: 5; 325 TABLET ORAL at 21:21

## 2024-04-21 RX ADMIN — SODIUM CHLORIDE, PRESERVATIVE FREE 10 ML: 5 INJECTION INTRAVENOUS at 19:40

## 2024-04-21 RX ADMIN — METOCLOPRAMIDE HYDROCHLORIDE 10 MG: 5 INJECTION INTRAMUSCULAR; INTRAVENOUS at 00:51

## 2024-04-21 RX ADMIN — ATORVASTATIN CALCIUM 40 MG: 40 TABLET, FILM COATED ORAL at 00:52

## 2024-04-21 RX ADMIN — HYDROCODONE BITARTRATE AND ACETAMINOPHEN 1 TABLET: 5; 325 TABLET ORAL at 15:19

## 2024-04-21 RX ADMIN — HYDROCODONE BITARTRATE AND ACETAMINOPHEN 1 TABLET: 5; 325 TABLET ORAL at 10:12

## 2024-04-21 RX ADMIN — ATORVASTATIN CALCIUM 40 MG: 40 TABLET, FILM COATED ORAL at 19:40

## 2024-04-21 RX ADMIN — SODIUM CHLORIDE, PRESERVATIVE FREE 10 ML: 5 INJECTION INTRAVENOUS at 00:58

## 2024-04-21 ASSESSMENT — PAIN DESCRIPTION - LOCATION
LOCATION: HEAD

## 2024-04-21 ASSESSMENT — PAIN SCALES - GENERAL
PAINLEVEL_OUTOF10: 9
PAINLEVEL_OUTOF10: 5
PAINLEVEL_OUTOF10: 9
PAINLEVEL_OUTOF10: 8
PAINLEVEL_OUTOF10: 7
PAINLEVEL_OUTOF10: 7
PAINLEVEL_OUTOF10: 0
PAINLEVEL_OUTOF10: 8

## 2024-04-21 ASSESSMENT — PAIN DESCRIPTION - ORIENTATION
ORIENTATION: POSTERIOR

## 2024-04-21 ASSESSMENT — PAIN DESCRIPTION - DESCRIPTORS
DESCRIPTORS: ACHING;DISCOMFORT;SORE
DESCRIPTORS: ACHING;DISCOMFORT;SORE
DESCRIPTORS: ACHING;DISCOMFORT
DESCRIPTORS: ACHING;DISCOMFORT;SORE
DESCRIPTORS: ACHING;DISCOMFORT

## 2024-04-21 ASSESSMENT — PAIN - FUNCTIONAL ASSESSMENT: PAIN_FUNCTIONAL_ASSESSMENT: ACTIVITIES ARE NOT PREVENTED

## 2024-04-21 NOTE — H&P
[facility]  History and Physical      CHIEF COMPLAINT:    Chief Complaint   Patient presents with    Cerebrovascular Accident     Pt lost his balance while at the gas station and now has left sided weakness with a facial droop. Hx of a blood on 4/16.        Patient of Junaid Wilson DO presents with:  Stroke-like symptoms    History of Present Illness:   Patient is a 53-year-old male with a past medical history of hypertension, EtOH abuse who presents to the emergency room for strokelike symptoms.  Patient was actually seen in the emergency room on 4/16 and 4/18 for headache and a fall where he was noted to only have residual tiny left SDH from a previous admission on 3/27 in which at that time had his left parietal epidural hematoma a nondisplaced skull fracture and a right frontal subdural hematoma.  Patient was brought to the emergency room yesterday for fall at a gas station where he states he got dizzy but was found to have left arm weakness.  He indicates he has been having recurrent falls.  This time he apparently tripped over something and fell at the gas station.  Previously he thinks he has passed out on a couple of occasions, after getting lightheaded prior to fall.  More of a limited historian and does not give a clear history.  The CT head was obtained revealing no acute intercranial abnormality with extensive chronic ischemic changes and stable area of encephalomalacia in the right frontal lobe.  CTA head and neck revealed no LVO.  Labs on arrival revealed an anion gap of 18, positive barbiturate likely from previous admission and was given phenobarbital for EtOH withdrawal.  Patient is admitted to intermediate telemetry for further workup and treatment.  On evaluation he is resting comfortably in no acute distress.  He tells me that he only drinks once in a while with his buddies.  However his alcohol levels have been consistently elevated over past few admissions.  He indicates he

## 2024-04-21 NOTE — PROGRESS NOTES
OCCUPATIONAL THERAPY INITIAL EVALUATION    [facility]  [address] [place], [place]    Date:2024                                                  Patient Name: Kirsty Thurston    MRN: [id number]    : 1970    Room: [address] [place] [address]      Evaluating OT: Kiley Kearney OTR/L, [id number]    Referring Provider:Brittany Long APRN - CNP   Specific Provider Orders/Date: OT Eval and Treat 24    Diagnosis: Stroke-like symptoms [R29.90]  Closed head injury, initial encounter [S09.90XA]  Acute alcoholic intoxication without complication (HCC) [F10.920]   Surgery: NA     Pertinent Medical History:      Past Medical History:   Diagnosis Date    Hypertension     Kidney stone          Past Surgical History:   Procedure Laterality Date    GASTRIC BYPASS SURGERY       Precautions:  Fall Risk, recent SDH (3-4 weeks ago), monitor BP, bed alarm    Assessment of current deficits    [x] Functional mobility  [x]ADLs  [x] Strength               [x]Cognition    [x] Functional transfers   [x] IADLs         [x] Safety Awareness   [x]Endurance    [x] Fine Coordination              [x] Balance      [] Vision/perception   [x]Sensation     []Gross Motor Coordination  [] ROM  [] Delirium                   [] Motor Control     OT PLAN OF CARE   OT POC based on physician orders, patient diagnosis and results of clinical assessment    Frequency/Duration 1-3 days/wk for 2 weeks PRN   Specific OT Treatment Interventions to include:   * Instruction/training on adapted ADL techniques and AE recommendations to increase functional independence within precautions       * Training on energy conservation strategies, correct breathing pattern and techniques to improve independence/tolerance for self-care routine  * Functional transfer/mobility training/DME recommendations for increased independence, safety, and fall prevention  * Patient/Family education to increase follow through with safety

## 2024-04-21 NOTE — PROGRESS NOTES
Physical Therapy  Physical Therapy Initial Assessment     Name: Kirsty Thurston  : 1970  MRN: [id number]      Date of Service: 2024    Evaluating PT:  Desiree Acevedo PT, DPT, [id number]    Room #:  8502/8502-A  Diagnosis:  Stroke-like symptoms [R29.90]  Closed head injury, initial encounter [S09.90XA]  Acute alcoholic intoxication without complication (HCC) [F10.920]  PMHx/PSHx:    Past Medical History:   Diagnosis Date    Hypertension     Kidney stone       Past Surgical History:   Procedure Laterality Date    GASTRIC BYPASS SURGERY        Procedure/Surgery:  none this admission   Precautions:  Fall Risk, + Alarms, dizziness, cognition  Equipment Needs:  TBD    SUBJECTIVE:    Pt lives with his mother in a 2 story home with 4-5 stairs to enter and 2 rail.  Bed is on 1 floor and bath is on 1 floor.  Pt ambulated with no AD PTA. Pt works as a .     OBJECTIVE:   Initial Evaluation  Date: 24 Treatment Short Term/ Long Term   Goals   AM-PAC 6 Clicks      Was pt agreeable to Eval/treatment? yes     Does pt have pain? /10 pain in the back of his head     Bed Mobility  Rolling: NT  Supine to sit: SBA  Sit to supine: SBA  Scooting: SBA  Rolling: Independent   Supine to sit: Independent   Sit to supine: Independent   Scooting: Independent    Transfers Sit to stand: SBA  Stand to sit: SBA  Stand pivot: NT  Sit to stand: Independent   Stand to sit: Independent   Stand pivot: mod Independent with AAD prn   Ambulation    NT  150+ feet with AAD prn mod Independent    Stair negotiation: ascended and descended  NT  5+ steps with 1 rail mod Independent    ROM BUE:  Refer to OT note  BLE:  WFL     Strength BUE:  Refer to OT note  BLE:  4/5  Improve by 1/3 MMT   Balance Sitting EOB:  SBA  Dynamic Standing:  NT  Sitting EOB:  Independent   Dynamic Standing:  mod Independent with AAD prn      Pt is A & O x 3  Sensation:  Pt reports numbness and tingling to R digits 4-5  Edema:  unremarkable

## 2024-04-21 NOTE — PLAN OF CARE
Problem: Discharge Planning  Goal: Discharge to home or other facility with appropriate resources  4/21/2024 0503 by Alondra Torres RN  Outcome: Progressing  4/21/2024 0503 by Alondra Torres RN  Outcome: Progressing     Problem: Pain  Goal: Verbalizes/displays adequate comfort level or baseline comfort level  4/21/2024 0503 by Alondra Torres RN  Outcome: Progressing  4/21/2024 0503 by Alondra Torres RN  Outcome: Progressing     Problem: Safety - Adult  Goal: Free from fall injury  4/21/2024 0503 by Alondra Torres RN  Outcome: Progressing  4/21/2024 0503 by Alondra Torres RN  Outcome: Progressing

## 2024-04-22 ENCOUNTER — APPOINTMENT (OUTPATIENT)
Dept: MRI IMAGING | Age: 54
End: 2024-04-22
Payer: COMMERCIAL

## 2024-04-22 LAB
ANION GAP SERPL CALCULATED.3IONS-SCNC: 11 MMOL/L (ref 7–16)
BUN SERPL-MCNC: 11 MG/DL (ref 6–20)
CALCIUM SERPL-MCNC: 8.9 MG/DL (ref 8.6–10.2)
CHLORIDE SERPL-SCNC: 102 MMOL/L (ref 98–107)
CO2 SERPL-SCNC: 22 MMOL/L (ref 22–29)
CREAT SERPL-MCNC: 0.8 MG/DL (ref 0.7–1.2)
GFR SERPL CREATININE-BSD FRML MDRD: >90 ML/MIN/1.73M2
GLUCOSE SERPL-MCNC: 96 MG/DL (ref 74–99)
POTASSIUM SERPL-SCNC: 3.6 MMOL/L (ref 3.5–5)
SODIUM SERPL-SCNC: 135 MMOL/L (ref 132–146)

## 2024-04-22 PROCEDURE — 2060000000 HC ICU INTERMEDIATE R&B

## 2024-04-22 PROCEDURE — 6370000000 HC RX 637 (ALT 250 FOR IP): Performed by: INTERNAL MEDICINE

## 2024-04-22 PROCEDURE — 2580000003 HC RX 258: Performed by: NURSE PRACTITIONER

## 2024-04-22 PROCEDURE — 6360000002 HC RX W HCPCS: Performed by: FAMILY MEDICINE

## 2024-04-22 PROCEDURE — 99222 1ST HOSP IP/OBS MODERATE 55: CPT | Performed by: NEUROLOGICAL SURGERY

## 2024-04-22 PROCEDURE — 6370000000 HC RX 637 (ALT 250 FOR IP): Performed by: NEUROLOGICAL SURGERY

## 2024-04-22 PROCEDURE — 36415 COLL VENOUS BLD VENIPUNCTURE: CPT

## 2024-04-22 PROCEDURE — 70551 MRI BRAIN STEM W/O DYE: CPT

## 2024-04-22 PROCEDURE — 6360000002 HC RX W HCPCS: Performed by: NURSE PRACTITIONER

## 2024-04-22 PROCEDURE — 6370000000 HC RX 637 (ALT 250 FOR IP): Performed by: NURSE PRACTITIONER

## 2024-04-22 PROCEDURE — 80048 BASIC METABOLIC PNL TOTAL CA: CPT

## 2024-04-22 RX ORDER — HYDRALAZINE HYDROCHLORIDE 20 MG/ML
5 INJECTION INTRAMUSCULAR; INTRAVENOUS ONCE
Status: DISCONTINUED | OUTPATIENT
Start: 2024-04-22 | End: 2024-04-25 | Stop reason: HOSPADM

## 2024-04-22 RX ORDER — METOCLOPRAMIDE 5 MG/1
10 TABLET ORAL
Status: DISCONTINUED | OUTPATIENT
Start: 2024-04-22 | End: 2024-04-25 | Stop reason: HOSPADM

## 2024-04-22 RX ORDER — HYDRALAZINE HYDROCHLORIDE 20 MG/ML
10 INJECTION INTRAMUSCULAR; INTRAVENOUS EVERY 4 HOURS PRN
Status: DISCONTINUED | OUTPATIENT
Start: 2024-04-22 | End: 2024-04-25 | Stop reason: HOSPADM

## 2024-04-22 RX ORDER — LEVETIRACETAM 500 MG/1
500 TABLET ORAL 2 TIMES DAILY
Status: DISCONTINUED | OUTPATIENT
Start: 2024-04-22 | End: 2024-04-25 | Stop reason: HOSPADM

## 2024-04-22 RX ADMIN — SODIUM CHLORIDE, PRESERVATIVE FREE 10 ML: 5 INJECTION INTRAVENOUS at 19:51

## 2024-04-22 RX ADMIN — HYDROCODONE BITARTRATE AND ACETAMINOPHEN 1 TABLET: 5; 325 TABLET ORAL at 16:35

## 2024-04-22 RX ADMIN — ATORVASTATIN CALCIUM 40 MG: 40 TABLET, FILM COATED ORAL at 19:51

## 2024-04-22 RX ADMIN — LEVETIRACETAM 500 MG: 500 TABLET, FILM COATED ORAL at 20:51

## 2024-04-22 RX ADMIN — HYDRALAZINE HYDROCHLORIDE 10 MG: 20 INJECTION INTRAMUSCULAR; INTRAVENOUS at 23:21

## 2024-04-22 RX ADMIN — PHENOBARBITAL 32.4 MG: 32.4 TABLET ORAL at 09:49

## 2024-04-22 RX ADMIN — METOCLOPRAMIDE 10 MG: 5 TABLET ORAL at 16:35

## 2024-04-22 RX ADMIN — LEVETIRACETAM 500 MG: 500 TABLET, FILM COATED ORAL at 14:27

## 2024-04-22 RX ADMIN — HYDROCODONE BITARTRATE AND ACETAMINOPHEN 1 TABLET: 5; 325 TABLET ORAL at 09:58

## 2024-04-22 RX ADMIN — PHENOBARBITAL 32.4 MG: 32.4 TABLET ORAL at 19:51

## 2024-04-22 RX ADMIN — HYDROCODONE BITARTRATE AND ACETAMINOPHEN 1 TABLET: 5; 325 TABLET ORAL at 23:18

## 2024-04-22 RX ADMIN — HYDRALAZINE HYDROCHLORIDE 5 MG: 20 INJECTION, SOLUTION INTRAMUSCULAR; INTRAVENOUS at 14:30

## 2024-04-22 RX ADMIN — SODIUM CHLORIDE, PRESERVATIVE FREE 10 ML: 5 INJECTION INTRAVENOUS at 09:50

## 2024-04-22 RX ADMIN — METOCLOPRAMIDE 10 MG: 5 TABLET ORAL at 19:51

## 2024-04-22 RX ADMIN — METOCLOPRAMIDE HYDROCHLORIDE 10 MG: 5 INJECTION INTRAMUSCULAR; INTRAVENOUS at 09:49

## 2024-04-22 RX ADMIN — HYDROCODONE BITARTRATE AND ACETAMINOPHEN 1 TABLET: 5; 325 TABLET ORAL at 03:35

## 2024-04-22 RX ADMIN — METOCLOPRAMIDE HYDROCHLORIDE 10 MG: 5 INJECTION INTRAMUSCULAR; INTRAVENOUS at 02:32

## 2024-04-22 RX ADMIN — HYDRALAZINE HYDROCHLORIDE 5 MG: 20 INJECTION, SOLUTION INTRAMUSCULAR; INTRAVENOUS at 03:40

## 2024-04-22 RX ADMIN — METOPROLOL SUCCINATE 25 MG: 25 TABLET, EXTENDED RELEASE ORAL at 09:49

## 2024-04-22 RX ADMIN — DIPHENHYDRAMINE HYDROCHLORIDE 25 MG: 50 INJECTION, SOLUTION INTRAMUSCULAR; INTRAVENOUS at 00:44

## 2024-04-22 ASSESSMENT — PAIN DESCRIPTION - ORIENTATION
ORIENTATION: POSTERIOR

## 2024-04-22 ASSESSMENT — PAIN DESCRIPTION - DESCRIPTORS
DESCRIPTORS: ACHING;DISCOMFORT

## 2024-04-22 ASSESSMENT — PAIN DESCRIPTION - LOCATION
LOCATION: HEAD

## 2024-04-22 ASSESSMENT — PAIN SCALES - GENERAL
PAINLEVEL_OUTOF10: 7
PAINLEVEL_OUTOF10: 0
PAINLEVEL_OUTOF10: 0
PAINLEVEL_OUTOF10: 7

## 2024-04-22 NOTE — PROGRESS NOTES
[facility]                                Progress note    Subjective:    The patient is awake and alert, oriented.  Complaint of pain in back of head. Denies chest pain, angina, SOB     Objective:    BP (!) 145/89   Pulse 83   Temp 98.2 °F (36.8 °C) (Oral)   Resp 18   Ht 1.727 m (5' 8\")   Wt 78 kg (171 lb 15.3 oz)   SpO2 95%   BMI 26.15 kg/m²     In: 240 [P.O.:240]  Out: 775   In: 240   Out: 775 [Urine:775]    General appearance: NAD, conversant, pleasant  HEENT: AT/NC, MMM  Neck: FROM, supple  Lungs: Clear to auscultation  CV: RRR, no MRGs  Vasc: Radial pulses 2+  Abdomen: Soft, non-tender; no masses or HSM  Extremities: No peripheral edema or digital cyanosis  Skin: Forehead laceration  Psych: Alert and oriented to person, place and time  Neuro: Alert and interactive     Recent Labs     04/20/24  1245 04/21/24  0441   WBC 7.8 8.3   HGB 12.5 12.6   HCT 37.4 38.0    279       Recent Labs     04/20/24  1245 04/21/24  0441 04/22/24  0514    139 135   K 4.4 4.3 3.6    100 102   CO2 17* 20* 22   BUN 8 10 11   CREATININE 0.8 0.8 0.8   CALCIUM 8.7 9.2 8.9       Assessment:    Principal Problem:    Stroke-like symptoms  Resolved Problems:    * No resolved hospital problems. *      Plan:  Patient is 53-year-old male with a hx of multiple falls likely due to alcohol intoxication resulting in multiple SDH's in the past who is admitted to [facility] for  Strokelike symptoms, recent subdural hematoma  -Monitor labs  -Check lipid panel and A1c  -MRI of the brain  -Monitor neuro's  -Hold off on aspirin and any other blood thinners/chemical DVT prophylaxis due to recent subdural hematoma  -PT OT  Unclear etiology of recurrent falls recently resulting in subdural hematoma and skull fracture-from alcohol?  -Neurology evaluation/if worsening symptoms  neurosurgery evaluation was done previously  for previous subdural hematoma-CTA head with extensive chronic ischemic changes and

## 2024-04-22 NOTE — CONSULTS
[facility]              [address] [place] [address]                              CONSULTATION      PATIENT NAME: MICHAEL ATWOOD                  : 1970  MED REC NO: [id number]                        ROOM: 8502  ACCOUNT NO: [id number]                       ADMIT DATE: 2024  PROVIDER: Gloria Swanson MD    NEUROSURGERY CONSULT    CONSULT DATE:  2024      REASON FOR CONSULT:  Right frontal intracranial hemorrhage.    HISTORY OF PRESENT ILLNESS:  The patient is a 53-year-old gentleman who is known to my service.  He has been seen on 2 other occasions for a left parietal epidural hematoma and a right frontal subdural hematoma and an interhemispheric subdural hematoma.  He presents after a fall while at a gas station.  He was reaching up to grab an ice tea and lost his footing, fell, hit his head.  There was no loss of consciousness.  He was ultimately taken to the hospital where part of his workup included repeat imaging and an MRI that did show a new right frontal intracranial hemorrhage, for which Neurosurgery Service was consulted again.  At this time, he denies any new numbness, tingling, or weakness or loss of control of bowel or bladder function.  He does complain of an occipital headache.    PAST MEDICAL HISTORY:  Positive for hypertension and kidney stones.    PAST SURGICAL HISTORY:  Positive for gastric bypass surgery.    FAMILY HISTORY:  Noncontributory.    SOCIAL HISTORY:  Positive for social consumption of alcoholic beverages.    ALLERGIES:  INCLUDE PENICILLIN.      REVIEW OF SYSTEMS:  HEENT:  Positive for headache.  Negative for double vision or blurred vision.  CARDIOVASCULAR:  Negative for chest pain, arrhythmia, or palpitations.  RESPIRATORY:  Negative for shortness of breath, asthma, bronchitis, or pneumonia.  GASTROINTESTINAL:  Negative for heartburn, nausea, vomiting, diarrhea, or constipation.  GENITOURINARY:  Negative for  hematuria or dysuria.  HEMATOLOGIC:  Positive for easy bruising.  INFECTIOUS:  Negative for any recent infection.  MUSCULOSKELETAL:  Positive for joint stiffness and swelling.  PSYCHIATRIC:  Negative for anxiety or depression.  NEUROLOGIC:  Negative for seizure or stroke.  ENDOCRINE:  Negative for thyroid disorder or diabetes.    PHYSICAL EXAMINATION:  VITAL SIGNS:  He is currently afebrile with a T-current of 36.8 degrees Celsius, pulse 83, blood pressure is 145/89.  GENERAL:  He is resting in bed, does not appear to be in any acute distress, appears his stated age.  HEENT:  His head is normocephalic.  There is evidence of trauma as manifested by facial lacerations.  He has no drainage out of his eyes, ears, nose or throat.  SKIN:  Warm and dry.  MUSCULOSKELETAL:  He has good range of motion in his bilateral upper and lower extremities.  ABDOMEN:  Soft, nontender, nondistended.  RESPIRATORY:  He is not using any accessory muscles of respiration.  NEUROLOGIC:  He is awake, alert, and oriented x3.  Cranial nerves 2 through 12 are intact bilaterally.  Motor exam reveals 5/5 strength in his bilateral upper and lower extremities.  Sensation is grossly intact to light touch.  Reflexes are 2+ and symmetric.  Toes are going down.    LABORATORY VALUES:  Sodium 135, potassium 3.6, BUN is 11, creatinine is 0.8.    REVIEW OF IMAGING:  MRI of his brain shows right frontal intracranial hemorrhage.    ASSESSMENT AND PLAN:  The patient is a 53-year-old gentleman with intracranial hemorrhage.  He is neurologically stable from a neurosurgical standpoint, no intervention is required.  I am recommending that he is to be placed on Keppra 500 mg b.i.d.          LESA DAVIS MD      D:  04/22/2024 14:01:42     T:  04/22/2024 19:16:49     KU/AQS  Job #:  [id number]     Doc#:  [id number]

## 2024-04-22 NOTE — ACP (ADVANCE CARE PLANNING)
Advance Care Planning   Healthcare Decision Maker:    Primary Decision Maker: MayIliana everett [facility] - [phone]    Click here to complete Healthcare Decision Makers including selection of the Healthcare Decision Maker Relationship (ie \"Primary\").

## 2024-04-22 NOTE — PROGRESS NOTES
IV to PO Conversion Policy     Notification of IV to PO conversion:    This patient's order for metoclopramide IV has been changed to metoclopramide PO as approved by the [facility] ([facility]) INTRAVENOUS TO ORAL Policy.    If the patient should become strict NPO while on this therapy, contact the prescriber for further orders.    Odilon Taylor PharmD, BCPS 4/22/2024 2:26 PM

## 2024-04-22 NOTE — PROGRESS NOTES
Call placed to dr. De Jesus for pt, he is complaining of R sided head pain, he is asking if there is something different or additional that could be ordered

## 2024-04-22 NOTE — CONSULTS
[facility]              [address] [id number]                              CONSULTATION      PATIENT NAME: MICHAEL ATWOOD                  : 1970  MED REC NO: [id number]                        ROOM: 8502  ACCOUNT NO: [id number]                       ADMIT DATE: 2024  PROVIDER: Sher Rice MD    REHAB CONSULT      AGE:  53.    SEX:  Male.    CHIEF COMPLAINT:  Intracranial bleed.    HISTORY OF PRESENT ILLNESS:  It looks like the patient had a fall initially on about 2024.  He was found at that time to have left parietal, epidural, and right frontal subdural hematomas and a parietal skull fracture.  No surgical intervention was necessary.  He was minimal assist for PT at that time, but apparently was discharged home.  It looks like he has had several more falls since then with several trips to the emergency room, most recently on 2024, and he was finally admitted at that point.  MRI is still showing an area of hematoma.  He is still unsteady.  He was standby assist for transfer, but we have not yet evaluated gait.  He was moderate assist with impaired balance for OT.  I was asked to see him for planning further rehab.    PAST MEDICAL HISTORY:    1. Multiple falls.  2. Rib fractures.  3. Hypertension.  4. Nicotine addiction.  5. Possible alcohol use disorder.    ALLERGIES:  PENICILLIN.      CURRENT MEDICATIONS:  Lipitor, Reglan, Toprol, Nicoderm, and phenobarbital.    SOCIAL HISTORY:  The patient lives with his mother in a 2-story home.    REVIEW OF SYSTEMS:  Negative for prior HEENT problems.  Negative for prior cardiac or pulmonary problems.  Negative for prior GI or  problems.  ORTHOPEDIC:  Positive for fractures as above.  NEUROLOGIC:  Positive at this point for traumatic brain injury with intracranial bleeding.    PHYSICAL EXAMINATION:  GENERAL:  Well-nourished, well-developed white male.  HEENT:  Head:  Multiple

## 2024-04-22 NOTE — CARE COORDINATION
Readmission Assessment  Number of Days since last admission?: 8-30 days  Previous Disposition: Home with Family  Who is being Interviewed: Patient  What was the patient's/caregiver's perception as to why they think they needed to return back to the hospital?: Other (Comment) (Fall at home)  Did you visit your Primary Care Physician after you left the hospital, before you returned this time?: No  Why weren't you able to visit your PCP?: Did not have an appointment, Could not get an appointment ([facility]'s office money)  Did you see a specialist, such as Cardiac, Pulmonary, Orthopedic Physician, etc. after you left the hospital?: No  Who advised the patient to return to the hospital?: Self-referral  Does the patient report anything that got in the way of taking their medications?: No  In our efforts to provide the best possible care to you and others like you, can you think of anything that we could have done to help you after you left the hospital the first time, so that you might not have needed to return so soon?: Arrange for more help when leaving the hospital, Improved written discharge instructions, Discharge instructions that are concise, clear, and non contradictory, Teach back instructions regarding management of illness      Electronically signed by Kiley Mccallum RN on 4/22/24 at 10:42 AM EDT

## 2024-04-22 NOTE — CARE COORDINATION
Chart reviewed and case reviewed in IDR.  Patient admitted after a fall down the steps.  MRI of the brain completed and showing: Small punctate focus of restricted diffusion within the high right frontal  lobe near the convexity likely representing acute ischemia; 1.8 cm subacute parenchymal hematoma within the anterior right frontal lobe.  Echo ordered.  Met with the patient at the bedside to discuss transition of care planning.  The patient lives with his mother in a two story home with a first floor set up and four steps to enter with one rail.  The patient has no DME and no history of physical rehab.  Patient's PCP is Dr Junaid Wilson  and he uses [facility] on [address] in [place] for his medications.  Discussed the recommendation for rehab prior to returning home.  Patient is agreeable with rehab.  Discussed options for ARU and patient would prefer [facility] as it is closer to home.  Call placed to Colleen, liaison with [facility] and referral made.  She will review.  PM&R consult also noted and Dr Rice to see.  Will continue to follow for further transition of care planning needs.      Kiley Mccallum RN.  P:  [phone]      Case Management Assessment  Initial Evaluation    Date/Time of Evaluation: 4/22/2024 10:40 AM  Assessment Completed by: Kiley Mccallum RN    If patient is discharged prior to next notation, then this note serves as note for discharge by case management.    Patient Name: Kirsty Thurston                   YOB: 1970  Diagnosis: Stroke-like symptoms [R29.90]  Closed head injury, initial encounter [S09.90XA]  Acute alcoholic intoxication without complication (HCC) [F10.920]                   Date / Time: 4/20/2024 12:27 PM    Patient Admission Status: Inpatient   Readmission Risk (Low < 19, Mod (19-27), High > 27): Readmission Risk Score: 9.9    Current PCP: Junaid Wilson DO  PCP verified by CM? Yes (Junaid Wilson DO)    Chart Reviewed: Yes      History Provided  [place] - [address] [facility] - P [phone] - F [phone]  [address] [facility]  [place]) [place] [address]  Phone: [phone] Fax: [phone]      Notes:    Factors facilitating achievement of predicted outcomes: Family support, Cooperative, and Pleasant    Barriers to discharge: Limited safety awareness and Stairs at home    Additional Case Management Notes: See Above     The Plan for Transition of Care is related to the following treatment goals of Stroke-like symptoms [R29.90]  Closed head injury, initial encounter [S09.90XA]  Acute alcoholic intoxication without complication (HCC) [F10.920]    IF APPLICABLE: The Patient and/or patient representative Kirsty and his family were provided with a choice of provider and agrees with the discharge plan. Freedom of choice list with basic dialogue that supports the patient's individualized plan of care/goals and shares the quality data associated with the providers was provided to:     Patient Representative Name:       The Patient and/or Patient Representative Agree with the Discharge Plan?      Kiley Mccallum RN  Case Management Department  Ph: [phone]   Fax: [phone]

## 2024-04-23 ENCOUNTER — APPOINTMENT (OUTPATIENT)
Age: 54
End: 2024-04-23
Attending: INTERNAL MEDICINE
Payer: COMMERCIAL

## 2024-04-23 LAB
ANION GAP SERPL CALCULATED.3IONS-SCNC: 14 MMOL/L (ref 7–16)
BUN SERPL-MCNC: 10 MG/DL (ref 6–20)
CALCIUM SERPL-MCNC: 9.3 MG/DL (ref 8.6–10.2)
CHLORIDE SERPL-SCNC: 100 MMOL/L (ref 98–107)
CO2 SERPL-SCNC: 22 MMOL/L (ref 22–29)
CREAT SERPL-MCNC: 0.8 MG/DL (ref 0.7–1.2)
ECHO AR MAX VEL PISA: 4 M/S
ECHO AV AREA PEAK VELOCITY: 2.5 CM2
ECHO AV AREA VTI: 3.1 CM2
ECHO AV AREA/BSA PEAK VELOCITY: 1.3 CM2/M2
ECHO AV AREA/BSA VTI: 1.6 CM2/M2
ECHO AV CUSP MM: 2.1 CM
ECHO AV MEAN GRADIENT: 5 MMHG
ECHO AV MEAN VELOCITY: 1 M/S
ECHO AV PEAK GRADIENT: 9 MMHG
ECHO AV PEAK VELOCITY: 1.5 M/S
ECHO AV REGURGITANT PHT: 565.7 MILLISECOND
ECHO AV VELOCITY RATIO: 0.8
ECHO AV VTI: 26.4 CM
ECHO BSA: 1.93 M2
ECHO LA DIAMETER INDEX: 2.15 CM/M2
ECHO LA DIAMETER: 4.1 CM
ECHO LA VOL A-L A2C: 46 ML (ref 18–58)
ECHO LA VOL A-L A4C: 41 ML (ref 18–58)
ECHO LA VOL BP: 41 ML (ref 18–58)
ECHO LA VOL MOD A2C: 44 ML (ref 18–58)
ECHO LA VOL MOD A4C: 38 ML (ref 18–58)
ECHO LA VOL/BSA BIPLANE: 21 ML/M2 (ref 16–34)
ECHO LA VOLUME AREA LENGTH: 45 ML
ECHO LA VOLUME INDEX A-L A2C: 24 ML/M2 (ref 16–34)
ECHO LA VOLUME INDEX A-L A4C: 21 ML/M2 (ref 16–34)
ECHO LA VOLUME INDEX AREA LENGTH: 24 ML/M2 (ref 16–34)
ECHO LA VOLUME INDEX MOD A2C: 23 ML/M2 (ref 16–34)
ECHO LA VOLUME INDEX MOD A4C: 20 ML/M2 (ref 16–34)
ECHO LV EDV A2C: 97 ML
ECHO LV EDV A4C: 92 ML
ECHO LV EDV BP: 95 ML (ref 67–155)
ECHO LV EDV INDEX A4C: 48 ML/M2
ECHO LV EDV INDEX BP: 50 ML/M2
ECHO LV EDV NDEX A2C: 51 ML/M2
ECHO LV EF PHYSICIAN: 60 %
ECHO LV EJECTION FRACTION A2C: 63 %
ECHO LV EJECTION FRACTION A4C: 62 %
ECHO LV EJECTION FRACTION BIPLANE: 62 % (ref 55–100)
ECHO LV ESV A2C: 36 ML
ECHO LV ESV A4C: 35 ML
ECHO LV ESV BP: 36 ML (ref 22–58)
ECHO LV ESV INDEX A2C: 19 ML/M2
ECHO LV ESV INDEX A4C: 18 ML/M2
ECHO LV ESV INDEX BP: 19 ML/M2
ECHO LV FRACTIONAL SHORTENING: 46 % (ref 28–44)
ECHO LV INTERNAL DIMENSION DIASTOLE INDEX: 2.72 CM/M2
ECHO LV INTERNAL DIMENSION DIASTOLIC: 5.2 CM (ref 4.2–5.9)
ECHO LV INTERNAL DIMENSION SYSTOLIC INDEX: 1.47 CM/M2
ECHO LV INTERNAL DIMENSION SYSTOLIC: 2.8 CM
ECHO LV IVSD: 1.1 CM (ref 0.6–1)
ECHO LV IVSS: 1.3 CM
ECHO LV MASS 2D: 234.6 G (ref 88–224)
ECHO LV MASS INDEX 2D: 122.8 G/M2 (ref 49–115)
ECHO LV POSTERIOR WALL DIASTOLIC: 1.2 CM (ref 0.6–1)
ECHO LV POSTERIOR WALL SYSTOLIC: 1.7 CM
ECHO LV RELATIVE WALL THICKNESS RATIO: 0.46
ECHO LVOT AREA: 3.5 CM2
ECHO LVOT AV VTI INDEX: 0.93
ECHO LVOT DIAM: 2.1 CM
ECHO LVOT MEAN GRADIENT: 3 MMHG
ECHO LVOT PEAK GRADIENT: 5 MMHG
ECHO LVOT PEAK VELOCITY: 1.2 M/S
ECHO LVOT STROKE VOLUME INDEX: 44.4 ML/M2
ECHO LVOT SV: 84.8 ML
ECHO LVOT VTI: 24.5 CM
ECHO MV A VELOCITY: 1.11 M/S
ECHO MV AREA PHT: 2.2 CM2
ECHO MV AREA VTI: 2.3 CM2
ECHO MV E DECELERATION TIME (DT): 304.7 MS
ECHO MV E VELOCITY: 0.83 M/S
ECHO MV E/A RATIO: 0.75
ECHO MV LVOT VTI INDEX: 1.5
ECHO MV MAX VELOCITY: 1.2 M/S
ECHO MV MEAN GRADIENT: 3 MMHG
ECHO MV MEAN VELOCITY: 0.7 M/S
ECHO MV PEAK GRADIENT: 6 MMHG
ECHO MV PRESSURE HALF TIME (PHT): 98.6 MS
ECHO MV VTI: 36.7 CM
ECHO PULMONARY ARTERY END DIASTOLIC PRESSURE: 3 MMHG
ECHO PV REGURGITANT MAX VELOCITY: 0.9 M/S
ECHO PVEIN A DURATION: 135.1 MS
ECHO PVEIN A VELOCITY: 0.4 M/S
ECHO PVEIN PEAK D VELOCITY: 0.4 M/S
ECHO PVEIN PEAK S VELOCITY: 0.8 M/S
ECHO PVEIN S/D RATIO: 2
ECHO RV FREE WALL PEAK S': 18 CM/S
ECHO RV INTERNAL DIMENSION: 2.6 CM
ECHO RV TAPSE: 2 CM (ref 1.7–?)
GFR SERPL CREATININE-BSD FRML MDRD: >90 ML/MIN/1.73M2
GLUCOSE SERPL-MCNC: 100 MG/DL (ref 74–99)
POTASSIUM SERPL-SCNC: 3.9 MMOL/L (ref 3.5–5)
SODIUM SERPL-SCNC: 136 MMOL/L (ref 132–146)

## 2024-04-23 PROCEDURE — 6360000002 HC RX W HCPCS: Performed by: FAMILY MEDICINE

## 2024-04-23 PROCEDURE — 6370000000 HC RX 637 (ALT 250 FOR IP): Performed by: NURSE PRACTITIONER

## 2024-04-23 PROCEDURE — 6370000000 HC RX 637 (ALT 250 FOR IP): Performed by: INTERNAL MEDICINE

## 2024-04-23 PROCEDURE — 80048 BASIC METABOLIC PNL TOTAL CA: CPT

## 2024-04-23 PROCEDURE — 6360000002 HC RX W HCPCS: Performed by: NURSE PRACTITIONER

## 2024-04-23 PROCEDURE — 93306 TTE W/DOPPLER COMPLETE: CPT

## 2024-04-23 PROCEDURE — 2060000000 HC ICU INTERMEDIATE R&B

## 2024-04-23 PROCEDURE — 2580000003 HC RX 258: Performed by: NURSE PRACTITIONER

## 2024-04-23 PROCEDURE — 6370000000 HC RX 637 (ALT 250 FOR IP): Performed by: NEUROLOGICAL SURGERY

## 2024-04-23 PROCEDURE — 97530 THERAPEUTIC ACTIVITIES: CPT

## 2024-04-23 PROCEDURE — 36415 COLL VENOUS BLD VENIPUNCTURE: CPT

## 2024-04-23 PROCEDURE — 93306 TTE W/DOPPLER COMPLETE: CPT | Performed by: INTERNAL MEDICINE

## 2024-04-23 PROCEDURE — 97535 SELF CARE MNGMENT TRAINING: CPT

## 2024-04-23 RX ORDER — ACETAMINOPHEN 325 MG/1
650 TABLET ORAL EVERY 6 HOURS PRN
Status: DISCONTINUED | OUTPATIENT
Start: 2024-04-23 | End: 2024-04-25 | Stop reason: HOSPADM

## 2024-04-23 RX ADMIN — PHENOBARBITAL 32.4 MG: 32.4 TABLET ORAL at 08:27

## 2024-04-23 RX ADMIN — LEVETIRACETAM 500 MG: 500 TABLET, FILM COATED ORAL at 20:09

## 2024-04-23 RX ADMIN — ACETAMINOPHEN 650 MG: 325 TABLET ORAL at 23:31

## 2024-04-23 RX ADMIN — ONDANSETRON 4 MG: 2 INJECTION INTRAMUSCULAR; INTRAVENOUS at 05:56

## 2024-04-23 RX ADMIN — HYDROCODONE BITARTRATE AND ACETAMINOPHEN 1 TABLET: 5; 325 TABLET ORAL at 12:36

## 2024-04-23 RX ADMIN — METOCLOPRAMIDE 10 MG: 5 TABLET ORAL at 05:36

## 2024-04-23 RX ADMIN — HYDRALAZINE HYDROCHLORIDE 10 MG: 20 INJECTION INTRAMUSCULAR; INTRAVENOUS at 18:48

## 2024-04-23 RX ADMIN — ATORVASTATIN CALCIUM 40 MG: 40 TABLET, FILM COATED ORAL at 20:10

## 2024-04-23 RX ADMIN — ONDANSETRON 4 MG: 4 TABLET, ORALLY DISINTEGRATING ORAL at 12:39

## 2024-04-23 RX ADMIN — PHENOBARBITAL 32.4 MG: 32.4 TABLET ORAL at 20:10

## 2024-04-23 RX ADMIN — HYDROCODONE BITARTRATE AND ACETAMINOPHEN 1 TABLET: 5; 325 TABLET ORAL at 18:40

## 2024-04-23 RX ADMIN — SODIUM CHLORIDE, PRESERVATIVE FREE 10 ML: 5 INJECTION INTRAVENOUS at 08:27

## 2024-04-23 RX ADMIN — HYDROCODONE BITARTRATE AND ACETAMINOPHEN 1 TABLET: 5; 325 TABLET ORAL at 05:35

## 2024-04-23 RX ADMIN — METOCLOPRAMIDE 10 MG: 5 TABLET ORAL at 10:17

## 2024-04-23 RX ADMIN — HYDRALAZINE HYDROCHLORIDE 10 MG: 20 INJECTION INTRAMUSCULAR; INTRAVENOUS at 14:06

## 2024-04-23 RX ADMIN — HYDRALAZINE HYDROCHLORIDE 10 MG: 20 INJECTION INTRAMUSCULAR; INTRAVENOUS at 10:17

## 2024-04-23 RX ADMIN — HYDROMORPHONE HYDROCHLORIDE 0.5 MG: 1 INJECTION, SOLUTION INTRAMUSCULAR; INTRAVENOUS; SUBCUTANEOUS at 16:02

## 2024-04-23 RX ADMIN — METOCLOPRAMIDE 10 MG: 5 TABLET ORAL at 16:02

## 2024-04-23 RX ADMIN — HYDRALAZINE HYDROCHLORIDE 10 MG: 20 INJECTION INTRAMUSCULAR; INTRAVENOUS at 23:32

## 2024-04-23 RX ADMIN — METOPROLOL SUCCINATE 25 MG: 25 TABLET, EXTENDED RELEASE ORAL at 08:27

## 2024-04-23 RX ADMIN — METOCLOPRAMIDE 10 MG: 5 TABLET ORAL at 20:10

## 2024-04-23 RX ADMIN — SODIUM CHLORIDE, PRESERVATIVE FREE 10 ML: 5 INJECTION INTRAVENOUS at 20:10

## 2024-04-23 RX ADMIN — HYDRALAZINE HYDROCHLORIDE 10 MG: 20 INJECTION INTRAMUSCULAR; INTRAVENOUS at 06:21

## 2024-04-23 RX ADMIN — LEVETIRACETAM 500 MG: 500 TABLET, FILM COATED ORAL at 08:26

## 2024-04-23 ASSESSMENT — PAIN DESCRIPTION - ORIENTATION
ORIENTATION: POSTERIOR

## 2024-04-23 ASSESSMENT — PAIN DESCRIPTION - LOCATION
LOCATION: HEAD

## 2024-04-23 ASSESSMENT — PAIN DESCRIPTION - DESCRIPTORS
DESCRIPTORS: ACHING;DISCOMFORT;POUNDING
DESCRIPTORS: ACHING
DESCRIPTORS: ACHING;DISCOMFORT
DESCRIPTORS: ACHING;DISCOMFORT
DESCRIPTORS: ACHING

## 2024-04-23 ASSESSMENT — PAIN SCALES - GENERAL
PAINLEVEL_OUTOF10: 8
PAINLEVEL_OUTOF10: 0
PAINLEVEL_OUTOF10: 8

## 2024-04-23 ASSESSMENT — PAIN - FUNCTIONAL ASSESSMENT: PAIN_FUNCTIONAL_ASSESSMENT: ACTIVITIES ARE NOT PREVENTED

## 2024-04-23 NOTE — PROGRESS NOTES
[facility]                                Progress note    Subjective:    The patient is awake and alert, oriented.  Complaint of pain in back of head. Denies chest pain, angina, SOB     Objective:    BP (!) 168/90   Pulse 79   Temp 98.7 °F (37.1 °C) (Oral)   Resp 18   Ht 1.727 m (5' 8\")   Wt 77.6 kg (171 lb)   SpO2 96%   BMI 26.00 kg/m²     In: 720 [P.O.:720]  Out: 600   In: 720   Out: 600 [Urine:600]    General appearance: NAD, conversant, pleasant  HEENT: AT/NC, MMM  Neck: FROM, supple  Lungs: Clear to auscultation  CV: RRR, no MRGs  Vasc: Radial pulses 2+  Abdomen: Soft, non-tender; no masses or HSM  Extremities: No peripheral edema or digital cyanosis  Skin: Forehead laceration  Psych: Alert and oriented to person, place and time  Neuro: Alert and interactive     Recent Labs     04/21/24  0441   WBC 8.3   HGB 12.6   HCT 38.0          Recent Labs     04/21/24  0441 04/22/24  0514 04/23/24  0544    135 136   K 4.3 3.6 3.9    102 100   CO2 20* 22 22   BUN 10 11 10   CREATININE 0.8 0.8 0.8   CALCIUM 9.2 8.9 9.3       Assessment:    Principal Problem:    Stroke-like symptoms  Active Problems:    Intracranial hemorrhage (HCC)  Resolved Problems:    * No resolved hospital problems. *      Plan:  Patient is 53-year-old male with a hx of multiple falls likely due to alcohol intoxication resulting in multiple SDH's in the past who is admitted to [facility] for  Strokelike symptoms, recent subdural hematoma  -Monitor labs  -Check lipid panel and A1c  -MRI of the brain  -Monitor neuro's  -Hold off on aspirin and any other blood thinners/chemical DVT prophylaxis due to recent subdural hematoma  -PT OT  Unclear etiology of recurrent falls recently resulting in subdural hematoma and skull fracture-from alcohol?  -Neurology evaluation/if worsening symptoms  neurosurgery evaluation was done previously  for previous subdural hematoma-CTA head with extensive chronic ischemic changes  and encephalomalacia in right frontal lobe from previous trauma  -Obtain echocardiogram as he also complains of syncopal episode     Hypertension  -Continue home metoprolol  -Continue to monitor Bps     EtOH abuse  -Monitor for signs symptoms of withdrawal  -Start phenobarbital twice daily      4/22/2024  Vital Signs stable  Anticoagulants on hold   MRI- hematoma right frontal lobe -small punctate focus of restricted diffusion within the high right frontal lobe likely representing acute ischemia  Neurosurgery consulted   PMR to evaluate for acute rehab     4/23/2024  Patient complaining of headache  One-time Dilaudid dose ordered  Initiate Keppra 500 mg twice daily  Await pre-CERT for [place]  Vital signs stable    Code status: FULL   Consultants:  Neurosurgery, PMR   DVT Prophylaxis   PT/OT  Discharge planning       I have spent a total time of 30 minutes of this patient encounter reviewing chart, labs, coordinating care with interdisciplinary teams, face to face encounter with patient, providing counseling/education to patient/family.      Xiomy Winchester, APRN - CNP,  5:20 PM  4/23/2024

## 2024-04-23 NOTE — CARE COORDINATION
Chart reviewed and case reviewed in IDR.   Patient pending echocardiogram.  Test completed, await reading.  Patient accepted to [facility] for acute rehab yesterday.  Patient found to have [employer], active since 4/1/24, per Colleen for [facility] and authorization was submitted yesterday.  Met with the patient at the bedside and reviewed above.  Patient expressed understanding and is in agreement with transition to [facility] when medically stable to discharge.  Will continue to follow for further transition of care planning needs.     Kiley Mccallum RN.  P:  [phone]

## 2024-04-23 NOTE — PROGRESS NOTES
Occupational Therapy  OT BEDSIDE TREATMENT NOTE   [facility]  [address], [place]       Date:2024  Patient Name: Kirsty Thurston  MRN: [id number]  : 1970  Room: [address] [place] [address]A     Per OT Eval:    Evaluating OT: Kiley Kearney, PATRICKR/L, [id number]     Referring Provider:Brittany Long APRN - CNP   Specific Provider Orders/Date: OT Eval and Treat 24     Diagnosis: Stroke-like symptoms [R29.90]  Closed head injury, initial encounter [S09.90XA]  Acute alcoholic intoxication without complication (HCC) [F10.920]   Surgery: NA     Pertinent Medical History:      Past Medical History        Past Medical History:   Diagnosis Date    Hypertension      Kidney stone           Past Surgical History         Past Surgical History:   Procedure Laterality Date    GASTRIC BYPASS SURGERY             Precautions:  Fall Risk, recent SDH (3-4 weeks ago), monitor BP, bed alarm     Assessment of current deficits    [x] Functional mobility          [x]ADLs           [x] Strength                  [x]Cognition    [x] Functional transfers         [x] IADLs         [x] Safety Awareness   [x]Endurance    [x] Fine Coordination                        [x] Balance      [] Vision/perception   [x]Sensation      []Gross Motor Coordination            [] ROM           [] Delirium                   [] Motor Control      OT PLAN OF CARE   OT POC based on physician orders, patient diagnosis and results of clinical assessment     Frequency/Duration 1-3 days/wk for 2 weeks PRN   Specific OT Treatment Interventions to include:   * Instruction/training on adapted ADL techniques and AE recommendations to increase functional independence within precautions       * Training on energy conservation strategies, correct breathing pattern and techniques to improve independence/tolerance for self-care routine  * Functional transfer/mobility training/DME recommendations for increased  treatment regarding proper technique & safety with bed mobility, functional transfers & light mobility and ADL compensatory strategies to ease tasks, improve safety & prevent falls to return home safely.      Comments: Upon arrival pt was in bed & agreeable for therapy, nsg approved. At end of session pt was seated at EOB, all lines and tubes intact, call light within reach.     Pt has made Fair+ progress towards set goals.   Continue with current plan of care    Treatment Time In: 11:20            Treatment Time Out: 11:58           Treatment Charges: Mins Units   Ther Ex  35945     Manual Therapy 05741     Thera Activities 64741 10 1   ADL/Home Mgt 02895 28 2   Neuro Re-ed 33016     Group Therapy      Orthotic manage/training  13988     Non-Billable Time     Total Timed Treatment 38 3       Alondra Michel, ZEFERINO/JESSICA [id number]

## 2024-04-23 NOTE — PROGRESS NOTES
Manual /90 HR 79. PRN BP med unavailable at this time. Complaining of 7/10 headache and requesting additional pain medication     Christine Winchester NP notified

## 2024-04-24 DIAGNOSIS — S06.5XAA SUBDURAL HEMATOMA (HCC): Primary | ICD-10-CM

## 2024-04-24 DIAGNOSIS — S06.A0XA: ICD-10-CM

## 2024-04-24 DIAGNOSIS — S06.2X0S: ICD-10-CM

## 2024-04-24 DIAGNOSIS — I60.9 SUBARACHNOID HEMORRHAGE (HCC): ICD-10-CM

## 2024-04-24 PROCEDURE — 2060000000 HC ICU INTERMEDIATE R&B

## 2024-04-24 PROCEDURE — 6370000000 HC RX 637 (ALT 250 FOR IP): Performed by: NURSE PRACTITIONER

## 2024-04-24 PROCEDURE — 6370000000 HC RX 637 (ALT 250 FOR IP): Performed by: INTERNAL MEDICINE

## 2024-04-24 PROCEDURE — 6370000000 HC RX 637 (ALT 250 FOR IP): Performed by: NEUROLOGICAL SURGERY

## 2024-04-24 PROCEDURE — 97530 THERAPEUTIC ACTIVITIES: CPT

## 2024-04-24 PROCEDURE — 2580000003 HC RX 258: Performed by: NURSE PRACTITIONER

## 2024-04-24 PROCEDURE — 6370000000 HC RX 637 (ALT 250 FOR IP): Performed by: FAMILY MEDICINE

## 2024-04-24 PROCEDURE — 6360000002 HC RX W HCPCS: Performed by: FAMILY MEDICINE

## 2024-04-24 RX ORDER — LISINOPRIL 5 MG/1
5 TABLET ORAL DAILY
Status: DISCONTINUED | OUTPATIENT
Start: 2024-04-24 | End: 2024-04-25 | Stop reason: HOSPADM

## 2024-04-24 RX ADMIN — METOCLOPRAMIDE 10 MG: 5 TABLET ORAL at 17:54

## 2024-04-24 RX ADMIN — PHENOBARBITAL 32.4 MG: 32.4 TABLET ORAL at 20:28

## 2024-04-24 RX ADMIN — HYDROCODONE BITARTRATE AND ACETAMINOPHEN 1 TABLET: 5; 325 TABLET ORAL at 12:37

## 2024-04-24 RX ADMIN — SODIUM CHLORIDE, PRESERVATIVE FREE 10 ML: 5 INJECTION INTRAVENOUS at 08:30

## 2024-04-24 RX ADMIN — ONDANSETRON 4 MG: 4 TABLET, ORALLY DISINTEGRATING ORAL at 08:36

## 2024-04-24 RX ADMIN — METOCLOPRAMIDE 10 MG: 5 TABLET ORAL at 10:30

## 2024-04-24 RX ADMIN — ATORVASTATIN CALCIUM 40 MG: 40 TABLET, FILM COATED ORAL at 20:26

## 2024-04-24 RX ADMIN — LISINOPRIL 5 MG: 10 TABLET ORAL at 11:37

## 2024-04-24 RX ADMIN — LEVETIRACETAM 500 MG: 500 TABLET, FILM COATED ORAL at 08:27

## 2024-04-24 RX ADMIN — PHENOBARBITAL 32.4 MG: 32.4 TABLET ORAL at 08:28

## 2024-04-24 RX ADMIN — ACETAMINOPHEN 650 MG: 325 TABLET ORAL at 11:37

## 2024-04-24 RX ADMIN — HYDROCODONE BITARTRATE AND ACETAMINOPHEN 1 TABLET: 5; 325 TABLET ORAL at 06:22

## 2024-04-24 RX ADMIN — LEVETIRACETAM 500 MG: 500 TABLET, FILM COATED ORAL at 20:27

## 2024-04-24 RX ADMIN — SODIUM CHLORIDE, PRESERVATIVE FREE 10 ML: 5 INJECTION INTRAVENOUS at 20:26

## 2024-04-24 RX ADMIN — HYDRALAZINE HYDROCHLORIDE 10 MG: 20 INJECTION INTRAMUSCULAR; INTRAVENOUS at 08:28

## 2024-04-24 RX ADMIN — HYDROCODONE BITARTRATE AND ACETAMINOPHEN 1 TABLET: 5; 325 TABLET ORAL at 19:02

## 2024-04-24 RX ADMIN — ACETAMINOPHEN 650 MG: 325 TABLET ORAL at 22:41

## 2024-04-24 RX ADMIN — HYDROCODONE BITARTRATE AND ACETAMINOPHEN 1 TABLET: 5; 325 TABLET ORAL at 00:32

## 2024-04-24 RX ADMIN — METOCLOPRAMIDE 10 MG: 5 TABLET ORAL at 06:22

## 2024-04-24 RX ADMIN — METOCLOPRAMIDE 10 MG: 5 TABLET ORAL at 20:26

## 2024-04-24 RX ADMIN — METOPROLOL SUCCINATE 25 MG: 25 TABLET, EXTENDED RELEASE ORAL at 08:28

## 2024-04-24 ASSESSMENT — PAIN DESCRIPTION - ORIENTATION
ORIENTATION: POSTERIOR

## 2024-04-24 ASSESSMENT — PAIN SCALES - GENERAL
PAINLEVEL_OUTOF10: 4
PAINLEVEL_OUTOF10: 8
PAINLEVEL_OUTOF10: 2
PAINLEVEL_OUTOF10: 4
PAINLEVEL_OUTOF10: 7
PAINLEVEL_OUTOF10: 0
PAINLEVEL_OUTOF10: 7
PAINLEVEL_OUTOF10: 0
PAINLEVEL_OUTOF10: 5
PAINLEVEL_OUTOF10: 7
PAINLEVEL_OUTOF10: 3

## 2024-04-24 ASSESSMENT — PAIN DESCRIPTION - LOCATION
LOCATION: HEAD

## 2024-04-24 ASSESSMENT — PAIN DESCRIPTION - DESCRIPTORS
DESCRIPTORS: ACHING
DESCRIPTORS: ACHING;DISCOMFORT;CRUSHING
DESCRIPTORS: ACHING;DISCOMFORT;DULL
DESCRIPTORS: ACHING

## 2024-04-24 ASSESSMENT — PAIN DESCRIPTION - PAIN TYPE
TYPE: CHRONIC PAIN
TYPE: CHRONIC PAIN

## 2024-04-24 ASSESSMENT — PAIN DESCRIPTION - ONSET
ONSET: ON-GOING

## 2024-04-24 ASSESSMENT — PAIN - FUNCTIONAL ASSESSMENT
PAIN_FUNCTIONAL_ASSESSMENT: PREVENTS OR INTERFERES SOME ACTIVE ACTIVITIES AND ADLS
PAIN_FUNCTIONAL_ASSESSMENT: ACTIVITIES ARE NOT PREVENTED
PAIN_FUNCTIONAL_ASSESSMENT: PREVENTS OR INTERFERES SOME ACTIVE ACTIVITIES AND ADLS

## 2024-04-24 ASSESSMENT — PAIN DESCRIPTION - FREQUENCY
FREQUENCY: INTERMITTENT

## 2024-04-24 NOTE — PROGRESS NOTES
encephalomalacia in right frontal lobe from previous trauma  -Obtain echocardiogram as he also complains of syncopal episode     Hypertension  -Continue home metoprolol  -Continue to monitor Bps     EtOH abuse  -Monitor for signs symptoms of withdrawal  -Start phenobarbital twice daily      4/22/2024  Vital Signs stable  Anticoagulants on hold   MRI- hematoma right frontal lobe -small punctate focus of restricted diffusion within the high right frontal lobe likely representing acute ischemia  Neurosurgery consulted   PMR to evaluate for acute rehab     4/23/2024  Patient complaining of headache  One-time Dilaudid dose ordered  Initiate Keppra 500 mg twice daily  Await pre-CERT for [place]  Vital signs stable    4/24/2024  BP remains elevated- 181/92-   Lisinopril 5mg daily   Complains of headache   Echo completed - EF >60%  no evidence of PFO  Awaiting pre cert for [place]       Code status: FULL   Consultants:  Neurosurgery, PMR   DVT Prophylaxis   PT/OT  Discharge planning       I have spent a total time of 30 minutes of this patient encounter reviewing chart, labs, coordinating care with interdisciplinary teams, face to face encounter with patient, providing counseling/education to patient/family.      Xiomy Winchester, APRN - CNP,  1:43 PM  4/24/2024

## 2024-04-24 NOTE — PROGRESS NOTES
Occupational Therapy  OT BEDSIDE TREATMENT NOTE   [facility]  [address] [place], [place]       Date:2024  Patient Name: Kirsty Thurston  MRN: [id number]  : 1970  Room: [address] [place] [address]A     Per OT Eval:    Evaluating OT: Kiley Kearney, PATRICKR/L, [id number]     Referring Provider:Brittany Long APRN - CNP   Specific Provider Orders/Date: OT Eval and Treat 24     Diagnosis: Stroke-like symptoms [R29.90]  Closed head injury, initial encounter [S09.90XA]  Acute alcoholic intoxication without complication (HCC) [F10.920]   Surgery: NA     Pertinent Medical History:      Past Medical History        Past Medical History:   Diagnosis Date    Hypertension      Kidney stone           Past Surgical History         Past Surgical History:   Procedure Laterality Date    GASTRIC BYPASS SURGERY             Precautions:  Fall Risk, recent SDH (3-4 weeks ago), monitor BP, bed alarm     Assessment of current deficits    [x] Functional mobility          [x]ADLs           [x] Strength                  [x]Cognition    [x] Functional transfers         [x] IADLs         [x] Safety Awareness   [x]Endurance    [x] Fine Coordination                        [x] Balance      [] Vision/perception   [x]Sensation      []Gross Motor Coordination            [] ROM           [] Delirium                   [] Motor Control      OT PLAN OF CARE   OT POC based on physician orders, patient diagnosis and results of clinical assessment     Frequency/Duration 1-3 days/wk for 2 weeks PRN   Specific OT Treatment Interventions to include:   * Instruction/training on adapted ADL techniques and AE recommendations to increase functional independence within precautions       * Training on energy conservation strategies, correct breathing pattern and techniques to improve independence/tolerance for self-care routine  * Functional transfer/mobility training/DME recommendations for increased  educated through out treatment regarding proper technique & safety with bed mobility, functional transfers, mobility, walker management and ADL compensatory strategies to ease tasks, improve safety & prevent falls to return home safely.  Educated pt on importance of OOB activity, sitting up at EOB or up in chair to increase his activity. Fair understanding overall, mildly self limiting.     Comments: Upon arrival pt was in bed & agreeable for therapy, nsg approved. Treatment was ended due to staff arrival to complete a test. At end of session pt was supine in bed, all lines and tubes intact, call light within reach.     Pt has made Fair progress towards set goals.   Continue with current plan of care    Treatment Time In: 8:50            Treatment Time Out: 9:05          Treatment Charges: Mins Units   Ther Ex  27749     Manual Therapy 58649     Thera Activities 08790 15 1   ADL/Home Mgt 36898     Neuro Re-ed 75076     Group Therapy      Orthotic manage/training  04997     Non-Billable Time     Total Timed Treatment 15 1       Alondra Michel, ZEFERINO/JESSICA [id number]

## 2024-04-24 NOTE — PROGRESS NOTES
Sitting EOB:  SBA  Dynamic Standing:  NT Sitting EOB:  SBA  Dynamic Standing:  Elbert with WW Sitting EOB:  Independent   Dynamic Standing:  mod Independent with AAD prn    Pt is A & O x: x4 to person, place, month/year, and situation  Sensation: Intact  Edema: Unremarkable    Patient education  Pt educated on hand placement/technique, role of PT.    Patient response to education:   Pt verbalized understanding Pt demonstrated skill Pt requires further education in this area   Yes Yes Reinforce     ASSESSMENT:    Comments:    Pt was in bed upon room entry; agreeable to PT treatment. Pt completed all mobility noted above. Pt got out of bed without assistance. Pt noted slight onset of dizziness. Pt provided time to reduce dizziness, balance good at EOB. Pt ambulated into hallway with light assistance for WW management. Pt displayed slight R deviation during ambulation. Pt navigated stairs with step to pattern and slight assistance for balance. Pt requested to return to floor after navigating part way. Pt guided on return to floor level. Pt ambulated back to room due to increase dizziness. Pt displayed slow gait with small step length. Pt returned to room and was positioned comfortably in bed. Pt was left in bed with all needs met at conclusion of session. Pt's HR ranged from 100-115 bpm with activity. RN notified.    Treatment:  Patient practiced and was instructed in the following treatment:    Therapeutic activities:  Bed mobility: Pt was cued for trunk management/ BLE management during bed mobility transfers.  Seated balance: Performed for 4+ min. Challenged static balance and upright tolerance.  Transfers: Pt was cued for hand placement/technique with WW during sit <> stand transfers.  Ambulation: Pt was cued for WW management and step length.  Vitals and symptoms were closely monitored throughout session.  Skilled positioning in bed to protect skin/joint integrity.  Education: Pt was educated on benefits of activity  and WW safety.    PLAN:    Patient is making Good progress towards established goals. Will continue with current POC.      Time in: 0850  Time out: 0905    Total Treatment Time 15 minutes     CPT codes:  [] Gait training 28563 0 minutes  [] Manual therapy 58857 0 minutes  [x] Therapeutic activities 23351 15 minutes  [] Therapeutic exercises 99646 0 minutes  [] Neuromuscular reeducation 32520 0 minutes      Lázaro Dunham, PT, DPT   [id number]    Gautam Mittal, SPT

## 2024-04-24 NOTE — PLAN OF CARE
Problem: Discharge Planning  Goal: Discharge to home or other facility with appropriate resources  4/24/2024 1317 by Xiomy Bills RN  Outcome: Progressing     Problem: Pain  Goal: Verbalizes/displays adequate comfort level or baseline comfort level  4/24/2024 1317 by Xiomy Bills RN  Outcome: Progressing     Problem: Safety - Adult  Goal: Free from fall injury  4/24/2024 1317 by Xiomy Bills RN  Outcome: Progressing  Flowsheets (Taken 4/24/2024 1313)  Free From Fall Injury: Instruct family/caregiver on patient safety

## 2024-04-25 VITALS
RESPIRATION RATE: 16 BRPM | HEART RATE: 75 BPM | DIASTOLIC BLOOD PRESSURE: 88 MMHG | WEIGHT: 179.23 LBS | TEMPERATURE: 98.6 F | OXYGEN SATURATION: 97 % | HEIGHT: 68 IN | BODY MASS INDEX: 27.16 KG/M2 | SYSTOLIC BLOOD PRESSURE: 160 MMHG

## 2024-04-25 PROCEDURE — 6370000000 HC RX 637 (ALT 250 FOR IP): Performed by: FAMILY MEDICINE

## 2024-04-25 PROCEDURE — 6370000000 HC RX 637 (ALT 250 FOR IP): Performed by: NURSE PRACTITIONER

## 2024-04-25 PROCEDURE — 6360000002 HC RX W HCPCS: Performed by: FAMILY MEDICINE

## 2024-04-25 PROCEDURE — 6370000000 HC RX 637 (ALT 250 FOR IP): Performed by: NEUROLOGICAL SURGERY

## 2024-04-25 PROCEDURE — 6370000000 HC RX 637 (ALT 250 FOR IP): Performed by: INTERNAL MEDICINE

## 2024-04-25 RX ORDER — METOPROLOL SUCCINATE 25 MG/1
25 TABLET, EXTENDED RELEASE ORAL DAILY
Qty: 30 TABLET | Refills: 3 | Status: SHIPPED | OUTPATIENT
Start: 2024-04-26

## 2024-04-25 RX ORDER — ATORVASTATIN CALCIUM 40 MG/1
40 TABLET, FILM COATED ORAL NIGHTLY
Qty: 30 TABLET | Refills: 3 | Status: SHIPPED | OUTPATIENT
Start: 2024-04-25

## 2024-04-25 RX ORDER — METOCLOPRAMIDE 10 MG/1
10 TABLET ORAL
Qty: 120 TABLET | Refills: 3 | Status: SHIPPED | OUTPATIENT
Start: 2024-04-25

## 2024-04-25 RX ORDER — NICOTINE 21 MG/24HR
1 PATCH, TRANSDERMAL 24 HOURS TRANSDERMAL DAILY
Qty: 30 PATCH | Refills: 3 | Status: SHIPPED | OUTPATIENT
Start: 2024-04-26

## 2024-04-25 RX ORDER — LEVETIRACETAM 500 MG/1
500 TABLET ORAL 2 TIMES DAILY
Qty: 60 TABLET | Refills: 3 | Status: SHIPPED | OUTPATIENT
Start: 2024-04-25

## 2024-04-25 RX ORDER — PHENOBARBITAL 32.4 MG/1
32.4 TABLET ORAL 2 TIMES DAILY
Qty: 28 TABLET | Refills: 0 | Status: SHIPPED | OUTPATIENT
Start: 2024-04-25 | End: 2024-05-09

## 2024-04-25 RX ADMIN — METOPROLOL SUCCINATE 25 MG: 25 TABLET, EXTENDED RELEASE ORAL at 07:56

## 2024-04-25 RX ADMIN — LEVETIRACETAM 500 MG: 500 TABLET, FILM COATED ORAL at 07:56

## 2024-04-25 RX ADMIN — LISINOPRIL 5 MG: 10 TABLET ORAL at 07:56

## 2024-04-25 RX ADMIN — PHENOBARBITAL 32.4 MG: 32.4 TABLET ORAL at 07:56

## 2024-04-25 RX ADMIN — ACETAMINOPHEN 650 MG: 325 TABLET ORAL at 11:41

## 2024-04-25 RX ADMIN — METOCLOPRAMIDE 10 MG: 5 TABLET ORAL at 06:40

## 2024-04-25 RX ADMIN — HYDROCODONE BITARTRATE AND ACETAMINOPHEN 1 TABLET: 5; 325 TABLET ORAL at 07:56

## 2024-04-25 RX ADMIN — HYDRALAZINE HYDROCHLORIDE 10 MG: 20 INJECTION INTRAMUSCULAR; INTRAVENOUS at 04:30

## 2024-04-25 RX ADMIN — HYDROCODONE BITARTRATE AND ACETAMINOPHEN 1 TABLET: 5; 325 TABLET ORAL at 01:42

## 2024-04-25 ASSESSMENT — PAIN SCALES - GENERAL
PAINLEVEL_OUTOF10: 5
PAINLEVEL_OUTOF10: 4
PAINLEVEL_OUTOF10: 0
PAINLEVEL_OUTOF10: 10
PAINLEVEL_OUTOF10: 4

## 2024-04-25 ASSESSMENT — PAIN DESCRIPTION - LOCATION
LOCATION: HAND
LOCATION: HEAD
LOCATION: HEAD

## 2024-04-25 ASSESSMENT — PAIN DESCRIPTION - DESCRIPTORS
DESCRIPTORS: ACHING
DESCRIPTORS: ACHING;THROBBING;DISCOMFORT
DESCRIPTORS: ACHING

## 2024-04-25 ASSESSMENT — PAIN DESCRIPTION - ORIENTATION
ORIENTATION: POSTERIOR
ORIENTATION: POSTERIOR

## 2024-04-25 NOTE — PROGRESS NOTES
Orthos completed from lying to standing. Pt refused to stand stating he was too dizzy to attempt and afraid of falling. Charge RN notified.     Lying /84    Sitting /80

## 2024-04-25 NOTE — CARE COORDINATION
Chart reviewed and case reviewed in IDR.  Patient c/o blurry vision and HA.  Nursing spoke with neurosurgery and they do not feel this is related to his bleed.  Met with the patient with COLTON King with Dr De Jesus at the bedside to discuss transition of care planning with the patient.  Patient evaluated by COLTON at the bedside.  Pupils equal and reactive to light on assessment.  Patient able to track with his eyes.  Patient stated Tylenol is helping with headache.  Patient did ask about staying at [facility].  Explained that patient had stated he wanted to go to [facility] previously.  Patient confirmed this.  Explained that since authorization has been received, it can not be changed.  Patient OK to discharge to rehab at this time.  [facility] and spoke with [facility].  Transportation arranged for a 12:30 pm pick-up time.  Bedside nurse Clarisa notified.  She will have the patient ready for transition of care.  Call placed to the patient's mother Iliana to notify of transition of care.  Will continue to follow for further.    Kiley Mccallum RN.  P:  [phone]

## 2024-04-25 NOTE — DISCHARGE INSTR - COC
Continuity of Care Form    Patient Name: Kirsty Thurston   :  1970  MRN:  [id number]    Admit date:  2024  Discharge date:  24    Code Status Order: Full Code   Advance Directives:     Admitting Physician:  Kim De Jesus MD  PCP: Junaid Wilson DO    Discharging Nurse: ***  Discharging Hospital Unit/Room#: 8502/8502-A  Discharging Unit Phone Number: [phone]    Emergency Contact:   Extended Emergency Contact Information  Primary Emergency Contact: Iliana Thurston  Home Phone: [phone]  Mobile Phone: [phone]  Relation: Parent  Preferred language: English   needed? No  Secondary Emergency Contact: na,na  Mobile Phone: [phone]  Relation: Other   needed? No    Past Surgical History:  Past Surgical History:   Procedure Laterality Date    GASTRIC BYPASS SURGERY         Immunization History:     There is no immunization history on file for this patient.    Active Problems:  Patient Active Problem List   Diagnosis Code    Multiple rib fractures involving four or more ribs S22.49XA    Closed fracture of multiple ribs of left side, initial encounter S22.42XA    Epidural hematoma (HCC) S06.4XAA    Subdural hematoma (HCC) S06.5XAA    Traumatic hematoma of brain with compression and midline shift of brain (HCC) S06.2X0S, S06.A0XA    Closed fracture of parietal bone of skull (HCC) S02.0XXA    Subarachnoid hemorrhage (HCC) I60.9    Intracranial hemorrhage (HCC) I62.9    Fall W19.XXXA    Closed skull fracture (HCC) S02.91XA    Fall from standing, initial encounter W19.XXXA    Stroke-like symptoms R29.90       Isolation/Infection:   Isolation            No Isolation          Patient Infection Status       None to display            Nurse Assessment:  Last Vital Signs: BP (!) 160/88   Pulse 75   Temp 98.6 °F (37 °C) (Temporal)   Resp 16   Ht 1.727 m (5' 8\")   Wt 81.3 kg (179 lb 3.7 oz)   SpO2 97%   BMI 27.25 kg/m²     Last documented pain score (0-10 scale): Pain Level:  Facility (if applicable)   Name:  Address:  Dialysis Schedule:  Phone:  Fax:    / signature: {Esignature:[id number]}    PHYSICIAN SECTION    Prognosis: Good    Condition at Discharge: Stable    Rehab Potential (if transferring to Rehab): Good    Recommended Labs or Other Treatments After Discharge:     Physician Certification: I certify the above information and transfer of Kirsty Thurston  is necessary for the continuing treatment of the diagnosis listed and that he requires Skilled Nursing Facility for greater 30 days.     Update Admission H&P: No change in H&P    PHYSICIAN SIGNATURE:  Electronically signed by COLTON Robin CNP on 4/25/24 at 11:28 AM EDT

## 2024-04-25 NOTE — FLOWSHEET NOTE
04/25/24 1009   Vital Signs   Orthostatic B/P and Pulse? Yes   Blood Pressure Lying 133/84   Pulse Lying 100 PER MINUTE   Blood Pressure Sitting 152/80   Pulse Sitting 104 PER MINUTE     Pt refused to stand
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
## 2024-05-12 ENCOUNTER — HOSPITAL ENCOUNTER (EMERGENCY)
Age: 54
Discharge: HOME | End: 2024-05-12
Attending: STUDENT IN AN ORGANIZED HEALTH CARE EDUCATION/TRAINING PROGRAM
Payer: COMMERCIAL

## 2024-05-12 ENCOUNTER — APPOINTMENT (OUTPATIENT)
Dept: GENERAL RADIOLOGY | Age: 54
End: 2024-05-12
Payer: COMMERCIAL

## 2024-05-12 ENCOUNTER — APPOINTMENT (OUTPATIENT)
Dept: CT IMAGING | Age: 54
End: 2024-05-12
Payer: COMMERCIAL

## 2024-05-12 VITALS
WEIGHT: 180 LBS | RESPIRATION RATE: 21 BRPM | OXYGEN SATURATION: 95 % | HEART RATE: 95 BPM | BODY MASS INDEX: 27.37 KG/M2 | DIASTOLIC BLOOD PRESSURE: 97 MMHG | TEMPERATURE: 98.6 F | SYSTOLIC BLOOD PRESSURE: 159 MMHG

## 2024-05-12 DIAGNOSIS — H53.9 VISION CHANGES: Primary | ICD-10-CM

## 2024-05-12 DIAGNOSIS — Z86.79 HX OF INTRACRANIAL HEMORRHAGE: ICD-10-CM

## 2024-05-12 LAB
ALBUMIN SERPL-MCNC: 4.1 G/DL (ref 3.5–5.2)
ALP SERPL-CCNC: 70 U/L (ref 40–129)
ALT SERPL-CCNC: 16 U/L (ref 0–40)
ANION GAP SERPL CALCULATED.3IONS-SCNC: 15 MMOL/L (ref 7–16)
AST SERPL-CCNC: 18 U/L (ref 0–39)
BASOPHILS # BLD: 0.03 K/UL (ref 0–0.2)
BASOPHILS NFR BLD: 0 % (ref 0–2)
BILIRUB SERPL-MCNC: 0.4 MG/DL (ref 0–1.2)
BUN SERPL-MCNC: 11 MG/DL (ref 6–20)
CALCIUM SERPL-MCNC: 8.9 MG/DL (ref 8.6–10.2)
CHLORIDE SERPL-SCNC: 97 MMOL/L (ref 98–107)
CO2 SERPL-SCNC: 19 MMOL/L (ref 22–29)
CREAT SERPL-MCNC: 0.8 MG/DL (ref 0.7–1.2)
EOSINOPHIL # BLD: 0.04 K/UL (ref 0.05–0.5)
EOSINOPHILS RELATIVE PERCENT: 1 % (ref 0–6)
ERYTHROCYTE [DISTWIDTH] IN BLOOD BY AUTOMATED COUNT: 12.9 % (ref 11.5–15)
GFR, ESTIMATED: >90 ML/MIN/1.73M2
GLUCOSE BLD-MCNC: 118 MG/DL (ref 74–99)
GLUCOSE SERPL-MCNC: 92 MG/DL (ref 74–99)
HCT VFR BLD AUTO: 37.1 % (ref 37–54)
HGB BLD-MCNC: 12.5 G/DL (ref 12.5–16.5)
IMM GRANULOCYTES # BLD AUTO: 0.03 K/UL (ref 0–0.58)
IMM GRANULOCYTES NFR BLD: 0 % (ref 0–5)
INR PPP: 1
LYMPHOCYTES NFR BLD: 1.26 K/UL (ref 1.5–4)
LYMPHOCYTES RELATIVE PERCENT: 16 % (ref 20–42)
MAGNESIUM SERPL-MCNC: 2 MG/DL (ref 1.6–2.6)
MCH RBC QN AUTO: 30 PG (ref 26–35)
MCHC RBC AUTO-ENTMCNC: 33.7 G/DL (ref 32–34.5)
MCV RBC AUTO: 89.2 FL (ref 80–99.9)
MONOCYTES NFR BLD: 0.65 K/UL (ref 0.1–0.95)
MONOCYTES NFR BLD: 8 % (ref 2–12)
NEUTROPHILS NFR BLD: 75 % (ref 43–80)
NEUTS SEG NFR BLD: 6.06 K/UL (ref 1.8–7.3)
PLATELET # BLD AUTO: 236 K/UL (ref 130–450)
PMV BLD AUTO: 9.1 FL (ref 7–12)
POTASSIUM SERPL-SCNC: 3.9 MMOL/L (ref 3.5–5)
PROT SERPL-MCNC: 6.8 G/DL (ref 6.4–8.3)
PROTHROMBIN TIME: 11.5 SEC (ref 9.3–12.4)
RBC # BLD AUTO: 4.16 M/UL (ref 3.8–5.8)
SODIUM SERPL-SCNC: 131 MMOL/L (ref 132–146)
WBC OTHER # BLD: 8.1 K/UL (ref 4.5–11.5)

## 2024-05-12 PROCEDURE — 99285 EMERGENCY DEPT VISIT HI MDM: CPT

## 2024-05-12 PROCEDURE — 85025 COMPLETE CBC W/AUTO DIFF WBC: CPT

## 2024-05-12 PROCEDURE — 93005 ELECTROCARDIOGRAM TRACING: CPT

## 2024-05-12 PROCEDURE — 82962 GLUCOSE BLOOD TEST: CPT

## 2024-05-12 PROCEDURE — 83735 ASSAY OF MAGNESIUM: CPT

## 2024-05-12 PROCEDURE — 70450 CT HEAD/BRAIN W/O DYE: CPT

## 2024-05-12 PROCEDURE — 80053 COMPREHEN METABOLIC PANEL: CPT

## 2024-05-12 PROCEDURE — 73502 X-RAY EXAM HIP UNI 2-3 VIEWS: CPT

## 2024-05-12 PROCEDURE — 85610 PROTHROMBIN TIME: CPT

## 2024-05-12 NOTE — ED PROVIDER NOTES
membranes are moist.      Pharynx: Oropharynx is clear.   Eyes:      Extraocular Movements: Extraocular movements intact.      Conjunctiva/sclera: Conjunctivae normal.      Pupils: Pupils are equal, round, and reactive to light.      Comments: Patient has 20/40 vision in his right unaffected eye and 20/50 vision in his left affected eye.  I do not appreciate any abnormalities of his extraocular movements though there is some very mild leftward unilateral nystagmus.  Sclera and conjunctiva are normal.  Did not appreciate any corneal or pupillary abnormalities no evidence of any proptosis or periorbital swelling.  He does have an eye patch on and when it is taken off, he does say that he has double vision though he does appear to be tracking appropriately   Neck:      Comments: Lower occipital tenderness with tenderness into the paraspinous musculature and trapezius muscles bilaterally  Cardiovascular:      Rate and Rhythm: Normal rate and regular rhythm.      Heart sounds: Normal heart sounds. No murmur heard.  Pulmonary:      Effort: Pulmonary effort is normal. No respiratory distress.      Breath sounds: Normal breath sounds.   Abdominal:      General: Abdomen is flat. There is no distension.      Tenderness: There is no abdominal tenderness.   Musculoskeletal:         General: Tenderness present. No swelling. Normal range of motion.      Cervical back: Normal range of motion. No rigidity.   Skin:     General: Skin is warm and dry.      Findings: No rash.   Neurological:      General: No focal deficit present.      Mental Status: He is alert and oriented to person, place, and time. Mental status is at baseline.      Cranial Nerves: No cranial nerve deficit.      Sensory: No sensory deficit.      Motor: No weakness.      Coordination: Coordination normal.   Psychiatric:         Mood and Affect: Mood normal.         Behavior: Behavior normal.          Medical Decision Making/Differential Diagnosis:    CC/HPI Summary,

## 2024-05-13 ENCOUNTER — HOSPITAL ENCOUNTER (EMERGENCY)
Age: 54
Discharge: HOME | End: 2024-05-13
Attending: EMERGENCY MEDICINE
Payer: COMMERCIAL

## 2024-05-13 VITALS
RESPIRATION RATE: 16 BRPM | HEART RATE: 84 BPM | OXYGEN SATURATION: 97 % | DIASTOLIC BLOOD PRESSURE: 95 MMHG | SYSTOLIC BLOOD PRESSURE: 170 MMHG | TEMPERATURE: 98.7 F

## 2024-05-13 DIAGNOSIS — R51.9 NONINTRACTABLE HEADACHE, UNSPECIFIED CHRONICITY PATTERN, UNSPECIFIED HEADACHE TYPE: Primary | ICD-10-CM

## 2024-05-13 LAB
EKG ATRIAL RATE: 93 BPM
EKG P AXIS: 6 DEGREES
EKG P-R INTERVAL: 148 MS
EKG Q-T INTERVAL: 370 MS
EKG QRS DURATION: 88 MS
EKG QTC CALCULATION (BAZETT): 460 MS
EKG R AXIS: -12 DEGREES
EKG T AXIS: 21 DEGREES
EKG VENTRICULAR RATE: 93 BPM

## 2024-05-13 PROCEDURE — 6370000000 HC RX 637 (ALT 250 FOR IP)

## 2024-05-13 PROCEDURE — 93010 ELECTROCARDIOGRAM REPORT: CPT | Performed by: INTERNAL MEDICINE

## 2024-05-13 PROCEDURE — 99283 EMERGENCY DEPT VISIT LOW MDM: CPT

## 2024-05-13 RX ORDER — HYDROCODONE BITARTRATE AND ACETAMINOPHEN 5; 325 MG/1; MG/1
1 TABLET ORAL ONCE
Status: COMPLETED | OUTPATIENT
Start: 2024-05-13 | End: 2024-05-13

## 2024-05-13 RX ADMIN — HYDROCODONE BITARTRATE AND ACETAMINOPHEN 1 TABLET: 5; 325 TABLET ORAL at 02:04

## 2024-05-13 ASSESSMENT — PAIN DESCRIPTION - DESCRIPTORS: DESCRIPTORS: ACHING

## 2024-05-13 ASSESSMENT — PAIN DESCRIPTION - LOCATION: LOCATION: HEAD

## 2024-05-13 ASSESSMENT — PAIN SCALES - GENERAL: PAINLEVEL_OUTOF10: 8

## 2024-05-13 NOTE — DISCHARGE INSTRUCTIONS
Follow-up with your ophthalmologist for further evaluation regarding her vision changes.  Follow-up with your primary care physician to follow-up on your symptoms and discuss her recent emergency room visit.

## 2024-05-13 NOTE — ED PROVIDER NOTES
Course User Index  [KS] Rasheed Jacobs MD         Treatment here in the ED:   Medications - No data to display        Discussion: Patient appears to have some headaches likely related to his somewhat recent intracranial hemorrhages and subdural/epidural hematomas.  He is not having a significant amount of pain at this time and is instructed on supportive care and over-the-counter analgesics not to include NSAIDs at this time.  The patient's diplopia seems to be related to either these hemorrhages or due to the fact that he has been wearing an eye patch for a couple weeks now.  I recommended that when he is seated or lying down that he remove the eye patch to attempt to utilize his eyes and coordination but his workup is unremarkable.  His bedside Snellen exam is roughly similar in each eye.  He has some very mild hyponatremia mildly decreased bicarb and no other concerns on laboratory workup.  CT of the head is not showing any acute abnormalities or new concerns and the x-ray of his pelvis is normal.  The patient does have an ophthalmology appointment coming up this month.  At this time he is in no acute distress and will be discharged home for outpatient follow-up.  Other than the diplopia which appears to be subjective, the patient has no neurologic deficits or symptoms at this time.    REVIEW OF SYSTEMS :           Positives and Pertinent negatives as per HPI.     SURGICAL HISTORY     Past Surgical History:   Procedure Laterality Date    GASTRIC BYPASS SURGERY         CURRENTMEDICATIONS       Previous Medications    ATORVASTATIN (LIPITOR) 40 MG TABLET    Take 1 tablet by mouth nightly    LEVETIRACETAM (KEPPRA) 500 MG TABLET    Take 1 tablet by mouth 2 times daily    METOCLOPRAMIDE (REGLAN) 10 MG TABLET    Take 1 tablet by mouth 4 times daily (before meals and nightly)    METOPROLOL SUCCINATE (TOPROL XL) 25 MG EXTENDED RELEASE TABLET    Take 1 tablet by mouth daily    NICOTINE (NICODERM CQ) 21 MG/24HR    Place 1

## 2024-05-14 ENCOUNTER — APPOINTMENT (OUTPATIENT)
Dept: GENERAL RADIOLOGY | Age: 54
End: 2024-05-14
Payer: COMMERCIAL

## 2024-05-14 ENCOUNTER — APPOINTMENT (OUTPATIENT)
Dept: CT IMAGING | Age: 54
End: 2024-05-14
Payer: COMMERCIAL

## 2024-05-14 ENCOUNTER — HOSPITAL ENCOUNTER (EMERGENCY)
Age: 54
Discharge: HOME | End: 2024-05-14
Payer: COMMERCIAL

## 2024-05-14 VITALS
RESPIRATION RATE: 16 BRPM | TEMPERATURE: 98 F | SYSTOLIC BLOOD PRESSURE: 169 MMHG | OXYGEN SATURATION: 98 % | DIASTOLIC BLOOD PRESSURE: 99 MMHG | HEART RATE: 90 BPM

## 2024-05-14 DIAGNOSIS — S70.02XA CONTUSION OF LEFT HIP, INITIAL ENCOUNTER: Primary | ICD-10-CM
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
## 2024-05-14 LAB — ETHANOLAMINE SERPL-MCNC: <10 MG/DL (ref 0–0.08)

## 2024-05-14 PROCEDURE — 99284 EMERGENCY DEPT VISIT MOD MDM: CPT

## 2024-05-14 PROCEDURE — G0480 DRUG TEST DEF 1-7 CLASSES: HCPCS

## 2024-05-14 PROCEDURE — 73502 X-RAY EXAM HIP UNI 2-3 VIEWS: CPT

## 2024-05-14 PROCEDURE — 6370000000 HC RX 637 (ALT 250 FOR IP)

## 2024-05-14 PROCEDURE — 70450 CT HEAD/BRAIN W/O DYE: CPT

## 2024-05-14 PROCEDURE — 73700 CT LOWER EXTREMITY W/O DYE: CPT

## 2024-05-14 RX ORDER — ACETAMINOPHEN 325 MG/1
650 TABLET ORAL ONCE
Status: COMPLETED | OUTPATIENT
Start: 2024-05-14 | End: 2024-05-14

## 2024-05-14 RX ORDER — ONDANSETRON 4 MG/1
4 TABLET, ORALLY DISINTEGRATING ORAL ONCE
Status: COMPLETED | OUTPATIENT
Start: 2024-05-14 | End: 2024-05-14

## 2024-05-14 RX ORDER — ACETAMINOPHEN 500 MG
500 TABLET ORAL EVERY 6 HOURS PRN
Qty: 28 TABLET | Refills: 0 | Status: SHIPPED | OUTPATIENT
Start: 2024-05-14 | End: 2024-05-21

## 2024-05-14 RX ADMIN — ACETAMINOPHEN 650 MG: 325 TABLET ORAL at 13:09

## 2024-05-14 RX ADMIN — ONDANSETRON 4 MG: 4 TABLET, ORALLY DISINTEGRATING ORAL at 20:18

## 2024-05-14 ASSESSMENT — LIFESTYLE VARIABLES
HOW MANY STANDARD DRINKS CONTAINING ALCOHOL DO YOU HAVE ON A TYPICAL DAY: 3 OR 4
HOW OFTEN DO YOU HAVE A DRINK CONTAINING ALCOHOL: 2-4 TIMES A MONTH

## 2024-05-14 ASSESSMENT — ENCOUNTER SYMPTOMS
ALLERGIC/IMMUNOLOGIC NEGATIVE: 1
EYES NEGATIVE: 1
RESPIRATORY NEGATIVE: 1
GASTROINTESTINAL NEGATIVE: 1

## 2024-05-14 NOTE — ED PROVIDER NOTES
[facility] EMERGENCY DEPARTMENT  EMERGENCY DEPARTMENT ENCOUNTER        Pt Name: Kirsty Thurston  MRN: [id number]  Birthdate 1970  Date of evaluation: 5/14/2024  Provider: COLTON Wasserman - CNP  PCP: Junaid Wilson DO  Note Started: 1:01 PM EDT 5/14/24      HERLINDA. I have evaluated this patient.        CHIEF COMPLAINT       Chief Complaint   Patient presents with    Hip Pain     Lt hip pain,edema and bruising.  patient states he took an extra step and fell. -LOC       HISTORY OF PRESENT ILLNESS: 1 or more Elements     History from : Patient    Limitations to history : None    Kirsty Thurston is a 53 y.o. male who presents to the ED by private vehicle for complaints of left hip pain after a fall yesterday evening.  Patient states he was walking down the stairs when he missed the last step causing him to fall onto his buttocks.  Patient denies head injury, LOC, and use of blood thinners.  Patient states he has been ambulatory since the fall.  Patient does have a history of gait problems and uses a walker since a subdural hematoma that occurred 4 months ago.  Patient states he was released from [facility] 1 week.  Patient denies any recent illness or any other injury.  Patient has no other complaints at this time.    Nursing Notes were all reviewed and agreed with or any disagreements were addressed in the HPI.    REVIEW OF SYSTEMS :      Review of Systems   Constitutional: Negative.    HENT: Negative.     Eyes: Negative.    Respiratory: Negative.     Cardiovascular: Negative.    Gastrointestinal: Negative.    Endocrine: Negative.    Genitourinary: Negative.    Musculoskeletal:         Left hip pain   Skin: Negative.    Allergic/Immunologic: Negative.    Neurological: Negative.    Hematological: Negative.    Psychiatric/Behavioral: Negative.         Positives and Pertinent negatives as per HPI.     SURGICAL HISTORY     Past Surgical History:   Procedure Laterality Date

## 2024-05-14 NOTE — CONSULTS
Department of Orthopedic Surgery  Resident Consult Note    Reason for Consult:  avulsion fracture    HISTORY OF PRESENT ILLNESS:       Patient is a 53 y.o. male who presents with left hip pain after sustaining fall. History is unclear and patient is poor historian but reports he was in hospital for unknown reason when he fell on his left hip earlier today.  Patient reports he has been having left hip pain since fall, otherwise no acute orthopedic complaints.  Denies distal numbness/tingling/paresthesias.    Past Medical History:        Diagnosis Date    Hypertension     Kidney stone      Past Surgical History:        Procedure Laterality Date    GASTRIC BYPASS SURGERY       Current Medications:   No current facility-administered medications for this encounter.  Allergies:  Pcn [penicillins]    Social History:   TOBACCO:   reports that he has been smoking cigarettes. He does not have any smokeless tobacco history on file.  ETOH:   reports current alcohol use.  DRUGS:   reports no history of drug use.  ACTIVITIES OF DAILY LIVING:    OCCUPATION:   Family History:   History reviewed. No pertinent family history.    REVIEW OF SYSTEMS:  CONSTITUTIONAL:  negative for  fevers, chills  EYES:  negative for blurred vision, visual disturbance  HEENT:  negative for  hearing loss, voice change  RESPIRATORY:  negative for  dyspnea, wheezing  CARDIOVASCULAR:  negative for  chest pain, palpitations  GASTROINTESTINAL:  negative for nausea, vomiting  GENITOURINARY:  negative for frequency, urinary incontinence  HEMATOLOGIC/LYMPHATIC:  negative for bleeding and petechiae  MUSCULOSKELETAL:  positive for  pain  NEUROLOGICAL:  negative for headaches, dizziness  BEHAVIOR/PSYCH:  negative for increased agitation and anxiety    PHYSICAL EXAM:    VITALS:  BP (!) 169/99   Pulse 90   Temp 98 °F (36.7 °C)   Resp 16   SpO2 98%   CONSTITUTIONAL:  awake, alert, cooperative, no apparent distress, and appears stated age  EYES: Lids and lashes

## 2024-05-15 NOTE — ED NOTES
Patient moved to chair at this time. Patient is missing his walker and states that he left it inside to go outside and it was gone when he came back. Patient is also missing his keys. Patient was moved to chair due to reported fall. Unwitnessed.

## 2024-05-15 NOTE — ED NOTES
Patient is up for discharge. Patient's mother unable to come pick him up due to the fact that she is 82 years old and does not know how to get here and cannot use GPS. Patient states to call his wife, marychuy to come pick him up. Marychuy and the patient are  and currently going through a divorce. She states she will not be able to come pick him up and that he needs to go back to his mom's address because that is where he is staying.

## 2024-05-15 NOTE — ED NOTES
This nurse took patient to his car to obtain his walker. Upon opening patient's unlocked car door (with patient's permission) patient admitted that he had \"messed up today\". There were at least 15 empty tall beer cans scattered throughout patient's car. Patient asked this nurse to look through his car to find his keys as he was not confident he could stand very well. Patient's keys ended up being buried in his glove box. Patient does not remember putting them there. Patient's car locked, keys in this nurse's possession as this nurse has had patient elope while intoxicated and drive himself home previously after stating he would not do so. Provider notified and asked to order alcohol level. Patient has been here approx eight hours however did go out to car sometime before 1557 and may have drank more while out there. Patient cannot remember. This nurse also provided patient with box lunch while he was waiting.

## 2024-05-16 ENCOUNTER — APPOINTMENT (OUTPATIENT)
Dept: CT IMAGING | Age: 54
End: 2024-05-16
Payer: COMMERCIAL

## 2024-05-16 ENCOUNTER — HOSPITAL ENCOUNTER (EMERGENCY)
Age: 54
Discharge: HOME | End: 2024-05-16
Attending: EMERGENCY MEDICINE
Payer: COMMERCIAL

## 2024-05-16 ENCOUNTER — APPOINTMENT (OUTPATIENT)
Dept: GENERAL RADIOLOGY | Age: 54
End: 2024-05-16
Payer: COMMERCIAL

## 2024-05-16 VITALS
SYSTOLIC BLOOD PRESSURE: 175 MMHG | RESPIRATION RATE: 18 BRPM | TEMPERATURE: 98.1 F | HEART RATE: 85 BPM | DIASTOLIC BLOOD PRESSURE: 84 MMHG | BODY MASS INDEX: 26.61 KG/M2 | OXYGEN SATURATION: 95 % | WEIGHT: 175 LBS

## 2024-05-16 DIAGNOSIS — S16.1XXA STRAIN OF NECK MUSCLE, INITIAL ENCOUNTER: ICD-10-CM

## 2024-05-16 DIAGNOSIS — S42.92XA CLOSED FRACTURE OF LEFT SHOULDER, INITIAL ENCOUNTER: Primary | ICD-10-CM

## 2024-05-16 DIAGNOSIS — S09.90XA CLOSED HEAD INJURY, INITIAL ENCOUNTER: ICD-10-CM

## 2024-05-16 PROCEDURE — 6370000000 HC RX 637 (ALT 250 FOR IP): Performed by: PHYSICIAN ASSISTANT

## 2024-05-16 PROCEDURE — 70450 CT HEAD/BRAIN W/O DYE: CPT

## 2024-05-16 PROCEDURE — 99284 EMERGENCY DEPT VISIT MOD MDM: CPT

## 2024-05-16 PROCEDURE — 6360000002 HC RX W HCPCS: Performed by: PHYSICIAN ASSISTANT

## 2024-05-16 PROCEDURE — 90471 IMMUNIZATION ADMIN: CPT | Performed by: PHYSICIAN ASSISTANT

## 2024-05-16 PROCEDURE — 72125 CT NECK SPINE W/O DYE: CPT

## 2024-05-16 PROCEDURE — 90714 TD VACC NO PRESV 7 YRS+ IM: CPT | Performed by: PHYSICIAN ASSISTANT

## 2024-05-16 PROCEDURE — 73030 X-RAY EXAM OF SHOULDER: CPT

## 2024-05-16 RX ORDER — IBUPROFEN 800 MG/1
800 TABLET ORAL ONCE
Status: COMPLETED | OUTPATIENT
Start: 2024-05-16 | End: 2024-05-16

## 2024-05-16 RX ORDER — NAPROXEN 500 MG/1
500 TABLET ORAL 2 TIMES DAILY
Qty: 14 TABLET | Refills: 0 | Status: SHIPPED | OUTPATIENT
Start: 2024-05-16 | End: 2024-05-23

## 2024-05-16 RX ADMIN — IBUPROFEN 800 MG: 800 TABLET, FILM COATED ORAL at 11:42

## 2024-05-16 RX ADMIN — CLOSTRIDIUM TETANI TOXOID ANTIGEN (FORMALDEHYDE INACTIVATED) AND CORYNEBACTERIUM DIPHTHERIAE TOXOID ANTIGEN (FORMALDEHYDE INACTIVATED) 0.5 ML: 5; 2 INJECTION, SUSPENSION INTRAMUSCULAR at 11:42

## 2024-05-16 ASSESSMENT — PAIN DESCRIPTION - LOCATION: LOCATION: HEAD;SHOULDER

## 2024-05-16 ASSESSMENT — PAIN DESCRIPTION - DESCRIPTORS: DESCRIPTORS: SHARP

## 2024-05-16 ASSESSMENT — PAIN DESCRIPTION - ORIENTATION: ORIENTATION: LEFT;RIGHT

## 2024-05-16 ASSESSMENT — PAIN - FUNCTIONAL ASSESSMENT: PAIN_FUNCTIONAL_ASSESSMENT: PREVENTS OR INTERFERES SOME ACTIVE ACTIVITIES AND ADLS

## 2024-05-16 ASSESSMENT — PAIN SCALES - GENERAL: PAINLEVEL_OUTOF10: 8

## 2024-05-16 NOTE — ED PROVIDER NOTES
is good      NOTE: This report was transcribed using voice recognition software. Every effort was made to ensure accuracy; however, inadvertent computerized transcription errors may be present

## 2024-05-19 ENCOUNTER — HOSPITAL ENCOUNTER (EMERGENCY)
Age: 54
Discharge: HOME | End: 2024-05-19
Attending: EMERGENCY MEDICINE
Payer: COMMERCIAL

## 2024-05-19 ENCOUNTER — APPOINTMENT (OUTPATIENT)
Dept: GENERAL RADIOLOGY | Age: 54
End: 2024-05-19
Payer: COMMERCIAL

## 2024-05-19 ENCOUNTER — APPOINTMENT (OUTPATIENT)
Dept: CT IMAGING | Age: 54
End: 2024-05-19
Attending: EMERGENCY MEDICINE
Payer: COMMERCIAL

## 2024-05-19 VITALS
DIASTOLIC BLOOD PRESSURE: 105 MMHG | BODY MASS INDEX: 26.52 KG/M2 | WEIGHT: 175 LBS | OXYGEN SATURATION: 97 % | TEMPERATURE: 97.5 F | RESPIRATION RATE: 23 BRPM | HEART RATE: 78 BPM | SYSTOLIC BLOOD PRESSURE: 173 MMHG | HEIGHT: 68 IN
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
## 2024-05-19 DIAGNOSIS — R78.0 ELEVATED BLOOD ALCOHOL LEVEL, BLOOD ALCOHOL LEVEL NOT SPECIFIED: ICD-10-CM

## 2024-05-19 DIAGNOSIS — T14.8XXA ABRASION: ICD-10-CM

## 2024-05-19 LAB — ETHANOLAMINE SERPL-MCNC: 150 MG/DL (ref 0–0.08)

## 2024-05-19 PROCEDURE — 72125 CT NECK SPINE W/O DYE: CPT

## 2024-05-19 PROCEDURE — G0480 DRUG TEST DEF 1-7 CLASSES: HCPCS

## 2024-05-19 PROCEDURE — 71045 X-RAY EXAM CHEST 1 VIEW: CPT

## 2024-05-19 PROCEDURE — 2580000003 HC RX 258: Performed by: EMERGENCY MEDICINE

## 2024-05-19 PROCEDURE — 70450 CT HEAD/BRAIN W/O DYE: CPT

## 2024-05-19 PROCEDURE — 73030 X-RAY EXAM OF SHOULDER: CPT

## 2024-05-19 PROCEDURE — 99284 EMERGENCY DEPT VISIT MOD MDM: CPT

## 2024-05-19 RX ORDER — SODIUM CHLORIDE 9 MG/ML
INJECTION, SOLUTION INTRAVENOUS CONTINUOUS
Status: DISCONTINUED | OUTPATIENT
Start: 2024-05-19 | End: 2024-05-19 | Stop reason: HOSPADM

## 2024-05-19 RX ORDER — 0.9 % SODIUM CHLORIDE 0.9 %
1000 INTRAVENOUS SOLUTION INTRAVENOUS ONCE
Status: COMPLETED | OUTPATIENT
Start: 2024-05-19 | End: 2024-05-19

## 2024-05-19 RX ORDER — LIDOCAINE HYDROCHLORIDE AND EPINEPHRINE 10; 10 MG/ML; UG/ML
20 INJECTION, SOLUTION INFILTRATION; PERINEURAL ONCE
Status: DISCONTINUED | OUTPATIENT
Start: 2024-05-19 | End: 2024-05-19

## 2024-05-19 RX ADMIN — SODIUM CHLORIDE 1000 ML: 9 INJECTION, SOLUTION INTRAVENOUS at 16:15

## 2024-05-19 RX ADMIN — SODIUM CHLORIDE: 9 INJECTION, SOLUTION INTRAVENOUS at 17:27

## 2024-05-19 ASSESSMENT — LIFESTYLE VARIABLES
HOW MANY STANDARD DRINKS CONTAINING ALCOHOL DO YOU HAVE ON A TYPICAL DAY: PATIENT UNABLE TO ANSWER
HOW OFTEN DO YOU HAVE A DRINK CONTAINING ALCOHOL: 2-3 TIMES A WEEK

## 2024-05-19 NOTE — ED PROVIDER NOTES
Department of Emergency Medicine   ED  Provider Note  Admit Date/RoomTime: 5/19/2024  3:49 PM  ED Room: [address] [place] [address]          History of Present Illness:  5/19/24, Time: 7:46 PM EDT  Chief Complaint   Patient presents with    Fall     Pt fell backwards getting out of car and hit back of head, unknown loc. -thinners, 1 inch lac to back of head. Pt admits to drinking today. Pt has a broken left shoulder he was recently seen here for. Pt complaints of lower back pain. In c-collar                Kirsty Thurston is a 53 y.o. male presenting to the ED for fall.  Patient states he was getting out of his car when he lost his balance and fell and struck the back of his head.  There is no loss conscious, he is on no anticoagulation.  He was oozing of blood from the occipital area per EMS.  He was drinking today.  He was ambulatory at the scene.  Denies any chest pain or back pain.  Denies any nausea or vomiting.  Denies neck pain or stiffness.  Denies any paresthesias.  Denies any other symptoms or complaints.    Review of Systems:   Pertinent positives and negatives are stated within HPI, all other systems reviewed and are negative.        --------------------------------------------- PAST HISTORY ---------------------------------------------  Past Medical History:  has a past medical history of Hypertension and Kidney stone.    Past Surgical History:  has a past surgical history that includes Gastric bypass surgery.    Social History:  reports that he has been smoking cigarettes. He does not have any smokeless tobacco history on file. He reports current alcohol use. He reports that he does not use drugs.    Family History: family history is not on file.. Unless otherwise noted, family history is non contributory    The patient’s home medications have been reviewed.    Allergies: Pcn [penicillins]        ---------------------------------------------------PHYSICAL

## 2024-05-22 ENCOUNTER — HOSPITAL ENCOUNTER (EMERGENCY)
Age: 54
Discharge: HOME | End: 2024-05-22
Attending: EMERGENCY MEDICINE
Payer: COMMERCIAL

## 2024-05-22 VITALS
TEMPERATURE: 98.1 F | RESPIRATION RATE: 14 BRPM | HEART RATE: 99 BPM | DIASTOLIC BLOOD PRESSURE: 114 MMHG | OXYGEN SATURATION: 98 % | SYSTOLIC BLOOD PRESSURE: 178 MMHG

## 2024-05-22 DIAGNOSIS — Z59.819 HOUSING INSECURITY: Primary | ICD-10-CM

## 2024-05-22 PROCEDURE — 99282 EMERGENCY DEPT VISIT SF MDM: CPT

## 2024-05-22 SDOH — ECONOMIC STABILITY - HOUSING INSECURITY: HOUSING INSTABILITY UNSPECIFIED: Z59.819

## 2024-05-22 NOTE — ED PROVIDER NOTES
SpO2: 98%         Patient was given the following medications:  Medications - No data to display      Medical Decision Making/Differential Diagnosis:    CC/HPI Summary, Social Determinants of health, Records Reviewed, DDx, testing done/not done, ED Course, Reassessment, disposition considerations/shared decision making with patient, consults, disposition:      ED Course as of 05/22/24 1457   Wed May 22, 2024   1452   ATTENDING PROVIDER ATTESTATION:     I have personally performed and/or participated in the history, exam, medical decision making, and procedures and agree with all pertinent clinical information unless otherwise noted.    I have also reviewed and agree with the past medical, family and social history unless otherwise noted.    I have discussed this patient in detail with the resident and provided the instruction and education regarding the evidence-based evaluation and treatment of placement.    Any EKG that may have been performed has been personally reviewed by me and I agree with the documentation as noted by the resident.    History: patient states he has been having disagreements at home with his mother and she wants him to move out.  He is requesting we get him placed at [facility] where he is most comfortable.  He has no physical complaints.    My findings: Kirsty Thurston is a 53 y.o. male whom is in no distress. Physical exam reveals well appearing.  Heart RRR, lungs CTA, no focal neurologic deficits.  Ambulates without assistance.    My plan: Symptomatic and supportive care.  to assist with housing insecurity.    Electronically signed by Rosa Haile DO on 5/22/24 at 2:52 PM EDT       [JS]      ED Course User Index  [JS] Rosa Haile DO        Medical Decision Making      Patient is a 53 y.o. male who presents for housing insecurity.  Patient reporting left shoulder pain that is unchanged from previous injury a week ago.  Denies any new

## 2024-05-22 NOTE — CARE COORDINATION
SS Note: Pt w/recent brain bleeds, epidural hematoma, frontal SDH, and parietal. +ETOH. He was transferred from [facility] to [facility] & d/c'd to [facility] 4/25/24. Patient did ask about staying at [facility] while there. Pt lives w/his mom in 2 story home with 1 st floor set up. His PCP is Dr GAUSTINA Wilson Sr. & he uses either [facility] or [facility] in [place]. No DME, HHC or FELIPE history. Pt presents today for L Shoulder pain. Pt indicates possible need for housing.    SW spoke to pt & explained role. Pt notes he wants to get back into [facility] if possible. Pt notes he drove here. He confirms above information. He continues to stay w/his mother, but it appears she does not want him there- he does not disclose why. Pt notes he has some memory issues but is ambulating on own and independent otherwise. His mother noted he should go to [facility] & he declines this. Pt notes he will stay in his car if he has to. He needs to file for disability and is not working. SW asked pt about his ETOh use & he indicates it is not an issue. He again notes he really wants to get more rehab @ [facility]. SW noted he seems to be doing well physically & likely not qualify for [facility] but can call to verify this.     SW called [facility] and discussed pt and referral. She noted pt was d/c'd 5/10/24 & did not qualify for speech. He is doing well & has come back there requesting to get readmitted. She states he is not appropriate. He does not qualify for [facility]. She notes she cannot accept referral.    SW spoke to pt. He is not intoxicated & A&O x3 and SW explained he is not appropriate for [facility]. He seems to have housing issue between he & his mother. He notes he wishes his mom was not so picky. He still declines ETOH as an issue, but SW still provided him w/multiple resources for this. SW also gave him homeless shelter resources as well as housing resources. SW noted he may benefit from residential or sober house. Talked to him about calling for this. Pt notes he has

## 2024-05-29 ENCOUNTER — HOSPITAL ENCOUNTER (EMERGENCY)
Age: 54
Discharge: HOME | End: 2024-05-30
Attending: STUDENT IN AN ORGANIZED HEALTH CARE EDUCATION/TRAINING PROGRAM
Payer: COMMERCIAL

## 2024-05-29 ENCOUNTER — APPOINTMENT (OUTPATIENT)
Dept: CT IMAGING | Age: 54
End: 2024-05-29
Payer: COMMERCIAL

## 2024-05-29 VITALS
RESPIRATION RATE: 18 BRPM | BODY MASS INDEX: 26.61 KG/M2 | TEMPERATURE: 98.2 F | OXYGEN SATURATION: 96 % | DIASTOLIC BLOOD PRESSURE: 96 MMHG | HEART RATE: 83 BPM | WEIGHT: 175 LBS | SYSTOLIC BLOOD PRESSURE: 172 MMHG

## 2024-05-29 DIAGNOSIS — S22.49XA CLOSED FRACTURE OF MULTIPLE RIBS, UNSPECIFIED LATERALITY, INITIAL ENCOUNTER: ICD-10-CM
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
## 2024-05-29 PROCEDURE — 99284 EMERGENCY DEPT VISIT MOD MDM: CPT

## 2024-05-29 PROCEDURE — 71250 CT THORAX DX C-: CPT

## 2024-05-29 PROCEDURE — 72125 CT NECK SPINE W/O DYE: CPT

## 2024-05-29 PROCEDURE — 70450 CT HEAD/BRAIN W/O DYE: CPT

## 2024-05-29 PROCEDURE — 72128 CT CHEST SPINE W/O DYE: CPT

## 2024-05-29 RX ORDER — LIDOCAINE 4 G/G
1 PATCH TOPICAL DAILY
Qty: 30 PATCH | Refills: 0 | Status: SHIPPED | OUTPATIENT
Start: 2024-05-29 | End: 2024-05-29

## 2024-05-29 RX ORDER — LIDOCAINE 4 G/G
1 PATCH TOPICAL DAILY
Status: DISCONTINUED | OUTPATIENT
Start: 2024-05-30 | End: 2024-05-29

## 2024-05-29 RX ORDER — LIDOCAINE 4 G/G
1 PATCH TOPICAL DAILY
Qty: 30 PATCH | Refills: 0 | Status: SHIPPED | OUTPATIENT
Start: 2024-05-29 | End: 2024-06-28

## 2024-05-29 RX ORDER — LIDOCAINE 4 G/G
1 PATCH TOPICAL
Status: DISCONTINUED | OUTPATIENT
Start: 2024-05-30 | End: 2024-05-30 | Stop reason: HOSPADM

## 2024-05-29 ASSESSMENT — ENCOUNTER SYMPTOMS
SHORTNESS OF BREATH: 0
BACK PAIN: 0
SINUS PRESSURE: 0
EYE PAIN: 0
NAUSEA: 0
SORE THROAT: 0
COUGH: 0
EYE REDNESS: 0
DIARRHEA: 0
WHEEZING: 0
VOMITING: 0
EYE DISCHARGE: 0
ABDOMINAL PAIN: 0

## 2024-05-30 PROCEDURE — 6370000000 HC RX 637 (ALT 250 FOR IP): Performed by: STUDENT IN AN ORGANIZED HEALTH CARE EDUCATION/TRAINING PROGRAM

## 2024-05-30 NOTE — ED PROVIDER NOTES
with chronic deformities of the left 9th   and 10th ribs along the posterior angle of the rib mixed aging rib   deformities. No chest wall contusion or chest wall hematoma.   2. Cardiomegaly with trace interstitial edema involvement.   3. Hepatomegaly with hepatic steatosis.             ------------------------- NURSING NOTES AND VITALS REVIEWED ---------------------------  Date / Time Roomed:  5/29/2024  8:06 PM  ED Bed Assignment:  [id number]    The nursing notes within the ED encounter and vital signs as below have been reviewed.   BP (!) 172/96   Pulse 83   Temp 98.2 °F (36.8 °C)   Resp 18   Wt 79.4 kg (175 lb)   SpO2 96%   BMI 26.61 kg/m²   Oxygen Saturation Interpretation: Normal  --------------------------------- ADDITIONAL PROVIDER NOTES ---------------------------------  At this time the patient is without objective evidence of an acute process requiring hospitalization or inpatient management.  They have remained hemodynamically stable throughout their entire ED visit and are stable for discharge with outpatient follow-up.     The plan has been discussed in detail and they are aware of the specific conditions for emergent return, as well as the importance of follow-up.      Discharge Medication List as of 5/30/2024 12:05 AM        START taking these medications    Details   lidocaine 4 % external patch Place 1 patch onto the skin daily, TransDERmal, DAILY Starting Wed 5/29/2024, Until Fri 6/28/2024, For 30 days, Disp-30 patch, R-0, Normal             Diagnosis:  1. Fall, initial encounter    2. Closed fracture of multiple ribs, unspecified laterality, initial encounter        Disposition:  Patient's disposition: Discharge to home  Patient's condition is stable.           Shyam Grullon DO  05/30/24 0032

## 2024-05-30 NOTE — DISCHARGE INSTRUCTIONS
Continue using incentive spirometer every 2 hours while awake.    You may use Tylenol for pain as well as lidocaine patches.

## 2024-07-30 ENCOUNTER — HOSPITAL ENCOUNTER (EMERGENCY)
Age: 54
Discharge: HOME | End: 2024-07-30
Attending: STUDENT IN AN ORGANIZED HEALTH CARE EDUCATION/TRAINING PROGRAM
Payer: COMMERCIAL

## 2024-07-30 ENCOUNTER — APPOINTMENT (OUTPATIENT)
Dept: CT IMAGING | Age: 54
End: 2024-07-30
Payer: COMMERCIAL

## 2024-07-30 VITALS
TEMPERATURE: 98.6 F | RESPIRATION RATE: 20 BRPM | DIASTOLIC BLOOD PRESSURE: 97 MMHG | WEIGHT: 188 LBS | SYSTOLIC BLOOD PRESSURE: 182 MMHG | BODY MASS INDEX: 28.59 KG/M2 | OXYGEN SATURATION: 100 % | HEART RATE: 99 BPM

## 2024-07-30 DIAGNOSIS — I10 ESSENTIAL HYPERTENSION: ICD-10-CM

## 2024-07-30 DIAGNOSIS — R42 LIGHTHEADED: ICD-10-CM

## 2024-07-30 DIAGNOSIS — R51.9 NONINTRACTABLE HEADACHE, UNSPECIFIED CHRONICITY PATTERN, UNSPECIFIED HEADACHE TYPE: Primary | ICD-10-CM

## 2024-07-30 LAB
ALBUMIN SERPL-MCNC: 4.2 G/DL (ref 3.5–5.2)
ALP SERPL-CCNC: 88 U/L (ref 40–129)
ALT SERPL-CCNC: 13 U/L (ref 0–40)
ANION GAP SERPL CALCULATED.3IONS-SCNC: 10 MMOL/L (ref 7–16)
AST SERPL-CCNC: 19 U/L (ref 0–39)
BASOPHILS # BLD: 0.04 K/UL (ref 0–0.2)
BASOPHILS NFR BLD: 1 % (ref 0–2)
BILIRUB SERPL-MCNC: 0.3 MG/DL (ref 0–1.2)
BUN SERPL-MCNC: 16 MG/DL (ref 6–20)
CALCIUM SERPL-MCNC: 9.5 MG/DL (ref 8.6–10.2)
CHLORIDE SERPL-SCNC: 108 MMOL/L (ref 98–107)
CO2 SERPL-SCNC: 24 MMOL/L (ref 22–29)
CREAT SERPL-MCNC: 1 MG/DL (ref 0.7–1.2)
D-DIMER QUANTITATIVE: 263 NG/ML DDU (ref 0–230)
EOSINOPHIL # BLD: 0.3 K/UL (ref 0.05–0.5)
EOSINOPHILS RELATIVE PERCENT: 4 % (ref 0–6)
ERYTHROCYTE [DISTWIDTH] IN BLOOD BY AUTOMATED COUNT: 15.1 % (ref 11.5–15)
GFR, ESTIMATED: 86 ML/MIN/1.73M2
GLUCOSE SERPL-MCNC: 89 MG/DL (ref 74–99)
HCT VFR BLD AUTO: 37 % (ref 37–54)
HGB BLD-MCNC: 11.8 G/DL (ref 12.5–16.5)
IMM GRANULOCYTES # BLD AUTO: <0.03 K/UL (ref 0–0.58)
IMM GRANULOCYTES NFR BLD: 0 % (ref 0–5)
LYMPHOCYTES NFR BLD: 2.09 K/UL (ref 1.5–4)
LYMPHOCYTES RELATIVE PERCENT: 28 % (ref 20–42)
MCH RBC QN AUTO: 29.1 PG (ref 26–35)
MCHC RBC AUTO-ENTMCNC: 31.9 G/DL (ref 32–34.5)
MCV RBC AUTO: 91.1 FL (ref 80–99.9)
MONOCYTES NFR BLD: 0.72 K/UL (ref 0.1–0.95)
MONOCYTES NFR BLD: 10 % (ref 2–12)
NEUTROPHILS NFR BLD: 57 % (ref 43–80)
NEUTS SEG NFR BLD: 4.28 K/UL (ref 1.8–7.3)
PLATELET # BLD AUTO: 229 K/UL (ref 130–450)
PMV BLD AUTO: 10.4 FL (ref 7–12)
POTASSIUM SERPL-SCNC: 4 MMOL/L (ref 3.5–5)
PROT SERPL-MCNC: 6.7 G/DL (ref 6.4–8.3)
RBC # BLD AUTO: 4.06 M/UL (ref 3.8–5.8)
SODIUM SERPL-SCNC: 142 MMOL/L (ref 132–146)
TROPONIN I SERPL HS-MCNC: 11 NG/L (ref 0–11)
TROPONIN I SERPL HS-MCNC: 12 NG/L (ref 0–11)
WBC OTHER # BLD: 7.5 K/UL (ref 4.5–11.5)

## 2024-07-30 PROCEDURE — 6370000000 HC RX 637 (ALT 250 FOR IP): Performed by: STUDENT IN AN ORGANIZED HEALTH CARE EDUCATION/TRAINING PROGRAM

## 2024-07-30 PROCEDURE — 84484 ASSAY OF TROPONIN QUANT: CPT

## 2024-07-30 PROCEDURE — 6360000004 HC RX CONTRAST MEDICATION: Performed by: RADIOLOGY

## 2024-07-30 PROCEDURE — 96374 THER/PROPH/DIAG INJ IV PUSH: CPT

## 2024-07-30 PROCEDURE — 96375 TX/PRO/DX INJ NEW DRUG ADDON: CPT

## 2024-07-30 PROCEDURE — 80053 COMPREHEN METABOLIC PANEL: CPT

## 2024-07-30 PROCEDURE — 85379 FIBRIN DEGRADATION QUANT: CPT

## 2024-07-30 PROCEDURE — 6360000002 HC RX W HCPCS: Performed by: STUDENT IN AN ORGANIZED HEALTH CARE EDUCATION/TRAINING PROGRAM

## 2024-07-30 PROCEDURE — 93005 ELECTROCARDIOGRAM TRACING: CPT | Performed by: STUDENT IN AN ORGANIZED HEALTH CARE EDUCATION/TRAINING PROGRAM

## 2024-07-30 PROCEDURE — 71275 CT ANGIOGRAPHY CHEST: CPT

## 2024-07-30 PROCEDURE — 85025 COMPLETE CBC W/AUTO DIFF WBC: CPT

## 2024-07-30 PROCEDURE — 99285 EMERGENCY DEPT VISIT HI MDM: CPT

## 2024-07-30 PROCEDURE — 70450 CT HEAD/BRAIN W/O DYE: CPT

## 2024-07-30 RX ORDER — ACETAMINOPHEN 325 MG/1
650 TABLET ORAL ONCE
Status: COMPLETED | OUTPATIENT
Start: 2024-07-30 | End: 2024-07-30

## 2024-07-30 RX ORDER — DIPHENHYDRAMINE HYDROCHLORIDE 50 MG/ML
25 INJECTION INTRAMUSCULAR; INTRAVENOUS ONCE
Status: COMPLETED | OUTPATIENT
Start: 2024-07-30 | End: 2024-07-30

## 2024-07-30 RX ORDER — METOCLOPRAMIDE HYDROCHLORIDE 5 MG/ML
10 INJECTION INTRAMUSCULAR; INTRAVENOUS ONCE
Status: COMPLETED | OUTPATIENT
Start: 2024-07-30 | End: 2024-07-30

## 2024-07-30 RX ADMIN — METOCLOPRAMIDE 10 MG: 5 INJECTION, SOLUTION INTRAMUSCULAR; INTRAVENOUS at 19:47

## 2024-07-30 RX ADMIN — ACETAMINOPHEN 650 MG: 325 TABLET ORAL at 19:43

## 2024-07-30 RX ADMIN — IOPAMIDOL 75 ML: 755 INJECTION, SOLUTION INTRAVENOUS at 20:30

## 2024-07-30 RX ADMIN — DIPHENHYDRAMINE HYDROCHLORIDE 25 MG: 50 INJECTION, SOLUTION INTRAMUSCULAR; INTRAVENOUS at 19:44

## 2024-07-30 ASSESSMENT — LIFESTYLE VARIABLES: HOW OFTEN DO YOU HAVE A DRINK CONTAINING ALCOHOL: NEVER

## 2024-07-30 NOTE — ED PROVIDER NOTES
Department of Emergency Medicine  FIRST PROVIDER TRIAGE NOTE             Independent MLP           7/30/24  6:15 PM EDT    Date of Encounter: 7/30/24   MRN: [id number]      HPI: Kirsty Thurston is a 54 y.o. male who presents to the ED for dizziness and double vision in left eye x 1 month w/ left occipital headache x 2 days and intermittent nausea, diarrhea, dark urine, fatigue, mild intermittent SOB, and low back pain. Hx of  epidural / subdural hematoma / hemorrhage 4 months ago. Has not been taking medications for the last month. Gait is off and feels like he is going to fall down.    ROS: Negative for cp, abd pain, fever, or vomiting.    PE: Gen Appearance/Constitutional: alert  CV: regular rate  Pulm: CTA bilat  Musculoskeletal: moves all extremities x 4    Provider-Patient relationship only established for Provider In Triage (PIT)  Full assessment, HPI and examination not performed, therefore, it is not yet possible to state whether or not an emergency medical condition exists   Focused assessment only during triage. This is not a comprehensive evaluation due to no physical ER space, staff shortage and high numbers of boarding patients in the ER     Initial Plan of Care: All treatment areas with department are currently occupied. Proceed toTreatment Area When Bed Available for ED Attending/MLP to Continue Care    Electronically signed by COLTON Wilcox CNP   DD: 7/30/24       Shanita Robison APRN - CNP  07/31/24 8103

## 2024-07-31 LAB
EKG ATRIAL RATE: 74 BPM
EKG P AXIS: 31 DEGREES
EKG P-R INTERVAL: 126 MS
EKG Q-T INTERVAL: 402 MS
EKG QRS DURATION: 84 MS
EKG QTC CALCULATION (BAZETT): 446 MS
EKG R AXIS: -12 DEGREES
EKG T AXIS: 39 DEGREES
EKG VENTRICULAR RATE: 74 BPM

## 2024-07-31 PROCEDURE — 93010 ELECTROCARDIOGRAM REPORT: CPT | Performed by: INTERNAL MEDICINE

## 2024-07-31 NOTE — DISCHARGE INSTRUCTIONS
CTA PULMONARY W CONTRAST   Final Result   1. No acute pulmonary artery embolism.   2. Hiatal hernia.   3. Constipation.         CT HEAD WO CONTRAST   Final Result   No acute intracranial abnormality.

## 2024-08-03 ASSESSMENT — ENCOUNTER SYMPTOMS
CHEST TIGHTNESS: 0
ABDOMINAL PAIN: 0
DIARRHEA: 1
BACK PAIN: 0
SHORTNESS OF BREATH: 1
ABDOMINAL DISTENTION: 0
COUGH: 1
VOMITING: 0
NAUSEA: 1
PHOTOPHOBIA: 0
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
## 2024-09-10 ENCOUNTER — APPOINTMENT (OUTPATIENT)
Dept: GENERAL RADIOLOGY | Age: 54
End: 2024-09-10
Payer: COMMERCIAL

## 2024-09-10 ENCOUNTER — HOSPITAL ENCOUNTER (OUTPATIENT)
Age: 54
Setting detail: OBSERVATION
Discharge: OTHER INSTITUTION | End: 2024-09-12
Attending: EMERGENCY MEDICINE | Admitting: INTERNAL MEDICINE
Payer: COMMERCIAL

## 2024-09-10 DIAGNOSIS — R07.9 CHEST PAIN, UNSPECIFIED TYPE: Primary | ICD-10-CM

## 2024-09-10 LAB
ALBUMIN SERPL-MCNC: 3.7 G/DL (ref 3.5–5.2)
ALP SERPL-CCNC: 97 U/L (ref 40–129)
ALT SERPL-CCNC: 9 U/L (ref 0–40)
ANION GAP SERPL CALCULATED.3IONS-SCNC: 10 MMOL/L (ref 7–16)
AST SERPL-CCNC: 14 U/L (ref 0–39)
B PARAP IS1001 DNA NPH QL NAA+NON-PROBE: NOT DETECTED
B PERT DNA SPEC QL NAA+PROBE: NOT DETECTED
BASOPHILS # BLD: 0.05 K/UL (ref 0–0.2)
BASOPHILS NFR BLD: 1 % (ref 0–2)
BILIRUB SERPL-MCNC: 0.4 MG/DL (ref 0–1.2)
BNP SERPL-MCNC: 127 PG/ML (ref 0–125)
BUN SERPL-MCNC: 16 MG/DL (ref 6–20)
C PNEUM DNA NPH QL NAA+NON-PROBE: NOT DETECTED
CALCIUM SERPL-MCNC: 9.1 MG/DL (ref 8.6–10.2)
CHLORIDE SERPL-SCNC: 110 MMOL/L (ref 98–107)
CO2 SERPL-SCNC: 23 MMOL/L (ref 22–29)
CREAT SERPL-MCNC: 0.9 MG/DL (ref 0.7–1.2)
D-DIMER QUANTITATIVE: <200 NG/ML DDU (ref 0–230)
EKG ATRIAL RATE: 71 BPM
EKG P AXIS: 26 DEGREES
EKG P-R INTERVAL: 136 MS
EKG Q-T INTERVAL: 402 MS
EKG QRS DURATION: 92 MS
EKG QTC CALCULATION (BAZETT): 436 MS
EKG R AXIS: 12 DEGREES
EKG T AXIS: 1 DEGREES
EKG VENTRICULAR RATE: 71 BPM
EOSINOPHIL # BLD: 0.16 K/UL (ref 0.05–0.5)
EOSINOPHILS RELATIVE PERCENT: 2 % (ref 0–6)
ERYTHROCYTE [DISTWIDTH] IN BLOOD BY AUTOMATED COUNT: 16 % (ref 11.5–15)
FLUAV RNA NPH QL NAA+NON-PROBE: NOT DETECTED
FLUBV RNA NPH QL NAA+NON-PROBE: NOT DETECTED
GFR, ESTIMATED: >90 ML/MIN/1.73M2
GLUCOSE BLD-MCNC: 81 MG/DL (ref 74–99)
GLUCOSE SERPL-MCNC: 62 MG/DL (ref 74–99)
HADV DNA NPH QL NAA+NON-PROBE: NOT DETECTED
HCOV 229E RNA NPH QL NAA+NON-PROBE: NOT DETECTED
HCOV HKU1 RNA NPH QL NAA+NON-PROBE: NOT DETECTED
HCOV NL63 RNA NPH QL NAA+NON-PROBE: NOT DETECTED
HCOV OC43 RNA NPH QL NAA+NON-PROBE: NOT DETECTED
HCT VFR BLD AUTO: 38 % (ref 37–54)
HGB BLD-MCNC: 12 G/DL (ref 12.5–16.5)
HMPV RNA NPH QL NAA+NON-PROBE: NOT DETECTED
HPIV1 RNA NPH QL NAA+NON-PROBE: NOT DETECTED
HPIV2 RNA NPH QL NAA+NON-PROBE: NOT DETECTED
HPIV3 RNA NPH QL NAA+NON-PROBE: NOT DETECTED
HPIV4 RNA NPH QL NAA+NON-PROBE: NOT DETECTED
IMM GRANULOCYTES # BLD AUTO: 0.04 K/UL (ref 0–0.58)
IMM GRANULOCYTES NFR BLD: 0 % (ref 0–5)
LYMPHOCYTES NFR BLD: 1.89 K/UL (ref 1.5–4)
LYMPHOCYTES RELATIVE PERCENT: 21 % (ref 20–42)
M PNEUMO DNA NPH QL NAA+NON-PROBE: NOT DETECTED
MCH RBC QN AUTO: 29.1 PG (ref 26–35)
MCHC RBC AUTO-ENTMCNC: 31.6 G/DL (ref 32–34.5)
MCV RBC AUTO: 92.2 FL (ref 80–99.9)
MONOCYTES NFR BLD: 1.09 K/UL (ref 0.1–0.95)
MONOCYTES NFR BLD: 12 % (ref 2–12)
NEUTROPHILS NFR BLD: 65 % (ref 43–80)
NEUTS SEG NFR BLD: 6 K/UL (ref 1.8–7.3)
PLATELET # BLD AUTO: 236 K/UL (ref 130–450)
PMV BLD AUTO: 10.2 FL (ref 7–12)
POTASSIUM SERPL-SCNC: 4 MMOL/L (ref 3.5–5)
PROT SERPL-MCNC: 6.2 G/DL (ref 6.4–8.3)
RBC # BLD AUTO: 4.12 M/UL (ref 3.8–5.8)
RSV RNA NPH QL NAA+NON-PROBE: NOT DETECTED
RV+EV RNA NPH QL NAA+NON-PROBE: NOT DETECTED
SARS-COV-2 RNA NPH QL NAA+NON-PROBE: NOT DETECTED
SODIUM SERPL-SCNC: 143 MMOL/L (ref 132–146)
SPECIMEN DESCRIPTION: NORMAL
TROPONIN I SERPL HS-MCNC: 11 NG/L (ref 0–11)
TROPONIN I SERPL HS-MCNC: 12 NG/L (ref 0–11)
TROPONIN I SERPL HS-MCNC: 13 NG/L (ref 0–11)
WBC OTHER # BLD: 9.2 K/UL (ref 4.5–11.5)

## 2024-09-10 PROCEDURE — 82962 GLUCOSE BLOOD TEST: CPT

## 2024-09-10 PROCEDURE — 93005 ELECTROCARDIOGRAM TRACING: CPT | Performed by: EMERGENCY MEDICINE

## 2024-09-10 PROCEDURE — 83880 ASSAY OF NATRIURETIC PEPTIDE: CPT

## 2024-09-10 PROCEDURE — 6360000002 HC RX W HCPCS: Performed by: FAMILY MEDICINE

## 2024-09-10 PROCEDURE — 6370000000 HC RX 637 (ALT 250 FOR IP): Performed by: FAMILY MEDICINE

## 2024-09-10 PROCEDURE — 6370000000 HC RX 637 (ALT 250 FOR IP): Performed by: EMERGENCY MEDICINE

## 2024-09-10 PROCEDURE — 93010 ELECTROCARDIOGRAM REPORT: CPT | Performed by: INTERNAL MEDICINE

## 2024-09-10 PROCEDURE — 99285 EMERGENCY DEPT VISIT HI MDM: CPT

## 2024-09-10 PROCEDURE — 84484 ASSAY OF TROPONIN QUANT: CPT

## 2024-09-10 PROCEDURE — G0378 HOSPITAL OBSERVATION PER HR: HCPCS

## 2024-09-10 PROCEDURE — 85379 FIBRIN DEGRADATION QUANT: CPT

## 2024-09-10 PROCEDURE — 96372 THER/PROPH/DIAG INJ SC/IM: CPT

## 2024-09-10 PROCEDURE — 36415 COLL VENOUS BLD VENIPUNCTURE: CPT

## 2024-09-10 PROCEDURE — 2580000003 HC RX 258: Performed by: FAMILY MEDICINE

## 2024-09-10 PROCEDURE — 71045 X-RAY EXAM CHEST 1 VIEW: CPT

## 2024-09-10 PROCEDURE — 85025 COMPLETE CBC W/AUTO DIFF WBC: CPT

## 2024-09-10 PROCEDURE — 0202U NFCT DS 22 TRGT SARS-COV-2: CPT

## 2024-09-10 PROCEDURE — 80053 COMPREHEN METABOLIC PANEL: CPT

## 2024-09-10 RX ORDER — POTASSIUM CHLORIDE 1500 MG/1
40 TABLET, EXTENDED RELEASE ORAL PRN
Status: DISCONTINUED | OUTPATIENT
Start: 2024-09-10 | End: 2024-09-12 | Stop reason: HOSPADM

## 2024-09-10 RX ORDER — ONDANSETRON 2 MG/ML
4 INJECTION INTRAMUSCULAR; INTRAVENOUS EVERY 6 HOURS PRN
Status: DISCONTINUED | OUTPATIENT
Start: 2024-09-10 | End: 2024-09-12 | Stop reason: HOSPADM

## 2024-09-10 RX ORDER — ATORVASTATIN CALCIUM 40 MG/1
40 TABLET, FILM COATED ORAL NIGHTLY
Status: DISCONTINUED | OUTPATIENT
Start: 2024-09-10 | End: 2024-09-12 | Stop reason: HOSPADM

## 2024-09-10 RX ORDER — NITROGLYCERIN 0.4 MG/1
0.4 TABLET SUBLINGUAL ONCE
Status: COMPLETED | OUTPATIENT
Start: 2024-09-10 | End: 2024-09-10

## 2024-09-10 RX ORDER — ASPIRIN 81 MG/1
81 TABLET, CHEWABLE ORAL DAILY
Status: DISCONTINUED | OUTPATIENT
Start: 2024-09-11 | End: 2024-09-12 | Stop reason: HOSPADM

## 2024-09-10 RX ORDER — MAGNESIUM SULFATE IN WATER 40 MG/ML
2000 INJECTION, SOLUTION INTRAVENOUS PRN
Status: DISCONTINUED | OUTPATIENT
Start: 2024-09-10 | End: 2024-09-12 | Stop reason: HOSPADM

## 2024-09-10 RX ORDER — ENOXAPARIN SODIUM 100 MG/ML
40 INJECTION SUBCUTANEOUS DAILY
Status: DISCONTINUED | OUTPATIENT
Start: 2024-09-10 | End: 2024-09-12 | Stop reason: HOSPADM

## 2024-09-10 RX ORDER — ONDANSETRON 4 MG/1
4 TABLET, ORALLY DISINTEGRATING ORAL EVERY 8 HOURS PRN
Status: DISCONTINUED | OUTPATIENT
Start: 2024-09-10 | End: 2024-09-12 | Stop reason: HOSPADM

## 2024-09-10 RX ORDER — POTASSIUM CHLORIDE 7.45 MG/ML
10 INJECTION INTRAVENOUS PRN
Status: DISCONTINUED | OUTPATIENT
Start: 2024-09-10 | End: 2024-09-12 | Stop reason: HOSPADM

## 2024-09-10 RX ORDER — ACETAMINOPHEN 325 MG/1
650 TABLET ORAL EVERY 6 HOURS PRN
Status: DISCONTINUED | OUTPATIENT
Start: 2024-09-10 | End: 2024-09-12 | Stop reason: HOSPADM

## 2024-09-10 RX ORDER — SODIUM CHLORIDE 9 MG/ML
INJECTION, SOLUTION INTRAVENOUS PRN
Status: DISCONTINUED | OUTPATIENT
Start: 2024-09-10 | End: 2024-09-12 | Stop reason: HOSPADM

## 2024-09-10 RX ORDER — SODIUM CHLORIDE 0.9 % (FLUSH) 0.9 %
10 SYRINGE (ML) INJECTION PRN
Status: DISCONTINUED | OUTPATIENT
Start: 2024-09-10 | End: 2024-09-12 | Stop reason: HOSPADM

## 2024-09-10 RX ORDER — ASPIRIN 81 MG/1
324 TABLET, CHEWABLE ORAL ONCE
Status: DISCONTINUED | OUTPATIENT
Start: 2024-09-10 | End: 2024-09-12 | Stop reason: HOSPADM

## 2024-09-10 RX ORDER — GUAIFENESIN 200 MG/10ML
200 LIQUID ORAL EVERY 6 HOURS PRN
Status: DISCONTINUED | OUTPATIENT
Start: 2024-09-10 | End: 2024-09-12 | Stop reason: HOSPADM

## 2024-09-10 RX ORDER — SODIUM CHLORIDE 0.9 % (FLUSH) 0.9 %
5-40 SYRINGE (ML) INJECTION EVERY 12 HOURS SCHEDULED
Status: DISCONTINUED | OUTPATIENT
Start: 2024-09-10 | End: 2024-09-12 | Stop reason: HOSPADM

## 2024-09-10 RX ORDER — ACETAMINOPHEN 650 MG/1
650 SUPPOSITORY RECTAL EVERY 6 HOURS PRN
Status: DISCONTINUED | OUTPATIENT
Start: 2024-09-10 | End: 2024-09-12 | Stop reason: HOSPADM

## 2024-09-10 RX ADMIN — SODIUM CHLORIDE, PRESERVATIVE FREE 10 ML: 5 INJECTION INTRAVENOUS at 20:56

## 2024-09-10 RX ADMIN — NITROGLYCERIN 0.4 MG: 0.4 TABLET, ORALLY DISINTEGRATING SUBLINGUAL at 14:00

## 2024-09-10 RX ADMIN — ATORVASTATIN CALCIUM 40 MG: 40 TABLET, FILM COATED ORAL at 20:55

## 2024-09-10 RX ADMIN — ACETAMINOPHEN 650 MG: 325 TABLET ORAL at 18:27

## 2024-09-10 RX ADMIN — ENOXAPARIN SODIUM 40 MG: 100 INJECTION SUBCUTANEOUS at 18:27

## 2024-09-10 ASSESSMENT — PAIN DESCRIPTION - LOCATION: LOCATION: HEAD

## 2024-09-10 ASSESSMENT — PAIN - FUNCTIONAL ASSESSMENT: PAIN_FUNCTIONAL_ASSESSMENT: NONE - DENIES PAIN

## 2024-09-10 ASSESSMENT — PAIN SCALES - GENERAL
PAINLEVEL_OUTOF10: 0
PAINLEVEL_OUTOF10: 7

## 2024-09-11 ENCOUNTER — APPOINTMENT (OUTPATIENT)
Dept: NUCLEAR MEDICINE | Age: 54
End: 2024-09-11
Payer: COMMERCIAL

## 2024-09-11 ENCOUNTER — APPOINTMENT (OUTPATIENT)
Age: 54
End: 2024-09-11
Payer: COMMERCIAL

## 2024-09-11 LAB
ALBUMIN SERPL-MCNC: 3.1 G/DL (ref 3.5–5.2)
ALP SERPL-CCNC: 78 U/L (ref 40–129)
ALT SERPL-CCNC: 8 U/L (ref 0–40)
ANION GAP SERPL CALCULATED.3IONS-SCNC: 11 MMOL/L (ref 7–16)
AST SERPL-CCNC: 11 U/L (ref 0–39)
BILIRUB SERPL-MCNC: 0.6 MG/DL (ref 0–1.2)
BUN SERPL-MCNC: 16 MG/DL (ref 6–20)
CALCIUM SERPL-MCNC: 8.8 MG/DL (ref 8.6–10.2)
CHLORIDE SERPL-SCNC: 108 MMOL/L (ref 98–107)
CHOLEST SERPL-MCNC: 89 MG/DL
CO2 SERPL-SCNC: 21 MMOL/L (ref 22–29)
CREAT SERPL-MCNC: 0.7 MG/DL (ref 0.7–1.2)
ECHO BSA: 1.9 M2
ERYTHROCYTE [DISTWIDTH] IN BLOOD BY AUTOMATED COUNT: 15.9 % (ref 11.5–15)
GFR, ESTIMATED: >90 ML/MIN/1.73M2
GLUCOSE SERPL-MCNC: 87 MG/DL (ref 74–99)
HBA1C MFR BLD: 5 % (ref 4–5.6)
HCT VFR BLD AUTO: 33.3 % (ref 37–54)
HDLC SERPL-MCNC: 54 MG/DL
HGB BLD-MCNC: 11 G/DL (ref 12.5–16.5)
LDLC SERPL CALC-MCNC: 27 MG/DL
MCH RBC QN AUTO: 29.6 PG (ref 26–35)
MCHC RBC AUTO-ENTMCNC: 33 G/DL (ref 32–34.5)
MCV RBC AUTO: 89.5 FL (ref 80–99.9)
NUC STRESS EJECTION FRACTION: 69 %
PLATELET # BLD AUTO: 200 K/UL (ref 130–450)
PMV BLD AUTO: 10.4 FL (ref 7–12)
POTASSIUM SERPL-SCNC: 3.9 MMOL/L (ref 3.5–5)
PROT SERPL-MCNC: 5.3 G/DL (ref 6.4–8.3)
RBC # BLD AUTO: 3.72 M/UL (ref 3.8–5.8)
SODIUM SERPL-SCNC: 140 MMOL/L (ref 132–146)
STRESS BASELINE DIAS BP: 82 MMHG
STRESS BASELINE HR: 74 BPM
STRESS BASELINE SYS BP: 178 MMHG
STRESS ESTIMATED WORKLOAD: 7.7 METS
STRESS PEAK DIAS BP: 80 MMHG
STRESS PEAK SYS BP: 180 MMHG
STRESS PERCENT HR ACHIEVED: 88 %
STRESS POST PEAK HR: 146 BPM
STRESS RATE PRESSURE PRODUCT: NORMAL BPM*MMHG
STRESS TARGET HR: 165 BPM
TRIGL SERPL-MCNC: 40 MG/DL
VLDLC SERPL CALC-MCNC: 8 MG/DL
WBC OTHER # BLD: 6.4 K/UL (ref 4.5–11.5)

## 2024-09-11 PROCEDURE — 80053 COMPREHEN METABOLIC PANEL: CPT

## 2024-09-11 PROCEDURE — 2580000003 HC RX 258: Performed by: FAMILY MEDICINE

## 2024-09-11 PROCEDURE — 36415 COLL VENOUS BLD VENIPUNCTURE: CPT

## 2024-09-11 PROCEDURE — 78452 HT MUSCLE IMAGE SPECT MULT: CPT | Performed by: INTERNAL MEDICINE

## 2024-09-11 PROCEDURE — 2500000003 HC RX 250 WO HCPCS: Performed by: NURSE PRACTITIONER

## 2024-09-11 PROCEDURE — 85027 COMPLETE CBC AUTOMATED: CPT

## 2024-09-11 PROCEDURE — 93018 CV STRESS TEST I&R ONLY: CPT | Performed by: INTERNAL MEDICINE

## 2024-09-11 PROCEDURE — 78452 HT MUSCLE IMAGE SPECT MULT: CPT

## 2024-09-11 PROCEDURE — 6370000000 HC RX 637 (ALT 250 FOR IP): Performed by: FAMILY MEDICINE

## 2024-09-11 PROCEDURE — 83036 HEMOGLOBIN GLYCOSYLATED A1C: CPT

## 2024-09-11 PROCEDURE — 80061 LIPID PANEL: CPT

## 2024-09-11 PROCEDURE — 93016 CV STRESS TEST SUPVJ ONLY: CPT | Performed by: INTERNAL MEDICINE

## 2024-09-11 PROCEDURE — 3430000000 HC RX DIAGNOSTIC RADIOPHARMACEUTICAL: Performed by: RADIOLOGY

## 2024-09-11 PROCEDURE — A9500 TC99M SESTAMIBI: HCPCS | Performed by: RADIOLOGY

## 2024-09-11 PROCEDURE — 93017 CV STRESS TEST TRACING ONLY: CPT

## 2024-09-11 PROCEDURE — G0378 HOSPITAL OBSERVATION PER HR: HCPCS

## 2024-09-11 RX ORDER — TETRAKIS(2-METHOXYISOBUTYLISOCYANIDE)COPPER(I) TETRAFLUOROBORATE 1 MG/ML
37 INJECTION, POWDER, LYOPHILIZED, FOR SOLUTION INTRAVENOUS
Status: COMPLETED | OUTPATIENT
Start: 2024-09-11 | End: 2024-09-11

## 2024-09-11 RX ORDER — TETRAKIS(2-METHOXYISOBUTYLISOCYANIDE)COPPER(I) TETRAFLUOROBORATE 1 MG/ML
12.6 INJECTION, POWDER, LYOPHILIZED, FOR SOLUTION INTRAVENOUS
Status: COMPLETED | OUTPATIENT
Start: 2024-09-11 | End: 2024-09-11

## 2024-09-11 RX ORDER — LISINOPRIL 5 MG/1
10 TABLET ORAL DAILY
Status: DISCONTINUED | OUTPATIENT
Start: 2024-09-11 | End: 2024-09-12 | Stop reason: HOSPADM

## 2024-09-11 RX ADMIN — GUAIFENESIN 200 MG: 100 LIQUID ORAL at 21:35

## 2024-09-11 RX ADMIN — ATORVASTATIN CALCIUM 40 MG: 40 TABLET, FILM COATED ORAL at 20:33

## 2024-09-11 RX ADMIN — Medication 37 MILLICURIE: at 10:24

## 2024-09-11 RX ADMIN — Medication 12.6 MILLICURIE: at 10:08

## 2024-09-11 RX ADMIN — SODIUM CHLORIDE, PRESERVATIVE FREE 10 ML: 5 INJECTION INTRAVENOUS at 20:33

## 2024-09-11 RX ADMIN — GUAIFENESIN 200 MG: 100 LIQUID ORAL at 03:57

## 2024-09-12 VITALS
WEIGHT: 169 LBS | OXYGEN SATURATION: 95 % | DIASTOLIC BLOOD PRESSURE: 92 MMHG | HEIGHT: 67 IN | HEART RATE: 74 BPM | BODY MASS INDEX: 26.53 KG/M2 | RESPIRATION RATE: 18 BRPM | SYSTOLIC BLOOD PRESSURE: 140 MMHG | TEMPERATURE: 97.3 F

## 2024-09-12 LAB
ALBUMIN SERPL-MCNC: 3 G/DL (ref 3.5–5.2)
ALP SERPL-CCNC: 87 U/L (ref 40–129)
ALT SERPL-CCNC: 8 U/L (ref 0–40)
ANION GAP SERPL CALCULATED.3IONS-SCNC: 11 MMOL/L (ref 7–16)
AST SERPL-CCNC: 11 U/L (ref 0–39)
BILIRUB SERPL-MCNC: 0.5 MG/DL (ref 0–1.2)
BUN SERPL-MCNC: 14 MG/DL (ref 6–20)
CALCIUM SERPL-MCNC: 8.6 MG/DL (ref 8.6–10.2)
CHLORIDE SERPL-SCNC: 107 MMOL/L (ref 98–107)
CO2 SERPL-SCNC: 21 MMOL/L (ref 22–29)
CREAT SERPL-MCNC: 0.8 MG/DL (ref 0.7–1.2)
GFR, ESTIMATED: >90 ML/MIN/1.73M2
GLUCOSE SERPL-MCNC: 89 MG/DL (ref 74–99)
POTASSIUM SERPL-SCNC: 3.9 MMOL/L (ref 3.5–5)
PROT SERPL-MCNC: 5.3 G/DL (ref 6.4–8.3)
SODIUM SERPL-SCNC: 139 MMOL/L (ref 132–146)

## 2024-09-12 PROCEDURE — 96372 THER/PROPH/DIAG INJ SC/IM: CPT

## 2024-09-12 PROCEDURE — 2580000003 HC RX 258: Performed by: FAMILY MEDICINE

## 2024-09-12 PROCEDURE — 36415 COLL VENOUS BLD VENIPUNCTURE: CPT

## 2024-09-12 PROCEDURE — 80053 COMPREHEN METABOLIC PANEL: CPT

## 2024-09-12 PROCEDURE — 6370000000 HC RX 637 (ALT 250 FOR IP): Performed by: FAMILY MEDICINE

## 2024-09-12 PROCEDURE — G0378 HOSPITAL OBSERVATION PER HR: HCPCS

## 2024-09-12 PROCEDURE — 6360000002 HC RX W HCPCS: Performed by: FAMILY MEDICINE

## 2024-09-12 PROCEDURE — 6370000000 HC RX 637 (ALT 250 FOR IP): Performed by: INTERNAL MEDICINE

## 2024-09-12 RX ORDER — GUAIFENESIN 600 MG/1
600 TABLET, EXTENDED RELEASE ORAL 2 TIMES DAILY PRN
Qty: 14 TABLET | Refills: 0 | Status: SHIPPED | OUTPATIENT
Start: 2024-09-12 | End: 2024-09-19

## 2024-09-12 RX ORDER — LISINOPRIL 10 MG/1
10 TABLET ORAL DAILY
Qty: 30 TABLET | Refills: 3 | Status: SHIPPED | OUTPATIENT
Start: 2024-09-13

## 2024-09-12 RX ORDER — FLUTICASONE PROPIONATE 50 MCG
2 SPRAY, SUSPENSION (ML) NASAL DAILY
Qty: 1 EACH | Refills: 0 | Status: SHIPPED | OUTPATIENT
Start: 2024-09-12 | End: 2024-09-17

## 2024-09-12 RX ADMIN — ASPIRIN 81 MG: 81 TABLET, CHEWABLE ORAL at 09:00

## 2024-09-12 RX ADMIN — LISINOPRIL 10 MG: 5 TABLET ORAL at 09:00

## 2024-09-12 RX ADMIN — SODIUM CHLORIDE, PRESERVATIVE FREE 10 ML: 5 INJECTION INTRAVENOUS at 09:00

## 2024-09-12 RX ADMIN — ENOXAPARIN SODIUM 40 MG: 100 INJECTION SUBCUTANEOUS at 09:00

## 2024-09-12 RX ADMIN — ACETAMINOPHEN 650 MG: 325 TABLET ORAL at 09:00

## 2024-09-12 ASSESSMENT — PAIN DESCRIPTION - DESCRIPTORS: DESCRIPTORS: ACHING

## 2024-09-12 ASSESSMENT — PAIN SCALES - GENERAL: PAINLEVEL_OUTOF10: 3

## 2024-09-12 ASSESSMENT — PAIN DESCRIPTION - LOCATION: LOCATION: HEAD

## 2024-09-23 ENCOUNTER — APPOINTMENT (OUTPATIENT)
Dept: GENERAL RADIOLOGY | Age: 54
End: 2024-09-23
Payer: COMMERCIAL

## 2024-09-23 ENCOUNTER — HOSPITAL ENCOUNTER (EMERGENCY)
Age: 54
Discharge: HOME | End: 2024-09-23
Attending: EMERGENCY MEDICINE
Payer: COMMERCIAL

## 2024-09-23 VITALS
WEIGHT: 170 LBS | OXYGEN SATURATION: 97 % | HEIGHT: 67 IN | RESPIRATION RATE: 16 BRPM | TEMPERATURE: 97.4 F | DIASTOLIC BLOOD PRESSURE: 87 MMHG | BODY MASS INDEX: 26.68 KG/M2 | SYSTOLIC BLOOD PRESSURE: 150 MMHG | HEART RATE: 80 BPM

## 2024-09-23 DIAGNOSIS — B34.8 RHINOVIRUS: Primary | ICD-10-CM

## 2024-09-23 LAB

## 2024-09-23 PROCEDURE — 71045 X-RAY EXAM CHEST 1 VIEW: CPT

## 2024-09-23 PROCEDURE — 96372 THER/PROPH/DIAG INJ SC/IM: CPT

## 2024-09-23 PROCEDURE — 6360000002 HC RX W HCPCS: Performed by: EMERGENCY MEDICINE

## 2024-09-23 PROCEDURE — 0202U NFCT DS 22 TRGT SARS-COV-2: CPT

## 2024-09-23 PROCEDURE — 99285 EMERGENCY DEPT VISIT HI MDM: CPT

## 2024-09-23 PROCEDURE — 93005 ELECTROCARDIOGRAM TRACING: CPT | Performed by: EMERGENCY MEDICINE

## 2024-09-23 PROCEDURE — 6370000000 HC RX 637 (ALT 250 FOR IP): Performed by: EMERGENCY MEDICINE

## 2024-09-23 RX ORDER — OXYMETAZOLINE HYDROCHLORIDE 0.05 G/100ML
2 SPRAY NASAL 2 TIMES DAILY
Qty: 12 ML | Refills: 0 | Status: SHIPPED | OUTPATIENT
Start: 2024-09-23 | End: 2024-10-23

## 2024-09-23 RX ORDER — KETOROLAC TROMETHAMINE 30 MG/ML
30 INJECTION, SOLUTION INTRAMUSCULAR; INTRAVENOUS ONCE
Status: COMPLETED | OUTPATIENT
Start: 2024-09-23 | End: 2024-09-23

## 2024-09-23 RX ORDER — ACETAMINOPHEN 325 MG/1
650 TABLET ORAL ONCE
Status: COMPLETED | OUTPATIENT
Start: 2024-09-23 | End: 2024-09-23

## 2024-09-23 RX ADMIN — KETOROLAC TROMETHAMINE 30 MG: 30 INJECTION, SOLUTION INTRAMUSCULAR at 11:11

## 2024-09-23 RX ADMIN — ACETAMINOPHEN 650 MG: 325 TABLET ORAL at 11:11

## 2024-09-23 ASSESSMENT — LIFESTYLE VARIABLES: HOW OFTEN DO YOU HAVE A DRINK CONTAINING ALCOHOL: NEVER

## 2024-09-23 ASSESSMENT — PAIN DESCRIPTION - FREQUENCY: FREQUENCY: INTERMITTENT

## 2024-09-23 ASSESSMENT — PAIN DESCRIPTION - DESCRIPTORS: DESCRIPTORS: TIGHTNESS

## 2024-09-23 ASSESSMENT — PAIN SCALES - GENERAL: PAINLEVEL_OUTOF10: 3

## 2024-09-23 ASSESSMENT — PAIN DESCRIPTION - LOCATION: LOCATION: CHEST

## 2024-09-23 ASSESSMENT — PAIN - FUNCTIONAL ASSESSMENT: PAIN_FUNCTIONAL_ASSESSMENT: 0-10

## 2024-09-23 ASSESSMENT — PAIN DESCRIPTION - PAIN TYPE: TYPE: ACUTE PAIN

## 2024-09-24 LAB
EKG ATRIAL RATE: 70 BPM
EKG P AXIS: 10 DEGREES
EKG P-R INTERVAL: 132 MS
EKG Q-T INTERVAL: 406 MS
EKG QRS DURATION: 88 MS
EKG QTC CALCULATION (BAZETT): 438 MS
EKG R AXIS: 1 DEGREES
EKG T AXIS: -11 DEGREES
EKG VENTRICULAR RATE: 70 BPM

## 2024-09-24 PROCEDURE — 93010 ELECTROCARDIOGRAM REPORT: CPT | Performed by: INTERNAL MEDICINE

## 2024-09-30 ENCOUNTER — OFFICE VISIT (OUTPATIENT)
Dept: FAMILY MEDICINE CLINIC | Age: 54
End: 2024-09-30

## 2024-09-30 VITALS
SYSTOLIC BLOOD PRESSURE: 141 MMHG | DIASTOLIC BLOOD PRESSURE: 86 MMHG | TEMPERATURE: 98.4 F | OXYGEN SATURATION: 97 % | BODY MASS INDEX: 26.68 KG/M2 | HEIGHT: 67 IN | WEIGHT: 170 LBS | RESPIRATION RATE: 18 BRPM | HEART RATE: 81 BPM

## 2024-09-30 DIAGNOSIS — F10.90 ALCOHOL USE DISORDER: ICD-10-CM

## 2024-09-30 DIAGNOSIS — I60.9 SUBARACHNOID HEMORRHAGE (HCC): ICD-10-CM

## 2024-09-30 DIAGNOSIS — S06.5XAA SUBDURAL HEMATOMA: ICD-10-CM

## 2024-09-30 DIAGNOSIS — E78.5 HYPERLIPIDEMIA, UNSPECIFIED HYPERLIPIDEMIA TYPE: ICD-10-CM

## 2024-09-30 DIAGNOSIS — Z98.84 HX OF GASTRIC BYPASS: ICD-10-CM

## 2024-09-30 DIAGNOSIS — S02.0XXD CLOSED FRACTURE OF PARIETAL BONE OF SKULL WITH ROUTINE HEALING, SUBSEQUENT ENCOUNTER: ICD-10-CM

## 2024-09-30 DIAGNOSIS — I10 ESSENTIAL HYPERTENSION: Primary | ICD-10-CM

## 2024-09-30 DIAGNOSIS — Z12.11 COLON CANCER SCREENING: ICD-10-CM

## 2024-09-30 DIAGNOSIS — F17.200 TOBACCO USE DISORDER: ICD-10-CM

## 2024-09-30 PROBLEM — F10.10 CHRONIC ALCOHOL ABUSE: Status: ACTIVE | Noted: 2024-09-30

## 2024-09-30 PROBLEM — Z59.819 HOUSING INSTABILITY: Status: ACTIVE | Noted: 2024-09-30

## 2024-09-30 PROBLEM — Z72.0 NICOTINE USE: Status: ACTIVE | Noted: 2024-09-30

## 2024-09-30 PROBLEM — Z86.79 HISTORY OF SUBARACHNOID HEMORRHAGE: Status: ACTIVE | Noted: 2024-09-30

## 2024-09-30 PROBLEM — E66.3 OVERWEIGHT: Status: ACTIVE | Noted: 2024-09-30

## 2024-09-30 PROBLEM — R26.0 ATAXIC GAIT: Status: ACTIVE | Noted: 2024-09-30

## 2024-09-30 PROBLEM — Z86.79 HISTORY OF SUBDURAL HEMORRHAGE: Status: ACTIVE | Noted: 2024-09-30

## 2024-09-30 PROBLEM — E78.2 MIXED HYPERLIPIDEMIA: Status: ACTIVE | Noted: 2024-09-30

## 2024-09-30 PROBLEM — Z87.820 HISTORY OF TRAUMATIC BRAIN INJURY: Status: ACTIVE | Noted: 2024-09-30

## 2024-09-30 LAB
FERRITIN: 22 NG/ML
FOLATE: >20 NG/ML (ref 4.8–24.2)
IRON % SATURATION: 16 % (ref 20–55)
IRON: 56 UG/DL (ref 59–158)
TOTAL IRON BINDING CAPACITY: 354 UG/DL (ref 250–450)
VITAMIN B-12: 638 PG/ML (ref 211–946)
VITAMIN D 25-HYDROXY: 27.4 NG/ML (ref 30–100)

## 2024-09-30 RX ORDER — LANOLIN ALCOHOL/MO/W.PET/CERES
100 CREAM (GRAM) TOPICAL DAILY
Qty: 30 TABLET | Refills: 2 | Status: SHIPPED | OUTPATIENT
Start: 2024-09-30

## 2024-09-30 RX ORDER — LANOLIN ALCOHOL/MO/W.PET/CERES
100 CREAM (GRAM) TOPICAL DAILY
COMMUNITY
Start: 2024-08-04 | End: 2024-09-30 | Stop reason: SDUPTHER

## 2024-09-30 RX ORDER — FOLIC ACID 1 MG/1
1000 TABLET ORAL DAILY
Qty: 30 TABLET | Refills: 2 | Status: SHIPPED | OUTPATIENT
Start: 2024-09-30

## 2024-09-30 RX ORDER — FOLIC ACID 1 MG/1
1 TABLET ORAL DAILY
COMMUNITY
Start: 2024-08-07 | End: 2024-09-30 | Stop reason: SDUPTHER

## 2024-09-30 RX ORDER — ATORVASTATIN CALCIUM 40 MG/1
40 TABLET, FILM COATED ORAL DAILY
Qty: 30 TABLET | Refills: 2 | Status: SHIPPED | OUTPATIENT
Start: 2024-09-30

## 2024-09-30 RX ORDER — AMLODIPINE BESYLATE 10 MG/1
1 TABLET ORAL DAILY
COMMUNITY
Start: 2024-08-12 | End: 2024-09-30 | Stop reason: SDUPTHER

## 2024-09-30 RX ORDER — ATORVASTATIN CALCIUM 40 MG/1
40 TABLET, FILM COATED ORAL DAILY
COMMUNITY
Start: 2024-08-04 | End: 2024-09-30

## 2024-09-30 RX ORDER — AMLODIPINE BESYLATE 10 MG/1
5 TABLET ORAL DAILY
Qty: 30 TABLET | Refills: 0 | Status: SHIPPED | OUTPATIENT
Start: 2024-09-30

## 2024-09-30 RX ORDER — LISINOPRIL 10 MG/1
10 TABLET ORAL DAILY
Qty: 30 TABLET | Refills: 3 | Status: SHIPPED | OUTPATIENT
Start: 2024-09-30

## 2024-09-30 RX ORDER — UREA 10 %
500 LOTION (ML) TOPICAL DAILY
Qty: 30 TABLET | Refills: 2 | Status: SHIPPED | OUTPATIENT
Start: 2024-09-30 | End: 2024-12-29

## 2024-09-30 SDOH — ECONOMIC STABILITY: FOOD INSECURITY: WITHIN THE PAST 12 MONTHS, THE FOOD YOU BOUGHT JUST DIDN'T LAST AND YOU DIDN'T HAVE MONEY TO GET MORE.: OFTEN TRUE

## 2024-09-30 SDOH — ECONOMIC STABILITY: FOOD INSECURITY: WITHIN THE PAST 12 MONTHS, YOU WORRIED THAT YOUR FOOD WOULD RUN OUT BEFORE YOU GOT MONEY TO BUY MORE.: OFTEN TRUE

## 2024-09-30 SDOH — ECONOMIC STABILITY: INCOME INSECURITY: HOW HARD IS IT FOR YOU TO PAY FOR THE VERY BASICS LIKE FOOD, HOUSING, MEDICAL CARE, AND HEATING?: VERY HARD

## 2024-09-30 ASSESSMENT — PATIENT HEALTH QUESTIONNAIRE - PHQ9
1. LITTLE INTEREST OR PLEASURE IN DOING THINGS: SEVERAL DAYS
SUM OF ALL RESPONSES TO PHQ QUESTIONS 1-9: 1
SUM OF ALL RESPONSES TO PHQ9 QUESTIONS 1 & 2: 1
SUM OF ALL RESPONSES TO PHQ QUESTIONS 1-9: 1
2. FEELING DOWN, DEPRESSED OR HOPELESS: NOT AT ALL
SUM OF ALL RESPONSES TO PHQ QUESTIONS 1-9: 1
SUM OF ALL RESPONSES TO PHQ QUESTIONS 1-9: 1
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
## 2024-09-30 NOTE — PROGRESS NOTES
Attending Physician Statement    S:   Chief Complaint   Patient presents with    New Patient    Hypertension    Medication Refill    Sinus Problem     Cough, sinus and ears clogged.    Referral - General     Neurologist patient stated he has a brain bleed.      PMH -HTN, CVA (hemorrhage/skull fracture), EtOH abuse.  Never followed after discharge from skull fracture.  Abnormal gait, dizziness - no therapy   Not taking amlodipine recently - asymptomatic   Sober for 6 months, lives at [facility]   Had gastric bypass in 2001, but not taking supplements currently  O: Blood pressure (!) 141/86, pulse 81, temperature 98.4 °F (36.9 °C), temperature source Temporal, resp. rate 18, height 1.702 m (5' 7\"), weight 77.1 kg (170 lb), SpO2 97%.   Exam:   Heart - RRR   Lungs - clear   ENT- negative   Neuro - finger-to-nose, alternating movements abnormal on left.  Ataxic gait (mild)  A: As above  P:  Refer to neurology   Restart amlodipine   Vitamin supplementation   Reviewed labs/previous records   Obtain iron and vitamin studies   Follow-up as ordered    I have discussed the case, including pertinent history and exam findings with the resident.  I also have personally seen and examined the patient.  I agree with the documented assessment and plan.    David Evans MD

## 2024-10-01 ENCOUNTER — TELEPHONE (OUTPATIENT)
Dept: FAMILY MEDICINE CLINIC | Age: 54
End: 2024-10-01

## 2024-10-01 NOTE — TELEPHONE ENCOUNTER
Per pt request in office yesterday, contacted the [facility] in order to reach him and inform of results. Pt was not available at that time, left message advising him to call with best direct contact info.

## 2024-10-07 ENCOUNTER — APPOINTMENT (OUTPATIENT)
Dept: CT IMAGING | Age: 54
End: 2024-10-07
Payer: COMMERCIAL

## 2024-10-07 ENCOUNTER — HOSPITAL ENCOUNTER (EMERGENCY)
Age: 54
Discharge: HOME | End: 2024-10-07
Attending: EMERGENCY MEDICINE
Payer: COMMERCIAL

## 2024-10-07 VITALS
HEART RATE: 92 BPM | BODY MASS INDEX: 26.68 KG/M2 | OXYGEN SATURATION: 97 % | SYSTOLIC BLOOD PRESSURE: 161 MMHG | HEIGHT: 67 IN | TEMPERATURE: 97.7 F | WEIGHT: 170 LBS | DIASTOLIC BLOOD PRESSURE: 85 MMHG | RESPIRATION RATE: 16 BRPM

## 2024-10-07 DIAGNOSIS — R42 LIGHTHEADEDNESS: Primary | ICD-10-CM

## 2024-10-07 PROCEDURE — 72125 CT NECK SPINE W/O DYE: CPT

## 2024-10-07 PROCEDURE — 70450 CT HEAD/BRAIN W/O DYE: CPT

## 2024-10-07 PROCEDURE — 99284 EMERGENCY DEPT VISIT MOD MDM: CPT

## 2024-10-07 PROCEDURE — 6370000000 HC RX 637 (ALT 250 FOR IP)

## 2024-10-07 RX ORDER — MECLIZINE HCL 12.5 MG 12.5 MG/1
25 TABLET ORAL ONCE
Status: COMPLETED | OUTPATIENT
Start: 2024-10-07 | End: 2024-10-07

## 2024-10-07 RX ORDER — ACETAMINOPHEN 500 MG
1000 TABLET ORAL ONCE
Status: COMPLETED | OUTPATIENT
Start: 2024-10-07 | End: 2024-10-07

## 2024-10-07 RX ADMIN — MECLIZINE 25 MG: 12.5 TABLET ORAL at 14:16

## 2024-10-07 RX ADMIN — ACETAMINOPHEN 1000 MG: 500 TABLET ORAL at 14:15

## 2024-10-07 ASSESSMENT — LIFESTYLE VARIABLES
HOW OFTEN DO YOU HAVE A DRINK CONTAINING ALCOHOL: NEVER
HOW MANY STANDARD DRINKS CONTAINING ALCOHOL DO YOU HAVE ON A TYPICAL DAY: PATIENT DOES NOT DRINK

## 2024-10-07 ASSESSMENT — PAIN SCALES - GENERAL: PAINLEVEL_OUTOF10: 10
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
## 2024-10-09 ENCOUNTER — TELEPHONE (OUTPATIENT)
Dept: FAMILY MEDICINE CLINIC | Age: 54
End: 2024-10-09

## 2024-10-09 DIAGNOSIS — I60.9 SUBARACHNOID HEMORRHAGE (HCC): Primary | ICD-10-CM

## 2024-10-09 DIAGNOSIS — S06.5XAA SUBDURAL HEMATOMA: ICD-10-CM

## 2024-10-09 DIAGNOSIS — S06.A0XA: ICD-10-CM

## 2024-10-09 DIAGNOSIS — S06.2XAA: ICD-10-CM

## 2024-10-09 NOTE — TELEPHONE ENCOUNTER
[facility] called.  Provider doesn't treat condition referred for.  Please redirect referral to another practice.

## 2024-10-10 ENCOUNTER — APPOINTMENT (OUTPATIENT)
Dept: GENERAL RADIOLOGY | Age: 54
End: 2024-10-10
Payer: COMMERCIAL

## 2024-10-10 ENCOUNTER — HOSPITAL ENCOUNTER (EMERGENCY)
Age: 54
Discharge: HOME | End: 2024-10-10
Attending: STUDENT IN AN ORGANIZED HEALTH CARE EDUCATION/TRAINING PROGRAM
Payer: COMMERCIAL

## 2024-10-10 ENCOUNTER — APPOINTMENT (OUTPATIENT)
Dept: CT IMAGING | Age: 54
End: 2024-10-10
Payer: COMMERCIAL

## 2024-10-10 VITALS
HEART RATE: 90 BPM | SYSTOLIC BLOOD PRESSURE: 123 MMHG | OXYGEN SATURATION: 97 % | DIASTOLIC BLOOD PRESSURE: 83 MMHG | RESPIRATION RATE: 18 BRPM | TEMPERATURE: 98.3 F

## 2024-10-10 DIAGNOSIS — R42 LIGHTHEADEDNESS: ICD-10-CM

## 2024-10-10 DIAGNOSIS — J18.9 PNEUMONIA DUE TO INFECTIOUS ORGANISM, UNSPECIFIED LATERALITY, UNSPECIFIED PART OF LUNG: Primary | ICD-10-CM

## 2024-10-10 DIAGNOSIS — R51.9 NONINTRACTABLE HEADACHE, UNSPECIFIED CHRONICITY PATTERN, UNSPECIFIED HEADACHE TYPE: ICD-10-CM

## 2024-10-10 LAB
ALBUMIN SERPL-MCNC: 3.8 G/DL (ref 3.5–5.2)
ALP SERPL-CCNC: 86 U/L (ref 40–129)
ALT SERPL-CCNC: 15 U/L (ref 0–40)
ANION GAP SERPL CALCULATED.3IONS-SCNC: 10 MMOL/L (ref 7–16)
AST SERPL-CCNC: 17 U/L (ref 0–39)
BASOPHILS # BLD: 0.05 K/UL (ref 0–0.2)
BASOPHILS NFR BLD: 1 % (ref 0–2)
BILIRUB SERPL-MCNC: 0.3 MG/DL (ref 0–1.2)
BUN SERPL-MCNC: 19 MG/DL (ref 6–20)
CALCIUM SERPL-MCNC: 9.6 MG/DL (ref 8.6–10.2)
CHLORIDE SERPL-SCNC: 108 MMOL/L (ref 98–107)
CO2 SERPL-SCNC: 23 MMOL/L (ref 22–29)
CREAT SERPL-MCNC: 1.1 MG/DL (ref 0.7–1.2)
EKG ATRIAL RATE: 85 BPM
EKG P AXIS: 24 DEGREES
EKG P-R INTERVAL: 144 MS
EKG Q-T INTERVAL: 362 MS
EKG QRS DURATION: 84 MS
EKG QTC CALCULATION (BAZETT): 430 MS
EKG R AXIS: -5 DEGREES
EKG T AXIS: 30 DEGREES
EKG VENTRICULAR RATE: 85 BPM
EOSINOPHIL # BLD: 0.31 K/UL (ref 0.05–0.5)
EOSINOPHILS RELATIVE PERCENT: 3 % (ref 0–6)
ERYTHROCYTE [DISTWIDTH] IN BLOOD BY AUTOMATED COUNT: 15.9 % (ref 11.5–15)
GFR, ESTIMATED: 80 ML/MIN/1.73M2
GLUCOSE SERPL-MCNC: 107 MG/DL (ref 74–99)
HCT VFR BLD AUTO: 39.2 % (ref 37–54)
HGB BLD-MCNC: 12.6 G/DL (ref 12.5–16.5)
IMM GRANULOCYTES # BLD AUTO: 0.06 K/UL (ref 0–0.58)
IMM GRANULOCYTES NFR BLD: 1 % (ref 0–5)
LYMPHOCYTES NFR BLD: 1.78 K/UL (ref 1.5–4)
LYMPHOCYTES RELATIVE PERCENT: 19 % (ref 20–42)
MCH RBC QN AUTO: 30.2 PG (ref 26–35)
MCHC RBC AUTO-ENTMCNC: 32.1 G/DL (ref 32–34.5)
MCV RBC AUTO: 94 FL (ref 80–99.9)
MONOCYTES NFR BLD: 0.68 K/UL (ref 0.1–0.95)
MONOCYTES NFR BLD: 7 % (ref 2–12)
NEUTROPHILS NFR BLD: 69 % (ref 43–80)
NEUTS SEG NFR BLD: 6.39 K/UL (ref 1.8–7.3)
PLATELET # BLD AUTO: 265 K/UL (ref 130–450)
PMV BLD AUTO: 9.9 FL (ref 7–12)
POTASSIUM SERPL-SCNC: 4.4 MMOL/L (ref 3.5–5)
PROT SERPL-MCNC: 6.3 G/DL (ref 6.4–8.3)
RBC # BLD AUTO: 4.17 M/UL (ref 3.8–5.8)
SODIUM SERPL-SCNC: 141 MMOL/L (ref 132–146)
TROPONIN I SERPL HS-MCNC: 10 NG/L (ref 0–11)
TROPONIN I SERPL HS-MCNC: 11 NG/L (ref 0–11)
WBC OTHER # BLD: 9.3 K/UL (ref 4.5–11.5)

## 2024-10-10 PROCEDURE — 71045 X-RAY EXAM CHEST 1 VIEW: CPT

## 2024-10-10 PROCEDURE — 93005 ELECTROCARDIOGRAM TRACING: CPT | Performed by: STUDENT IN AN ORGANIZED HEALTH CARE EDUCATION/TRAINING PROGRAM

## 2024-10-10 PROCEDURE — 93010 ELECTROCARDIOGRAM REPORT: CPT | Performed by: INTERNAL MEDICINE

## 2024-10-10 PROCEDURE — 99285 EMERGENCY DEPT VISIT HI MDM: CPT

## 2024-10-10 PROCEDURE — 84484 ASSAY OF TROPONIN QUANT: CPT

## 2024-10-10 PROCEDURE — 80053 COMPREHEN METABOLIC PANEL: CPT

## 2024-10-10 PROCEDURE — 70450 CT HEAD/BRAIN W/O DYE: CPT

## 2024-10-10 PROCEDURE — 6370000000 HC RX 637 (ALT 250 FOR IP): Performed by: STUDENT IN AN ORGANIZED HEALTH CARE EDUCATION/TRAINING PROGRAM

## 2024-10-10 PROCEDURE — 85025 COMPLETE CBC W/AUTO DIFF WBC: CPT

## 2024-10-10 RX ORDER — ACETAMINOPHEN 500 MG
1000 TABLET ORAL ONCE
Status: COMPLETED | OUTPATIENT
Start: 2024-10-10 | End: 2024-10-10

## 2024-10-10 RX ORDER — MECLIZINE HCL 12.5 MG 12.5 MG/1
25 TABLET ORAL ONCE
Status: COMPLETED | OUTPATIENT
Start: 2024-10-10 | End: 2024-10-10

## 2024-10-10 RX ORDER — DOXYCYCLINE HYCLATE 100 MG
100 TABLET ORAL 2 TIMES DAILY
Qty: 14 TABLET | Refills: 0 | Status: SHIPPED | OUTPATIENT
Start: 2024-10-10 | End: 2024-10-17

## 2024-10-10 RX ORDER — DOXYCYCLINE 100 MG/1
100 CAPSULE ORAL ONCE
Status: COMPLETED | OUTPATIENT
Start: 2024-10-10 | End: 2024-10-10

## 2024-10-10 RX ADMIN — DOXYCYCLINE HYCLATE 100 MG: 100 CAPSULE ORAL at 17:27

## 2024-10-10 RX ADMIN — MECLIZINE 25 MG: 12.5 TABLET ORAL at 16:50

## 2024-10-10 RX ADMIN — ACETAMINOPHEN 1000 MG: 500 TABLET ORAL at 16:50

## 2024-10-10 ASSESSMENT — PAIN DESCRIPTION - LOCATION
LOCATION: HEAD
LOCATION: HEAD

## 2024-10-10 ASSESSMENT — PAIN DESCRIPTION - ORIENTATION
ORIENTATION: POSTERIOR;LOWER
ORIENTATION: POSTERIOR

## 2024-10-10 ASSESSMENT — PAIN DESCRIPTION - DESCRIPTORS
DESCRIPTORS: ACHING
DESCRIPTORS: ACHING

## 2024-10-10 ASSESSMENT — PAIN DESCRIPTION - PAIN TYPE: TYPE: ACUTE PAIN

## 2024-10-10 ASSESSMENT — PAIN SCALES - GENERAL
PAINLEVEL_OUTOF10: 7
PAINLEVEL_OUTOF10: 7

## 2024-10-10 ASSESSMENT — PAIN - FUNCTIONAL ASSESSMENT
PAIN_FUNCTIONAL_ASSESSMENT: ACTIVITIES ARE NOT PREVENTED
PAIN_FUNCTIONAL_ASSESSMENT: 0-10

## 2024-10-10 ASSESSMENT — PAIN DESCRIPTION - FREQUENCY: FREQUENCY: INTERMITTENT

## 2024-10-10 NOTE — ED PROVIDER NOTES
Pedro Thurston is a 54-year-old male present emergency department concern for cough, URI symptoms, symptoms of dizziness.  Patient states that he sometimes feels like the room is spinning when he gets up.  He feels fatigued.  Patient states he does have a history of intracranial hemorrhage.  Symptoms have been present for 3 days.  Cough is productive.  Patient denies nausea, vomiting, fever, chills.  Patient states that he did hit his head on a bed frame several days before symptoms had started    The history is provided by the patient and medical records.        Review of Systems   Constitutional:  Negative for chills, diaphoresis, fatigue and fever.   HENT:  Positive for congestion.    Eyes:  Negative for photophobia and visual disturbance.   Respiratory:  Positive for cough. Negative for chest tightness and shortness of breath.    Cardiovascular:  Negative for chest pain, palpitations and leg swelling.   Gastrointestinal:  Negative for abdominal distention, abdominal pain, diarrhea, nausea and vomiting.   Genitourinary:  Negative for dysuria.   Musculoskeletal:  Negative for back pain, neck pain and neck stiffness.   Skin:  Negative for pallor and rash.   Neurological:  Positive for dizziness and headaches.   Psychiatric/Behavioral:  Negative for confusion.         Physical Exam  Vitals and nursing note reviewed.   Constitutional:       General: He is not in acute distress.     Appearance: Normal appearance. He is not ill-appearing.   HENT:      Head: Normocephalic and atraumatic.   Eyes:      General: No scleral icterus.     Conjunctiva/sclera: Conjunctivae normal.      Pupils: Pupils are equal, round, and reactive to light.   Cardiovascular:      Rate and Rhythm: Normal rate and regular rhythm.   Pulmonary:      Effort: Pulmonary effort is normal.      Breath sounds: Normal breath sounds.   Abdominal:      General: Bowel sounds are normal. There is no distension.      Palpations: Abdomen is soft.

## 2024-10-11 NOTE — PROGRESS NOTES
CLINICAL PHARMACY NOTE: MEDS TO BEDS    Total # of Prescriptions Filled: 1   The following medications were delivered to the patient:  Doxycycline hyclate 100 mg    Additional Documentation:   Pt picked up

## 2024-10-12 ASSESSMENT — ENCOUNTER SYMPTOMS
DIARRHEA: 0
COUGH: 1
CHEST TIGHTNESS: 0
SHORTNESS OF BREATH: 0
ABDOMINAL DISTENTION: 0
PHOTOPHOBIA: 0
ABDOMINAL PAIN: 0
VOMITING: 0
BACK PAIN: 0
NAUSEA: 0

## 2024-10-17 ENCOUNTER — APPOINTMENT (OUTPATIENT)
Dept: CT IMAGING | Age: 54
End: 2024-10-17
Payer: COMMERCIAL

## 2024-10-17 ENCOUNTER — HOSPITAL ENCOUNTER (EMERGENCY)
Age: 54
Discharge: HOME | End: 2024-10-18
Payer: COMMERCIAL

## 2024-10-17 ENCOUNTER — APPOINTMENT (OUTPATIENT)
Dept: GENERAL RADIOLOGY | Age: 54
End: 2024-10-17
Payer: COMMERCIAL

## 2024-10-17 DIAGNOSIS — F10.90 ALCOHOL USE DISORDER: ICD-10-CM

## 2024-10-17 DIAGNOSIS — R42 DIZZINESS: Primary | ICD-10-CM

## 2024-10-17 DIAGNOSIS — E78.5 HYPERLIPIDEMIA, UNSPECIFIED HYPERLIPIDEMIA TYPE: ICD-10-CM

## 2024-10-17 DIAGNOSIS — I10 ESSENTIAL HYPERTENSION: ICD-10-CM

## 2024-10-17 DIAGNOSIS — Z98.84 HX OF GASTRIC BYPASS: ICD-10-CM

## 2024-10-17 LAB
ALBUMIN SERPL-MCNC: 3.9 G/DL (ref 3.5–5.2)
ALP SERPL-CCNC: 71 U/L (ref 40–129)
ALT SERPL-CCNC: 19 U/L (ref 0–40)
ANION GAP SERPL CALCULATED.3IONS-SCNC: 11 MMOL/L (ref 7–16)
AST SERPL-CCNC: 36 U/L (ref 0–39)
BASOPHILS # BLD: 0.04 K/UL (ref 0–0.2)
BASOPHILS NFR BLD: 1 % (ref 0–2)
BILIRUB SERPL-MCNC: 0.3 MG/DL (ref 0–1.2)
BUN SERPL-MCNC: 19 MG/DL (ref 6–20)
CALCIUM SERPL-MCNC: 9.1 MG/DL (ref 8.6–10.2)
CHLORIDE SERPL-SCNC: 110 MMOL/L (ref 98–107)
CO2 SERPL-SCNC: 20 MMOL/L (ref 22–29)
CREAT SERPL-MCNC: 0.7 MG/DL (ref 0.7–1.2)
EOSINOPHIL # BLD: 0.46 K/UL (ref 0.05–0.5)
EOSINOPHILS RELATIVE PERCENT: 6 % (ref 0–6)
ERYTHROCYTE [DISTWIDTH] IN BLOOD BY AUTOMATED COUNT: 15.9 % (ref 11.5–15)
GFR, ESTIMATED: >90 ML/MIN/1.73M2
GLUCOSE SERPL-MCNC: 91 MG/DL (ref 74–99)
HCT VFR BLD AUTO: 38.8 % (ref 37–54)
HGB BLD-MCNC: 12.3 G/DL (ref 12.5–16.5)
IMM GRANULOCYTES # BLD AUTO: <0.03 K/UL (ref 0–0.58)
IMM GRANULOCYTES NFR BLD: 0 % (ref 0–5)
LYMPHOCYTES NFR BLD: 2.08 K/UL (ref 1.5–4)
LYMPHOCYTES RELATIVE PERCENT: 26 % (ref 20–42)
MAGNESIUM SERPL-MCNC: 2.2 MG/DL (ref 1.6–2.6)
MCH RBC QN AUTO: 29.8 PG (ref 26–35)
MCHC RBC AUTO-ENTMCNC: 31.7 G/DL (ref 32–34.5)
MCV RBC AUTO: 93.9 FL (ref 80–99.9)
MONOCYTES NFR BLD: 0.7 K/UL (ref 0.1–0.95)
MONOCYTES NFR BLD: 9 % (ref 2–12)
NEUTROPHILS NFR BLD: 58 % (ref 43–80)
NEUTS SEG NFR BLD: 4.6 K/UL (ref 1.8–7.3)
PLATELET # BLD AUTO: 264 K/UL (ref 130–450)
PMV BLD AUTO: 9.8 FL (ref 7–12)
POTASSIUM SERPL-SCNC: 5.1 MMOL/L (ref 3.5–5)
PROT SERPL-MCNC: 7 G/DL (ref 6.4–8.3)
RBC # BLD AUTO: 4.13 M/UL (ref 3.8–5.8)
SODIUM SERPL-SCNC: 141 MMOL/L (ref 132–146)
WBC OTHER # BLD: 7.9 K/UL (ref 4.5–11.5)

## 2024-10-17 PROCEDURE — 6360000002 HC RX W HCPCS: Performed by: NURSE PRACTITIONER

## 2024-10-17 PROCEDURE — 71045 X-RAY EXAM CHEST 1 VIEW: CPT

## 2024-10-17 PROCEDURE — 83735 ASSAY OF MAGNESIUM: CPT

## 2024-10-17 PROCEDURE — 96374 THER/PROPH/DIAG INJ IV PUSH: CPT

## 2024-10-17 PROCEDURE — 93005 ELECTROCARDIOGRAM TRACING: CPT | Performed by: NURSE PRACTITIONER

## 2024-10-17 PROCEDURE — 6370000000 HC RX 637 (ALT 250 FOR IP): Performed by: NURSE PRACTITIONER

## 2024-10-17 PROCEDURE — 2580000003 HC RX 258: Performed by: RADIOLOGY

## 2024-10-17 PROCEDURE — 99285 EMERGENCY DEPT VISIT HI MDM: CPT

## 2024-10-17 PROCEDURE — 96375 TX/PRO/DX INJ NEW DRUG ADDON: CPT

## 2024-10-17 PROCEDURE — 84484 ASSAY OF TROPONIN QUANT: CPT

## 2024-10-17 PROCEDURE — 70498 CT ANGIOGRAPHY NECK: CPT

## 2024-10-17 PROCEDURE — 85025 COMPLETE CBC W/AUTO DIFF WBC: CPT

## 2024-10-17 PROCEDURE — 6360000004 HC RX CONTRAST MEDICATION: Performed by: RADIOLOGY

## 2024-10-17 PROCEDURE — 70496 CT ANGIOGRAPHY HEAD: CPT

## 2024-10-17 PROCEDURE — 80053 COMPREHEN METABOLIC PANEL: CPT

## 2024-10-17 RX ORDER — SODIUM CHLORIDE 0.9 % (FLUSH) 0.9 %
10 SYRINGE (ML) INJECTION PRN
Status: COMPLETED | OUTPATIENT
Start: 2024-10-17 | End: 2024-10-17

## 2024-10-17 RX ORDER — DIPHENHYDRAMINE HYDROCHLORIDE 50 MG/ML
12.5 INJECTION INTRAMUSCULAR; INTRAVENOUS ONCE
Status: COMPLETED | OUTPATIENT
Start: 2024-10-17 | End: 2024-10-17

## 2024-10-17 RX ORDER — ACETAMINOPHEN 500 MG
1000 TABLET ORAL ONCE
Status: COMPLETED | OUTPATIENT
Start: 2024-10-17 | End: 2024-10-17

## 2024-10-17 RX ORDER — IOPAMIDOL 755 MG/ML
70 INJECTION, SOLUTION INTRAVASCULAR
Status: COMPLETED | OUTPATIENT
Start: 2024-10-17 | End: 2024-10-17

## 2024-10-17 RX ORDER — METOCLOPRAMIDE HYDROCHLORIDE 5 MG/ML
10 INJECTION INTRAMUSCULAR; INTRAVENOUS ONCE
Status: COMPLETED | OUTPATIENT
Start: 2024-10-17 | End: 2024-10-17

## 2024-10-17 RX ADMIN — DIPHENHYDRAMINE HYDROCHLORIDE 12.5 MG: 50 INJECTION INTRAMUSCULAR; INTRAVENOUS at 19:25

## 2024-10-17 RX ADMIN — SODIUM CHLORIDE, PRESERVATIVE FREE 10 ML: 5 INJECTION INTRAVENOUS at 21:53

## 2024-10-17 RX ADMIN — ACETAMINOPHEN 1000 MG: 500 TABLET ORAL at 19:23

## 2024-10-17 RX ADMIN — METOCLOPRAMIDE 10 MG: 5 INJECTION, SOLUTION INTRAMUSCULAR; INTRAVENOUS at 19:25

## 2024-10-17 RX ADMIN — IOPAMIDOL 70 ML: 755 INJECTION, SOLUTION INTRAVENOUS at 21:53

## 2024-10-17 NOTE — ED PROVIDER NOTES
home.     History from : Patient and Medical records     Limitations to history : None    Chronic Conditions: has a past medical history of Alcohol use, Hypertension, ICH (intracerebral hemorrhage) (HCC), Kidney stone, SAH (subarachnoid hemorrhage) (HCC), and SDH (subdural hematoma).    CONSULTS:pcp    Discussion with Other Profesionals : None    Social Determinants : None    Records Reviewed : Source patient and Inpatient Notes epic medical records        Disposition Considerations (Tests not ordered but considered, Shared Decision Making, Pt Expectation of Test or Tx.):   Appropriate for outpatient management yes and Evaluation by myself and discharge recommended.      I am the Primary Clinician of Record.      Counseling:   The emergency provider has spoken with the patient and discussed today’s results, in addition to providing specific details for the plan of care and counseling regarding the diagnosis and prognosis.  Questions are answered at this time and they are agreeable with the plan.      --------------------------------- IMPRESSION AND DISPOSITION ---------------------------------    IMPRESSION     --------------------------------- IMPRESSION AND DISPOSITION ---------------------------------    IMPRESSION  1. Dizziness    2. Essential hypertension    3. Hyperlipidemia, unspecified hyperlipidemia type    4. Hx of gastric bypass    5. Alcohol use disorder        DISPOSITION  Disposition: Discharge to home  Patient condition is stable        DISPOSITION  Disposition: Discharge to home  Patient condition is stable      NOTE: This report was transcribed using voice recognition software. Every effort was made to ensure accuracy; however, inadvertent computerized transcription errors may be present     ATTENDING PROVIDER ATTESTATION:     Supervising Physician, on-site, available for consultation, non-participatory in the evaluation or care of this patient       Ezequiel Cueva MD  10/18/24 [id number]

## 2024-10-18 VITALS
WEIGHT: 170 LBS | DIASTOLIC BLOOD PRESSURE: 82 MMHG | TEMPERATURE: 98.1 F | SYSTOLIC BLOOD PRESSURE: 148 MMHG | OXYGEN SATURATION: 98 % | HEIGHT: 67 IN | HEART RATE: 78 BPM | RESPIRATION RATE: 16 BRPM | BODY MASS INDEX: 26.68 KG/M2

## 2024-10-18 LAB
ALBUMIN SERPL-MCNC: 3.7 G/DL (ref 3.5–5.2)
ALP SERPL-CCNC: 68 U/L (ref 40–129)
ALT SERPL-CCNC: 15 U/L (ref 0–40)
ANION GAP SERPL CALCULATED.3IONS-SCNC: 10 MMOL/L (ref 7–16)
AST SERPL-CCNC: 18 U/L (ref 0–39)
BILIRUB SERPL-MCNC: 0.3 MG/DL (ref 0–1.2)
BUN SERPL-MCNC: 19 MG/DL (ref 6–20)
CALCIUM SERPL-MCNC: 8.9 MG/DL (ref 8.6–10.2)
CHLORIDE SERPL-SCNC: 111 MMOL/L (ref 98–107)
CO2 SERPL-SCNC: 19 MMOL/L (ref 22–29)
CREAT SERPL-MCNC: 0.8 MG/DL (ref 0.7–1.2)
EKG ATRIAL RATE: 75 BPM
EKG P AXIS: 25 DEGREES
EKG P-R INTERVAL: 130 MS
EKG Q-T INTERVAL: 388 MS
EKG QRS DURATION: 94 MS
EKG QTC CALCULATION (BAZETT): 433 MS
EKG R AXIS: 15 DEGREES
EKG T AXIS: 28 DEGREES
EKG VENTRICULAR RATE: 75 BPM
GFR, ESTIMATED: >90 ML/MIN/1.73M2
GLUCOSE SERPL-MCNC: 83 MG/DL (ref 74–99)
POTASSIUM SERPL-SCNC: 3.6 MMOL/L (ref 3.5–5)
PROT SERPL-MCNC: 6.6 G/DL (ref 6.4–8.3)
SODIUM SERPL-SCNC: 140 MMOL/L (ref 132–146)
TROPONIN I SERPL HS-MCNC: 10 NG/L (ref 0–11)

## 2024-10-18 PROCEDURE — 93010 ELECTROCARDIOGRAM REPORT: CPT | Performed by: INTERNAL MEDICINE

## 2024-10-18 RX ORDER — ATORVASTATIN CALCIUM 40 MG/1
40 TABLET, FILM COATED ORAL DAILY
Qty: 30 TABLET | Refills: 2 | Status: SHIPPED | OUTPATIENT
Start: 2024-10-18

## 2024-10-18 RX ORDER — AMLODIPINE BESYLATE 10 MG/1
5 TABLET ORAL DAILY
Qty: 30 TABLET | Refills: 0 | Status: SHIPPED | OUTPATIENT
Start: 2024-10-18

## 2024-10-18 RX ORDER — UREA 10 %
500 LOTION (ML) TOPICAL DAILY
Qty: 30 TABLET | Refills: 2 | Status: SHIPPED | OUTPATIENT
Start: 2024-10-18 | End: 2025-01-16

## 2024-10-18 RX ORDER — FOLIC ACID 1 MG/1
1000 TABLET ORAL DAILY
Qty: 30 TABLET | Refills: 2 | Status: SHIPPED | OUTPATIENT
Start: 2024-10-18

## 2024-10-18 RX ORDER — MECLIZINE HYDROCHLORIDE 25 MG/1
25 TABLET ORAL 3 TIMES DAILY PRN
Qty: 15 TABLET | Refills: 0 | Status: SHIPPED | OUTPATIENT
Start: 2024-10-18 | End: 2024-10-28

## 2024-10-18 RX ORDER — LANOLIN ALCOHOL/MO/W.PET/CERES
100 CREAM (GRAM) TOPICAL DAILY
Qty: 30 TABLET | Refills: 2 | Status: SHIPPED | OUTPATIENT
Start: 2024-10-18

## 2024-10-18 NOTE — ED NOTES
[facility] called regarding cane, they have them and will bring up to floor for pt, NP aware that pt will get cane.

## 2024-10-21 ENCOUNTER — OFFICE VISIT (OUTPATIENT)
Dept: NEUROSURGERY | Age: 54
End: 2024-10-21
Payer: COMMERCIAL

## 2024-10-21 VITALS
BODY MASS INDEX: 26.53 KG/M2 | SYSTOLIC BLOOD PRESSURE: 115 MMHG | DIASTOLIC BLOOD PRESSURE: 75 MMHG | TEMPERATURE: 97.9 F | WEIGHT: 169 LBS | HEART RATE: 85 BPM | HEIGHT: 67 IN | OXYGEN SATURATION: 97 %

## 2024-10-21 DIAGNOSIS — I62.9 INTRACRANIAL HEMORRHAGE (HCC): Primary | ICD-10-CM

## 2024-10-21 PROCEDURE — G8419 CALC BMI OUT NRM PARAM NOF/U: HCPCS | Performed by: PHYSICIAN ASSISTANT

## 2024-10-21 PROCEDURE — 99213 OFFICE O/P EST LOW 20 MIN: CPT | Performed by: PHYSICIAN ASSISTANT

## 2024-10-21 PROCEDURE — 3078F DIAST BP <80 MM HG: CPT | Performed by: PHYSICIAN ASSISTANT

## 2024-10-21 PROCEDURE — G8427 DOCREV CUR MEDS BY ELIG CLIN: HCPCS | Performed by: PHYSICIAN ASSISTANT

## 2024-10-21 PROCEDURE — 3074F SYST BP LT 130 MM HG: CPT | Performed by: PHYSICIAN ASSISTANT

## 2024-10-21 PROCEDURE — G8484 FLU IMMUNIZE NO ADMIN: HCPCS | Performed by: PHYSICIAN ASSISTANT

## 2024-10-21 PROCEDURE — 4004F PT TOBACCO SCREEN RCVD TLK: CPT | Performed by: PHYSICIAN ASSISTANT

## 2024-10-21 PROCEDURE — 3017F COLORECTAL CA SCREEN DOC REV: CPT | Performed by: PHYSICIAN ASSISTANT

## 2024-10-21 NOTE — PROGRESS NOTES
Patient is here for follow up from a hospital consult for: ICH.  Still has some headaches and forgetfulness,.    Physical exam  Alert and Oriented X3  PERRLA, EOMI  MICHAEL 5/5  Sensation intact to LT and PP  Reflexes are 2+ and symmetric    A/P: patient is here for follow up for: ICH.  Head Ct shows resolution of hemorrhage.  No restrictions.  F/u PRN

## 2024-10-24 ENCOUNTER — HOSPITAL ENCOUNTER (EMERGENCY)
Age: 54
Discharge: HOME | End: 2024-10-24
Attending: STUDENT IN AN ORGANIZED HEALTH CARE EDUCATION/TRAINING PROGRAM
Payer: COMMERCIAL

## 2024-10-24 ENCOUNTER — APPOINTMENT (OUTPATIENT)
Dept: GENERAL RADIOLOGY | Age: 54
End: 2024-10-24
Payer: COMMERCIAL

## 2024-10-24 ENCOUNTER — APPOINTMENT (OUTPATIENT)
Dept: CT IMAGING | Age: 54
End: 2024-10-24
Payer: COMMERCIAL

## 2024-10-24 VITALS
TEMPERATURE: 98 F | SYSTOLIC BLOOD PRESSURE: 135 MMHG | HEART RATE: 92 BPM | RESPIRATION RATE: 18 BRPM | OXYGEN SATURATION: 97 % | DIASTOLIC BLOOD PRESSURE: 71 MMHG

## 2024-10-24 DIAGNOSIS — R55 NEAR SYNCOPE: ICD-10-CM

## 2024-10-24 DIAGNOSIS — G43.C1 INTRACTABLE PERIODIC HEADACHE SYNDROME: Primary | ICD-10-CM

## 2024-10-24 DIAGNOSIS — R42 DIZZINESS: ICD-10-CM

## 2024-10-24 LAB
ALBUMIN SERPL-MCNC: 4.3 G/DL (ref 3.5–5.2)
ALP SERPL-CCNC: 80 U/L (ref 40–129)
ALT SERPL-CCNC: 16 U/L (ref 0–40)
ANION GAP SERPL CALCULATED.3IONS-SCNC: 11 MMOL/L (ref 7–16)
AST SERPL-CCNC: 18 U/L (ref 0–39)
BASOPHILS # BLD: 0.04 K/UL (ref 0–0.2)
BASOPHILS NFR BLD: 1 % (ref 0–2)
BILIRUB SERPL-MCNC: 0.3 MG/DL (ref 0–1.2)
BNP SERPL-MCNC: 38 PG/ML (ref 0–125)
BUN SERPL-MCNC: 16 MG/DL (ref 6–20)
CALCIUM SERPL-MCNC: 9.4 MG/DL (ref 8.6–10.2)
CHLORIDE SERPL-SCNC: 108 MMOL/L (ref 98–107)
CO2 SERPL-SCNC: 24 MMOL/L (ref 22–29)
CREAT SERPL-MCNC: 0.8 MG/DL (ref 0.7–1.2)
EOSINOPHIL # BLD: 0.02 K/UL (ref 0.05–0.5)
EOSINOPHILS RELATIVE PERCENT: 0 % (ref 0–6)
ERYTHROCYTE [DISTWIDTH] IN BLOOD BY AUTOMATED COUNT: 15.6 % (ref 11.5–15)
GFR, ESTIMATED: >90 ML/MIN/1.73M2
GLUCOSE SERPL-MCNC: 125 MG/DL (ref 74–99)
HCT VFR BLD AUTO: 39.2 % (ref 37–54)
HGB BLD-MCNC: 12.4 G/DL (ref 12.5–16.5)
IMM GRANULOCYTES # BLD AUTO: 0.04 K/UL (ref 0–0.58)
IMM GRANULOCYTES NFR BLD: 1 % (ref 0–5)
LYMPHOCYTES NFR BLD: 0.87 K/UL (ref 1.5–4)
LYMPHOCYTES RELATIVE PERCENT: 13 % (ref 20–42)
MCH RBC QN AUTO: 29.7 PG (ref 26–35)
MCHC RBC AUTO-ENTMCNC: 31.6 G/DL (ref 32–34.5)
MCV RBC AUTO: 94 FL (ref 80–99.9)
MONOCYTES NFR BLD: 0.1 K/UL (ref 0.1–0.95)
MONOCYTES NFR BLD: 2 % (ref 2–12)
NEUTROPHILS NFR BLD: 84 % (ref 43–80)
NEUTS SEG NFR BLD: 5.43 K/UL (ref 1.8–7.3)
PLATELET # BLD AUTO: 274 K/UL (ref 130–450)
PMV BLD AUTO: 9.8 FL (ref 7–12)
POTASSIUM SERPL-SCNC: 4.4 MMOL/L (ref 3.5–5)
PROT SERPL-MCNC: 7.2 G/DL (ref 6.4–8.3)
RBC # BLD AUTO: 4.17 M/UL (ref 3.8–5.8)
SODIUM SERPL-SCNC: 143 MMOL/L (ref 132–146)
TROPONIN I SERPL HS-MCNC: 12 NG/L (ref 0–11)
TROPONIN I SERPL HS-MCNC: 8 NG/L (ref 0–11)
WBC OTHER # BLD: 6.5 K/UL (ref 4.5–11.5)

## 2024-10-24 PROCEDURE — 70450 CT HEAD/BRAIN W/O DYE: CPT

## 2024-10-24 PROCEDURE — 99285 EMERGENCY DEPT VISIT HI MDM: CPT

## 2024-10-24 PROCEDURE — 85025 COMPLETE CBC W/AUTO DIFF WBC: CPT

## 2024-10-24 PROCEDURE — 80053 COMPREHEN METABOLIC PANEL: CPT

## 2024-10-24 PROCEDURE — 6370000000 HC RX 637 (ALT 250 FOR IP): Performed by: NURSE PRACTITIONER

## 2024-10-24 PROCEDURE — 96372 THER/PROPH/DIAG INJ SC/IM: CPT

## 2024-10-24 PROCEDURE — 84484 ASSAY OF TROPONIN QUANT: CPT

## 2024-10-24 PROCEDURE — 71046 X-RAY EXAM CHEST 2 VIEWS: CPT

## 2024-10-24 PROCEDURE — 6360000002 HC RX W HCPCS: Performed by: NURSE PRACTITIONER

## 2024-10-24 PROCEDURE — 93005 ELECTROCARDIOGRAM TRACING: CPT | Performed by: NURSE PRACTITIONER

## 2024-10-24 PROCEDURE — 83880 ASSAY OF NATRIURETIC PEPTIDE: CPT

## 2024-10-24 RX ORDER — ACETAMINOPHEN 500 MG
500 TABLET ORAL 4 TIMES DAILY PRN
Qty: 40 TABLET | Refills: 1 | Status: SHIPPED | OUTPATIENT
Start: 2024-10-24

## 2024-10-24 RX ORDER — PROCHLORPERAZINE MALEATE 10 MG
5 TABLET ORAL EVERY 6 HOURS PRN
Qty: 10 TABLET | Refills: 0 | Status: SHIPPED | OUTPATIENT
Start: 2024-10-24

## 2024-10-24 RX ORDER — PREDNISONE 20 MG/1
40 TABLET ORAL ONCE
Status: COMPLETED | OUTPATIENT
Start: 2024-10-24 | End: 2024-10-24

## 2024-10-24 RX ORDER — KETOROLAC TROMETHAMINE 30 MG/ML
60 INJECTION, SOLUTION INTRAMUSCULAR; INTRAVENOUS ONCE
Status: COMPLETED | OUTPATIENT
Start: 2024-10-24 | End: 2024-10-24

## 2024-10-24 RX ORDER — IBUPROFEN 600 MG/1
600 TABLET, FILM COATED ORAL 4 TIMES DAILY PRN
Qty: 40 TABLET | Refills: 0 | Status: SHIPPED | OUTPATIENT
Start: 2024-10-24

## 2024-10-24 RX ORDER — METOCLOPRAMIDE 5 MG/1
10 TABLET ORAL ONCE
Status: COMPLETED | OUTPATIENT
Start: 2024-10-24 | End: 2024-10-24

## 2024-10-24 RX ORDER — DIPHENHYDRAMINE HCL 25 MG
50 TABLET ORAL ONCE
Status: COMPLETED | OUTPATIENT
Start: 2024-10-24 | End: 2024-10-24

## 2024-10-24 RX ORDER — MECLIZINE HYDROCHLORIDE 25 MG/1
25 TABLET ORAL 3 TIMES DAILY PRN
Qty: 30 TABLET | Refills: 1 | Status: SHIPPED | OUTPATIENT
Start: 2024-10-24 | End: 2024-11-03

## 2024-10-24 RX ADMIN — PREDNISONE 40 MG: 20 TABLET ORAL at 14:24

## 2024-10-24 RX ADMIN — METOCLOPRAMIDE 10 MG: 5 TABLET ORAL at 14:24

## 2024-10-24 RX ADMIN — KETOROLAC TROMETHAMINE 60 MG: 60 INJECTION, SOLUTION INTRAMUSCULAR at 14:24

## 2024-10-24 RX ADMIN — DIPHENHYDRAMINE HYDROCHLORIDE 50 MG: 25 TABLET ORAL at 14:24

## 2024-10-24 NOTE — ED PROVIDER NOTES
Independent HERLINDA Visit.     HPI:  10/24/24, Time: 2:13 PM EDT         Kirsty Thurston is a 54 y.o. male presenting to the ED for headache, beginning 1 hour ago.  The complaint has been persistent, moderate in severity, and worsened by nothing.  Pt stated that he has a history of migraine headache, hx of previous SAH march 2024. He stated that he was getting ready to eat when he felt a headache and feels like migraine. Antivert has always helped but today was about to run out of same. He denied numbness weakness. Denied any other concerns. No facial paralysis. Gait instability. Was not exercising when it happened. No recent trauma or falls. SAH was caused by fall. No fever chills vision issues. No CP SOB abd pain or back pain.     ROS:   Pertinent positives and negatives are stated within HPI, all other systems reviewed and are negative.  --------------------------------------------- PAST HISTORY ---------------------------------------------  Past Medical History:  has a past medical history of Alcohol use, Hypertension, ICH (intracerebral hemorrhage) (HCC), Kidney stone, SAH (subarachnoid hemorrhage) (HCC), and SDH (subdural hematoma).    Past Surgical History:  has a past surgical history that includes Gastric bypass surgery.    Social History:  reports that he has been smoking cigarettes. He has a 1.5 pack-year smoking history. He does not have any smokeless tobacco history on file. He reports that he does not currently use alcohol. He reports that he does not use drugs.    Family History: family history includes Heart Disease in his maternal uncle.     The patient’s home medications have been reviewed.    Allergies: Pcn [penicillins]    ---------------------------------------------------PHYSICAL EXAM--------------------------------------    Constitutional/General: Alert and oriented x3, well appearing, non toxic in NAD  Head: Normocephalic and atraumatic  Eyes: PERRL, EOMI  Mouth: Oropharynx clear, handling

## 2024-10-24 NOTE — DISCHARGE INSTRUCTIONS
Make sure to try to keep hydrated    See family doctor and neurologist in the next on  Week    Return if needed

## 2024-10-24 NOTE — ED PROVIDER NOTES
Shared HERLINDA-ED Attending Visit.  CC: No       [facility] EMERGENCY DEPARTMENT  ED  Encounter Note  Admit Date/RoomTime: 10/24/2024  6:44 PM  ED Room:   NAME: Kirsty Thurston  : 1970  MRN: [id number]  PCP: Pierce Andrew MD    CHIEF COMPLAINT     Dizziness (Out of antivert-became dizzy today) and Headache (7/10)    HISTORY OF PRESENT ILLNESS      This patient was signed out to me by the preceding advanced practice provider pending a negative CT of the head and reevaluation.  CT of the head is resulted and on reevaluation patient is reporting a near syncopal event.      I have completed my own history and physical and review of studies as follows.        Kirsty Thurston is a 54 y.o. male who presents [facility] the ED [facility] from [facility] with report of dizziness.  He states that he was standing in line at the cafeteria at the homeless shelter and when he was about to get his food everything went black and he started to tilt backwards.  He did not strike his head.  He thinks that the person behind him caught him from hitting the floor.  He does frequently have dizziness episodes which are typically controlled with meclizine but does not typically experience the \"blackness\" or falling.  He denies having chest pain or shortness of breath.  He does have some left arm pain in his tricep.  This is worse with movement.  He does report he has had a history of subarachnoid as well as subdural.  He has had negative stress test on 9/10/2024  REVIEW OF SYSTEMS     Pertinent positives and negatives are stated within HPI, all other systems reviewed and are negative.    Past Medical History:  has a past medical history of Alcohol use, Hypertension, ICH (intracerebral hemorrhage) (HCC), Kidney stone, SAH (subarachnoid hemorrhage) (HCC), and SDH (subdural hematoma).  Surgical History:  has a past surgical history that includes Gastric bypass surgery.  Social History:  reports that he has been

## 2024-10-25 LAB
EKG ATRIAL RATE: 78 BPM
EKG P AXIS: 24 DEGREES
EKG P-R INTERVAL: 138 MS
EKG Q-T INTERVAL: 392 MS
EKG QRS DURATION: 92 MS
EKG QTC CALCULATION (BAZETT): 446 MS
EKG R AXIS: -3 DEGREES
EKG T AXIS: 49 DEGREES
EKG VENTRICULAR RATE: 78 BPM

## 2024-10-25 PROCEDURE — 93010 ELECTROCARDIOGRAM REPORT: CPT | Performed by: INTERNAL MEDICINE

## 2024-10-25 NOTE — PROGRESS NOTES
CLINICAL PHARMACY NOTE: MEDS TO BEDS    Total # of Prescriptions Filled: 4   The following medications were delivered to the patient:  Meclizine 25mg  Prochlorperazine 10mg  Acetaminophen 500mg  Ibuprofen 600mg    Additional Documentation:  Delivered to patient 10-25-24

## 2024-10-28 ENCOUNTER — OFFICE VISIT (OUTPATIENT)
Dept: FAMILY MEDICINE CLINIC | Age: 54
End: 2024-10-28

## 2024-10-28 VITALS
DIASTOLIC BLOOD PRESSURE: 70 MMHG | TEMPERATURE: 97.6 F | OXYGEN SATURATION: 97 % | HEIGHT: 67 IN | HEART RATE: 85 BPM | RESPIRATION RATE: 18 BRPM | WEIGHT: 179 LBS | BODY MASS INDEX: 28.09 KG/M2 | SYSTOLIC BLOOD PRESSURE: 134 MMHG

## 2024-10-28 DIAGNOSIS — R42 DIZZINESS: Primary | ICD-10-CM

## 2024-10-28 DIAGNOSIS — R53.83 OTHER FATIGUE: ICD-10-CM

## 2024-10-28 DIAGNOSIS — I10 ESSENTIAL HYPERTENSION: ICD-10-CM

## 2024-10-28 LAB — TSH SERPL DL<=0.05 MIU/L-ACNC: 1.69 UIU/ML (ref 0.27–4.2)

## 2024-10-28 RX ORDER — AMLODIPINE BESYLATE 10 MG/1
5 TABLET ORAL DAILY
Qty: 30 TABLET | Refills: 0 | Status: SHIPPED | OUTPATIENT
Start: 2024-10-28

## 2024-10-28 RX ORDER — GAUZE BANDAGE 2" X 2"
100 BANDAGE TOPICAL DAILY
COMMUNITY
Start: 2024-09-30
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
## 2024-10-28 NOTE — PROGRESS NOTES
[facility]  FAMILY MEDICINE RESIDENCY PROGRAM  DATE OF VISIT : 10/29/2024    Patient : Kirsty Thurston   Age : 54 y.o.    : 1970   MRN : [id number]   ______________________________________________________________________    Chief Complaint:   Chief Complaint   Patient presents with    ED Follow-up     Patient presents today for ED Follow up 10/24/24       HPI:   Kirsty Thurston is a 54 y.o. male who presents for f/u ICH/SAH/SDH, HTN, hx gastric bypass    Pt has been seen in the ED 4x in the last month. Initially experienced lightheadedness and fall but w/o evidence of hemorrhage on CT, was d/c'd. 3 days later presented for productive cough and fatigue. Was dx'd w/ PNA and started on doxycycline. Also presented for dizziness/headache- d/c'd with meclizine, compazine, and Tylenol/Advil.     The patient states he has been having multiple episodes of near syncope. He states that he will feel weak, lightheaded, and feel his vision blacking out. He states these episodes have been ongoing for the last month, but he states they have been more frequent and intense over the last 3-4 days. He is on Meclizine from the ED, which he thought had been helping. He denies associated heart palpitations and chest pain.  He states that the spells are not triggered by moving from sitting to standing. He states that they do seem to be triggered by turning his head. He denies substance use. He reports some associated nausea. Denies tinnitus and decreased hearing. He states it has been ongoing since he had rhinovirus 1 mo ago     Due for refill of amlodipine    Past Medical History:  Past Medical History:   Diagnosis Date    Alcohol use     Hypertension     ICH (intracerebral hemorrhage) (HCC)     Kidney stone     SAH (subarachnoid hemorrhage) (HCC)     SDH (subdural hematoma)        Allergies:   Allergies   Allergen Reactions    Pcn [Penicillins] Other (See Comments)     \"Unknown\"        Medications:    Current Outpatient

## 2024-10-29 ENCOUNTER — HOSPITAL ENCOUNTER (OUTPATIENT)
Dept: CT IMAGING | Age: 54
Discharge: HOME | End: 2024-10-31
Payer: COMMERCIAL

## 2024-10-29 DIAGNOSIS — R42 DIZZINESS: ICD-10-CM

## 2024-10-29 LAB
HEPATITIS C ANTIBODY: NONREACTIVE
HIV AG/AB: NONREACTIVE

## 2024-10-29 PROCEDURE — 6360000004 HC RX CONTRAST MEDICATION: Performed by: RADIOLOGY

## 2024-10-29 PROCEDURE — 70496 CT ANGIOGRAPHY HEAD: CPT

## 2024-10-29 RX ORDER — IOPAMIDOL 755 MG/ML
70 INJECTION, SOLUTION INTRAVASCULAR
Status: COMPLETED | OUTPATIENT
Start: 2024-10-29 | End: 2024-10-29

## 2024-10-29 RX ADMIN — IOPAMIDOL 70 ML: 755 INJECTION, SOLUTION INTRAVENOUS at 15:20

## 2024-11-08 RX ORDER — IBUPROFEN 600 MG/1
600 TABLET, FILM COATED ORAL 4 TIMES DAILY PRN
Qty: 40 TABLET | Refills: 0 | Status: SHIPPED | OUTPATIENT
Start: 2024-11-08

## 2024-11-08 NOTE — TELEPHONE ENCOUNTER
Last Appointment:  10/28/2024  Future Appointments   Date Time Provider Department Center   11/19/2024  9:45 AM Ulises Pate MD [facility]   2/28/2025  8:40 AM Radha Petty MD [facility] NEURO Neurology -

## 2024-11-12 ENCOUNTER — TELEPHONE (OUTPATIENT)
Dept: FAMILY MEDICINE CLINIC | Age: 54
End: 2024-11-12

## 2024-11-12 DIAGNOSIS — R42 DIZZINESS: Primary | ICD-10-CM

## 2024-11-12 RX ORDER — MECLIZINE HYDROCHLORIDE 25 MG/1
25 TABLET ORAL 3 TIMES DAILY PRN
Qty: 90 TABLET | Refills: 0 | Status: SHIPPED | OUTPATIENT
Start: 2024-11-12 | End: 2024-12-12

## 2024-11-12 NOTE — TELEPHONE ENCOUNTER
Meclizine 25 mg 1 tid phoned into [facility] for vertigo. Thank you.

## 2024-11-14 NOTE — PATIENT INSTRUCTIONS
or more.   Black, tarry stools   Severe abdominal pain   Hard, swollen abdomen   Signs of infection, including fever or chills   Inability to pass gas or stool   Coughing, shortness of breath, chest pain, severe nausea or vomiting     In case of an emergency, CALL 911 .

## 2024-11-19 ENCOUNTER — TELEPHONE (OUTPATIENT)
Dept: SURGERY | Age: 54
End: 2024-11-19

## 2024-11-19 ENCOUNTER — OFFICE VISIT (OUTPATIENT)
Dept: SURGERY | Age: 54
End: 2024-11-19
Payer: COMMERCIAL

## 2024-11-19 ENCOUNTER — PREP FOR PROCEDURE (OUTPATIENT)
Dept: SURGERY | Age: 54
End: 2024-11-19

## 2024-11-19 VITALS
DIASTOLIC BLOOD PRESSURE: 79 MMHG | HEART RATE: 111 BPM | SYSTOLIC BLOOD PRESSURE: 139 MMHG | BODY MASS INDEX: 29.02 KG/M2 | OXYGEN SATURATION: 97 % | WEIGHT: 184.9 LBS | RESPIRATION RATE: 16 BRPM | HEIGHT: 67 IN

## 2024-11-19 DIAGNOSIS — Z12.11 SCREEN FOR COLON CANCER: ICD-10-CM

## 2024-11-19 DIAGNOSIS — Z12.11 COLON CANCER SCREENING: Primary | ICD-10-CM

## 2024-11-19 PROCEDURE — 4004F PT TOBACCO SCREEN RCVD TLK: CPT | Performed by: SURGERY

## 2024-11-19 PROCEDURE — 3078F DIAST BP <80 MM HG: CPT | Performed by: SURGERY

## 2024-11-19 PROCEDURE — 99204 OFFICE O/P NEW MOD 45 MIN: CPT | Performed by: SURGERY

## 2024-11-19 PROCEDURE — 3017F COLORECTAL CA SCREEN DOC REV: CPT | Performed by: SURGERY

## 2024-11-19 PROCEDURE — 99202 OFFICE O/P NEW SF 15 MIN: CPT | Performed by: SURGERY

## 2024-11-19 PROCEDURE — G8427 DOCREV CUR MEDS BY ELIG CLIN: HCPCS | Performed by: SURGERY

## 2024-11-19 PROCEDURE — G8484 FLU IMMUNIZE NO ADMIN: HCPCS | Performed by: SURGERY

## 2024-11-19 PROCEDURE — 3075F SYST BP GE 130 - 139MM HG: CPT | Performed by: SURGERY

## 2024-11-19 PROCEDURE — G8419 CALC BMI OUT NRM PARAM NOF/U: HCPCS | Performed by: SURGERY

## 2024-11-19 RX ORDER — SODIUM CHLORIDE 0.9 % (FLUSH) 0.9 %
10 SYRINGE (ML) INJECTION PRN
OUTPATIENT
Start: 2024-11-19

## 2024-11-19 RX ORDER — SODIUM CHLORIDE 0.9 % (FLUSH) 0.9 %
10 SYRINGE (ML) INJECTION EVERY 12 HOURS SCHEDULED
OUTPATIENT
Start: 2024-11-19

## 2024-11-19 RX ORDER — SODIUM CHLORIDE 9 MG/ML
INJECTION, SOLUTION INTRAVENOUS PRN
OUTPATIENT
Start: 2024-11-19

## 2024-11-19 RX ORDER — POLYETHYLENE GLYCOL 3350, SODIUM SULFATE ANHYDROUS, SODIUM BICARBONATE, SODIUM CHLORIDE, POTASSIUM CHLORIDE 236; 22.74; 6.74; 5.86; 2.97 G/4L; G/4L; G/4L; G/4L; G/4L
1 POWDER, FOR SOLUTION ORAL ONCE
Qty: 1 EACH | Refills: 0 | Status: SHIPPED | OUTPATIENT
Start: 2024-11-19 | End: 2024-11-19

## 2024-11-19 ASSESSMENT — ENCOUNTER SYMPTOMS
RESPIRATORY NEGATIVE: 1
GASTROINTESTINAL NEGATIVE: 1

## 2024-11-19 NOTE — H&P
CC:  Colorectal cancer screening/surveillance    Assessment:  Average risk for colorectal cancer      Plan:  Schedule for colonoscopy with possible biopsy, cauterization, or polypectomy         HPI  Kirsty Thurston presents for colorectal cancer screening.  The patient has never been screened..  The patient reports no abdominal pain, change in stool caliber, blood in stool, rectal bleeding, or weight loss.  There is no family history of colorectal cancer..    Past Medical History:   Diagnosis Date    Alcohol use     Hypertension     ICH (intracerebral hemorrhage) (HCC)     Kidney stone     SAH (subarachnoid hemorrhage) (HCC)     SDH (subdural hematoma)      Past Surgical History:   Procedure Laterality Date    GASTRIC BYPASS SURGERY  2001    [facility]     Family History   Problem Relation Age of Onset    Heart Disease Maternal Uncle      Social History     Tobacco Use    Smoking status: Every Day     Current packs/day: 0.50     Average packs/day: 0.5 packs/day for 3.0 years (1.5 ttl pk-yrs)     Types: Cigarettes   Vaping Use    Vaping status: Never Used   Substance Use Topics    Alcohol use: Not Currently     Comment: prev drank 48 oz beer/day    Drug use: No     Prior to Admission medications    Medication Sig Start Date End Date Taking? Authorizing Provider   PEG 3350-KCl-NaBcb-NaCl-NaSulf (PEG-3350/ELECTROLYTES) 236 g SOLR Take 4,000 mLs by mouth once for 1 dose Pharmacist may substitute 11/19/24 11/19/24 Yes Ulises Pate MD   meclizine (ANTIVERT) 25 MG tablet Take 1 tablet by mouth 3 times daily as needed for Dizziness 11/12/24 12/12/24 Yes Kianna Vargas,    ibuprofen (ADVIL;MOTRIN) 600 MG tablet Take 1 tablet by mouth 4 times daily as needed for Pain 11/8/24  Yes Pierce Andrew MD   thiamine mononitrate 100 MG tablet Take 1 tablet by mouth daily 9/30/24  Yes ProviderHermilo MD   amLODIPine (NORVASC) 10 MG tablet Take 0.5 tablets by mouth daily 10/28/24  Yes Pierce Andrew MD   acetaminophen

## 2024-11-19 NOTE — TELEPHONE ENCOUNTER
Scheduled pt for Colonoscopy 1/10/25 at 8:15AM. Pt needs to arrive at [facility] at 7AM. Patient confirmed date and time, address and directions given in office. Prep instructions given in office, patient understood.      Prior Authorization Form:      DEMOGRAPHICS:                     Patient Name:  Michael Thurston  Patient :  1970            Insurance:  Payor: [facility] / Plan: [facility] MEDICAID / Product Type: *No Product type* /   Insurance ID Number:    Payer/Plan Subscr  Sex Relation Sub. Ins. ID Effective Group Num   1. [facility] - * MICHAEL THURSTON 1970 Male Self [id number] [address] [facility] [address]         DIAGNOSIS & PROCEDURE:                       Procedure/Operation: COLONOSCOPY           CPT Code: 29111    Diagnosis:  SCREENING    ICD10 Code: Z12.11    Location:  [facility]    Surgeon:  TREVER GRAY    SCHEDULING INFORMATION:                          Date: 1/10/25    Time: 8:15AM              Anesthesia:  LMAC                                                       Status:  OUTPATIENT       Special Comments:  N/A       Electronically signed by Karen Lacy MA on 2024 at 10:56 AM

## 2024-12-03 ENCOUNTER — TELEPHONE (OUTPATIENT)
Dept: FAMILY MEDICINE CLINIC | Age: 54
End: 2024-12-03

## 2024-12-03 DIAGNOSIS — I10 ESSENTIAL HYPERTENSION: ICD-10-CM

## 2024-12-03 RX ORDER — IBUPROFEN 600 MG/1
600 TABLET, FILM COATED ORAL 4 TIMES DAILY PRN
Qty: 40 TABLET | Refills: 0 | Status: SHIPPED | OUTPATIENT
Start: 2024-12-03

## 2024-12-03 RX ORDER — LISINOPRIL 10 MG/1
10 TABLET ORAL DAILY
Qty: 30 TABLET | Refills: 3 | Status: SHIPPED | OUTPATIENT
Start: 2024-12-03

## 2024-12-03 RX ORDER — AMLODIPINE BESYLATE 10 MG/1
5 TABLET ORAL DAILY
Qty: 30 TABLET | Refills: 0 | Status: SHIPPED | OUTPATIENT
Start: 2024-12-03

## 2024-12-05 ENCOUNTER — HOSPITAL ENCOUNTER (OUTPATIENT)
Dept: CT IMAGING | Age: 54
Discharge: HOME | End: 2024-12-07
Attending: NEUROLOGICAL SURGERY
Payer: COMMERCIAL

## 2024-12-05 DIAGNOSIS — S06.2XAA: ICD-10-CM

## 2024-12-05 DIAGNOSIS — I60.9 SUBARACHNOID HEMORRHAGE (HCC): ICD-10-CM

## 2024-12-05 DIAGNOSIS — E78.5 HYPERLIPIDEMIA, UNSPECIFIED HYPERLIPIDEMIA TYPE: ICD-10-CM

## 2024-12-05 DIAGNOSIS — F10.90 ALCOHOL USE DISORDER: ICD-10-CM

## 2024-12-05 DIAGNOSIS — S06.5XAA SUBDURAL HEMATOMA: ICD-10-CM

## 2024-12-05 DIAGNOSIS — Z98.84 HX OF GASTRIC BYPASS: ICD-10-CM

## 2024-12-05 DIAGNOSIS — S06.A0XA: ICD-10-CM

## 2024-12-05 PROCEDURE — 70450 CT HEAD/BRAIN W/O DYE: CPT

## 2024-12-05 RX ORDER — FOLIC ACID 1 MG/1
1000 TABLET ORAL DAILY
Qty: 30 TABLET | Refills: 2 | Status: SHIPPED | OUTPATIENT
Start: 2024-12-05

## 2024-12-05 RX ORDER — ACETAMINOPHEN 500 MG
500 TABLET ORAL 4 TIMES DAILY PRN
Qty: 40 TABLET | Refills: 1 | Status: SHIPPED | OUTPATIENT
Start: 2024-12-05

## 2024-12-05 RX ORDER — ATORVASTATIN CALCIUM 40 MG/1
40 TABLET, FILM COATED ORAL DAILY
Qty: 30 TABLET | Refills: 2 | Status: SHIPPED | OUTPATIENT
Start: 2024-12-05

## 2024-12-05 RX ORDER — LANOLIN ALCOHOL/MO/W.PET/CERES
100 CREAM (GRAM) TOPICAL DAILY
Qty: 30 TABLET | Refills: 2 | Status: SHIPPED | OUTPATIENT
Start: 2024-12-05

## 2024-12-05 NOTE — TELEPHONE ENCOUNTER
Last Appointment:  10/28/2024  Future Appointments   Date Time Provider Department Center   2/28/2025  8:40 AM Radha Petty MD [facility] NEURO Neurology -

## 2024-12-15 ENCOUNTER — APPOINTMENT (OUTPATIENT)
Dept: GENERAL RADIOLOGY | Age: 54
End: 2024-12-15
Payer: COMMERCIAL

## 2024-12-15 ENCOUNTER — HOSPITAL ENCOUNTER (EMERGENCY)
Age: 54
Discharge: HOME | End: 2024-12-15
Attending: EMERGENCY MEDICINE
Payer: COMMERCIAL

## 2024-12-15 VITALS
SYSTOLIC BLOOD PRESSURE: 134 MMHG | OXYGEN SATURATION: 99 % | TEMPERATURE: 98 F | DIASTOLIC BLOOD PRESSURE: 77 MMHG | HEART RATE: 96 BPM | RESPIRATION RATE: 16 BRPM

## 2024-12-15 DIAGNOSIS — S42.035A CLOSED NONDISPLACED FRACTURE OF ACROMIAL END OF LEFT CLAVICLE, INITIAL ENCOUNTER: Primary | ICD-10-CM

## 2024-12-15 PROCEDURE — 99283 EMERGENCY DEPT VISIT LOW MDM: CPT

## 2024-12-15 PROCEDURE — 73060 X-RAY EXAM OF HUMERUS: CPT

## 2024-12-15 PROCEDURE — 73030 X-RAY EXAM OF SHOULDER: CPT

## 2024-12-15 PROCEDURE — 6370000000 HC RX 637 (ALT 250 FOR IP): Performed by: EMERGENCY MEDICINE

## 2024-12-15 PROCEDURE — 23500 CLTX CLAVICULAR FX W/O MNPJ: CPT

## 2024-12-15 RX ORDER — MECLIZINE HCL 12.5 MG 12.5 MG/1
25 TABLET ORAL ONCE
Status: COMPLETED | OUTPATIENT
Start: 2024-12-15 | End: 2024-12-15

## 2024-12-15 RX ORDER — MECLIZINE HYDROCHLORIDE 25 MG/1
25 TABLET ORAL 3 TIMES DAILY PRN
Qty: 30 TABLET | Refills: 0 | Status: SHIPPED | OUTPATIENT
Start: 2024-12-15 | End: 2024-12-16 | Stop reason: SDUPTHER

## 2024-12-15 RX ORDER — HYDROCODONE BITARTRATE AND ACETAMINOPHEN 5; 325 MG/1; MG/1
1 TABLET ORAL ONCE
Status: COMPLETED | OUTPATIENT
Start: 2024-12-15 | End: 2024-12-15

## 2024-12-15 RX ADMIN — MECLIZINE 25 MG: 12.5 TABLET ORAL at 13:07

## 2024-12-15 RX ADMIN — MECLIZINE 25 MG: 12.5 TABLET ORAL at 10:11

## 2024-12-15 RX ADMIN — HYDROCODONE BITARTRATE AND ACETAMINOPHEN 1 TABLET: 5; 325 TABLET ORAL at 10:11

## 2024-12-15 ASSESSMENT — PAIN DESCRIPTION - ORIENTATION
ORIENTATION: LEFT
ORIENTATION: LEFT

## 2024-12-15 ASSESSMENT — PAIN DESCRIPTION - LOCATION
LOCATION: SHOULDER
LOCATION: ARM

## 2024-12-15 ASSESSMENT — PAIN SCALES - GENERAL
PAINLEVEL_OUTOF10: 7
PAINLEVEL_OUTOF10: 10

## 2024-12-15 ASSESSMENT — PAIN - FUNCTIONAL ASSESSMENT: PAIN_FUNCTIONAL_ASSESSMENT: 0-10

## 2024-12-15 ASSESSMENT — PAIN DESCRIPTION - DESCRIPTORS: DESCRIPTORS: SHARP

## 2024-12-15 NOTE — ED PROVIDER NOTES
[facility] EMERGENCY DEPARTMENT  EMERGENCY DEPARTMENT ENCOUNTER        Pt Name: Kirsty Thurston  MRN: [id number]  Birthdate 1970  Date of evaluation: 12/15/2024  Provider: Americo Estrada MD  PCP: Pierce Andrew MD  Note Started: 9:52 AM EST 12/15/24    CHIEF COMPLAINT       Chief Complaint   Patient presents with    Shoulder Pain     Pt had a fall 1 week ago. Pt reports pain to left shoulder. Increased with ROM       HISTORY OF PRESENT ILLNESS: 1 or more Elements        Kirsty Thurston is a 54 y.o. male who presents for fall.  Patient reports that he fell a week ago landing on his left shoulder.  He reports he has pain along the left shoulder.  Worse with movement and better with rest.  He reports it is aching.  He denies any deformity.  He denies any lacerations.  He did not hit his head and there is no loss of consciousness.  He denies any numbness or tingling or weakness or paralysis.  He denies any other complaints.  His only other request is for a dose of his chronic meclizine.  He reports he has been out for 2 days.    Nursing Notes were all reviewed and agreed with or any disagreements were addressed in the HPI.      REVIEW OF EXTERNAL NOTE :       11- outpatient office notes      Chart Review/External Note Review    Last Echo reviewed by Me:  No results found for: \"LVEF\", \"LVEFMODE\"          Controlled Substance Monitoring:    Acute and Chronic Pain Monitoring:        No data to display                    REVIEW OF SYSTEMS :      Positives and Pertinent negatives as per HPI.     SURGICAL HISTORY     Past Surgical History:   Procedure Laterality Date    GASTRIC BYPASS SURGERY  2001    [facility]       CURRENTMEDICATIONS       Discharge Medication List as of 12/15/2024  1:06 PM        CONTINUE these medications which have NOT CHANGED    Details   atorvastatin (LIPITOR) 40 MG tablet Take 1 tablet by mouth daily, Disp-30 tablet, R-2Normal      folic acid

## 2024-12-16 RX ORDER — MECLIZINE HYDROCHLORIDE 25 MG/1
25 TABLET ORAL 3 TIMES DAILY PRN
Qty: 30 TABLET | Refills: 2 | OUTPATIENT
Start: 2024-12-16

## 2024-12-16 RX ORDER — MECLIZINE HYDROCHLORIDE 25 MG/1
25 TABLET ORAL 3 TIMES DAILY PRN
Qty: 30 TABLET | Refills: 0 | Status: SHIPPED | OUTPATIENT
Start: 2024-12-16

## 2024-12-16 NOTE — TELEPHONE ENCOUNTER
Pt would like refill sent to  pharmacy it was originally sent to [facility] but he doesn't have a ride there so he would like it sent to hospital pharmacy

## 2024-12-19 ENCOUNTER — OFFICE VISIT (OUTPATIENT)
Dept: FAMILY MEDICINE CLINIC | Age: 54
End: 2024-12-19

## 2024-12-19 VITALS
RESPIRATION RATE: 18 BRPM | WEIGHT: 184 LBS | SYSTOLIC BLOOD PRESSURE: 136 MMHG | HEIGHT: 68 IN | TEMPERATURE: 98.2 F | DIASTOLIC BLOOD PRESSURE: 75 MMHG | BODY MASS INDEX: 27.89 KG/M2 | HEART RATE: 72 BPM | OXYGEN SATURATION: 96 %

## 2024-12-19 DIAGNOSIS — Z23 FLU VACCINE NEED: ICD-10-CM

## 2024-12-19 DIAGNOSIS — E78.5 HYPERLIPIDEMIA, UNSPECIFIED HYPERLIPIDEMIA TYPE: ICD-10-CM

## 2024-12-19 DIAGNOSIS — I10 ESSENTIAL HYPERTENSION: ICD-10-CM

## 2024-12-19 DIAGNOSIS — R42 DIZZINESS: ICD-10-CM

## 2024-12-19 DIAGNOSIS — S42.032A TRAUMATIC CLOSED FRACTURE OF DISTAL CLAVICLE WITH MINIMAL DISPLACEMENT, LEFT, INITIAL ENCOUNTER: Primary | ICD-10-CM

## 2024-12-19 PROBLEM — Z12.11 SCREEN FOR COLON CANCER: Status: RESOLVED | Noted: 2024-11-19 | Resolved: 2024-12-19

## 2024-12-19 RX ORDER — LIDOCAINE 50 MG/G
1 PATCH TOPICAL DAILY
Qty: 30 PATCH | Refills: 0 | Status: SHIPPED | OUTPATIENT
Start: 2024-12-19 | End: 2025-01-18

## 2024-12-19 RX ORDER — ATORVASTATIN CALCIUM 40 MG/1
40 TABLET, FILM COATED ORAL DAILY
Qty: 30 TABLET | Refills: 2 | Status: SHIPPED | OUTPATIENT
Start: 2024-12-19

## 2024-12-19 RX ORDER — AMLODIPINE BESYLATE 5 MG/1
5 TABLET ORAL DAILY
Qty: 30 TABLET | Refills: 3 | Status: SHIPPED | OUTPATIENT
Start: 2024-12-19 | End: 2025-04-18

## 2024-12-19 NOTE — PROGRESS NOTES
Attending Physician Statement    S:   Chief Complaint   Patient presents with    Follow-Up from Hospital     Broken Clavicle      Fell onto left shoulder 10 days ago and seen in ED.  Placed in sling for clavicle fracture.  Hx of previous falls, resulting in brain bleeds.  Has had dizziness since.  Has been taking meclizine with minimal benefit.  No dizziness today  O: Blood pressure 136/75, pulse 72, temperature 98.2 °F (36.8 °C), temperature source Temporal, resp. rate 18, height 1.727 m (5' 8\"), weight 83.5 kg (184 lb), SpO2 96%.   Exam:   Heart - RRR   Lungs - clear   Shoulder - tender over left AC joint area.  No obvious step-off/deformity    Neurovascularly intact  A: Left clavicular fracture, vertigo  P:  Neuro consult already placed, refer to vestibular therapy   Refer to ortho   Follow-up as ordered    I have discussed the case, including pertinent history and exam findings with the resident. I agree with the documented assessment and plan.    David Evans MD           
General: Tenderness (L AC joint) present.      Right lower leg: No edema.      Left lower leg: No edema.      Comments: Sling in place   Neurological:      General: No focal deficit present.      Mental Status: He is alert. Mental status is at baseline.      Cranial Nerves: No cranial nerve deficit.      Motor: No weakness.      Coordination: Coordination normal.      Gait: Gait normal.      Comments: LUE neurovascularly intact           ______________________________________________________________________    Assessment & Plan:   Diagnosis Orders   1. Traumatic closed fracture of distal clavicle with minimal displacement, left, initial encounter  lidocaine (LIDODERM) 5 %    [facility] MSK Navigator      2. Essential hypertension  amLODIPine (NORVASC) 5 MG tablet      3. Dizziness  [facility] - [facility], [place]       4. Hyperlipidemia, unspecified hyperlipidemia type  atorvastatin (LIPITOR) 40 MG tablet      5. Flu vaccine need  Influenza, FLUCELVAX Trivalent, (age 6 mo+) IM, Preservative Free, 0.5mL          Chronic issue, MSK navigator referral placed to get patient urgent appointment  Lidocaine patches for pain control  Vertigo cause is uncertain, likely d/t prior brain bleeds. Pt to see neuro. Encourage continued ambulation with cane. Advise trial off of meclizine as long-term treatment with this medication is not advised.  Refill amlodipine and lipitor      Return to Office: Return in about 4 weeks (around 1/16/2025).    Pierce Andrew MD   This case was discussed with Dr. Evans

## 2025-01-06 RX ORDER — LANOLIN ALCOHOL/MO/W.PET/CERES
1000 CREAM (GRAM) TOPICAL DAILY
COMMUNITY
Start: 2024-12-05

## 2025-01-06 NOTE — PROGRESS NOTES
[facility] PRE-ADMISSION TESTING   COLONOSCOPY INSTRUCTIONS  PAT- Phone Number: [phone]    COLONOSCOPY INSTRUCTIONS:   -He does not have the instructions, i transferred him to Dr. Pate's office.  [] Bowel Prep instructions reviewed - any questions regarding your prep, please contact surgeon's office.  [x] Colonoscopy: 1-2 days prior: Clear liquids only - nothing red or purple in color.  [x] Antibacterial Soap Shower Night before AND the morning of procedure.  [] No solid food after midnight. You may have SIPS of clear liquids up until 2 hours before your arrival time to the hospital.   [x] Do not wear makeup, lotions, powders, deodorant.   [x] No tobacco products, illegal drugs, or alcohol within 24 hours of your surgery.  [x] Jewelry or valuables should not be brought to the hospital. All body and/or tongue piercing's must be removed prior to arriving to hospital. No contact lens or hair pins.   [] Urine Pregnancy test will be preformed the day of surgery. A specimen sample may be brought from home.  [x] Arrange transportation with a responsible adult  to and from the hospital. If you do not have a responsible adult  to transport you, you will need to make arrangements with a medical transportation company. Arrange for someone to be with you for the remainder of the day and for 24 hours after your procedure due to having had anesthesia.    -Who will be your  for transportation? roommate  -Who will be staying with you for 24 hrs after your procedure? roommates  [x] Bring insurance card and photo ID.  [] Bring copy of living will or healthcare power of  papers to be placed in your electronic record.    PARKING INSTRUCTIONS:     [x] ARRIVAL DATE & TIME: 1/10 @ 0700  [x] Times are subject to change. We will contact you the business day before surgery if that were to occur.  [x] Enter into the [facility] Entrance. Two people may accompany you. Masks  are not required.  [x] Parking Lot \"I\" is where you will park. It is located on the corner of [address] and [address]. The entrance is on [address].   Only one vehicle - per patient, is permitted in parking lot.   Upon entering the parking lot, a voucher ticket will print.    MEDICATION INSTRUCTIONS:    [] Bring a complete list of your medications, please write the last time you took the medicine, give this list to the nurse in Pre-Op.  [x] Take ONLY the following medications the morning of surgery: amlodipine  [x] Stop all herbal supplements and vitamins 5 days before surgery.   [x] Stop all NSAIDS 7 days before surgery.  [] DO NOT take any diabetic medicine the morning of surgery.  Follow instructions for insulin the day before surgery.  [] If you are diabetic and your blood sugar is low or you feel symptomatic, you may drink 1-2 ounces of apple juice or take a glucose tablet.            -The morning of your procedure, you may call the pre-op area if you have concerns about your blood sugar [phone].  [] Use your inhalers the morning of surgery.  Bring your emergency inhaler with you day of surgery.  [] Follow physician instructions regarding any blood thinners you may be taking.    WHAT TO EXPECT:    [x] The day of your procedure you will be greeted and checked in by the [facility] .  In addition, you will be registered in the [facility] by a Patient Access Representative. Please bring your photo ID and insurance card. A nurse will greet you in accordance to the time you are needed in the pre-op area to prepare you for surgery. Please do not be discouraged if you are not greeted in the order you arrive as there are many variables that are involved in patient preparation. Your patience is greatly appreciated as you wait for your nurse.  [x] Delays may occur. Staff will make a sincere effort to keep you informed of delays. If any delays occur with your procedure, we apologize

## 2025-01-10 ENCOUNTER — ANESTHESIA (OUTPATIENT)
Dept: ENDOSCOPY | Age: 55
End: 2025-01-10
Payer: COMMERCIAL

## 2025-01-10 ENCOUNTER — ANESTHESIA EVENT (OUTPATIENT)
Dept: ENDOSCOPY | Age: 55
End: 2025-01-10
Payer: COMMERCIAL

## 2025-01-10 ENCOUNTER — HOSPITAL ENCOUNTER (OUTPATIENT)
Dept: GENERAL RADIOLOGY | Age: 55
Discharge: HOME | End: 2025-01-12
Payer: COMMERCIAL

## 2025-01-10 ENCOUNTER — HOSPITAL ENCOUNTER (OUTPATIENT)
Age: 55
Setting detail: OUTPATIENT SURGERY
Discharge: HOME | End: 2025-01-10
Attending: SURGERY | Admitting: SURGERY
Payer: COMMERCIAL

## 2025-01-10 VITALS
RESPIRATION RATE: 20 BRPM | HEART RATE: 63 BPM | SYSTOLIC BLOOD PRESSURE: 162 MMHG | WEIGHT: 180 LBS | HEIGHT: 67 IN | DIASTOLIC BLOOD PRESSURE: 94 MMHG | TEMPERATURE: 98.2 F | OXYGEN SATURATION: 96 % | BODY MASS INDEX: 28.25 KG/M2

## 2025-01-10 DIAGNOSIS — T14.8XXA FRACTURE: Primary | ICD-10-CM

## 2025-01-10 PROBLEM — Z12.11 SCREEN FOR COLON CANCER: Status: ACTIVE | Noted: 2024-11-19

## 2025-01-10 PROCEDURE — 3700000000 HC ANESTHESIA ATTENDED CARE: Performed by: SURGERY

## 2025-01-10 PROCEDURE — 3609027000 HC COLONOSCOPY: Performed by: SURGERY

## 2025-01-10 PROCEDURE — 74280 X-RAY XM COLON 2CNTRST STD: CPT

## 2025-01-10 PROCEDURE — 2709999900 HC NON-CHARGEABLE SUPPLY: Performed by: SURGERY

## 2025-01-10 PROCEDURE — 6360000002 HC RX W HCPCS

## 2025-01-10 PROCEDURE — 7100000010 HC PHASE II RECOVERY - FIRST 15 MIN: Performed by: SURGERY

## 2025-01-10 PROCEDURE — 3700000001 HC ADD 15 MINUTES (ANESTHESIA): Performed by: SURGERY

## 2025-01-10 PROCEDURE — 2580000003 HC RX 258: Performed by: SURGERY

## 2025-01-10 PROCEDURE — 2580000003 HC RX 258

## 2025-01-10 PROCEDURE — 45378 DIAGNOSTIC COLONOSCOPY: CPT | Performed by: SURGERY

## 2025-01-10 PROCEDURE — 7100000011 HC PHASE II RECOVERY - ADDTL 15 MIN: Performed by: SURGERY

## 2025-01-10 RX ORDER — SODIUM CHLORIDE 9 MG/ML
INJECTION, SOLUTION INTRAVENOUS PRN
Status: DISCONTINUED | OUTPATIENT
Start: 2025-01-10 | End: 2025-01-10 | Stop reason: HOSPADM

## 2025-01-10 RX ORDER — SODIUM CHLORIDE 0.9 % (FLUSH) 0.9 %
10 SYRINGE (ML) INJECTION EVERY 12 HOURS SCHEDULED
Status: DISCONTINUED | OUTPATIENT
Start: 2025-01-10 | End: 2025-01-10 | Stop reason: HOSPADM

## 2025-01-10 RX ORDER — SODIUM CHLORIDE 9 MG/ML
INJECTION, SOLUTION INTRAVENOUS
Status: DISCONTINUED | OUTPATIENT
Start: 2025-01-10 | End: 2025-01-10 | Stop reason: SDUPTHER

## 2025-01-10 RX ORDER — SODIUM CHLORIDE 0.9 % (FLUSH) 0.9 %
10 SYRINGE (ML) INJECTION PRN
Status: DISCONTINUED | OUTPATIENT
Start: 2025-01-10 | End: 2025-01-10 | Stop reason: HOSPADM

## 2025-01-10 RX ORDER — PROPOFOL 10 MG/ML
INJECTION, EMULSION INTRAVENOUS
Status: DISCONTINUED | OUTPATIENT
Start: 2025-01-10 | End: 2025-01-10 | Stop reason: SDUPTHER

## 2025-01-10 RX ADMIN — SODIUM CHLORIDE: 9 INJECTION, SOLUTION INTRAVENOUS at 07:41

## 2025-01-10 RX ADMIN — PROPOFOL 100 MCG/KG/MIN: 10 INJECTION, EMULSION INTRAVENOUS at 08:00

## 2025-01-10 RX ADMIN — PROPOFOL 30 MG: 10 INJECTION, EMULSION INTRAVENOUS at 07:59

## 2025-01-10 RX ADMIN — SODIUM CHLORIDE: 0.9 INJECTION, SOLUTION INTRAVENOUS at 07:56

## 2025-01-10 RX ADMIN — PROPOFOL 80 MG: 10 INJECTION, EMULSION INTRAVENOUS at 07:58

## 2025-01-10 ASSESSMENT — PAIN - FUNCTIONAL ASSESSMENT
PAIN_FUNCTIONAL_ASSESSMENT: PREVENTS OR INTERFERES SOME ACTIVE ACTIVITIES AND ADLS
PAIN_FUNCTIONAL_ASSESSMENT: NONE - DENIES PAIN
PAIN_FUNCTIONAL_ASSESSMENT: 0-10

## 2025-01-10 ASSESSMENT — ENCOUNTER SYMPTOMS
RESPIRATORY NEGATIVE: 1
GASTROINTESTINAL NEGATIVE: 1

## 2025-01-10 ASSESSMENT — LIFESTYLE VARIABLES: SMOKING_STATUS: 1

## 2025-01-10 ASSESSMENT — PAIN DESCRIPTION - DESCRIPTORS: DESCRIPTORS: ACHING;CRAMPING

## 2025-01-10 NOTE — ANESTHESIA PRE PROCEDURE
10/24/2024 08:11 PM       CMP:   Lab Results   Component Value Date/Time     10/24/2024 08:11 PM    K 4.4 10/24/2024 08:11 PM    K 4.1 11/14/2022 05:27 AM     10/24/2024 08:11 PM    CO2 24 10/24/2024 08:11 PM    BUN 16 10/24/2024 08:11 PM    CREATININE 0.8 10/24/2024 08:11 PM    GFRAA >60 01/21/2015 04:30 AM    LABGLOM >90 10/24/2024 08:11 PM    LABGLOM >90 04/23/2024 05:44 AM    GLUCOSE 125 10/24/2024 08:11 PM    CALCIUM 9.4 10/24/2024 08:11 PM    BILITOT 0.3 10/24/2024 08:11 PM    ALKPHOS 80 10/24/2024 08:11 PM    AST 18 10/24/2024 08:11 PM    ALT 16 10/24/2024 08:11 PM       POC Tests: No results for input(s): \"POCGLU\", \"POCNA\", \"POCK\", \"POCCL\", \"POCBUN\", \"POCHEMO\", \"POCHCT\" in the last 72 hours.    Coags:   Lab Results   Component Value Date/Time    PROTIME 11.5 05/12/2024 01:12 PM    INR 1.0 05/12/2024 01:12 PM       HCG (If Applicable): No results found for: \"PREGTESTUR\", \"PREGSERUM\", \"HCG\", \"HCGQUANT\"     ABGs: No results found for: \"PHART\", \"PO2ART\", \"VHI0UTW\", \"KWJ1WBC\", \"BEART\", \"B5ANQDJM\"     Type & Screen (If Applicable):  Lab Results   Component Value Date    ABORH A POS 11/13/2022    LABANTI NEG 11/13/2022       Drug/Infectious Status (If Applicable):  Lab Results   Component Value Date/Time    HEPCAB NONREACTIVE 10/28/2024 02:42 PM       COVID-19 Screening (If Applicable):   Lab Results   Component Value Date/Time    COVID19 Not Detected 09/23/2024 11:00 AM     ECG 10/24/2024  Normal sinus rhythm  Normal ECG  When compared with ECG of 17-OCT-2024 19:00,  No significant change was found  Confirmed by Samra Urena ([id number]) on 10/25/2024 3:18:41 PM    ECHO 04/23/2024    Left Ventricle: Normal left ventricular systolic function. EF by visual approximation is > 60%. Left ventricle size is normal. Mild concentric hypertrophy. Stage 1 diastolic dysfunction.    Right Ventricle: Right ventricle size is normal. Normal systolic function. TAPSE is 2.0 cm. RV Peak S' is 18 cm/s.    Aortic Valve:

## 2025-01-10 NOTE — H&P
MD   thiamine 100 MG tablet Take 1 tablet by mouth daily 12/5/24  Yes Pierce Andrew MD   acetaminophen (TYLENOL) 500 MG tablet Take 1 tablet by mouth 4 times daily as needed for Pain 12/5/24  Yes Pierce Andrew MD   lisinopril (PRINIVIL;ZESTRIL) 10 MG tablet Take 1 tablet by mouth daily 12/3/24  Yes Pierce Andrew MD   Misc. Devices (CANE) MISC 1 each by Does not apply route daily 10/18/24  Yes Milvia Cornell APRN - CNP   atorvastatin (LIPITOR) 40 MG tablet Take 1 tablet by mouth daily 12/19/24   Pierce Andrew MD   meclizine (ANTIVERT) 25 MG tablet Take 1 tablet by mouth 3 times daily as needed for Dizziness  Patient not taking: Reported on 1/6/2025 12/16/24   Pierce Andrew MD   ibuprofen (ADVIL;MOTRIN) 600 MG tablet Take 1 tablet by mouth 4 times daily as needed for Pain 12/3/24   Pierce Andrew MD   thiamine mononitrate 100 MG tablet Take 1 tablet by mouth daily 9/30/24   ProviderHermilo MD   prochlorperazine (COMPAZINE) 10 MG tablet Take 0.5 tablets by mouth every 6 hours as needed (nausea vomiting)  Patient not taking: Reported on 1/6/2025 10/24/24   Salome Su APRN - CNP   vitamin B-12 (CYANOCOBALAMIN) 500 MCG tablet Take 1 tablet by mouth daily 10/18/24 1/16/25  Milvia Cornell APRN - CNP   nicotine polacrilex (NICORETTE) 2 MG gum Take 1 each by mouth as needed for Smoking cessation  Patient not taking: Reported on 10/21/2024 9/30/24   Pierce Andrew MD     Allergies   Allergen Reactions    Pcn [Penicillins] Other (See Comments)     \"Unknown\"        Review of Systems  Review of Systems   Constitutional:         Recent 12 pound weight gain   Respiratory: Negative.     Cardiovascular: Negative.    Gastrointestinal: Negative.    Musculoskeletal:  Positive for gait problem.   Skin: Negative.          Physical Exam   BP (!) 155/95   Pulse 89   Temp 97.6 °F (36.4 °C) (Temporal)   Resp 18   Ht 1.702 m (5' 7\")   Wt 81.6 kg (180 lb)   SpO2 98%   BMI 28.19 kg/m²   General - no acute distress,

## 2025-01-10 NOTE — ANESTHESIA POSTPROCEDURE EVALUATION
Department of Anesthesiology  Postprocedure Note    Patient: Kirsty Thurston  MRN: [id number]  YOB: 1970  Date of evaluation: 1/10/2025    Procedure Summary       Date: 01/10/25 Room / Location: [place] [address] / [facility]    Anesthesia Start: 0756 Anesthesia Stop: 0831    Procedure: COLORECTAL CANCER SCREENING, NOT HIGH RISK Diagnosis:       Screen for colon cancer      (Screen for colon cancer [Z12.11])    Surgeons: Ulises Pate MD Responsible Provider: Ron Fernandez MD    Anesthesia Type: MAC ASA Status: 3            Anesthesia Type: No value filed.    Anant Phase I: Anant Score: 10    Anant Phase II: Anant Score: 10    Anesthesia Post Evaluation    Patient location during evaluation: PACU  Patient participation: complete - patient participated  Level of consciousness: awake  Pain score: 3  Airway patency: patent  Nausea & Vomiting: no nausea and no vomiting  Cardiovascular status: blood pressure returned to baseline  Respiratory status: acceptable  Hydration status: euvolemic    No notable events documented.

## 2025-01-14 ASSESSMENT — PATIENT HEALTH QUESTIONNAIRE - PHQ9
3. TROUBLE FALLING OR STAYING ASLEEP: NOT AT ALL
6. FEELING BAD ABOUT YOURSELF - OR THAT YOU ARE A FAILURE OR HAVE LET YOURSELF OR YOUR FAMILY DOWN: NOT AT ALL
5. POOR APPETITE OR OVEREATING: NOT AT ALL
8. MOVING OR SPEAKING SO SLOWLY THAT OTHER PEOPLE COULD HAVE NOTICED. OR THE OPPOSITE - BEING SO FIDGETY OR RESTLESS THAT YOU HAVE BEEN MOVING AROUND A LOT MORE THAN USUAL: NOT AT ALL
7. TROUBLE CONCENTRATING ON THINGS, SUCH AS READING THE NEWSPAPER OR WATCHING TELEVISION: NOT AT ALL
SUM OF ALL RESPONSES TO PHQ QUESTIONS 1-9: 6
4. FEELING TIRED OR HAVING LITTLE ENERGY: MORE THAN HALF THE DAYS
1. LITTLE INTEREST OR PLEASURE IN DOING THINGS: MORE THAN HALF THE DAYS
3. TROUBLE FALLING OR STAYING ASLEEP: NOT AT ALL
SUM OF ALL RESPONSES TO PHQ9 QUESTIONS 1 & 2: 4
SUM OF ALL RESPONSES TO PHQ QUESTIONS 1-9: 6
7. TROUBLE CONCENTRATING ON THINGS, SUCH AS READING THE NEWSPAPER OR WATCHING TELEVISION: NOT AT ALL
SUM OF ALL RESPONSES TO PHQ9 QUESTIONS 1 & 2: 4
2. FEELING DOWN, DEPRESSED OR HOPELESS: MORE THAN HALF THE DAYS
2. FEELING DOWN, DEPRESSED OR HOPELESS: MORE THAN HALF THE DAYS
1. LITTLE INTEREST OR PLEASURE IN DOING THINGS: MORE THAN HALF THE DAYS
SUM OF ALL RESPONSES TO PHQ QUESTIONS 1-9: 6
9. THOUGHTS THAT YOU WOULD BE BETTER OFF DEAD, OR OF HURTING YOURSELF: NOT AT ALL
10. IF YOU CHECKED OFF ANY PROBLEMS, HOW DIFFICULT HAVE THESE PROBLEMS MADE IT FOR YOU TO DO YOUR WORK, TAKE CARE OF THINGS AT HOME, OR GET ALONG WITH OTHER PEOPLE: NOT DIFFICULT AT ALL
SUM OF ALL RESPONSES TO PHQ QUESTIONS 1-9: 6
4. FEELING TIRED OR HAVING LITTLE ENERGY: MORE THAN HALF THE DAYS
5. POOR APPETITE OR OVEREATING: NOT AT ALL
SUM OF ALL RESPONSES TO PHQ QUESTIONS 1-9: 6
6. FEELING BAD ABOUT YOURSELF - OR THAT YOU ARE A FAILURE OR HAVE LET YOURSELF OR YOUR FAMILY DOWN: NOT AT ALL
8. MOVING OR SPEAKING SO SLOWLY THAT OTHER PEOPLE COULD HAVE NOTICED. OR THE OPPOSITE, BEING SO FIGETY OR RESTLESS THAT YOU HAVE BEEN MOVING AROUND A LOT MORE THAN USUAL: NOT AT ALL
10. IF YOU CHECKED OFF ANY PROBLEMS, HOW DIFFICULT HAVE THESE PROBLEMS MADE IT FOR YOU TO DO YOUR WORK, TAKE CARE OF THINGS AT HOME, OR GET ALONG WITH OTHER PEOPLE: NOT DIFFICULT AT ALL
9. THOUGHTS THAT YOU WOULD BE BETTER OFF DEAD, OR OF HURTING YOURSELF: NOT AT ALL

## 2025-01-15 DIAGNOSIS — Z98.84 HX OF GASTRIC BYPASS: ICD-10-CM

## 2025-01-15 RX ORDER — FOLIC ACID 1 MG/1
1000 TABLET ORAL DAILY
Qty: 30 TABLET | Refills: 2 | OUTPATIENT
Start: 2025-01-15

## 2025-01-15 RX ORDER — LANOLIN ALCOHOL/MO/W.PET/CERES
1000 CREAM (GRAM) TOPICAL DAILY
Qty: 30 TABLET | Refills: 2 | OUTPATIENT
Start: 2025-01-15

## 2025-01-15 RX ORDER — LISINOPRIL 10 MG/1
10 TABLET ORAL DAILY
Qty: 30 TABLET | Refills: 3 | OUTPATIENT
Start: 2025-01-15

## 2025-01-15 RX ORDER — IBUPROFEN 600 MG/1
600 TABLET, FILM COATED ORAL 4 TIMES DAILY PRN
Qty: 40 TABLET | Refills: 0 | OUTPATIENT
Start: 2025-01-15

## 2025-01-16 ENCOUNTER — HOSPITAL ENCOUNTER (OUTPATIENT)
Dept: GENERAL RADIOLOGY | Age: 55
Discharge: HOME | End: 2025-01-18
Payer: COMMERCIAL

## 2025-01-16 ENCOUNTER — OFFICE VISIT (OUTPATIENT)
Dept: ORTHOPEDIC SURGERY | Age: 55
End: 2025-01-16
Payer: COMMERCIAL

## 2025-01-16 VITALS
HEART RATE: 91 BPM | DIASTOLIC BLOOD PRESSURE: 93 MMHG | OXYGEN SATURATION: 94 % | SYSTOLIC BLOOD PRESSURE: 161 MMHG | RESPIRATION RATE: 16 BRPM

## 2025-01-16 DIAGNOSIS — S42.002A FRACTURE OF UNSPECIFIED PART OF LEFT CLAVICLE, INITIAL ENCOUNTER FOR CLOSED FRACTURE: Primary | ICD-10-CM

## 2025-01-16 DIAGNOSIS — T14.8XXA FRACTURE: ICD-10-CM

## 2025-01-16 PROCEDURE — 99213 OFFICE O/P EST LOW 20 MIN: CPT | Performed by: PHYSICIAN ASSISTANT

## 2025-01-16 PROCEDURE — 73030 X-RAY EXAM OF SHOULDER: CPT

## 2025-01-16 PROCEDURE — 73060 X-RAY EXAM OF HUMERUS: CPT

## 2025-01-16 NOTE — PATIENT INSTRUCTIONS
Partial weightbearing left upper extremity 2-3 pounds.    Sling for comfort when out.  Otherwise can start coming out of sling.    Home exercise program.  Form with exercises printed for home use.    Follow-up in 4 weeks for reevaluation and x-rays.    Call if any questions or concerns      Shoulder Exercises  Introduction  Here are some examples of exercises for you to try. The exercises may be suggested for a condition or for rehabilitation. Start each exercise slowly. Ease off the exercises if you start to have pain.  You will be told when to start these exercises and which ones will work best for you.  How to do the exercises  Shoulder extensor stretch (lying down, with wand)    Lie on your back with your knees bent. Hold a wand with both hands, placing one hand near each end of the wand. (You can also use a broom handle or anything stiff and about 3 feet long.) Your palms should face down as you hold the wand. Straighten your elbows and rest the wand on your legs, just below your hips. This is your starting position.  Keeping your elbows straight, slowly raise your arms over your head. Raise them until you feel a stretch in your shoulders, upper back, and chest. Try not to shrug your shoulders.  Hold for 15 to 30 seconds, and then return to the starting position.  Repeat 2 to 4 times.  Shoulder rotation (lying down, with wand)    Lie on your back. Hold a wand with both hands with your elbows bent and palms up. You can also use a broom handle or anything stiff and about 3 feet long.  Hold your elbows close to your body, and move the wand across your body toward the affected arm.  Hold for 15 to 30 seconds, and then return to the starting position.  Repeat 2 to 4 times.  It's a good idea to repeat these steps toward your other arm.  Shoulder internal rotation stretch (with towel)    Roll up a towel lengthwise. Put the towel over your unaffected shoulder and hold the front end with your unaffected hand.  With your

## 2025-01-16 NOTE — PROGRESS NOTES
New Patient Orthopaedic Progress Note    Kirsty Thurston is a 54 y.o. male, his YOB: 1970 with the following history as recorded in [facility]:      Patient Active Problem List    Diagnosis Date Noted    Multiple rib fractures involving four or more ribs 11/13/2022    Closed fracture of multiple ribs of left side, initial encounter 11/13/2022    Fracture of unspecified part of left clavicle, initial encounter for closed fracture 01/16/2025    Screen for colon cancer 11/19/2024    Ataxic gait 09/30/2024    Chronic alcohol abuse 09/30/2024    Essential hypertension 09/30/2024    History of gastric bypass 09/30/2024    History of subarachnoid hemorrhage 09/30/2024    History of subdural hemorrhage 09/30/2024    History of traumatic brain injury 09/30/2024    Housing instability 09/30/2024    Mixed hyperlipidemia 09/30/2024    Nicotine use 09/30/2024    Overweight 09/30/2024    Chest pain 09/10/2024    Stroke-like symptoms 04/20/2024    Fall from standing, initial encounter 04/10/2024    Closed skull fracture 03/28/2024    Epidural hematoma 03/27/2024    Subdural hematoma 03/27/2024    Traumatic hematoma of brain with compression and midline shift of brain 03/27/2024    Closed fracture of parietal bone of skull 03/27/2024    Subarachnoid hemorrhage (HCC) 03/27/2024    Intracranial hemorrhage (HCC) 03/27/2024     Current Outpatient Medications   Medication Sig Dispense Refill    vitamin B-12 (CYANOCOBALAMIN) 1000 MCG tablet Take 1 tablet by mouth daily Out of med      amLODIPine (NORVASC) 5 MG tablet Take 1 tablet by mouth daily (Patient taking differently: Take 0.5 tablets by mouth daily) 30 tablet 3    atorvastatin (LIPITOR) 40 MG tablet Take 1 tablet by mouth daily 30 tablet 2    lidocaine (LIDODERM) 5 % Place 1 patch onto the skin daily 12 hours on, 12 hours off. 30 patch 0    folic acid (FOLVITE) 1 MG tablet Take 1 tablet by mouth daily 30 tablet 2    thiamine 100 MG tablet Take 1 tablet by mouth daily 30

## 2025-01-17 ENCOUNTER — OFFICE VISIT (OUTPATIENT)
Dept: FAMILY MEDICINE CLINIC | Age: 55
End: 2025-01-17
Payer: COMMERCIAL

## 2025-01-17 VITALS
RESPIRATION RATE: 18 BRPM | HEIGHT: 67 IN | HEART RATE: 90 BPM | BODY MASS INDEX: 29.98 KG/M2 | WEIGHT: 191 LBS | SYSTOLIC BLOOD PRESSURE: 166 MMHG | DIASTOLIC BLOOD PRESSURE: 90 MMHG | OXYGEN SATURATION: 96 % | TEMPERATURE: 98.7 F

## 2025-01-17 DIAGNOSIS — I10 ESSENTIAL HYPERTENSION: Primary | ICD-10-CM

## 2025-01-17 DIAGNOSIS — S42.002D CLOSED NONDISPLACED FRACTURE OF LEFT CLAVICLE WITH ROUTINE HEALING, UNSPECIFIED PART OF CLAVICLE, SUBSEQUENT ENCOUNTER: ICD-10-CM

## 2025-01-17 DIAGNOSIS — R60.0 PERIPHERAL EDEMA: ICD-10-CM

## 2025-01-17 DIAGNOSIS — E78.5 HYPERLIPIDEMIA, UNSPECIFIED HYPERLIPIDEMIA TYPE: ICD-10-CM

## 2025-01-17 DIAGNOSIS — Z98.84 HX OF GASTRIC BYPASS: ICD-10-CM

## 2025-01-17 DIAGNOSIS — B35.1 ONYCHOMYCOSIS: ICD-10-CM

## 2025-01-17 LAB
ALBUMIN: 4.3 G/DL (ref 3.5–5.2)
ALP BLD-CCNC: 65 U/L (ref 40–129)
ALT SERPL-CCNC: 16 U/L (ref 0–40)
ANION GAP SERPL CALCULATED.3IONS-SCNC: 13 MMOL/L (ref 7–16)
AST SERPL-CCNC: 16 U/L (ref 0–39)
BILIRUB SERPL-MCNC: 0.2 MG/DL (ref 0–1.2)
BUN BLDV-MCNC: 17 MG/DL (ref 6–20)
CALCIUM SERPL-MCNC: 9.4 MG/DL (ref 8.6–10.2)
CHLORIDE BLD-SCNC: 106 MMOL/L (ref 98–107)
CO2: 22 MMOL/L (ref 22–29)
CREAT SERPL-MCNC: 0.9 MG/DL (ref 0.7–1.2)
GFR, ESTIMATED: >90 ML/MIN/1.73M2
GLUCOSE BLD-MCNC: 59 MG/DL (ref 74–99)
POTASSIUM SERPL-SCNC: 3.9 MMOL/L (ref 3.5–5)
SODIUM BLD-SCNC: 141 MMOL/L (ref 132–146)
TOTAL PROTEIN: 6.9 G/DL (ref 6.4–8.3)
TSH SERPL DL<=0.05 MIU/L-ACNC: 1.13 UIU/ML (ref 0.27–4.2)

## 2025-01-17 PROCEDURE — 3077F SYST BP >= 140 MM HG: CPT

## 2025-01-17 PROCEDURE — 99213 OFFICE O/P EST LOW 20 MIN: CPT

## 2025-01-17 PROCEDURE — 3079F DIAST BP 80-89 MM HG: CPT

## 2025-01-17 PROCEDURE — G2211 COMPLEX E/M VISIT ADD ON: HCPCS

## 2025-01-17 RX ORDER — MULTIVITAMIN WITH IRON
500 TABLET ORAL DAILY
Qty: 30 TABLET | Refills: 2 | Status: SHIPPED | OUTPATIENT
Start: 2025-01-17 | End: 2025-04-17

## 2025-01-17 RX ORDER — HYDROPHOR 42 G/100G
OINTMENT TOPICAL
Qty: 1 EACH | Refills: 0 | Status: SHIPPED | OUTPATIENT
Start: 2025-01-17

## 2025-01-17 RX ORDER — HYDROCHLOROTHIAZIDE 12.5 MG/1
12.5 TABLET ORAL EVERY MORNING
Qty: 30 TABLET | Refills: 0 | Status: SHIPPED | OUTPATIENT
Start: 2025-01-17 | End: 2025-02-16

## 2025-01-17 RX ORDER — IBUPROFEN 600 MG/1
600 TABLET, FILM COATED ORAL 4 TIMES DAILY PRN
Qty: 40 TABLET | Refills: 0 | Status: SHIPPED | OUTPATIENT
Start: 2025-01-17

## 2025-01-17 RX ORDER — ATORVASTATIN CALCIUM 40 MG/1
40 TABLET, FILM COATED ORAL DAILY
Qty: 30 TABLET | Refills: 2 | Status: SHIPPED | OUTPATIENT
Start: 2025-01-17

## 2025-01-17 RX ORDER — FOLIC ACID 1 MG/1
1000 TABLET ORAL DAILY
Qty: 30 TABLET | Refills: 2 | Status: SHIPPED | OUTPATIENT
Start: 2025-01-17

## 2025-01-17 RX ORDER — TERBINAFINE HYDROCHLORIDE 250 MG/1
250 TABLET ORAL DAILY
Qty: 84 TABLET | Refills: 0 | Status: SHIPPED | OUTPATIENT
Start: 2025-01-17 | End: 2025-04-11

## 2025-01-17 NOTE — PROGRESS NOTES
[facility]  FAMILY MEDICINE RESIDENCY PROGRAM  DATE OF VISIT : 2025    Patient : Kirsty Thurston   Age : 54 y.o.    : 1970   MRN : [id number]   ______________________________________________________________________    Chief Complaint:   Chief Complaint   Patient presents with    Hypertension     F/u    Medication Refill     Patient needs medication refills        HPI:   Kirsty Thurston is a 54 y.o. male who presents for f/u chronic L clavicular fracture, dizziness    Chronic L clavicular fracture- referred to ortho at prev appt. Ortho recommended PT, pt declined d/t transportation issues. Was given HEP. To f/u with ortho in 1 mo. Has been doing home exercise program. ROM has improved and pain has improved as well.     HTN- BP uncontrolled today. The patient has been out of his lisinopril for 2 days- has refill at the pharmacy that he will  today. Patient denies Lightheadedness, Chest pain, Shortness of breath, Abdominal pain, Nausea, Vomiting     Peripheral edema- the patient has had swelling in his legs to the mid-calf for the last 2 weeks. Denies orthopnea and paroxysmal nocturnal dyspnea.    Onychomycosis- ongoing issue intermittently for years. Says all toenails have become yellowed/brittle. States he just washes with soap and does not use harsh chemicals like bleach. States that he has tried topical products in the past but they have been ineffective.     Past Medical History:  Past Medical History:   Diagnosis Date    Alcohol use     Clavicle fracture 12/10/2024    left- wears a sling    Hypertension     ICH (intracerebral hemorrhage) (HCC)     after fall    Kidney stone     Pseudocholinesterase deficiency     SAH (subarachnoid hemorrhage) (HCC) 12/10/2024    after fall    SDH (subdural hematoma) 12/10/2024    after fall       Allergies:   Allergies   Allergen Reactions    Pcn [Penicillins] Other (See Comments)     \"Unknown\"        Medications:    Current Outpatient Medications

## 2025-02-07 DIAGNOSIS — S42.002A FRACTURE OF UNSPECIFIED PART OF LEFT CLAVICLE, INITIAL ENCOUNTER FOR CLOSED FRACTURE: Primary | ICD-10-CM

## 2025-02-09 PROBLEM — Z12.11 SCREEN FOR COLON CANCER: Status: RESOLVED | Noted: 2024-11-19 | Resolved: 2025-02-09

## 2025-02-13 ENCOUNTER — HOSPITAL ENCOUNTER (OUTPATIENT)
Dept: GENERAL RADIOLOGY | Age: 55
Discharge: HOME | End: 2025-02-15
Payer: COMMERCIAL

## 2025-02-13 ENCOUNTER — OFFICE VISIT (OUTPATIENT)
Dept: ORTHOPEDIC SURGERY | Age: 55
End: 2025-02-13
Payer: COMMERCIAL

## 2025-02-13 VITALS
WEIGHT: 185 LBS | OXYGEN SATURATION: 99 % | BODY MASS INDEX: 29.03 KG/M2 | HEART RATE: 98 BPM | HEIGHT: 67 IN | DIASTOLIC BLOOD PRESSURE: 84 MMHG | SYSTOLIC BLOOD PRESSURE: 136 MMHG

## 2025-02-13 DIAGNOSIS — S42.002A FRACTURE OF UNSPECIFIED PART OF LEFT CLAVICLE, INITIAL ENCOUNTER FOR CLOSED FRACTURE: ICD-10-CM

## 2025-02-13 DIAGNOSIS — S42.002A FRACTURE OF UNSPECIFIED PART OF LEFT CLAVICLE, INITIAL ENCOUNTER FOR CLOSED FRACTURE: Primary | ICD-10-CM

## 2025-02-13 DIAGNOSIS — M75.42 IMPINGEMENT SYNDROME OF LEFT SHOULDER: ICD-10-CM

## 2025-02-13 PROCEDURE — 99212 OFFICE O/P EST SF 10 MIN: CPT | Performed by: PHYSICIAN ASSISTANT

## 2025-02-13 PROCEDURE — 3079F DIAST BP 80-89 MM HG: CPT | Performed by: PHYSICIAN ASSISTANT

## 2025-02-13 PROCEDURE — G8427 DOCREV CUR MEDS BY ELIG CLIN: HCPCS | Performed by: PHYSICIAN ASSISTANT

## 2025-02-13 PROCEDURE — 3017F COLORECTAL CA SCREEN DOC REV: CPT | Performed by: PHYSICIAN ASSISTANT

## 2025-02-13 PROCEDURE — 73030 X-RAY EXAM OF SHOULDER: CPT

## 2025-02-13 PROCEDURE — 99213 OFFICE O/P EST LOW 20 MIN: CPT | Performed by: PHYSICIAN ASSISTANT

## 2025-02-13 PROCEDURE — 73060 X-RAY EXAM OF HUMERUS: CPT

## 2025-02-13 PROCEDURE — 3075F SYST BP GE 130 - 139MM HG: CPT | Performed by: PHYSICIAN ASSISTANT

## 2025-02-13 PROCEDURE — G8419 CALC BMI OUT NRM PARAM NOF/U: HCPCS | Performed by: PHYSICIAN ASSISTANT

## 2025-02-13 PROCEDURE — 4004F PT TOBACCO SCREEN RCVD TLK: CPT | Performed by: PHYSICIAN ASSISTANT

## 2025-02-13 NOTE — PROGRESS NOTES
Chief Complaint   Patient presents with    Follow-up     Patient here for follow up on left distal clavicle fx, DOI 12/15/2024. Patient stopped using the arm sling about a week ago now, states the shoulder is slowly improving.      DOI: 12/5/24    Kirsty Thurston is a 54 y.o. year old  who presents for follow up of non operative management of a left distal clavicle fracture. He is now 10 weeks s/p injury. He reports his pain has significantly decreased. He has no numbness or tingling to the left upper extremity. He does ave occasional pain that radiates down his left arm. This is intermittent and unpredictable. He denies any new injury. He is unable to attend therapy secondary to transportation. He has been working on exercises on his own.       Past Medical History:   Diagnosis Date    Alcohol use     Clavicle fracture 12/10/2024    left- wears a sling    Hypertension     ICH (intracerebral hemorrhage) (HCC)     after fall    Kidney stone     Pseudocholinesterase deficiency     SAH (subarachnoid hemorrhage) (HCC) 12/10/2024    after fall    SDH (subdural hematoma) 12/10/2024    after fall     Past Surgical History:   Procedure Laterality Date    COLONOSCOPY N/A 1/10/2025    COLORECTAL CANCER SCREENING, NOT HIGH RISK performed by Ulises Pate MD at [facility]    GASTRIC BYPASS SURGERY  2001    [facility]       Current Outpatient Medications:     folic acid (FOLVITE) 1 MG tablet, Take 1 tablet by mouth daily, Disp: 30 tablet, Rfl: 2    ibuprofen (ADVIL;MOTRIN) 600 MG tablet, Take 1 tablet by mouth 4 times daily as needed for Pain, Disp: 40 tablet, Rfl: 0    vitamin B-12 (CYANOCOBALAMIN) 500 MCG tablet, Take 1 tablet by mouth daily, Disp: 30 tablet, Rfl: 2    atorvastatin (LIPITOR) 40 MG tablet, Take 1 tablet by mouth daily, Disp: 30 tablet, Rfl: 2    mineral oil-hydrophilic petrolatum (HYDROPHOR) ointment, Apply topically as needed., Disp: 1 each, Rfl: 0    hydroCHLOROthiazide 12.5 MG tablet, Take 1

## 2025-02-13 NOTE — PATIENT INSTRUCTIONS
WB:  Weight bearing as tolerated on left upper extremity    Exercises to the left shoulder. If your pain continues, we may consider a cortisone injection to the left shoulder at your next office visit.     Pain control : Over the counter analgesia as needed    Continue with ice to the injured extremity 2-3 times per day for swelling  If able continue with elevation and compression    Follow up in 6-8 weeks with XR of the left clavicle to be done in office    meet 80% consistently

## 2025-02-24 ENCOUNTER — OFFICE VISIT (OUTPATIENT)
Dept: FAMILY MEDICINE CLINIC | Age: 55
End: 2025-02-24
Payer: COMMERCIAL

## 2025-02-24 VITALS
WEIGHT: 194 LBS | OXYGEN SATURATION: 95 % | DIASTOLIC BLOOD PRESSURE: 83 MMHG | BODY MASS INDEX: 29.4 KG/M2 | RESPIRATION RATE: 16 BRPM | SYSTOLIC BLOOD PRESSURE: 135 MMHG | TEMPERATURE: 99.9 F | HEIGHT: 68 IN | HEART RATE: 87 BPM

## 2025-02-24 DIAGNOSIS — I10 ESSENTIAL HYPERTENSION: Primary | ICD-10-CM

## 2025-02-24 DIAGNOSIS — Z98.84 HX OF GASTRIC BYPASS: ICD-10-CM

## 2025-02-24 DIAGNOSIS — F10.90 ALCOHOL USE DISORDER: ICD-10-CM

## 2025-02-24 DIAGNOSIS — B35.1 ONYCHOMYCOSIS: ICD-10-CM

## 2025-02-24 DIAGNOSIS — E78.5 HYPERLIPIDEMIA, UNSPECIFIED HYPERLIPIDEMIA TYPE: ICD-10-CM

## 2025-02-24 PROCEDURE — 3079F DIAST BP 80-89 MM HG: CPT

## 2025-02-24 PROCEDURE — G8428 CUR MEDS NOT DOCUMENT: HCPCS

## 2025-02-24 PROCEDURE — 4004F PT TOBACCO SCREEN RCVD TLK: CPT

## 2025-02-24 PROCEDURE — G8419 CALC BMI OUT NRM PARAM NOF/U: HCPCS

## 2025-02-24 PROCEDURE — 3017F COLORECTAL CA SCREEN DOC REV: CPT

## 2025-02-24 PROCEDURE — 99213 OFFICE O/P EST LOW 20 MIN: CPT

## 2025-02-24 PROCEDURE — 3075F SYST BP GE 130 - 139MM HG: CPT

## 2025-02-24 RX ORDER — AMLODIPINE BESYLATE 5 MG/1
5 TABLET ORAL DAILY
Qty: 30 TABLET | Refills: 3 | Status: SHIPPED | OUTPATIENT
Start: 2025-02-24 | End: 2025-06-24

## 2025-02-24 RX ORDER — MULTIVITAMIN WITH IRON
500 TABLET ORAL DAILY
Qty: 30 TABLET | Refills: 2 | Status: SHIPPED | OUTPATIENT
Start: 2025-02-24 | End: 2025-05-25

## 2025-02-24 RX ORDER — HYDROCHLOROTHIAZIDE 12.5 MG/1
12.5 TABLET ORAL EVERY MORNING
Qty: 30 TABLET | Refills: 0 | Status: SHIPPED | OUTPATIENT
Start: 2025-02-24 | End: 2025-03-26

## 2025-02-24 RX ORDER — LANOLIN ALCOHOL/MO/W.PET/CERES
100 CREAM (GRAM) TOPICAL DAILY
Qty: 30 TABLET | Refills: 2 | Status: SHIPPED | OUTPATIENT
Start: 2025-02-24

## 2025-02-24 RX ORDER — ATORVASTATIN CALCIUM 40 MG/1
40 TABLET, FILM COATED ORAL DAILY
Qty: 30 TABLET | Refills: 2 | Status: SHIPPED | OUTPATIENT
Start: 2025-02-24

## 2025-02-24 RX ORDER — FOLIC ACID 1 MG/1
1000 TABLET ORAL DAILY
Qty: 30 TABLET | Refills: 2 | Status: SHIPPED | OUTPATIENT
Start: 2025-02-24
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
## 2025-02-26 ENCOUNTER — TELEPHONE (OUTPATIENT)
Age: 55
End: 2025-02-26

## 2025-02-26 NOTE — TELEPHONE ENCOUNTER
Called pt to reschedule 02/28/25 appt due to provider being out of office, no answer, lvm to call back

## 2025-03-10 ENCOUNTER — OFFICE VISIT (OUTPATIENT)
Age: 55
End: 2025-03-10
Payer: COMMERCIAL

## 2025-03-10 VITALS
HEIGHT: 68 IN | DIASTOLIC BLOOD PRESSURE: 96 MMHG | BODY MASS INDEX: 28.49 KG/M2 | HEART RATE: 87 BPM | WEIGHT: 188 LBS | SYSTOLIC BLOOD PRESSURE: 148 MMHG

## 2025-03-10 DIAGNOSIS — G43.E19 INTRACTABLE CHRONIC MIGRAINE WITH AURA AND WITHOUT STATUS MIGRAINOSUS: ICD-10-CM

## 2025-03-10 DIAGNOSIS — G81.10 SPASTIC HEMIPARESIS AFFECTING NONDOMINANT SIDE (HCC): ICD-10-CM

## 2025-03-10 DIAGNOSIS — R42 DIZZINESS: ICD-10-CM

## 2025-03-10 DIAGNOSIS — S06.9X5S TRAUMATIC BRAIN INJURY, WITH LOSS OF CONSCIOUSNESS GREATER THAN 24 HOURS WITH RETURN TO PRE-EXISTING CONSCIOUS LEVEL, SEQUELA: Primary | ICD-10-CM

## 2025-03-10 PROCEDURE — 3017F COLORECTAL CA SCREEN DOC REV: CPT | Performed by: PSYCHIATRY & NEUROLOGY

## 2025-03-10 PROCEDURE — 3077F SYST BP >= 140 MM HG: CPT | Performed by: PSYCHIATRY & NEUROLOGY

## 2025-03-10 PROCEDURE — 4004F PT TOBACCO SCREEN RCVD TLK: CPT | Performed by: PSYCHIATRY & NEUROLOGY

## 2025-03-10 PROCEDURE — 99204 OFFICE O/P NEW MOD 45 MIN: CPT | Performed by: PSYCHIATRY & NEUROLOGY

## 2025-03-10 PROCEDURE — G8427 DOCREV CUR MEDS BY ELIG CLIN: HCPCS | Performed by: PSYCHIATRY & NEUROLOGY

## 2025-03-10 PROCEDURE — G8419 CALC BMI OUT NRM PARAM NOF/U: HCPCS | Performed by: PSYCHIATRY & NEUROLOGY

## 2025-03-10 PROCEDURE — 3080F DIAST BP >= 90 MM HG: CPT | Performed by: PSYCHIATRY & NEUROLOGY

## 2025-03-10 RX ORDER — DIVALPROEX SODIUM 250 MG/1
250 TABLET, DELAYED RELEASE ORAL 2 TIMES DAILY
Qty: 60 TABLET | Refills: 3 | Status: SHIPPED | OUTPATIENT
Start: 2025-03-10

## 2025-03-10 RX ORDER — RIZATRIPTAN BENZOATE 10 MG/1
10 TABLET ORAL PRN
Qty: 9 TABLET | Refills: 3 | Status: SHIPPED | OUTPATIENT
Start: 2025-03-10

## 2025-03-10 RX ORDER — MECLIZINE HYDROCHLORIDE 25 MG/1
25 TABLET ORAL 2 TIMES DAILY
Qty: 60 TABLET | Refills: 3 | Status: SHIPPED | OUTPATIENT
Start: 2025-03-10

## 2025-03-10 RX ORDER — METHYLPREDNISOLONE 4 MG/1
TABLET ORAL
Qty: 22 TABLET | Refills: 0 | Status: SHIPPED | OUTPATIENT
Start: 2025-03-10 | End: 2025-03-16

## 2025-03-10 RX ORDER — BACLOFEN 5 MG/1
5 TABLET ORAL 3 TIMES DAILY
Qty: 90 TABLET | Refills: 3 | Status: SHIPPED | OUTPATIENT
Start: 2025-03-10
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
## 2025-03-20 ENCOUNTER — RESULTS FOLLOW-UP (OUTPATIENT)
Dept: FAMILY MEDICINE CLINIC | Age: 55
End: 2025-03-20

## 2025-03-20 ENCOUNTER — OFFICE VISIT (OUTPATIENT)
Dept: FAMILY MEDICINE CLINIC | Age: 55
End: 2025-03-20
Payer: COMMERCIAL

## 2025-03-20 VITALS
RESPIRATION RATE: 18 BRPM | TEMPERATURE: 98.3 F | OXYGEN SATURATION: 93 % | BODY MASS INDEX: 29.1 KG/M2 | WEIGHT: 192 LBS | DIASTOLIC BLOOD PRESSURE: 78 MMHG | HEART RATE: 93 BPM | HEIGHT: 68 IN | SYSTOLIC BLOOD PRESSURE: 148 MMHG

## 2025-03-20 DIAGNOSIS — I62.9 INTRACRANIAL HEMORRHAGE (HCC): ICD-10-CM

## 2025-03-20 DIAGNOSIS — J30.1 SEASONAL ALLERGIC RHINITIS DUE TO POLLEN: ICD-10-CM

## 2025-03-20 DIAGNOSIS — I10 ESSENTIAL HYPERTENSION: ICD-10-CM

## 2025-03-20 DIAGNOSIS — S06.A0XA: ICD-10-CM

## 2025-03-20 DIAGNOSIS — S06.2XAA: ICD-10-CM

## 2025-03-20 DIAGNOSIS — R26.0 ATAXIC GAIT: Primary | ICD-10-CM

## 2025-03-20 PROBLEM — I60.9 SUBARACHNOID HEMORRHAGE (HCC): Status: RESOLVED | Noted: 2024-03-27 | Resolved: 2025-03-20

## 2025-03-20 LAB
ALBUMIN: 4.2 G/DL (ref 3.5–5.2)
ALP BLD-CCNC: 70 U/L (ref 40–129)
ALT SERPL-CCNC: 13 U/L (ref 0–40)
ANION GAP SERPL CALCULATED.3IONS-SCNC: 15 MMOL/L (ref 7–16)
AST SERPL-CCNC: 17 U/L (ref 0–39)
BILIRUB SERPL-MCNC: 0.3 MG/DL (ref 0–1.2)
BUN BLDV-MCNC: 18 MG/DL (ref 6–20)
CALCIUM SERPL-MCNC: 9.6 MG/DL (ref 8.6–10.2)
CHLORIDE BLD-SCNC: 106 MMOL/L (ref 98–107)
CO2: 22 MMOL/L (ref 22–29)
CREAT SERPL-MCNC: 1 MG/DL (ref 0.7–1.2)
GFR, ESTIMATED: >90 ML/MIN/1.73M2
GLUCOSE BLD-MCNC: 63 MG/DL (ref 74–99)
POTASSIUM SERPL-SCNC: 4.8 MMOL/L (ref 3.5–5)
SODIUM BLD-SCNC: 143 MMOL/L (ref 132–146)
TOTAL PROTEIN: 7 G/DL (ref 6.4–8.3)

## 2025-03-20 PROCEDURE — G8428 CUR MEDS NOT DOCUMENT: HCPCS | Performed by: FAMILY MEDICINE

## 2025-03-20 PROCEDURE — 3017F COLORECTAL CA SCREEN DOC REV: CPT | Performed by: FAMILY MEDICINE

## 2025-03-20 PROCEDURE — G8419 CALC BMI OUT NRM PARAM NOF/U: HCPCS | Performed by: FAMILY MEDICINE

## 2025-03-20 PROCEDURE — 99213 OFFICE O/P EST LOW 20 MIN: CPT | Performed by: FAMILY MEDICINE

## 2025-03-20 PROCEDURE — 3077F SYST BP >= 140 MM HG: CPT | Performed by: FAMILY MEDICINE

## 2025-03-20 PROCEDURE — 3078F DIAST BP <80 MM HG: CPT | Performed by: FAMILY MEDICINE

## 2025-03-20 PROCEDURE — 4004F PT TOBACCO SCREEN RCVD TLK: CPT | Performed by: FAMILY MEDICINE

## 2025-03-20 RX ORDER — CETIRIZINE HYDROCHLORIDE 10 MG/1
10 TABLET ORAL DAILY
Qty: 90 TABLET | Refills: 0 | Status: SHIPPED | OUTPATIENT
Start: 2025-03-20

## 2025-03-20 NOTE — PROGRESS NOTES
Patient is a 54-year-old male with a past medical history of ICH, subdural hematoma, subarachnoid hemorrhage after a fall 1 year ago.  Since that time the patient has had chronic headaches with associated vertigo.  He is also had chronic left hip pain that requires him to ambulate with a cane.  The patient is currently following with neurology and [facility]  The patient is currently working with an 's office because he is struggling with his prior employment and he is trying to have disability paperwork filled out.  He presents today to have this paperwork done.  The patient states that since his head trauma, he has been more emotionally labile.  He also suffers from significant inattention making it hard for him to focus on a task for more than a few minutes.  In addition, because of the chronic pain in his hip, he cannot sit or stand for very long, and he cannot walk more than 1 city block without having to rest or having severe pain.  This is all significantly limited his employment options, necessitating him being on disability.  Blood pressure (!) 148/78, pulse 93, temperature 98.3 °F (36.8 °C), temperature source Temporal, resp. rate 18, height 1.715 m (5' 7.5\"), weight 87.1 kg (192 lb), SpO2 93%.  HEENT WNL     Heart regular    Lungs clear    abd non-tender      No edema    Pulses intact       Assessment and plan  Status post head trauma, chronic left hip OA  - Disability paperwork filled out, return to office in 1 month for follow-up of chronic conditions    Attending Physician Statement  I have discussed the case, including pertinent history and exam findings with the resident. I agree with the documented assessment and plan.      
sounds: Normal heart sounds.   Pulmonary:      Effort: Pulmonary effort is normal.      Breath sounds: Normal breath sounds.   Abdominal:      General: Abdomen is flat. Bowel sounds are normal. There is no distension.      Palpations: Abdomen is soft.      Tenderness: There is no abdominal tenderness.   Musculoskeletal:         General: Tenderness (Anterior and lateral L hip) present. No swelling or deformity.      Cervical back: Normal range of motion and neck supple.      Right lower leg: No edema.      Left lower leg: No edema.   Skin:     General: Skin is warm and dry.   Neurological:      General: No focal deficit present.      Mental Status: He is alert. Mental status is at baseline.      Cranial Nerves: No cranial nerve deficit.      Sensory: No sensory deficit.      Motor: Weakness (4/5 strength in L hip) present.      Gait: Gait abnormal (ambulates w/ cane, antalgic gait otherwise).   Psychiatric:         Mood and Affect: Mood normal.         Behavior: Behavior normal.           ______________________________________________________________________      Return to Office: 5/27/25    Pierce Andrew MD   This case was discussed with Dr. Laws

## 2025-03-27 ENCOUNTER — OFFICE VISIT (OUTPATIENT)
Dept: ORTHOPEDIC SURGERY | Age: 55
End: 2025-03-27
Payer: COMMERCIAL

## 2025-03-27 ENCOUNTER — HOSPITAL ENCOUNTER (OUTPATIENT)
Dept: GENERAL RADIOLOGY | Age: 55
Discharge: HOME | End: 2025-03-29
Payer: COMMERCIAL

## 2025-03-27 DIAGNOSIS — M70.62 GREATER TROCHANTERIC BURSITIS OF LEFT HIP: ICD-10-CM

## 2025-03-27 DIAGNOSIS — M25.552 LEFT HIP PAIN: ICD-10-CM

## 2025-03-27 DIAGNOSIS — S42.002A FRACTURE OF UNSPECIFIED PART OF LEFT CLAVICLE, INITIAL ENCOUNTER FOR CLOSED FRACTURE: ICD-10-CM

## 2025-03-27 DIAGNOSIS — M75.42 IMPINGEMENT SYNDROME OF LEFT SHOULDER: ICD-10-CM

## 2025-03-27 DIAGNOSIS — M25.552 LEFT HIP PAIN: Primary | ICD-10-CM

## 2025-03-27 PROCEDURE — 99213 OFFICE O/P EST LOW 20 MIN: CPT | Performed by: PHYSICIAN ASSISTANT

## 2025-03-27 PROCEDURE — 20610 DRAIN/INJ JOINT/BURSA W/O US: CPT | Performed by: PHYSICIAN ASSISTANT

## 2025-03-27 PROCEDURE — 73000 X-RAY EXAM OF COLLAR BONE: CPT

## 2025-03-27 PROCEDURE — 73502 X-RAY EXAM HIP UNI 2-3 VIEWS: CPT

## 2025-03-27 RX ORDER — LIDOCAINE HYDROCHLORIDE 10 MG/ML
6 INJECTION, SOLUTION INFILTRATION; PERINEURAL ONCE
Status: COMPLETED | OUTPATIENT
Start: 2025-03-27 | End: 2025-03-27

## 2025-03-27 RX ORDER — BUPIVACAINE HYDROCHLORIDE 2.5 MG/ML
6 INJECTION, SOLUTION INFILTRATION; PERINEURAL ONCE
Status: COMPLETED | OUTPATIENT
Start: 2025-03-27 | End: 2025-03-27

## 2025-03-27 RX ORDER — TRIAMCINOLONE ACETONIDE 40 MG/ML
40 INJECTION, SUSPENSION INTRA-ARTICULAR; INTRAMUSCULAR ONCE
Status: COMPLETED | OUTPATIENT
Start: 2025-03-27 | End: 2025-03-27

## 2025-03-27 RX ADMIN — LIDOCAINE HYDROCHLORIDE 6 ML: 10 INJECTION, SOLUTION INFILTRATION; PERINEURAL at 12:07

## 2025-03-27 RX ADMIN — TRIAMCINOLONE ACETONIDE 40 MG: 40 INJECTION, SUSPENSION INTRA-ARTICULAR; INTRAMUSCULAR at 12:08

## 2025-03-27 RX ADMIN — BUPIVACAINE HYDROCHLORIDE 15 MG: 2.5 INJECTION, SOLUTION INFILTRATION; PERINEURAL at 12:07

## 2025-03-27 NOTE — PROGRESS NOTES
Chief Complaint   Patient presents with    Follow-up     Pt follow up from left distal clavicle. Pt ambulating with single cane. Pt c/o L hip pain 7/10 pain and intermittent pain of LUE.        DOI: 12/5/24        Subjective:  Kirsty Thurston is approximately 3.5 follow-up from the above injury.  He is weight-bear as tolerated to the left upper extremity and left lower extremity.  Complains of new complaint of lateral left hip pain x 1 month.  Still using a cane but does not use it at baseline.  States that he uses a cane for balance, history of TBI.  States that he has pain when laying on the left side.  He has no groin pain.  He has no numbness or tingling or radiating pain.  Still having increased left shoulder pain with activity, better with rest.  No discomfort focally over the clavicle.  Would like to try left shoulder and left greater trochanteric bursa CSI today.  No new injuries.  No other complaints today.    Review of Systems -  all pertinent positives and negatives in HPI.      Objective:    General: Alert and oriented X 3, normocephalic atraumatic, external ears and eye normal, sclera clear, no acute distress, respirations easy and unlabored with no audible wheezes, skin warm and dry, speech and dress appropriate for noted age, affect euthymic.    Extremity:  Left Upper Extremity  Skin is clean dry and intact  Nontender clavicle  Belly press and open can test positive  Active left shoulder flexion to 150 degrees, abduction 130 degrees, internal rotation to lumbar spine, external rotation to 60  Radial pulse palpable, fingers warm with BCR  Flex/extension intact to wrist, thumb and fingers  Finger opposition intact  Finger adduction/abduction intact  Finger crossover intact  Subjectively states sensation intact to radial/medial/ulnar distribution    Left Lower Extremity  Skin clean dry and intact, without signs of infection  Focal moderate TTP over the lateral hip near the greater trochanter  Minimal to no

## 2025-03-27 NOTE — PROGRESS NOTES
CLINICAL PHARMACY NOTE: MEDS TO BEDS    Total # of Prescriptions Filled: 1   The following medications were delivered to the patient:  Arthritis pain 1% gel    Additional Documentation:   Pt picked up in pharmacy

## 2025-03-28 DIAGNOSIS — I10 ESSENTIAL HYPERTENSION: ICD-10-CM

## 2025-03-28 NOTE — TELEPHONE ENCOUNTER
Name of Medication(s) Requested:  Requested Prescriptions     Pending Prescriptions Disp Refills    hydroCHLOROthiazide 12.5 MG tablet [Pharmacy Med Name: HYDROCHLOROTHIAZIDE 12.5MG TABS] 30 tablet 0     Sig: TAKE ONE TABLET BY MOUTH EVERY MORNING       Medication is on current medication list Yes    Dosage and directions were verified? Yes    Quantity verified: 30 day supply     Pharmacy Verified?  Yes    Last Appointment:  3/20/2025    Future appts:  Future Appointments   Date Time Provider Department Center   5/13/2025 10:00 AM Pierce Andrew MD [facility] [place] Pending sale to [facility]   7/3/2025  8:00 AM SCHEDULE, SE ORTHO [facility] HM   7/3/2025  1:20 PM Radha Petty MD [facility] NEURO Neurology -        (If no appt send self scheduling link. .REFILLAPPT)  Scheduling request sent?     [] Yes  [x] No    Does patient need updated?  [] Yes  [x] No

## 2025-03-31 RX ORDER — HYDROCHLOROTHIAZIDE 12.5 MG/1
12.5 TABLET ORAL EVERY MORNING
Qty: 30 TABLET | Refills: 0 | Status: SHIPPED | OUTPATIENT
Start: 2025-03-31

## 2025-05-07 ENCOUNTER — APPOINTMENT (OUTPATIENT)
Dept: GENERAL RADIOLOGY | Age: 55
End: 2025-05-07
Payer: COMMERCIAL

## 2025-05-07 ENCOUNTER — APPOINTMENT (OUTPATIENT)
Dept: CT IMAGING | Age: 55
End: 2025-05-07
Attending: EMERGENCY MEDICINE
Payer: COMMERCIAL

## 2025-05-07 ENCOUNTER — HOSPITAL ENCOUNTER (EMERGENCY)
Age: 55
Discharge: HOME | End: 2025-05-07
Attending: EMERGENCY MEDICINE
Payer: COMMERCIAL

## 2025-05-07 VITALS
OXYGEN SATURATION: 98 % | HEART RATE: 98 BPM | DIASTOLIC BLOOD PRESSURE: 99 MMHG | RESPIRATION RATE: 16 BRPM | SYSTOLIC BLOOD PRESSURE: 164 MMHG | TEMPERATURE: 98.2 F
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
## 2025-05-07 DIAGNOSIS — S09.90XA INJURY OF HEAD, INITIAL ENCOUNTER: ICD-10-CM

## 2025-05-07 DIAGNOSIS — S70.02XA CONTUSION OF LEFT HIP, INITIAL ENCOUNTER: ICD-10-CM

## 2025-05-07 PROCEDURE — 71045 X-RAY EXAM CHEST 1 VIEW: CPT

## 2025-05-07 PROCEDURE — 70450 CT HEAD/BRAIN W/O DYE: CPT

## 2025-05-07 PROCEDURE — 72125 CT NECK SPINE W/O DYE: CPT

## 2025-05-07 PROCEDURE — 99284 EMERGENCY DEPT VISIT MOD MDM: CPT

## 2025-05-07 PROCEDURE — 73502 X-RAY EXAM HIP UNI 2-3 VIEWS: CPT

## 2025-05-07 NOTE — ED PROVIDER NOTES
HPI:  5/7/25,   Time: 7:46 PM EDT       Kirsty Thurston is a 54 y.o. male presenting to the ED for fall, beginning 30 min ago.  The complaint has been persistent, mild in severity, and worsened by movement of lle.  Patient mitts to EtOH use.  However able elicit history.  Fall.  Question hit head.  Left hip pain.  No numbness or ting.  No focal weakness.    Review of Systems:   Pertinent positives and negatives are stated within HPI, all other systems reviewed and are negative.          --------------------------------------------- PAST HISTORY ---------------------------------------------  Past Medical History:  has a past medical history of Alcohol use, Clavicle fracture, Headache, Hypertension, ICH (intracerebral hemorrhage) (HCC), Kidney stone, Memory disorder, Pseudocholinesterase deficiency, SAH (subarachnoid hemorrhage) (HCC), SDH (subdural hematoma) (HCC), and Syncope and collapse.    Past Surgical History:  has a past surgical history that includes Gastric bypass surgery (2001) and Colonoscopy (N/A, 1/10/2025).    Social History:  reports that he has been smoking cigarettes. He has a 1.5 pack-year smoking history. He has quit using smokeless tobacco. He reports that he does not currently use alcohol. He reports that he does not use drugs.    Family History: family history includes Heart Disease in his maternal uncle.     The patient’s home medications have been reviewed.    Allergies: Pcn [penicillins]        ---------------------------------------------------PHYSICAL EXAM--------------------------------------    Constitutional/General: Alert and oriented x3, well appearing, non toxic in NAD  Head: Normocephalic and atraumatic  Eyes: PERRL, EOMI, conjunctive normal, sclera non icteric  Mouth: Oropharynx clear, handling secretions,   Neck: Supple, full ROM, non tender to palpation in the midline, no stridor, no crepitus, no meningeal signs  Respiratory: . Not in respiratory distress  Cardiovascular:  Regular

## 2025-05-09 ENCOUNTER — HOSPITAL ENCOUNTER (EMERGENCY)
Age: 55
Discharge: HOME | DRG: 751 | End: 2025-05-09
Attending: STUDENT IN AN ORGANIZED HEALTH CARE EDUCATION/TRAINING PROGRAM
Payer: COMMERCIAL

## 2025-05-09 ENCOUNTER — APPOINTMENT (OUTPATIENT)
Dept: CT IMAGING | Age: 55
DRG: 751 | End: 2025-05-09
Payer: COMMERCIAL

## 2025-05-09 VITALS
RESPIRATION RATE: 16 BRPM | DIASTOLIC BLOOD PRESSURE: 98 MMHG | SYSTOLIC BLOOD PRESSURE: 189 MMHG | OXYGEN SATURATION: 97 % | HEART RATE: 96 BPM | TEMPERATURE: 98 F

## 2025-05-09 DIAGNOSIS — M53.9 MULTILEVEL DEGENERATIVE DISC DISEASE: ICD-10-CM

## 2025-05-09 DIAGNOSIS — M16.0 ARTHRITIS OF BOTH HIPS: Primary | ICD-10-CM

## 2025-05-09 PROCEDURE — 6360000002 HC RX W HCPCS

## 2025-05-09 PROCEDURE — 72131 CT LUMBAR SPINE W/O DYE: CPT

## 2025-05-09 PROCEDURE — 6370000000 HC RX 637 (ALT 250 FOR IP)

## 2025-05-09 PROCEDURE — 72192 CT PELVIS W/O DYE: CPT

## 2025-05-09 RX ORDER — LIDOCAINE 4 G/G
1 PATCH TOPICAL ONCE
Status: DISCONTINUED | OUTPATIENT
Start: 2025-05-09 | End: 2025-05-09 | Stop reason: HOSPADM

## 2025-05-09 RX ORDER — METHOCARBAMOL 500 MG/1
750 TABLET, FILM COATED ORAL ONCE
Status: COMPLETED | OUTPATIENT
Start: 2025-05-09 | End: 2025-05-09

## 2025-05-09 RX ORDER — NAPROXEN 500 MG/1
500 TABLET ORAL 2 TIMES DAILY PRN
Qty: 10 TABLET | Refills: 0 | Status: SHIPPED | OUTPATIENT
Start: 2025-05-09 | End: 2025-05-09

## 2025-05-09 RX ORDER — AMLODIPINE BESYLATE 5 MG/1
5 TABLET ORAL ONCE
Status: COMPLETED | OUTPATIENT
Start: 2025-05-09 | End: 2025-05-09

## 2025-05-09 RX ORDER — METHOCARBAMOL 750 MG/1
750 TABLET, FILM COATED ORAL 4 TIMES DAILY
Qty: 20 TABLET | Refills: 0 | Status: ON HOLD | OUTPATIENT
Start: 2025-05-09 | End: 2025-05-15 | Stop reason: HOSPADM

## 2025-05-09 RX ORDER — NAPROXEN 500 MG/1
500 TABLET ORAL 2 TIMES DAILY PRN
Qty: 10 TABLET | Refills: 0 | Status: SHIPPED | OUTPATIENT
Start: 2025-05-09 | End: 2025-05-14

## 2025-05-09 RX ORDER — HYDROCHLOROTHIAZIDE 12.5 MG/1
12.5 TABLET ORAL DAILY
Status: DISCONTINUED | OUTPATIENT
Start: 2025-05-09 | End: 2025-05-09 | Stop reason: HOSPADM

## 2025-05-09 RX ORDER — LIDOCAINE 4 G/G
1 PATCH TOPICAL DAILY
Qty: 5 EACH | Refills: 0 | Status: SHIPPED | OUTPATIENT
Start: 2025-05-09 | End: 2025-05-09

## 2025-05-09 RX ORDER — KETOROLAC TROMETHAMINE 30 MG/ML
30 INJECTION, SOLUTION INTRAMUSCULAR; INTRAVENOUS ONCE
Status: COMPLETED | OUTPATIENT
Start: 2025-05-09 | End: 2025-05-09

## 2025-05-09 RX ORDER — LIDOCAINE 4 G/G
1 PATCH TOPICAL DAILY
Qty: 5 EACH | Refills: 0 | Status: ON HOLD | OUTPATIENT
Start: 2025-05-09 | End: 2025-05-15 | Stop reason: HOSPADM

## 2025-05-09 RX ORDER — METHOCARBAMOL 750 MG/1
750 TABLET, FILM COATED ORAL 4 TIMES DAILY
Qty: 20 TABLET | Refills: 0 | Status: SHIPPED | OUTPATIENT
Start: 2025-05-09 | End: 2025-05-09

## 2025-05-09 RX ADMIN — METHOCARBAMOL 750 MG: 500 TABLET ORAL at 12:07

## 2025-05-09 RX ADMIN — KETOROLAC TROMETHAMINE 30 MG: 30 INJECTION, SOLUTION INTRAMUSCULAR at 12:06

## 2025-05-09 RX ADMIN — AMLODIPINE BESYLATE 5 MG: 5 TABLET ORAL at 14:22

## 2025-05-09 RX ADMIN — HYDROCHLOROTHIAZIDE 12.5 MG: 12.5 TABLET ORAL at 14:22

## 2025-05-09 ASSESSMENT — ENCOUNTER SYMPTOMS
ALLERGIC/IMMUNOLOGIC NEGATIVE: 1
EYES NEGATIVE: 1
BACK PAIN: 1
RESPIRATORY NEGATIVE: 1
GASTROINTESTINAL NEGATIVE: 1

## 2025-05-09 NOTE — PROGRESS NOTES
CLINICAL PHARMACY NOTE: MEDS TO BEDS    Total # of Prescriptions Filled: 3   The following medications were delivered to the patient:  Lidocaine 4% pain patch  Methocarbamol 750 mg  Naproxen 500 mg    Additional Documentation:     Patient picked up meds in the pharmacy

## 2025-05-09 NOTE — ED PROVIDER NOTES
[facility] EMERGENCY DEPARTMENT  EMERGENCY DEPARTMENT ENCOUNTER        Pt Name: Kirsty Thurston  MRN: [id number]  Birthdate 1970  Date of evaluation: 5/9/2025  Provider: COLTON Wasserman - CNP  PCP: Pierce Andrew MD  Note Started: 11:57 AM EDT 5/9/25      HERLINDA. I have evaluated this patient.        CHIEF COMPLAINT       Chief Complaint   Patient presents with    Hip Pain     Patient c/o fall 2 days ago denies hitting c/o left hip pain       HISTORY OF PRESENT ILLNESS: 1 or more Elements     History from : Patient    Limitations to history : None    Kirsty Thurston is a 54 y.o. male who presents to the ED with complaints of right lower back pain and left hip pain.  Patient states on Wednesday he had been drinking and fell.  Patient states he was seen after the fall on Wednesday but continues to have pain.  Patient denies any numbness, tingling, weakness, saddle anesthesia, loss of bowel or bladder control, urinary retention.  Patient denies any new injuries.  Patient denies any chest pain, shortness of breath, abdominal pain, nausea, vomiting, diarrhea, headache, lightheadedness, dizziness, fever, chills, body aches.  Patient denies any other symptoms.  Patient has no other complaints at this time.    Nursing Notes were all reviewed and agreed with or any disagreements were addressed in the HPI.    REVIEW OF SYSTEMS :      Review of Systems   Constitutional: Negative.    HENT: Negative.     Eyes: Negative.    Respiratory: Negative.     Cardiovascular: Negative.    Gastrointestinal: Negative.    Endocrine: Negative.    Genitourinary: Negative.    Musculoskeletal:  Positive for back pain.        Hip pain   Skin: Negative.    Allergic/Immunologic: Negative.    Neurological: Negative.    Hematological: Negative.    Psychiatric/Behavioral: Negative.         Positives and Pertinent negatives as per HPI.     SURGICAL HISTORY     Past Surgical History:   Procedure Laterality Date     motion.      Cervical back: Normal and normal range of motion.      Thoracic back: Normal.      Lumbar back: Tenderness present. No swelling, edema, deformity, signs of trauma, lacerations, spasms or bony tenderness. Normal range of motion.      Right hip: Normal.      Left hip: Tenderness and bony tenderness present. No deformity, lacerations or crepitus. Normal range of motion. Normal strength.      Right upper leg: Normal.      Left upper leg: Normal.      Right knee: Normal.      Left knee: Normal.      Right lower leg: Normal.      Left lower leg: Normal.      Right ankle: Normal.      Left ankle: Normal.      Right foot: Normal.      Left foot: Normal.      Comments: Right paraspinal lumbar tenderness, no midline lumbar tenderness  RLE neurovascularly intact with baseline strength, sensation, ROM  LLE neurovascularly intact with baseline strength, sensation, ROM  Tenderness left hip   Skin:     General: Skin is warm and dry.      Capillary Refill: Capillary refill takes less than 2 seconds.   Neurological:      General: No focal deficit present.      Mental Status: He is alert and oriented to person, place, and time.      Gait: Gait normal.   Psychiatric:         Mood and Affect: Mood normal.         Behavior: Behavior normal.       DIAGNOSTIC RESULTS   LABS:    Labs Reviewed - No data to display    When ordered only abnormal lab results are displayed. All other labs were within normal range or not returned as of this dictation.    EKG: When ordered, EKG's are interpreted by the Emergency Department Physician in the absence of a cardiologist.  Please see their note for interpretation of EKG.    RADIOLOGY:   Non-plain film images such as CT, Ultrasound and MRI are read by the radiologist. Plain radiographic images are visualized and preliminarily interpreted by the ED Provider with the below findings:    Interpretation per the Radiologist below, if available at the time of this note:    CT LUMBAR SPINE WO

## 2025-05-10 ENCOUNTER — HOSPITAL ENCOUNTER (INPATIENT)
Age: 55
LOS: 4 days | Discharge: HOME | DRG: 751 | End: 2025-05-15
Attending: EMERGENCY MEDICINE | Admitting: PSYCHIATRY & NEUROLOGY
Payer: COMMERCIAL
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
## 2025-05-10 DIAGNOSIS — R45.851 SUICIDAL IDEATION: ICD-10-CM

## 2025-05-10 LAB
ALBUMIN SERPL-MCNC: 4.4 G/DL (ref 3.5–5.2)
ALP SERPL-CCNC: 98 U/L (ref 40–129)
ALT SERPL-CCNC: 16 U/L (ref 0–50)
AMPHET UR QL SCN: NEGATIVE
ANION GAP SERPL CALCULATED.3IONS-SCNC: 19 MMOL/L (ref 7–16)
APAP SERPL-MCNC: <5 UG/ML (ref 10–30)
AST SERPL-CCNC: 22 U/L (ref 0–50)
BARBITURATES UR QL SCN: NEGATIVE
BASOPHILS # BLD: 0.05 K/UL (ref 0–0.2)
BASOPHILS NFR BLD: 1 % (ref 0–2)
BENZODIAZ UR QL: NEGATIVE
BILIRUB SERPL-MCNC: 0.5 MG/DL (ref 0–1.2)
BUN SERPL-MCNC: 9 MG/DL (ref 6–20)
BUPRENORPHINE UR QL: NEGATIVE
CALCIUM SERPL-MCNC: 9.1 MG/DL (ref 8.6–10)
CANNABINOIDS UR QL SCN: NEGATIVE
CHLORIDE SERPL-SCNC: 102 MMOL/L (ref 98–107)
CO2 SERPL-SCNC: 19 MMOL/L (ref 22–29)
COCAINE UR QL SCN: NEGATIVE
CREAT SERPL-MCNC: 1 MG/DL (ref 0.7–1.2)
EOSINOPHIL # BLD: 0.09 K/UL (ref 0.05–0.5)
EOSINOPHILS RELATIVE PERCENT: 1 % (ref 0–6)
ERYTHROCYTE [DISTWIDTH] IN BLOOD BY AUTOMATED COUNT: 14.7 % (ref 11.5–15)
ETHANOLAMINE SERPL-MCNC: 258 MG/DL (ref 0–0.08)
FENTANYL UR QL: NEGATIVE
GFR, ESTIMATED: >90 ML/MIN/1.73M2
GLUCOSE SERPL-MCNC: 102 MG/DL (ref 74–99)
HCT VFR BLD AUTO: 37 % (ref 37–54)
HGB BLD-MCNC: 12.1 G/DL (ref 12.5–16.5)
IMM GRANULOCYTES # BLD AUTO: 0.03 K/UL (ref 0–0.58)
IMM GRANULOCYTES NFR BLD: 0 % (ref 0–5)
LYMPHOCYTES NFR BLD: 2.07 K/UL (ref 1.5–4)
LYMPHOCYTES RELATIVE PERCENT: 27 % (ref 20–42)
MCH RBC QN AUTO: 28.3 PG (ref 26–35)
MCHC RBC AUTO-ENTMCNC: 32.7 G/DL (ref 32–34.5)
MCV RBC AUTO: 86.4 FL (ref 80–99.9)
METHADONE UR QL: NEGATIVE
MONOCYTES NFR BLD: 0.7 K/UL (ref 0.1–0.95)
MONOCYTES NFR BLD: 9 % (ref 2–12)
NEUTROPHILS NFR BLD: 61 % (ref 43–80)
NEUTS SEG NFR BLD: 4.63 K/UL (ref 1.8–7.3)
OPIATES UR QL SCN: NEGATIVE
OXYCODONE UR QL SCN: NEGATIVE
PCP UR QL SCN: NEGATIVE
PLATELET # BLD AUTO: 251 K/UL (ref 130–450)
PMV BLD AUTO: 9.2 FL (ref 7–12)
POTASSIUM SERPL-SCNC: 4.4 MMOL/L (ref 3.5–5.1)
PROT SERPL-MCNC: 7.1 G/DL (ref 6.4–8.3)
RBC # BLD AUTO: 4.28 M/UL (ref 3.8–5.8)
SALICYLATES SERPL-MCNC: <0.5 MG/DL (ref 0–30)
SODIUM SERPL-SCNC: 141 MMOL/L (ref 136–145)
TEST INFORMATION: NORMAL
TOXIC TRICYCLIC SC,BLOOD: NEGATIVE
WBC OTHER # BLD: 7.6 K/UL (ref 4.5–11.5)

## 2025-05-10 PROCEDURE — 80053 COMPREHEN METABOLIC PANEL: CPT

## 2025-05-10 PROCEDURE — 85025 COMPLETE CBC W/AUTO DIFF WBC: CPT

## 2025-05-10 PROCEDURE — 80143 DRUG ASSAY ACETAMINOPHEN: CPT

## 2025-05-10 PROCEDURE — 99285 EMERGENCY DEPT VISIT HI MDM: CPT

## 2025-05-10 PROCEDURE — 6370000000 HC RX 637 (ALT 250 FOR IP): Performed by: EMERGENCY MEDICINE

## 2025-05-10 PROCEDURE — 93005 ELECTROCARDIOGRAM TRACING: CPT | Performed by: EMERGENCY MEDICINE

## 2025-05-10 PROCEDURE — G0480 DRUG TEST DEF 1-7 CLASSES: HCPCS

## 2025-05-10 PROCEDURE — 80179 DRUG ASSAY SALICYLATE: CPT

## 2025-05-10 PROCEDURE — 80307 DRUG TEST PRSMV CHEM ANLYZR: CPT

## 2025-05-10 RX ORDER — ACETAMINOPHEN 325 MG/1
650 TABLET ORAL ONCE
Status: COMPLETED | OUTPATIENT
Start: 2025-05-10 | End: 2025-05-10

## 2025-05-10 RX ADMIN — ACETAMINOPHEN 650 MG: 325 TABLET ORAL at 20:16

## 2025-05-10 ASSESSMENT — PAIN DESCRIPTION - LOCATION
LOCATION: HEAD
LOCATION: HIP

## 2025-05-10 ASSESSMENT — PAIN SCALES - GENERAL
PAINLEVEL_OUTOF10: 5
PAINLEVEL_OUTOF10: 10

## 2025-05-10 ASSESSMENT — PAIN DESCRIPTION - DESCRIPTORS
DESCRIPTORS: ACHING;CRUSHING;DISCOMFORT
DESCRIPTORS: ACHING

## 2025-05-10 ASSESSMENT — PAIN DESCRIPTION - ORIENTATION
ORIENTATION: LEFT
ORIENTATION: POSTERIOR

## 2025-05-10 ASSESSMENT — PAIN - FUNCTIONAL ASSESSMENT: PAIN_FUNCTIONAL_ASSESSMENT: 0-10
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
## 2025-05-10 NOTE — ED PROVIDER NOTES
HPI:  5/10/25, Time: 6:27 PM EDT         Kirsty Thurston is a 54 y.o. male presenting to the ED for evaluation.  Patient states he is going to kill himself.  He is very depressed.  He will not specify otherwise.  He states he regrets calling EMS today, and he should have just killed himself.  He says he will not call EMS and he will just kill himself.  No hallucinations, no homicidal ideation, no attempt.  Does have a plan on overdosing on his pills.  No other symptoms or complaints.    Review of Systems:   A complete review of systems was performed and pertinent positives and negatives are stated within HPI, all other systems reviewed and are negative.          --------------------------------------------- PAST HISTORY ---------------------------------------------  Past Medical History:  has a past medical history of Alcohol use, Clavicle fracture, Headache, Hypertension, ICH (intracerebral hemorrhage) (HCC), Kidney stone, Memory disorder, Pseudocholinesterase deficiency, SAH (subarachnoid hemorrhage) (HCC), SDH (subdural hematoma) (HCC), and Syncope and collapse.    Past Surgical History:  has a past surgical history that includes Gastric bypass surgery (2001) and Colonoscopy (N/A, 1/10/2025).    Social History:  reports that he has been smoking cigarettes. He has a 1.5 pack-year smoking history. He has quit using smokeless tobacco. He reports that he does not currently use alcohol. He reports that he does not use drugs.    Family History: family history includes Heart Disease in his maternal uncle.     The patient’s home medications have been reviewed.    Allergies: Pcn [penicillins]    -------------------------------------------------- RESULTS -------------------------------------------------  All laboratory and radiology results have been personally reviewed by myself   LABS:  Results for orders placed or performed during the hospital encounter of 05/10/25   Comprehensive Metabolic Panel   Result Value Ref

## 2025-05-11 PROBLEM — F39 MOOD DISORDER: Status: ACTIVE | Noted: 2025-05-11

## 2025-05-11 LAB
EKG ATRIAL RATE: 97 BPM
EKG P AXIS: 27 DEGREES
EKG P-R INTERVAL: 146 MS
EKG Q-T INTERVAL: 362 MS
EKG QRS DURATION: 90 MS
EKG QTC CALCULATION (BAZETT): 459 MS
EKG R AXIS: -19 DEGREES
EKG T AXIS: 36 DEGREES
EKG VENTRICULAR RATE: 97 BPM
ETHANOLAMINE SERPL-MCNC: <10 MG/DL (ref 0–0.08)

## 2025-05-11 PROCEDURE — 93010 ELECTROCARDIOGRAM REPORT: CPT | Performed by: INTERNAL MEDICINE

## 2025-05-11 PROCEDURE — 6370000000 HC RX 637 (ALT 250 FOR IP): Performed by: PSYCHIATRY & NEUROLOGY

## 2025-05-11 PROCEDURE — 90791 PSYCH DIAGNOSTIC EVALUATION: CPT | Performed by: SOCIAL WORKER

## 2025-05-11 PROCEDURE — 1240000000 HC EMOTIONAL WELLNESS R&B

## 2025-05-11 PROCEDURE — G0480 DRUG TEST DEF 1-7 CLASSES: HCPCS

## 2025-05-11 PROCEDURE — 6370000000 HC RX 637 (ALT 250 FOR IP): Performed by: EMERGENCY MEDICINE

## 2025-05-11 RX ORDER — CHLORDIAZEPOXIDE HYDROCHLORIDE 25 MG/1
25 CAPSULE, GELATIN COATED ORAL EVERY 4 HOURS
Status: DISCONTINUED | OUTPATIENT
Start: 2025-05-11 | End: 2025-05-12

## 2025-05-11 RX ORDER — ACETAMINOPHEN 325 MG/1
650 TABLET ORAL EVERY 6 HOURS PRN
Status: DISCONTINUED | OUTPATIENT
Start: 2025-05-11 | End: 2025-05-15 | Stop reason: HOSPADM

## 2025-05-11 RX ORDER — MECLIZINE HCL 12.5 MG 12.5 MG/1
25 TABLET ORAL 2 TIMES DAILY
Status: DISCONTINUED | OUTPATIENT
Start: 2025-05-12 | End: 2025-05-11

## 2025-05-11 RX ORDER — MECLIZINE HCL 12.5 MG 12.5 MG/1
25 TABLET ORAL 2 TIMES DAILY
Status: DISCONTINUED | OUTPATIENT
Start: 2025-05-11 | End: 2025-05-15 | Stop reason: HOSPADM

## 2025-05-11 RX ORDER — HALOPERIDOL 5 MG/1
5 TABLET ORAL EVERY 6 HOURS PRN
Status: DISCONTINUED | OUTPATIENT
Start: 2025-05-11 | End: 2025-05-15 | Stop reason: HOSPADM

## 2025-05-11 RX ORDER — BACLOFEN 10 MG/1
5 TABLET ORAL ONCE
Status: COMPLETED | OUTPATIENT
Start: 2025-05-11 | End: 2025-05-11

## 2025-05-11 RX ORDER — MECLIZINE HCL 12.5 MG 12.5 MG/1
25 TABLET ORAL ONCE
Status: COMPLETED | OUTPATIENT
Start: 2025-05-11 | End: 2025-05-11

## 2025-05-11 RX ORDER — HYDROXYZINE HYDROCHLORIDE 50 MG/1
50 TABLET, FILM COATED ORAL 3 TIMES DAILY PRN
Status: DISCONTINUED | OUTPATIENT
Start: 2025-05-11 | End: 2025-05-15 | Stop reason: HOSPADM

## 2025-05-11 RX ORDER — DIVALPROEX SODIUM 250 MG/1
250 TABLET, DELAYED RELEASE ORAL 2 TIMES DAILY
Status: DISCONTINUED | OUTPATIENT
Start: 2025-05-11 | End: 2025-05-15

## 2025-05-11 RX ORDER — NICOTINE 21 MG/24HR
1 PATCH, TRANSDERMAL 24 HOURS TRANSDERMAL DAILY
Status: DISCONTINUED | OUTPATIENT
Start: 2025-05-11 | End: 2025-05-15 | Stop reason: HOSPADM

## 2025-05-11 RX ORDER — MAGNESIUM HYDROXIDE/ALUMINUM HYDROXICE/SIMETHICONE 120; 1200; 1200 MG/30ML; MG/30ML; MG/30ML
30 SUSPENSION ORAL PRN
Status: DISCONTINUED | OUTPATIENT
Start: 2025-05-11 | End: 2025-05-15 | Stop reason: HOSPADM

## 2025-05-11 RX ORDER — AMLODIPINE BESYLATE 5 MG/1
5 TABLET ORAL ONCE
Status: COMPLETED | OUTPATIENT
Start: 2025-05-11 | End: 2025-05-11

## 2025-05-11 RX ORDER — MECLIZINE HCL 12.5 MG 12.5 MG/1
12.5 TABLET ORAL 2 TIMES DAILY
Status: DISCONTINUED | OUTPATIENT
Start: 2025-05-11 | End: 2025-05-11

## 2025-05-11 RX ORDER — HALOPERIDOL 5 MG/ML
5 INJECTION INTRAMUSCULAR EVERY 6 HOURS PRN
Status: DISCONTINUED | OUTPATIENT
Start: 2025-05-11 | End: 2025-05-15 | Stop reason: HOSPADM

## 2025-05-11 RX ADMIN — DIVALPROEX SODIUM 250 MG: 250 TABLET, DELAYED RELEASE ORAL at 21:20

## 2025-05-11 RX ADMIN — BACLOFEN 5 MG: 10 TABLET ORAL at 09:13

## 2025-05-11 RX ADMIN — AMLODIPINE BESYLATE 5 MG: 5 TABLET ORAL at 09:12

## 2025-05-11 RX ADMIN — MECLIZINE 25 MG: 12.5 TABLET ORAL at 23:19

## 2025-05-11 RX ADMIN — MECLIZINE 25 MG: 12.5 TABLET ORAL at 09:13

## 2025-05-11 RX ADMIN — ACETAMINOPHEN 650 MG: 325 TABLET ORAL at 21:20

## 2025-05-11 RX ADMIN — DIVALPROEX SODIUM 250 MG: 250 TABLET, DELAYED RELEASE ORAL at 15:33

## 2025-05-11 RX ADMIN — CHLORDIAZEPOXIDE HYDROCHLORIDE 25 MG: 25 CAPSULE ORAL at 21:19

## 2025-05-11 RX ADMIN — CHLORDIAZEPOXIDE HYDROCHLORIDE 25 MG: 25 CAPSULE ORAL at 15:33

## 2025-05-11 ASSESSMENT — PAIN SCALES - GENERAL
PAINLEVEL_OUTOF10: 8
PAINLEVEL_OUTOF10: 7
PAINLEVEL_OUTOF10: 8

## 2025-05-11 ASSESSMENT — PAIN DESCRIPTION - LOCATION
LOCATION: BACK;HIP
LOCATION: BACK;HIP
LOCATION: HEAD

## 2025-05-11 ASSESSMENT — SLEEP AND FATIGUE QUESTIONNAIRES
AVERAGE NUMBER OF SLEEP HOURS: 7.5
SLEEP PATTERN: DIFFICULTY FALLING ASLEEP
DO YOU USE A SLEEP AID: NO
DO YOU HAVE DIFFICULTY SLEEPING: YES

## 2025-05-11 ASSESSMENT — PAIN DESCRIPTION - DESCRIPTORS
DESCRIPTORS: ACHING
DESCRIPTORS: ACHING;DISCOMFORT

## 2025-05-11 ASSESSMENT — PAIN DESCRIPTION - ORIENTATION: ORIENTATION: RIGHT;LEFT

## 2025-05-11 ASSESSMENT — LIFESTYLE VARIABLES
HOW OFTEN DO YOU HAVE A DRINK CONTAINING ALCOHOL: 2-3 TIMES A WEEK
HOW MANY STANDARD DRINKS CONTAINING ALCOHOL DO YOU HAVE ON A TYPICAL DAY: 1 OR 2

## 2025-05-11 NOTE — ED NOTES
Sw checked in with pt introducing herself. Pt was calm and cooperative, laying in bed. Pt stated that he is feeling better and stated that he shouldn't have had any drinks yesterday. He reported to have a skull fracture which has caused him difficulty; dizziness, falling constantly, and inability to walk across the room without assistance. Pt reported that he has fallen in his shower numerous times as he does not have a rail; he doubts that his apartment complex will accommodate him. He also has other medical concerns that has become overwhelming for him.     Pt expressed that he has filed for disability and it was pushed back 2 years. He doesn't have a car; therefore, it is more difficult to get to appointments and places. He utilizes public transportation or walking when he can. Pt recently left his phone in a [id number], which is another stressor.     Pt expressed that he use to be a . He reported to have  twice and although he has made mistakes, he is trying to right his wrongs. He has been able to have a conversation with his second wife that makes him feel better. He denied wanting to hurt himself; expressing that \"he is too chicken\".     Sw spoke with pt about his interest in outpatient counseling and/or case management to assist with some of his stressors. Pt was initially receptive then quickly stated that he is unsure being those places always wants them to be seen in person, it would be too difficult to get around. SW expressed that she would still place resources for him as some agencies offer telehealth services.

## 2025-05-11 NOTE — VIRTUAL HEALTH
Kirsty Thurston  [id number]  1970     Social Work Behavioral Health Crisis Assessment    05/11/25    Chief Complaint: SI    HPI: Patient is a 54 y.o. White (non-) male who presents for suicidal. Patient presented to the ED on 05/11/25 from home.      Per ED encounter chart review:    Nya Vasquez MD:   Kirsty Thurston is a 54 y.o. male presenting to the ED for evaluation.  Patient states he is going to kill himself.  He is very depressed.  He will not specify otherwise.  He states he regrets calling EMS today, and he should have just killed himself.  He says he will not call EMS and he will just kill himself.  No hallucinations, no homicidal ideation, no attempt.  Does have a plan on overdosing on his pills.  No other symptoms or complaints.       LISW met with patient once clinically sober to assess risk and mental health needs.  Pt was cooperative and engaged in the conversation.  Pt reported he was upset on 5/10 due to \"everything was eating at me yesterday.\"  He reported that due to the impact of the TBI he is struggling with depression as well.  \"I can't live my life like this.  It isn't working out.\"  Pt expressed being isolated in his new apartment and due to the TBI he is unable to drive so he must walk everywhere.  He also experiencing vertigo and has had multiple falls so pt is also experiencing a lot of pain.  Pt reported that he is afraid to walk in certain areas and stated \"I need my doctor to do something I can't keep at it like this.\"  He also noted not having a phone to reach out to anyone and per pt \"It just got a lot in my head so I decided to drink.\"      Pt denied any current SI as \"I am chicken\" but did communicate to this writer that he left the house and walked a mile to the store to \"get something to not have to deal with this anymore.\" Pt denied any SI thoughts while sober, any plans while intoxicated and noted to this writer drinking on 5/10 was a relapsed and he has been sober for a

## 2025-05-11 NOTE — ED NOTES
Nurse to nurse report given to Terrie PARKINSON on [address] which includes patient presentation complaint, involuntary hold, medications, lab results EKG Qtc, and past medical/psych history and admitting orders.

## 2025-05-11 NOTE — ED NOTES
Call placed to Tanya PATRICK to present patient for admission.  Message left awaiting a return phone call.

## 2025-05-11 NOTE — BH NOTE
[facility]  Admission Note     Patient arrived to the [facility] from [facility] via wheelchair. Patient ambulates with a cane, with a slow but steady gait. Denies any substance use and any past substance use treatment. Denies past mental health treatment. Per chart and patient the patient has a memory disorder. Patient has a history of falls, including a skull fracture from one. Patient complains of weakness in his left extremities, upon assessment some mild weakness was noted in the LLE.  Patient talked about how they live in an apartment by themself that has some accessibility issus such as steps, and a shower with no hand rails/shower chair. He also talked about how he is interested in an outpatient physical therapy. Patient talked about how he did not mean that he wanted his life to end and that doesn't want to live with the issues related to his skull fracture.     Denies suicidal ideation, homicidal ideation, and visual/auditory hallucinations. Endorses racing thoughts. Endorses occasional feelings of hopelessness, and worthlessness. Appears flat, blunt,anxious, depressed, and cooperative during assessment. Denies anxiety. Reported some difficult with falling asleep at night.     Admission Type:   Admission Type: Involuntary    Reason for admission:  Reason for Admission: \"To be honest because of my severe stupidity and not being able to control some of my thoughts\"      Addictive Behavior:   Addictive Behavior  In the Past 3 Months, Have You Felt or Has Someone Told You That You Have a Problem With  : None    Medical Problems:   Past Medical History:   Diagnosis Date    Alcohol use     Clavicle fracture 12/10/2024    left- wears a sling    Headache     Hypertension     ICH (intracerebral hemorrhage) ([facility])     after fall    Kidney stone     Memory disorder     Pseudocholinesterase deficiency     SAH (subarachnoid hemorrhage) ([facility]) 12/10/2024    after fall    SDH (subdural hematoma) ([facility]) 12/10/2024

## 2025-05-11 NOTE — ED NOTES
Per ED MD patient may sign out against medical advise and leave even with elevated alcohol level.  Involuntary hold revoked.

## 2025-05-11 NOTE — DISCHARGE INSTRUCTIONS
Call to see if they offer telehealth/virtual services.     [facility]  [address], [place] [address]  [phone]    [facility]  [address] [place] [address]  [phone]    [facility]  [address] [place] [address]  [phone]    [facility]  [address], [place] [address]  [phone]    [facility]  [address] [place] [address]  [phone]    [facility]  [address] [place] [address] [address], [place] [address]  [phone]      [facility]  [address], [place] [address]  [phone]    [facility]  [address] [place] [address]  [phone]    [facility]  [address], [place] [address]  [phone]    [facility]  [address] [place] [address]  [phone]    [facility]  [address], [place] [address]  [phone]    [facility]  [address], [place], [place] [id number] [phone]    [facility]  [address], [place] [address]    Patient was offered a referral to substance abuse treatment, patient declined referral at this time.      Follow up for Tobacco Cessation at:    [facility]                                 Date:  Friday 5/16 at 10am              [address]    [place], [place] [address]   (Inside [facility]    take B elevators to 7th floor)   Phone: [phone]   Fax: [phone]     [facility]   [address] [place] [address]    Phone: [phone]   Fax [phone]     [facility]   [address] [place] [address]   Phone: [phone] [address]   Fax: [phone]     Fabio Michaud

## 2025-05-11 NOTE — BH NOTE
4 Eyes Skin Assessment     NAME:  Kirsty Thurston  YOB: 1970  MEDICAL RECORD NUMBER:  [id number]    The patient is being assessed for  Admission    I agree that at least one RN has performed a thorough Head to Toe Skin Assessment on the patient. ALL assessment sites listed below have been assessed.      Areas assessed by both nurses:    Head, Face, Ears, Shoulders, Back, Chest, Arms, Elbows, Hands, Sacrum. Buttock, Coccyx, Ischium, and Legs. Feet and Heels        Does the Patient have a Wound? No noted wound(s). BRUISES ON RIGHT LOWER BACK AND LEFT HIP       Refugio Prevention initiated by RN: No  Wound Care Orders initiated by RN: No    Pressure Injury (Stage 3,4, Unstageable, DTI, NWPT, and Complex wounds) if present, place Wound referral order by RN under : No    New Ostomies, if present place, Ostomy referral order under : No     Nurse 1 eSignature: Electronically signed by Gauri Lucas RN on 5/11/25 at 4:07 PM EDT    **SHARE this note so that the co-signing nurse can place an eSignature**    Nurse 2 eSignature: Electronically signed by Usha Villatoro RN on 5/11/25 at 4:09 PM EDT

## 2025-05-11 NOTE — PLAN OF CARE
Problem: Pain  Goal: Verbalizes/displays adequate comfort level or baseline comfort level  Outcome: Progressing     Problem: Risk for Elopement  Goal: Patient will not exit the unit/facility without proper excort  Outcome: Progressing     Problem: Anxiety  Goal: Will report anxiety at manageable levels  Description: INTERVENTIONS:1. Administer medication as ordered2. Teach and rehearse alternative coping skills3. Provide emotional support with 1:1 interaction with staff  Outcome: Progressing     Problem: Coping  Goal: Pt/Family able to verbalize concerns and demonstrate effective coping strategies  Description: INTERVENTIONS:1. Assist patient/family to identify coping skills, available support systems and cultural and spiritual values2. Provide emotional support, including active listening and acknowledgement of concerns of patient and caregivers3. Reduce environmental stimuli, as able4. Instruct patient/family in relaxation techniques, as appropriate5. Assess for spiritual pain/suffering and initiate Spiritual Care, Psychosocial Clinical Specialist consults as needed  Outcome: Progressing     Problem: Decision Making  Goal: Pt/Family able to effectively weigh alternatives and participate in decision making related to treatment and care  Description: INTERVENTIONS:1. Determine when there are differences between patient's view, family's view, and healthcare provider's view of condition2. Facilitate patient and family articulation of goals for care3. Help patient and family identify pros/cons of alternative solutions4. Provide information as requested by patient/family5. Respect patient/family right to receive or not to receive information6. Serve as a liaison between patient and family and health care team7. Initiate Consults from Ethics, Palliative Care or initiate Family Care Conference as is appropriate  Outcome: Progressing     Problem: Behavior  Goal: Pt/Family maintain appropriate behavior and adhere to

## 2025-05-11 NOTE — ED NOTES
Pt accepted to 7W by Tanya PATRICK/ Dr. DANIS Thornton for  mood disorder.     SW called admitting and spoke with Luz. Pt assigned to [id number].

## 2025-05-12 PROBLEM — S06.9XAS MOOD DISORDER AS LATE EFFECT OF TRAUMATIC BRAIN INJURY: Status: ACTIVE | Noted: 2025-05-12

## 2025-05-12 PROBLEM — F10.10 ALCOHOL ABUSE: Status: ACTIVE | Noted: 2025-05-12

## 2025-05-12 PROBLEM — F06.30 MOOD DISORDER AS LATE EFFECT OF TRAUMATIC BRAIN INJURY: Status: ACTIVE | Noted: 2025-05-12

## 2025-05-12 PROBLEM — F33.9 MAJOR DEPRESSIVE DISORDER, RECURRENT EPISODE WITH MIXED FEATURES: Status: ACTIVE | Noted: 2025-05-11

## 2025-05-12 PROCEDURE — 1240000000 HC EMOTIONAL WELLNESS R&B

## 2025-05-12 PROCEDURE — 6370000000 HC RX 637 (ALT 250 FOR IP)

## 2025-05-12 PROCEDURE — 6370000000 HC RX 637 (ALT 250 FOR IP): Performed by: PSYCHIATRY & NEUROLOGY

## 2025-05-12 RX ORDER — DULOXETIN HYDROCHLORIDE 20 MG/1
20 CAPSULE, DELAYED RELEASE ORAL DAILY
Status: DISCONTINUED | OUTPATIENT
Start: 2025-05-12 | End: 2025-05-15 | Stop reason: HOSPADM

## 2025-05-12 RX ORDER — BACLOFEN 5 MG/1
5 TABLET ORAL 3 TIMES DAILY
COMMUNITY

## 2025-05-12 RX ORDER — CHLORDIAZEPOXIDE HYDROCHLORIDE 25 MG/1
25 CAPSULE, GELATIN COATED ORAL EVERY 6 HOURS
Status: DISCONTINUED | OUTPATIENT
Start: 2025-05-12 | End: 2025-05-13

## 2025-05-12 RX ORDER — BACLOFEN 10 MG/1
5 TABLET ORAL 3 TIMES DAILY
Status: DISCONTINUED | OUTPATIENT
Start: 2025-05-12 | End: 2025-05-15 | Stop reason: HOSPADM

## 2025-05-12 RX ORDER — LIDOCAINE 4 G/G
1 PATCH TOPICAL DAILY
Status: DISCONTINUED | OUTPATIENT
Start: 2025-05-12 | End: 2025-05-15 | Stop reason: HOSPADM

## 2025-05-12 RX ORDER — SUMATRIPTAN 50 MG/1
50 TABLET, FILM COATED ORAL
Status: DISCONTINUED | OUTPATIENT
Start: 2025-05-12 | End: 2025-05-15 | Stop reason: HOSPADM

## 2025-05-12 RX ORDER — AMLODIPINE BESYLATE 5 MG/1
5 TABLET ORAL DAILY
Status: DISCONTINUED | OUTPATIENT
Start: 2025-05-12 | End: 2025-05-15 | Stop reason: HOSPADM

## 2025-05-12 RX ORDER — CETIRIZINE HYDROCHLORIDE 10 MG/1
10 TABLET ORAL DAILY
Status: DISCONTINUED | OUTPATIENT
Start: 2025-05-12 | End: 2025-05-15 | Stop reason: HOSPADM

## 2025-05-12 RX ORDER — METHOCARBAMOL 750 MG/1
750 TABLET, FILM COATED ORAL 4 TIMES DAILY
Status: DISCONTINUED | OUTPATIENT
Start: 2025-05-12 | End: 2025-05-12

## 2025-05-12 RX ADMIN — CHLORDIAZEPOXIDE HYDROCHLORIDE 25 MG: 25 CAPSULE ORAL at 07:03

## 2025-05-12 RX ADMIN — AMLODIPINE BESYLATE 5 MG: 5 TABLET ORAL at 13:02

## 2025-05-12 RX ADMIN — ACETAMINOPHEN 650 MG: 325 TABLET ORAL at 20:57

## 2025-05-12 RX ADMIN — Medication 3 MG: at 20:58

## 2025-05-12 RX ADMIN — ACETAMINOPHEN 650 MG: 325 TABLET ORAL at 09:20

## 2025-05-12 RX ADMIN — DIVALPROEX SODIUM 250 MG: 250 TABLET, DELAYED RELEASE ORAL at 20:57

## 2025-05-12 RX ADMIN — CHLORDIAZEPOXIDE HYDROCHLORIDE 25 MG: 25 CAPSULE ORAL at 03:43

## 2025-05-12 RX ADMIN — MECLIZINE 25 MG: 12.5 TABLET ORAL at 09:20

## 2025-05-12 RX ADMIN — HYDROXYZINE HYDROCHLORIDE 50 MG: 50 TABLET, FILM COATED ORAL at 20:59

## 2025-05-12 RX ADMIN — BACLOFEN 5 MG: 10 TABLET ORAL at 13:02

## 2025-05-12 RX ADMIN — BACLOFEN 5 MG: 10 TABLET ORAL at 20:56

## 2025-05-12 RX ADMIN — CHLORDIAZEPOXIDE HYDROCHLORIDE 25 MG: 25 CAPSULE ORAL at 00:32

## 2025-05-12 RX ADMIN — MECLIZINE 25 MG: 12.5 TABLET ORAL at 20:57

## 2025-05-12 RX ADMIN — CHLORDIAZEPOXIDE HYDROCHLORIDE 25 MG: 25 CAPSULE ORAL at 13:02

## 2025-05-12 RX ADMIN — CETIRIZINE HYDROCHLORIDE 10 MG: 10 TABLET, FILM COATED ORAL at 13:02

## 2025-05-12 RX ADMIN — DIVALPROEX SODIUM 250 MG: 250 TABLET, DELAYED RELEASE ORAL at 09:20

## 2025-05-12 RX ADMIN — DULOXETINE 20 MG: 20 CAPSULE, DELAYED RELEASE ORAL at 11:33

## 2025-05-12 ASSESSMENT — PAIN DESCRIPTION - ORIENTATION
ORIENTATION: LEFT
ORIENTATION: RIGHT
ORIENTATION: LEFT;RIGHT

## 2025-05-12 ASSESSMENT — PAIN DESCRIPTION - DESCRIPTORS
DESCRIPTORS: ACHING
DESCRIPTORS: DISCOMFORT;SORE;SHARP
DESCRIPTORS: ACHING

## 2025-05-12 ASSESSMENT — PAIN DESCRIPTION - LOCATION
LOCATION: BACK;LEG
LOCATION: BACK
LOCATION: FLANK;LEG

## 2025-05-12 ASSESSMENT — PATIENT HEALTH QUESTIONNAIRE - PHQ9
SUM OF ALL RESPONSES TO PHQ QUESTIONS 1-9: 2
SUM OF ALL RESPONSES TO PHQ QUESTIONS 1-9: 2
2. FEELING DOWN, DEPRESSED OR HOPELESS: SEVERAL DAYS
SUM OF ALL RESPONSES TO PHQ QUESTIONS 1-9: 2
SUM OF ALL RESPONSES TO PHQ QUESTIONS 1-9: 2
1. LITTLE INTEREST OR PLEASURE IN DOING THINGS: SEVERAL DAYS

## 2025-05-12 ASSESSMENT — PAIN - FUNCTIONAL ASSESSMENT: PAIN_FUNCTIONAL_ASSESSMENT: ACTIVITIES ARE NOT PREVENTED

## 2025-05-12 ASSESSMENT — PAIN SCALES - GENERAL
PAINLEVEL_OUTOF10: 6
PAINLEVEL_OUTOF10: 7
PAINLEVEL_OUTOF10: 8
PAINLEVEL_OUTOF10: 5

## 2025-05-12 ASSESSMENT — SLEEP AND FATIGUE QUESTIONNAIRES
DO YOU USE A SLEEP AID: NO
AVERAGE NUMBER OF SLEEP HOURS: 7.5
SLEEP PATTERN: DIFFICULTY FALLING ASLEEP;DISTURBED/INTERRUPTED SLEEP
DO YOU HAVE DIFFICULTY SLEEPING: YES

## 2025-05-12 NOTE — PLAN OF CARE
Problem: Anxiety  Goal: Will report anxiety at manageable levels  Description: INTERVENTIONS:1. Administer medication as ordered2. Teach and rehearse alternative coping skills3. Provide emotional support with 1:1 interaction with staff  5/11/2025 2311 by Bethanie Gu RN  Outcome: Progressing     Problem: Coping  Goal: Pt/Family able to verbalize concerns and demonstrate effective coping strategies  Description: INTERVENTIONS:1. Assist patient/family to identify coping skills, available support systems and cultural and spiritual values2. Provide emotional support, including active listening and acknowledgement of concerns of patient and caregivers3. Reduce environmental stimuli, as able4. Instruct patient/family in relaxation techniques, as appropriate5. Assess for spiritual pain/suffering and initiate Spiritual Care, Psychosocial Clinical Specialist consults as needed  5/11/2025 2311 by Bethanie Gu RN  Outcome: Progressing     Problem: Depression/Self Harm  Goal: Effect of psychiatric condition will be minimized and patient will be protected from self harm  Description: INTERVENTIONS:1. Assess impact of patient's symptoms on level of functioning, self care needs and offer support as indicated2. Assess patient/family knowledge of depression, impact on illness and need for teaching3. Provide emotional support, presence and reassurance4. Assess for possible suicidal thoughts or ideation. If patient expresses suicidal thoughts or statements do not leave alone, initiate Suicide Precautions, move to a room close to the nursing station and obtain sitter5. Initiate consults as appropriate with Mental Health Professional, Spiritual Care, Psychosocial CNS, and consider a recommendation to the [facility] for a Psychiatric Consultation  5/11/2025 2311 by Bethanie Gu RN  Outcome: Progressing

## 2025-05-12 NOTE — BH NOTE
[facility]  Initial Interdisciplinary Treatment Plan NOTE    Review Date & Time: 0900 05/12/25    Patient was in treatment team    Admission Type:   Admission Type: Involuntary    Reason for admission:  Reason for Admission: \"To be honest because of my severe stupidity and not being able to control some of my thoughts\"      Estimated Length of Stay Update:  3-4 days  Estimated Discharge Date Update: 05/16/25    EDUCATION:   Learner Progress Toward Treatment Goals: Reviewed results and recommendations of this team, Reviewed group plan and strategies, and Reviewed signs, symptoms and risk of self harm and violent behavior    Method: Small group    Outcome: Verbalized understanding and Demonstrated Understanding    PATIENT GOALS: None verbalized at this time    PLAN/TREATMENT RECOMMENDATIONS UPDATE:Encourage patient to attend and participate in groups and take medications as prescribed.       GOALS UPDATE:   Time frame for Short-Term Goals: Prior to discharge    Gauri Lucas RN

## 2025-05-12 NOTE — GROUP NOTE
Group Therapy Note    Date: 5/12/2025    Group Start Time: 0940  Group End Time: 1035  Group Topic: Cognitive Skills    [facility] ACUTE [facility] 1    Grecia Swift LISW        Group Therapy Note    Attendees: 15       Patient's Goal:  Pt attended group therapy where we learned about trauma - what it is, how it effects us and treatments available.    Notes:  Pt was an active participant in group.    Status After Intervention:  Improved    Participation Level: Active Listener and Interactive    Participation Quality: Appropriate, Attentive, and Sharing      Speech:  normal      Thought Process/Content: Logical      Affective Functioning: Congruent      Mood: depressed      Level of consciousness:  Alert, Oriented x4, and Attentive      Response to Learning: Able to verbalize current knowledge/experience, Able to verbalize/acknowledge new learning, and Able to retain information      Endings: None Reported    Modes of Intervention: Education, Support, Socialization, Exploration, Clarifying, and Problem-solving      Discipline Responsible: /Counselor      Signature:  CLEMENTINE Vaca

## 2025-05-12 NOTE — BH NOTE
The patient is not currently under constant observation.    CSSR-S Screening and Risk Assessment completed and discussed with patient who scored Risk of Suicide: No Risk    Current mitigating factors: Patient currently in a ligature resistant environment with every 15-minute safety checks., Patient currently denying suicidal intent, Patient feels safe in new environment due to being removed from triggering factors, Lacks means at present time, and Other Identifies reason for living    NPwas notified of score and discussed risk/protective factors.     The provider recommends Discontinuing constant observation.

## 2025-05-12 NOTE — PLAN OF CARE
Patient denies suicidal ideation, homicidal ideation, and visual/auditory hallucinations. Denies anxiety and depression. Denies racing thoughts. No paranoia or delusions reported or observed. Appears flat, anxious, depressed, cooperative and tangential at times during assessment. Reports appetite and sleep are good. Patient is taking medications without issues and is attending groups at this time. Patient is observed out in the day room keeping to himself. Remains in control of behaviors.        Problem: Pain  Goal: Verbalizes/displays adequate comfort level or baseline comfort level  Outcome: Progressing     Problem: Anxiety  Goal: Will report anxiety at manageable levels  Description: INTERVENTIONS:1. Administer medication as ordered2. Teach and rehearse alternative coping skills3. Provide emotional support with 1:1 interaction with staff    Outcome: Progressing     Problem: Coping  Goal: Pt/Family able to verbalize concerns and demonstrate effective coping strategies  Description: INTERVENTIONS:1. Assist patient/family to identify coping skills, available support systems and cultural and spiritual values2. Provide emotional support, including active listening and acknowledgement of concerns of patient and caregivers3. Reduce environmental stimuli, as able4. Instruct patient/family in relaxation techniques, as appropriate5. Assess for spiritual pain/suffering and initiate Spiritual Care, Psychosocial Clinical Specialist consults as needed  Outcome: Progressing     Problem: Decision Making  Goal: Pt/Family able to effectively weigh alternatives and participate in decision making related to treatment and care  Description: INTERVENTIONS:1. Determine when there are differences between patient's view, family's view, and healthcare provider's view of condition2. Facilitate patient and family articulation of goals for care3. Help patient and family identify pros/cons of alternative solutions4. Provide information as

## 2025-05-12 NOTE — GROUP NOTE
Group Therapy Note    Date: 5/12/2025    Group Start Time: 1400  Group End Time: 1440  Group Topic: Recovery    [facility] 1    Talita Restrepo CTRS    Type of Group: Recovery    Module Name:  peer recovery     Patient's Goal:  pt will be able to id local resources for the [place] recovery in the local area.     Notes:  pt appeared to be an active listener and engaged in group discussion.     Status After Intervention:  Improved    Participation Level: Active Listener and Interactive    Participation Quality: Appropriate, Attentive, and Sharing      Speech:  normal      Thought Process/Content: Logical      Affective Functioning: Congruent      Mood: euthymic      Level of consciousness:  Alert, Oriented x4, and Attentive      Response to Learning: Able to verbalize/acknowledge new learning, Able to retain information, and Progressing to goal      Endings: None Reported    Modes of Intervention: Education, Support, Socialization, and Clarifying      Discipline Responsible: Psychoeducational Specialist      Signature:  LORENE Carpenter

## 2025-05-12 NOTE — PROGRESS NOTES
Patient requesting HS dose of meclizine. Dr. Thornton made aware. New order given for meclizine 25mg BID.
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
## 2025-05-12 NOTE — PROGRESS NOTES
Leisure assessment completed.    05/12/25 3160   Activities of Daily Living   Patient Requires assistance with daily self-care activities? No   Leisure Activity 1   3 Favorite Leisure Activities used to hunt fish and like to watch drag racing   Frequency > 2 hours/day   Last time this week   Barriers to participating  motivation   Leisure Activity 2   Favorite Leisure Activities  same   Leisure Activity 3   Favorite Leisure Activities  same   Social   Patient reports spending the majority of their free time alone   Patient verbalizes a preference for spending free time alone   Patient’s perception of support system less healthy   Patient’s perception of barriers to socializing with others include(s) lack of comfort;lack of motivation/interest   Beliefs & Coping   Has difficulty dealing with feelings   Yes   Internalizes feelings/Keeps feelings in Yes   Externalizes feelings through aggressiveness or poor temper control  Yes   Feels uncomfortable around others  Yes   Has difficulty talking to others  Yes   Depends on others for direction or decisions No   Difficulty dealing with anger of others  Yes   Difficulty dealing with own anger  Yes   Difficulty managing stress Yes   Frequently has difficulty with relationships  No   Has recently perceived/experienced loss, disappointment, humiliation or failure  Yes   General perception about self likes self   Attitude about abilities occasionally fails   Locus of Control  most of the time   Belief about recovery Recovery is possible   Patient Identified Strengths  easy to talk to   Patient Identified Limitations  physical limits and recent tbi   Perception of most stressful event prior to hospitalization falling alot and need help   Perception of changes needed need support   Strengths and Limitations   Strengths Independent in basic self-care activities   Limitations Multiple barriers to leisure interests

## 2025-05-12 NOTE — BH NOTE
Patient awake in room upon approach.  Patient has good eye contact.    Patient presents anxious and worried.   Appears well groomed  Patient denies suicidal/homicidal ideations and hallucinations.     Patient reports anxiety 6-7/10 and depression 5-6/10 due to worrying about missing appointments with social security and job and family services  Patient denies pain  Patient is  taking  ordered medications without issue. Patient is  attending groups per note review.  Purposeful Q15min rounding continues.
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
## 2025-05-13 ENCOUNTER — TELEPHONE (OUTPATIENT)
Dept: FAMILY MEDICINE CLINIC | Age: 55
End: 2025-05-13

## 2025-05-13 PROCEDURE — 6370000000 HC RX 637 (ALT 250 FOR IP): Performed by: PSYCHIATRY & NEUROLOGY

## 2025-05-13 PROCEDURE — 6370000000 HC RX 637 (ALT 250 FOR IP)

## 2025-05-13 PROCEDURE — 99232 SBSQ HOSP IP/OBS MODERATE 35: CPT

## 2025-05-13 PROCEDURE — 1240000000 HC EMOTIONAL WELLNESS R&B

## 2025-05-13 RX ORDER — CHLORDIAZEPOXIDE HYDROCHLORIDE 25 MG/1
25 CAPSULE, GELATIN COATED ORAL EVERY 8 HOURS
Status: DISCONTINUED | OUTPATIENT
Start: 2025-05-13 | End: 2025-05-14

## 2025-05-13 RX ADMIN — CHLORDIAZEPOXIDE HYDROCHLORIDE 25 MG: 25 CAPSULE ORAL at 01:20

## 2025-05-13 RX ADMIN — MECLIZINE 25 MG: 12.5 TABLET ORAL at 20:49

## 2025-05-13 RX ADMIN — CHLORDIAZEPOXIDE HYDROCHLORIDE 25 MG: 25 CAPSULE ORAL at 15:22

## 2025-05-13 RX ADMIN — BACLOFEN 5 MG: 10 TABLET ORAL at 14:13

## 2025-05-13 RX ADMIN — BACLOFEN 5 MG: 10 TABLET ORAL at 20:49

## 2025-05-13 RX ADMIN — ACETAMINOPHEN 650 MG: 325 TABLET ORAL at 14:14

## 2025-05-13 RX ADMIN — AMLODIPINE BESYLATE 5 MG: 5 TABLET ORAL at 09:02

## 2025-05-13 RX ADMIN — DULOXETINE 20 MG: 20 CAPSULE, DELAYED RELEASE ORAL at 09:02

## 2025-05-13 RX ADMIN — DIVALPROEX SODIUM 250 MG: 250 TABLET, DELAYED RELEASE ORAL at 09:02

## 2025-05-13 RX ADMIN — ACETAMINOPHEN 650 MG: 325 TABLET ORAL at 20:50

## 2025-05-13 RX ADMIN — Medication 3 MG: at 20:50

## 2025-05-13 RX ADMIN — CHLORDIAZEPOXIDE HYDROCHLORIDE 25 MG: 25 CAPSULE ORAL at 22:57

## 2025-05-13 RX ADMIN — MECLIZINE 25 MG: 12.5 TABLET ORAL at 09:03

## 2025-05-13 RX ADMIN — CETIRIZINE HYDROCHLORIDE 10 MG: 10 TABLET, FILM COATED ORAL at 09:02

## 2025-05-13 RX ADMIN — BACLOFEN 5 MG: 10 TABLET ORAL at 09:02

## 2025-05-13 RX ADMIN — CHLORDIAZEPOXIDE HYDROCHLORIDE 25 MG: 25 CAPSULE ORAL at 06:41

## 2025-05-13 RX ADMIN — DIVALPROEX SODIUM 250 MG: 250 TABLET, DELAYED RELEASE ORAL at 20:49

## 2025-05-13 ASSESSMENT — PAIN SCALES - GENERAL
PAINLEVEL_OUTOF10: 4
PAINLEVEL_OUTOF10: 6
PAINLEVEL_OUTOF10: 3
PAINLEVEL_OUTOF10: 0
PAINLEVEL_OUTOF10: 5

## 2025-05-13 ASSESSMENT — PAIN - FUNCTIONAL ASSESSMENT: PAIN_FUNCTIONAL_ASSESSMENT: ACTIVITIES ARE NOT PREVENTED

## 2025-05-13 ASSESSMENT — PAIN DESCRIPTION - DESCRIPTORS
DESCRIPTORS: ACHING;DISCOMFORT;SORE
DESCRIPTORS: THROBBING;CRAMPING;DISCOMFORT

## 2025-05-13 ASSESSMENT — PAIN DESCRIPTION - ORIENTATION: ORIENTATION: LEFT;RIGHT

## 2025-05-13 ASSESSMENT — PAIN DESCRIPTION - LOCATION
LOCATION: BACK
LOCATION: LEG;FLANK

## 2025-05-13 NOTE — CARE COORDINATION
SW met with pt to discuss discharge plan. Pt was seen in the common area watching TV. Pt stated that he is doing okay today and he has not called anyone other than the social security office and [facility]. Pt stated that he will be returning back home alone and will use the bus to get there. Pt denied having any access to guns/weapons and does not want follow up mental health services. Pt stated that he was just having a bad day. Pt denied any depression, anxiety, SI, HI, AVH.

## 2025-05-13 NOTE — GROUP NOTE
Group Therapy Note    Date: 5/13/2025    Group Start Time: 1030  Group End Time: 1045  Group Topic: Community Meeting    [facility] 2    Daniela Lan CTRS    Group Therapy Note    Attendees: 14    Date: 5/13/2025  Start Time: 1030  End Time:  1045  Number of Participants: 14    Type of Group: Community Meeting    Patient's Goal:  Increased awareness of expectations of the milieu, daily staffing and programming. Identified goal for the day.    Notes:  Patient was an active listener in group. Patient shared goal for the day as, \"Take care of business before I lost disability, food stamps, care source.\"    Status After Intervention:  Improved    Participation Level: Active Listener and Interactive    Participation Quality: Appropriate, Attentive, and Sharing      Speech:  normal      Thought Process/Content: Logical  Linear      Affective Functioning: Congruent      Mood:  Appropriate      Level of consciousness:  Alert and Attentive      Response to Learning: Able to verbalize current knowledge/experience, Able to verbalize/acknowledge new learning, Able to retain information, Capable of insight, Able to change behavior, and Progressing to goal      Endings: None Reported    Modes of Intervention: Education, Support, Socialization, Exploration, Clarifying, and Problem-solving      Discipline Responsible: Psychoeducational Specialist      Signature:  LORENE Paulson

## 2025-05-13 NOTE — PROGRESS NOTES
BEHAVIORAL HEALTH FOLLOW-UP NOTE     5/13/2025     Patient was seen and examined in person, Chart reviewed   Patient's case discussed with staff/team    Chief Complaint: suicidal ideations     Interim History:   Patient was seen in his room he is lying in bed he is flat but does brighten slightly anxious.  Patient states that he is feeling tired today he does report that he feels like the medication is helping a little.  He states he does still have some intermittent racing thoughts.  He denies any alcohol withdrawal symptoms.  Patient states that he was wondering if would be able to send a letter to [facility] family services stating that he was in the hospital.  He states that he is waiting for his disability and then states \"I can even work right now\".  Told patient we can provide him a letter stating he was in the hospital.  He becomes very focused on not being able to work right now and needing this to be documented.  He denies suicidal homicidal ideation intent or plan, denies auditory visual hallucinations.  He has been taking his medication, he has had no behavioral disturbances, he is attending group.  Sleep and appetite reported to be fair    Appetite: [x] Normal/Unchanged  [] Increased  [] Decreased      Sleep:       [x] Normal/Unchanged  [] Fair       [] Poor              Energy:    [x] Normal/Unchanged  [] Increased  [] Decreased        SI [] Present  [x] Absent    HI  []Present  [x] Absent     Aggression:  [] yes  [x] no    Patient is [x] able  [] unable to CONTRACT FOR SAFETY     PAST MEDICAL/PSYCHIATRIC HISTORY:   Past Medical History:   Diagnosis Date    Alcohol use     Clavicle fracture 12/10/2024    left- wears a sling    Headache     Hypertension     ICH (intracerebral hemorrhage) ([facility])     after fall    Kidney stone     Memory disorder     Pseudocholinesterase deficiency     SAH (subarachnoid hemorrhage) ([facility]) 12/10/2024    after fall    SDH (subdural hematoma) ([facility]) 12/10/2024

## 2025-05-13 NOTE — BH NOTE
Patient resting in bed eyes closed respiratory rate and rhythm even unlabored >12, no s/sx respiratory distress. Continue Q15min rounding.

## 2025-05-13 NOTE — GROUP NOTE
Group Therapy Note    Date: 5/13/2025    Group Start Time: 0935  Group End Time: 1030  Group Topic: Cognitive Skills    [facility] 1    Grecia Swift LISW        Group Therapy Note    Attendees: 12       Patient's Goal:  Pt attended group therapy where we discussed \"Fair Fighting Rules.\"     Notes:  Pt was an active listener in group.     Status After Intervention:  Unchanged    Participation Level: Active Listener    Participation Quality: Attentive      Speech:  normal      Thought Process/Content: Logical      Affective Functioning: Congruent      Mood: euthymic      Level of consciousness:  Alert, Oriented x4, and Attentive      Response to Learning: Able to retain information      Endings: None Reported    Modes of Intervention: Education, Support, Socialization, Exploration, Clarifying, and Problem-solving      Discipline Responsible: /Counselor      Signature:  CLEMENTINE Vaca

## 2025-05-13 NOTE — PLAN OF CARE
Problem: Pain  Goal: Verbalizes/displays adequate comfort level or baseline comfort level  Outcome: Progressing     Problem: Risk for Elopement  Goal: Patient will not exit the unit/facility without proper excort  Outcome: Progressing     Problem: Anxiety  Goal: Will report anxiety at manageable levels  Description: INTERVENTIONS:1. Administer medication as ordered2. Teach and rehearse alternative coping skills3. Provide emotional support with 1:1 interaction with staff  5/13/2025 1057 by Donna Joshua RN  Outcome: Progressing     Problem: Behavior  Goal: Pt/Family maintain appropriate behavior and adhere to behavioral management agreement, if implemented  Description: INTERVENTIONS:1. Assess patient/family's coping skills and  non-compliant behavior (including use of illegal substances)2. Notify security of behavior or suspected illegal substances which indicate the need for search of the family and/or belongings3. Encourage verbalization of thoughts and concerns in a socially appropriate manner4. Utilize positive, consistent limit setting strategies supporting safety of patient, staff and others5. Encourage participation in the decision making process about the behavioral management agreement6. If a visitor's behavior poses a threat to safety call refer to organization policy.7. Initiate consult with , Psychosocial CNS, Spiritual Care as appropriate  Outcome: Progressing     Problem: Depression/Self Harm  Goal: Effect of psychiatric condition will be minimized and patient will be protected from self harm  Description: INTERVENTIONS:1. Assess impact of patient's symptoms on level of functioning, self care needs and offer support as indicated2. Assess patient/family knowledge of depression, impact on illness and need for teaching3. Provide emotional support, presence and reassurance4. Assess for possible suicidal thoughts or ideation. If patient expresses suicidal thoughts or statements do not

## 2025-05-13 NOTE — GROUP NOTE
Group Therapy Note    Date: 5/13/2025    Group Start Time: 1045  Group End Time: 1115  Group Topic: Psychoeducation    [facility] 2    Daniela Lan CTRS    Group Therapy Note    Attendees: 14    Date: 5/13/2025  Start Time: 1045  End Time:  1115  Number of Participants: 14    Type of Group: Psychoeducation    Name:  Mindfulness    Patient's Goal:  Identified what is mindfulness, how mindfulness affects mental health and ways to add mindfulness to daily routines.    Notes:  CTRS led educational group discussion on mindfulness. Encouraged patients to share their experiences. Patient did not add to group discussion and demonstrated difficulty staying awake while at group.    Status After Intervention:  Unchanged    Participation Level: None    Participation Quality: Lethargic      Speech:  None      Thought Process/Content: None observed      Affective Functioning: Blunted      Mood: Tired      Level of consciousness:  Inattentive      Response to Learning: Resistant      Endings: None Reported    Modes of Intervention: Education, Support, Socialization, Exploration, Clarifying, and Problem-solving      Discipline Responsible: Psychoeducational Specialist      Signature:  LORENE Paulson

## 2025-05-13 NOTE — TELEPHONE ENCOUNTER
Patient called PCP office from inpatient [facility] Unit, requesting letter from PCP for [employer] stating that he cannot work at this time to prevent termination of Medicaid and SNAP (food assistance) benefits.  Inpatient social work advised patient call PCP office to request letter.  Per patient, please fax letter to  unit at [phone], Attention Social Work/patient's name.     Patient had received letter from [facility] stating his benefits were going to be terminated because he missed an appointment, had lost his cell phone, no longer at [employer] [facility], has own apartment.  [facility] needed letter yesterday, patient would appreciated if PCP could complete as soon as possible.

## 2025-05-13 NOTE — PLAN OF CARE
Problem: Anxiety  Goal: Will report anxiety at manageable levels  Description: INTERVENTIONS:1. Administer medication as ordered2. Teach and rehearse alternative coping skills3. Provide emotional support with 1:1 interaction with staff  5/12/2025 2158 by Joe Edwards, RN  Outcome: Progressing     Problem: Depression/Self Harm  Goal: Effect of psychiatric condition will be minimized and patient will be protected from self harm  Description: INTERVENTIONS:1. Assess impact of patient's symptoms on level of functioning, self care needs and offer support as indicated2. Assess patient/family knowledge of depression, impact on illness and need for teaching3. Provide emotional support, presence and reassurance4. Assess for possible suicidal thoughts or ideation. If patient expresses suicidal thoughts or statements do not leave alone, initiate Suicide Precautions, move to a room close to the nursing station and obtain sitter5. Initiate consults as appropriate with Mental Health Professional, Spiritual Care, Psychosocial CNS, and consider a recommendation to the LIP for a Psychiatric Consultation  5/12/2025 2158 by Joe Edwards RN  Outcome: Progressing  Flowsheets (Taken 5/12/2025 2155)  Effect of psychiatric condition will be minimized and patient will be protected from self harm: Assess impact of patient’s symptoms on level of functioning, self care needs and offer support as indicated     Patient denies suicidal, and homicidal ideations at this time.  Patient denies auditory, and visual hallucinations at this time.  Patient reports both depression and anxiety levels to be 5/10 at this time.  Patient endorses intermittent racing thoughts primarily at night. Patient denies paranoia, and was not observed having any delusions at this time.  Patient observed on unit social with peers. Patient is calm, cooperative, friendly to assessment, makes good eye contact. Patient tangential at times and minimizes somewhat stating

## 2025-05-13 NOTE — PROGRESS NOTES
[facility]  Day 3 Interdisciplinary Treatment Plan NOTE    Review Date & Time: 05/13/2025 0900    Admission Type:   Admission Type: Involuntary    Reason for admission:  Reason for Admission: \"To be honest because of my severe stupidity and not being able to control some of my thoughts\"  Estimated Length of Stay: 5-7 days  Estimated Discharge Date Update: to be determined by physician    PATIENT STRENGTHS:  Patient Strengths    Patient Strengths and Limitations:Limitations: Multiple barriers to leisure interests  Addictive Behavior:Addictive Behavior  In the Past 3 Months, Have You Felt or Has Someone Told You That You Have a Problem With  : None  Medical Problems:  Past Medical History:   Diagnosis Date    Alcohol use     Clavicle fracture 12/10/2024    left- wears a sling    Headache     Hypertension     ICH (intracerebral hemorrhage) ([facility])     after fall    Kidney stone     Memory disorder     Pseudocholinesterase deficiency     SAH (subarachnoid hemorrhage) ([facility]) 12/10/2024    after fall    SDH (subdural hematoma) ([facility]) 12/10/2024    after fall    Syncope and collapse        Risk:  Fall Risk   Refugio Scale Refugio Scale Score: 20  BVC    Change in scores no Changes to plan of Care no    Status EXAM:   Mental Status and Behavioral Exam  Normal: Yes (pt resting in bed eyes closed rr even non-labored >12 no s/sx respiratroy distress noted at this time)  Level of Assistance: Independent/Self  Facial Expression: Flat, Brightened (brightens with conversation)  Affect: Congruent  Level of Consciousness: Alert  Frequency of Checks: 4 times per hour, close  Mood:Normal: No  Mood: Anxious  Motor Activity:Normal: Yes  Eye Contact: Good  Observed Behavior: Cooperative, Friendly  Sexual Misconduct History: Current - no  Preception: [place] to person, [place] to time, [place] to place, [place] to situation  Attention:Normal: No  Attention: Distractible  Thought Processes: Tangential  Thought Content:Normal:

## 2025-05-14 PROCEDURE — 1240000000 HC EMOTIONAL WELLNESS R&B

## 2025-05-14 PROCEDURE — 6370000000 HC RX 637 (ALT 250 FOR IP): Performed by: PSYCHIATRY & NEUROLOGY

## 2025-05-14 PROCEDURE — 6370000000 HC RX 637 (ALT 250 FOR IP)

## 2025-05-14 RX ORDER — CHLORDIAZEPOXIDE HYDROCHLORIDE 25 MG/1
25 CAPSULE, GELATIN COATED ORAL EVERY 8 HOURS PRN
Status: DISCONTINUED | OUTPATIENT
Start: 2025-05-14 | End: 2025-05-15 | Stop reason: HOSPADM

## 2025-05-14 RX ADMIN — ACETAMINOPHEN 650 MG: 325 TABLET ORAL at 13:51

## 2025-05-14 RX ADMIN — DIVALPROEX SODIUM 250 MG: 250 TABLET, DELAYED RELEASE ORAL at 21:02

## 2025-05-14 RX ADMIN — MECLIZINE 25 MG: 12.5 TABLET ORAL at 08:41

## 2025-05-14 RX ADMIN — MECLIZINE 25 MG: 12.5 TABLET ORAL at 21:02

## 2025-05-14 RX ADMIN — CETIRIZINE HYDROCHLORIDE 10 MG: 10 TABLET, FILM COATED ORAL at 08:41

## 2025-05-14 RX ADMIN — ACETAMINOPHEN 650 MG: 325 TABLET ORAL at 21:02

## 2025-05-14 RX ADMIN — CHLORDIAZEPOXIDE HYDROCHLORIDE 25 MG: 25 CAPSULE ORAL at 06:50

## 2025-05-14 RX ADMIN — BACLOFEN 5 MG: 10 TABLET ORAL at 08:41

## 2025-05-14 RX ADMIN — DULOXETINE 20 MG: 20 CAPSULE, DELAYED RELEASE ORAL at 08:40

## 2025-05-14 RX ADMIN — DIVALPROEX SODIUM 250 MG: 250 TABLET, DELAYED RELEASE ORAL at 08:41

## 2025-05-14 RX ADMIN — BACLOFEN 5 MG: 10 TABLET ORAL at 21:02

## 2025-05-14 RX ADMIN — BACLOFEN 5 MG: 10 TABLET ORAL at 13:51

## 2025-05-14 RX ADMIN — Medication 3 MG: at 21:02

## 2025-05-14 RX ADMIN — AMLODIPINE BESYLATE 5 MG: 5 TABLET ORAL at 08:41

## 2025-05-14 ASSESSMENT — PAIN SCALES - GENERAL
PAINLEVEL_OUTOF10: 7
PAINLEVEL_OUTOF10: 0
PAINLEVEL_OUTOF10: 4
PAINLEVEL_OUTOF10: 7

## 2025-05-14 ASSESSMENT — PAIN DESCRIPTION - DESCRIPTORS
DESCRIPTORS: THROBBING;SHOOTING;DISCOMFORT
DESCRIPTORS: ACHING;DISCOMFORT;DULL

## 2025-05-14 ASSESSMENT — PAIN DESCRIPTION - ORIENTATION
ORIENTATION: MID
ORIENTATION: LEFT

## 2025-05-14 ASSESSMENT — PAIN - FUNCTIONAL ASSESSMENT: PAIN_FUNCTIONAL_ASSESSMENT: ACTIVITIES ARE NOT PREVENTED

## 2025-05-14 ASSESSMENT — PAIN DESCRIPTION - LOCATION
LOCATION: HEAD;HIP
LOCATION: BACK

## 2025-05-14 NOTE — PLAN OF CARE
Problem: Anxiety  Goal: Will report anxiety at manageable levels  Description: INTERVENTIONS:1. Administer medication as ordered2. Teach and rehearse alternative coping skills3. Provide emotional support with 1:1 interaction with staff  5/13/2025 2111 by Joe Edwards RN  Outcome: Progressing     Problem: Behavior  Goal: Pt/Family maintain appropriate behavior and adhere to behavioral management agreement, if implemented  Description: INTERVENTIONS:1. Assess patient/family's coping skills and  non-compliant behavior (including use of illegal substances)2. Notify security of behavior or suspected illegal substances which indicate the need for search of the family and/or belongings3. Encourage verbalization of thoughts and concerns in a socially appropriate manner4. Utilize positive, consistent limit setting strategies supporting safety of patient, staff and others5. Encourage participation in the decision making process about the behavioral management agreement6. If a visitor's behavior poses a threat to safety call refer to organization policy.7. Initiate consult with , Psychosocial CNS, Spiritual Care as appropriate  5/13/2025 2111 by Joe Edwards RN  Outcome: Progressing     Problem: Depression/Self Harm  Goal: Effect of psychiatric condition will be minimized and patient will be protected from self harm  Description: INTERVENTIONS:1. Assess impact of patient's symptoms on level of functioning, self care needs and offer support as indicated2. Assess patient/family knowledge of depression, impact on illness and need for teaching3. Provide emotional support, presence and reassurance4. Assess for possible suicidal thoughts or ideation. If patient expresses suicidal thoughts or statements do not leave alone, initiate Suicide Precautions, move to a room close to the nursing station and obtain sitter5. Initiate consults as appropriate with Mental Health Professional, Spiritual Care, Psychosocial

## 2025-05-14 NOTE — PROGRESS NOTES
BEHAVIORAL HEALTH FOLLOW-UP NOTE     5/14/2025     Patient was seen and examined in person, Chart reviewed   Patient's case discussed with staff/team    Chief Complaint: suicidal ideations     Interim History:   Patient was seen out on the unit he is keeping to himself watching television he is flat but does brighten, remains slightly anxious at times.  PCP did fax a letter stating that patient is in the hospital and unable to work at this time to be faxed to [facility] job and family services.  Patient states that this makes him feel less anxious he states she was really worried about that.  He does deny any alcohol withdrawal symptoms he was offered medication for alcohol cravings he states he does not need that and is not interested at this time.  He states that he had a really bad day before he came into the hospital states that he is feeling like he is in more control now.  He denies suicidal homicidal ideation intent or plan, denies auditory visual hallucinations.  He has been taking his medication, he has had no behavioral disturbances, he is attending group.  Sleep and appetite reported to be fair    Appetite: [x] Normal/Unchanged  [] Increased  [] Decreased      Sleep:       [x] Normal/Unchanged  [] Fair       [] Poor              Energy:    [x] Normal/Unchanged  [] Increased  [] Decreased        SI [] Present  [x] Absent    HI  []Present  [x] Absent     Aggression:  [] yes  [x] no    Patient is [x] able  [] unable to CONTRACT FOR SAFETY     PAST MEDICAL/PSYCHIATRIC HISTORY:   Past Medical History:   Diagnosis Date    Alcohol use     Clavicle fracture 12/10/2024    left- wears a sling    Headache     Hypertension     ICH (intracerebral hemorrhage) ([facility])     after fall    Kidney stone     Memory disorder     Pseudocholinesterase deficiency     SAH (subarachnoid hemorrhage) ([facility]) 12/10/2024    after fall    SDH (subdural hematoma) ([facility]) 12/10/2024    after fall    Syncope and collapse

## 2025-05-14 NOTE — PLAN OF CARE
Denies SI/HI  denies hallucinations  mood is anxious  out on the unit but stays to self  cooperative with meds     pleasant on approach    will continue to monitor

## 2025-05-14 NOTE — GROUP NOTE
Group Therapy Note    Date: 5/14/2025    Group Start Time: 0205  Group End Time: 0245  Group Topic: Cognitive Skills    [facility] 1    Grecia Swift LISW        Group Therapy Note    Attendees: 7       Patient's Goal:  Pt attended group therapy where we discussed the cycle of anxiety.    Notes:  Pt was an active participant in group.    Status After Intervention:  Improved    Participation Level: Active Listener and Interactive    Participation Quality: Appropriate, Attentive, and Sharing      Speech:  normal      Thought Process/Content: Logical      Affective Functioning: Congruent      Mood: euthymic      Level of consciousness:  Alert, Oriented x4, and Attentive      Response to Learning: Able to verbalize current knowledge/experience, Able to verbalize/acknowledge new learning, Able to retain information, and Capable of insight      Endings: None Reported    Modes of Intervention: Education, Support, Socialization, Exploration, Clarifying, and Problem-solving      Discipline Responsible: /Counselor      Signature:  CLEMENTINE Vaca

## 2025-05-14 NOTE — GROUP NOTE
Group Therapy Note    Date: 5/14/2025    Group Start Time: 1050  Group End Time: 1125  Group Topic: Community Meeting    [facility] 1    Talita Restrepo CTRS    Type of Group: Community Meeting      Patient's Goal:  Patient will be able to id staffing assignments, expectations of patients, and general information re: floor rules. Will be prompted to share goal for the day.     Notes:  Patient appeared to be an active listener, taking in information presented and was prompted to share goal for the day.    Status After Intervention:  Improved    Participation Level: Active Listener and Interactive    Participation Quality: Appropriate, Attentive, and Sharing      Speech:  normal      Thought Process/Content: Logical      Affective Functioning: Congruent      Mood: euthymic      Level of consciousness:  Alert, Oriented x4, and Attentive      Response to Learning: Able to verbalize/acknowledge new learning, Able to retain information, and Progressing to goal      Endings: None Reported    Modes of Intervention: Support, Socialization, and Clarifying      Discipline Responsible: Psychoeducational Specialist      Signature:  LORENE Carpenter       \

## 2025-05-14 NOTE — CARE COORDINATION
SW met with pt to discuss discharge plan. Pt stated that he is feeling better and he will be returning back home where he lives alone. Pt stated that he will use the bus to get there and denied any access to guns/weapons. Pt stated that he does not need any MH services and is open to getting resources. Pt reported to having some anxiety, pt denied depression, SI, HI, AVH.     OG faxed pt's letter from PCP to [facility] [phone] upon request.

## 2025-05-14 NOTE — GROUP NOTE
Group Therapy Note    Date: 5/14/2025    Group Start Time: 1600  Group End Time: 1630  Group Topic: Focus Group    [facility] 1    Prabhakar Floyd        Group Therapy Note    Attendees: 8             Patient's Goal:  Give thoughts on wisdom    Notes:  Good insight     Status After Intervention:  Improved    Participation Level: Active Listener and Interactive    Participation Quality: Appropriate, Attentive, Sharing, and Supportive      Speech:  normal      Thought Process/Content: Logical      Affective Functioning: Flat      Mood: euthymic      Level of consciousness:  Alert, Oriented x4, and Attentive      Response to Learning: Able to verbalize current knowledge/experience      Endings: None Reported    Modes of Intervention: Problem-solving      Discipline Responsible: Behavorial Health Tech      Signature:  Prabhakar Floyd

## 2025-05-15 VITALS
HEIGHT: 65 IN | DIASTOLIC BLOOD PRESSURE: 77 MMHG | BODY MASS INDEX: 31.99 KG/M2 | SYSTOLIC BLOOD PRESSURE: 135 MMHG | TEMPERATURE: 98.4 F | RESPIRATION RATE: 16 BRPM | OXYGEN SATURATION: 97 % | WEIGHT: 192 LBS | HEART RATE: 63 BPM

## 2025-05-15 LAB
DATE LAST DOSE: ABNORMAL
TME LAST DOSE: ABNORMAL H
VALPROATE SERPL-MCNC: 20 UG/ML (ref 50–100)
VANCOMYCIN DOSE: ABNORMAL MG

## 2025-05-15 PROCEDURE — 80164 ASSAY DIPROPYLACETIC ACD TOT: CPT

## 2025-05-15 PROCEDURE — 6370000000 HC RX 637 (ALT 250 FOR IP)

## 2025-05-15 PROCEDURE — 6370000000 HC RX 637 (ALT 250 FOR IP): Performed by: PSYCHIATRY & NEUROLOGY

## 2025-05-15 PROCEDURE — 36415 COLL VENOUS BLD VENIPUNCTURE: CPT

## 2025-05-15 RX ORDER — NICOTINE 21 MG/24HR
1 PATCH, TRANSDERMAL 24 HOURS TRANSDERMAL DAILY
COMMUNITY
Start: 2025-05-16

## 2025-05-15 RX ORDER — DIVALPROEX SODIUM 500 MG/1
500 TABLET, DELAYED RELEASE ORAL 2 TIMES DAILY
Qty: 60 TABLET | Refills: 0 | Status: SHIPPED | OUTPATIENT
Start: 2025-05-15 | End: 2025-06-14

## 2025-05-15 RX ORDER — DIVALPROEX SODIUM 500 MG/1
500 TABLET, DELAYED RELEASE ORAL 2 TIMES DAILY
Status: DISCONTINUED | OUTPATIENT
Start: 2025-05-15 | End: 2025-05-15 | Stop reason: HOSPADM

## 2025-05-15 RX ORDER — DULOXETIN HYDROCHLORIDE 20 MG/1
20 CAPSULE, DELAYED RELEASE ORAL DAILY
Qty: 30 CAPSULE | Refills: 0 | Status: SHIPPED | OUTPATIENT
Start: 2025-05-16 | End: 2025-06-15

## 2025-05-15 RX ADMIN — BACLOFEN 5 MG: 10 TABLET ORAL at 12:53

## 2025-05-15 RX ADMIN — AMLODIPINE BESYLATE 5 MG: 5 TABLET ORAL at 08:08

## 2025-05-15 RX ADMIN — CETIRIZINE HYDROCHLORIDE 10 MG: 10 TABLET, FILM COATED ORAL at 08:08

## 2025-05-15 RX ADMIN — MECLIZINE 25 MG: 12.5 TABLET ORAL at 08:08

## 2025-05-15 RX ADMIN — BACLOFEN 5 MG: 10 TABLET ORAL at 08:07

## 2025-05-15 RX ADMIN — DIVALPROEX SODIUM 250 MG: 250 TABLET, DELAYED RELEASE ORAL at 08:08

## 2025-05-15 RX ADMIN — DULOXETINE 20 MG: 20 CAPSULE, DELAYED RELEASE ORAL at 08:08

## 2025-05-15 ASSESSMENT — PAIN SCALES - GENERAL: PAINLEVEL_OUTOF10: 0

## 2025-05-15 NOTE — PLAN OF CARE
Problem: Anxiety  Goal: Will report anxiety at manageable levels  Description: INTERVENTIONS:1. Administer medication as ordered2. Teach and rehearse alternative coping skills3. Provide emotional support with 1:1 interaction with staff  Outcome: Progressing     Problem: Coping  Goal: Pt/Family able to verbalize concerns and demonstrate effective coping strategies  Description: INTERVENTIONS:1. Assist patient/family to identify coping skills, available support systems and cultural and spiritual values2. Provide emotional support, including active listening and acknowledgement of concerns of patient and caregivers3. Reduce environmental stimuli, as able4. Instruct patient/family in relaxation techniques, as appropriate5. Assess for spiritual pain/suffering and initiate Spiritual Care, Psychosocial Clinical Specialist consults as needed  Outcome: Progressing     Problem: Decision Making  Goal: Pt/Family able to effectively weigh alternatives and participate in decision making related to treatment and care  Description: INTERVENTIONS:1. Determine when there are differences between patient's view, family's view, and healthcare provider's view of condition2. Facilitate patient and family articulation of goals for care3. Help patient and family identify pros/cons of alternative solutions4. Provide information as requested by patient/family5. Respect patient/family right to receive or not to receive information6. Serve as a liaison between patient and family and health care team7. Initiate Consults from Ethics, Palliative Care or initiate Family Care Conference as is appropriate  Outcome: Progressing     Problem: Behavior  Goal: Pt/Family maintain appropriate behavior and adhere to behavioral management agreement, if implemented  Description: INTERVENTIONS:1. Assess patient/family's coping skills and  non-compliant behavior (including use of illegal substances)2. Notify security of behavior or suspected illegal substances

## 2025-05-15 NOTE — CARE COORDINATION
Discharge:    Sw met with pt to discuss discharge. He denied SI/HI/AVH. He stated that he is doing well. We discussed outpatient services. He was agreeable. He stated that he will be returning home and will be taking the bus home.     Appointments:    Pt has an eyes on appointment on 5/16 and a med appointment on 6/2    Transportation:    Pt stated that he will take the bus home.     In order to ensure appropriate transition and discharge planning is in place, the following documents have been transmitted to **, as the new outpatient provider:    The d/c diagnosis was transmitted to the next care provider  The reason for hospitalization was transmitted to the next care provider  The d/c medications (dosage and indication) were transmitted to the next care provider   The continuing care plan was transmitted to the next care provider

## 2025-05-15 NOTE — BH NOTE
[facility] [place]  Discharge Note    Pt discharged with followings belongings:   Dental Appliances: None  Vision - Corrective Lenses: Eyeglasses  Hearing Aid: None  Jewelry: Bracelet, Earrings, Necklace  Body Piercings Removed: No  Clothing: Belt, Footwear, Pants, Shirt, Undergarments  Other Valuables: Money, Wallet, Credit/Debit Card, Keys, Other (Comment) (One 5 dollar bill and 9 one dollar bills; ID)   Valuables sent home withyes or returned to patient. Patient educated on aftercare instructions: yes at 1:14 PM .Patient verbalize understanding of AVS:  yes.    Status EXAM upon discharge:  Mental Status and Behavioral Exam  Normal: No  Level of Assistance: Independent/Self  Facial Expression: Brightened  Affect: Appropriate  Level of Consciousness: Alert  Frequency of Checks: 4 times per hour, close  Mood:Normal: No  Mood: Anxious  Motor Activity:Normal: Yes  Eye Contact: Good  Observed Behavior: Cooperative  Sexual Misconduct History: Current - no  Preception: Holder to person, Holder to time, Holder to place, Holder to situation  Attention:Normal: Yes  Attention: Distractible  Thought Processes: Unremarkable  Thought Content:Normal: Yes  Thought Content: Preoccupations  Depression Symptoms: Isolative  Anxiety Symptoms: Generalized  Ebony Symptoms: No problems reported or observed.  Hallucinations: None  Delusions: No  Memory:Normal: Yes  Memory: Poor recent (pt reports poor short-term memory from prior head injury)  Insight and Judgment: No  Insight and Judgment: Poor judgment, Poor insight    Tobacco Screening:  Practical Counseling, on admission, tracy X, if applicable and completed (first 3 are required if patient doesn't refuse)           ( ) Recognizing danger situations (included triggers and roadblocks)                    ( ) Coping skills (new ways to manage stress,relaxation techniques, changing routine, distraction)                                                           ( ) Basic

## 2025-05-15 NOTE — PLAN OF CARE
Problem: Anxiety  Goal: Will report anxiety at manageable levels  Description: INTERVENTIONS:1. Administer medication as ordered2. Teach and rehearse alternative coping skills3. Provide emotional support with 1:1 interaction with staff  5/15/2025 0821 by Harini Quevedo RN  Outcome: Progressing  5/15/2025 0048 by Carmina Moore RN  Outcome: Progressing     Problem: Coping  Goal: Pt/Family able to verbalize concerns and demonstrate effective coping strategies  Description: INTERVENTIONS:1. Assist patient/family to identify coping skills, available support systems and cultural and spiritual values2. Provide emotional support, including active listening and acknowledgement of concerns of patient and caregivers3. Reduce environmental stimuli, as able4. Instruct patient/family in relaxation techniques, as appropriate5. Assess for spiritual pain/suffering and initiate Spiritual Care, Psychosocial Clinical Specialist consults as needed  5/15/2025 0821 by Harini Quevedo RN  Outcome: Progressing  5/15/2025 0048 by Carmina Moore RN  Outcome: Progressing     Problem: Decision Making  Goal: Pt/Family able to effectively weigh alternatives and participate in decision making related to treatment and care  Description: INTERVENTIONS:1. Determine when there are differences between patient's view, family's view, and healthcare provider's view of condition2. Facilitate patient and family articulation of goals for care3. Help patient and family identify pros/cons of alternative solutions4. Provide information as requested by patient/family5. Respect patient/family right to receive or not to receive information6. Serve as a liaison between patient and family and health care team7. Initiate Consults from Ethics, Palliative Care or initiate Family Care Conference as is appropriate  5/15/2025 0048 by Carmina Moore RN  Outcome: Progressing

## 2025-05-15 NOTE — PROGRESS NOTES
CLINICAL PHARMACY NOTE: MEDS TO BEDS    Total # of Prescriptions Filled: 2   The following medications were delivered to the patient:  Duloxetine 20 mg  Divalproex  mg    Additional Documentation: delivered to TESHA Tripp

## 2025-05-15 NOTE — BH NOTE
Patient denies suicidal ideation, homicidal ideations and AVH.  Presents brightened, friendly, calm and cooperative during assessment.  Patient is out on the unit isolative to self.  Medications taken without issue.  No complaints or concerns verbalized at this time.  No unit problems reported.  Will continue to observe and support.

## 2025-05-15 NOTE — GROUP NOTE
Shared goal for the day as to stop letting my mind race.                                                                       Group Therapy Note    Date: 5/15/2025    Group Start Time: 0945  Group End Time: 1010  Group Topic: Community Meeting    [facility] 1    Talita Resterpo, LORENE      Type of Group: Community Meeting      Patient's Goal:  Patient will be able to id staffing assignments, expectations of patients, and general information re: floor rules. Will be prompted to share goal for the day.     Notes:  Patient appeared to be an active listener, taking in information presented and was prompted to share goal for the day.    Status After Intervention:  Improved    Participation Level: Active Listener and Interactive    Participation Quality: Appropriate, Attentive, and Sharing      Speech:  normal      Thought Process/Content: Logical      Affective Functioning: Congruent      Mood: euthymic      Level of consciousness:  Alert, Oriented x4, and Attentive      Response to Learning: Able to verbalize/acknowledge new learning, Able to retain information, and Progressing to goal      Endings: None Reported    Modes of Intervention: Support and Clarifying      Discipline Responsible: Psychoeducational Specialist      Signature:  LORENE Carpenter

## 2025-05-15 NOTE — TRANSITION OF CARE
Behavioral Health Transition Record    Patient Name: Kirsty Thurston  YOB: 1970   Medical Record Number: [id number]  Date of Admission: 5/10/2025  6:23 PM   Date of Discharge: 5/15/2025    Attending Provider: Eduard Thornton MD   Discharging Provider: Dr Thornton  To contact this individual call [phone] and ask the  to page.  If unavailable, ask to be transferred to Behavioral Health Provider on call.  A Behavioral Health Provider will be available on call 24/7 and during holidays.    Primary Care Provider: Pierce Andrew MD    Allergies   Allergen Reactions    Pcn [Penicillins] Other (See Comments)     \"Unknown\"        Reason for Admission: CIRCUMSTANCES OF ADMISSION: Kirsty Thurston is a 54-year-old male with history of depression, TBI, alcohol abuse presented to the hospital for suicidal ideations.  Alcohol level was 294.  Drug screen was negative.  Patient was pink slipped and involuntarily admitted to [facility] for psychiatric stabilization and evaluation.  Duration of symptoms and circumstances of admission unknown.     Admission Diagnosis: Suicidal ideation [R45.851]  Mood disorder [F39]  Blood alcohol level of 240 mg/100 ml or more [Y90.8]    * No surgery found *    Results for orders placed or performed during the hospital encounter of 05/10/25   Comprehensive Metabolic Panel   Result Value Ref Range    Sodium 141 136 - 145 mmol/L    Potassium 4.4 3.5 - 5.1 mmol/L    Chloride 102 98 - 107 mmol/L    CO2 19 (L) 22 - 29 mmol/L    Anion Gap 19 (H) 7 - 16 mmol/L    Glucose 102 (H) 74 - 99 mg/dL    BUN 9 6 - 20 mg/dL    Creatinine 1.0 0.7 - 1.2 mg/dL    Est, Glom Filt Rate >90 >60 mL/min/1.73m2    Calcium 9.1 8.6 - 10.0 mg/dL    Total Protein 7.1 6.4 - 8.3 g/dL    Albumin 4.4 3.5 - 5.2 g/dL    Total Bilirubin 0.5 0.0 - 1.2 mg/dL    Alkaline Phosphatase 98 40 - 129 U/L    ALT 16 0 - 50 U/L    AST 22 0 - 50 U/L   CBC with Auto Differential   Result Value Ref Range    WBC 7.6 4.5 - 11.5 k/uL    RBC 4.28

## 2025-05-16 NOTE — PROGRESS NOTES
Spiritual Health History and Assessment/Progress Note  [facility]    Loneliness/Social Isolation,  ,  , Initial Encounter    Name: Kirsty Thurston MRN: [id number]    Age: 54 y.o.     Sex: male   Language: English   Latter-day: Sikh   Major depressive disorder, recurrent episode with mixed features     Date: 5/16/2025                           Spiritual Assessment began in [facility]  2        Referral/Consult From: Nurse   Encounter Overview/Reason: Loneliness/Social Isolation  Service Provided For: Patient    Dai, Belief, Meaning:   Patient identifies as spiritual and has beliefs or practices that help with coping during difficult times  Family/Friends No family/friends present      Importance and Influence:  Patient has spiritual/personal beliefs that influence decisions regarding their health  Family/Friends No family/friends present    Community:  Patient feels well-supported. Support system includes: Parent/s  Family/Friends No family/friends present    Assessment and Plan of Care:     Patient Interventions include: Facilitated expression of thoughts and feelings, Explored spiritual coping/struggle/distress, Affirmed coping skills/support systems, and Facilitated life review and/ or legacy  Family/Friends Interventions include: No family/friends present    Patient Plan of Care: Spiritual Care available upon further referral  Family/Friends Plan of Care: No family/friends present    Electronically signed by Chaplain Tim on 5/16/2025 at 6:32 PM

## 2025-05-17 ENCOUNTER — HOSPITAL ENCOUNTER (EMERGENCY)
Age: 55
Discharge: HOME | End: 2025-05-18
Attending: STUDENT IN AN ORGANIZED HEALTH CARE EDUCATION/TRAINING PROGRAM
Payer: COMMERCIAL

## 2025-05-17 ENCOUNTER — APPOINTMENT (OUTPATIENT)
Dept: GENERAL RADIOLOGY | Age: 55
End: 2025-05-17
Payer: COMMERCIAL

## 2025-05-17 ENCOUNTER — APPOINTMENT (OUTPATIENT)
Dept: CT IMAGING | Age: 55
End: 2025-05-17
Payer: COMMERCIAL
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
## 2025-05-17 LAB
ALBUMIN SERPL-MCNC: 4 G/DL (ref 3.5–5.2)
ALP SERPL-CCNC: 82 U/L (ref 40–129)
ALT SERPL-CCNC: 12 U/L (ref 0–50)
ANION GAP SERPL CALCULATED.3IONS-SCNC: 17 MMOL/L (ref 7–16)
APAP SERPL-MCNC: <5 UG/ML (ref 10–30)
AST SERPL-CCNC: 20 U/L (ref 0–50)
BASOPHILS # BLD: 0.04 K/UL (ref 0–0.2)
BASOPHILS NFR BLD: 1 % (ref 0–2)
BILIRUB SERPL-MCNC: 0.5 MG/DL (ref 0–1.2)
BUN SERPL-MCNC: 11 MG/DL (ref 6–20)
CALCIUM SERPL-MCNC: 9.1 MG/DL (ref 8.6–10)
CHLORIDE SERPL-SCNC: 106 MMOL/L (ref 98–107)
CO2 SERPL-SCNC: 19 MMOL/L (ref 22–29)
CREAT SERPL-MCNC: 0.9 MG/DL (ref 0.7–1.2)
EOSINOPHIL # BLD: 0.17 K/UL (ref 0.05–0.5)
EOSINOPHILS RELATIVE PERCENT: 2 % (ref 0–6)
ERYTHROCYTE [DISTWIDTH] IN BLOOD BY AUTOMATED COUNT: 15.7 % (ref 11.5–15)
ETHANOLAMINE SERPL-MCNC: 146 MG/DL (ref 0–0.08)
GFR, ESTIMATED: >90 ML/MIN/1.73M2
GLUCOSE SERPL-MCNC: 76 MG/DL (ref 74–99)
HCT VFR BLD AUTO: 36.1 % (ref 37–54)
HGB BLD-MCNC: 11.5 G/DL (ref 12.5–16.5)
IMM GRANULOCYTES # BLD AUTO: 0.03 K/UL (ref 0–0.58)
IMM GRANULOCYTES NFR BLD: 0 % (ref 0–5)
LYMPHOCYTES NFR BLD: 1.97 K/UL (ref 1.5–4)
LYMPHOCYTES RELATIVE PERCENT: 23 % (ref 20–42)
MCH RBC QN AUTO: 28.1 PG (ref 26–35)
MCHC RBC AUTO-ENTMCNC: 31.9 G/DL (ref 32–34.5)
MCV RBC AUTO: 88.3 FL (ref 80–99.9)
MONOCYTES NFR BLD: 0.78 K/UL (ref 0.1–0.95)
MONOCYTES NFR BLD: 9 % (ref 2–12)
NEUTROPHILS NFR BLD: 64 % (ref 43–80)
NEUTS SEG NFR BLD: 5.44 K/UL (ref 1.8–7.3)
PLATELET # BLD AUTO: 229 K/UL (ref 130–450)
PMV BLD AUTO: 9.1 FL (ref 7–12)
POTASSIUM SERPL-SCNC: 4.1 MMOL/L (ref 3.5–5.1)
PROT SERPL-MCNC: 6.7 G/DL (ref 6.4–8.3)
RBC # BLD AUTO: 4.09 M/UL (ref 3.8–5.8)
SALICYLATES SERPL-MCNC: <0.5 MG/DL (ref 0–30)
SODIUM SERPL-SCNC: 142 MMOL/L (ref 136–145)
TOXIC TRICYCLIC SC,BLOOD: NEGATIVE
WBC OTHER # BLD: 8.4 K/UL (ref 4.5–11.5)

## 2025-05-17 PROCEDURE — 72125 CT NECK SPINE W/O DYE: CPT

## 2025-05-17 PROCEDURE — 80143 DRUG ASSAY ACETAMINOPHEN: CPT

## 2025-05-17 PROCEDURE — 70450 CT HEAD/BRAIN W/O DYE: CPT

## 2025-05-17 PROCEDURE — 96375 TX/PRO/DX INJ NEW DRUG ADDON: CPT

## 2025-05-17 PROCEDURE — 6360000002 HC RX W HCPCS: Performed by: STUDENT IN AN ORGANIZED HEALTH CARE EDUCATION/TRAINING PROGRAM

## 2025-05-17 PROCEDURE — 80053 COMPREHEN METABOLIC PANEL: CPT

## 2025-05-17 PROCEDURE — 73502 X-RAY EXAM HIP UNI 2-3 VIEWS: CPT

## 2025-05-17 PROCEDURE — 71260 CT THORAX DX C+: CPT

## 2025-05-17 PROCEDURE — 72131 CT LUMBAR SPINE W/O DYE: CPT

## 2025-05-17 PROCEDURE — 96374 THER/PROPH/DIAG INJ IV PUSH: CPT

## 2025-05-17 PROCEDURE — G0480 DRUG TEST DEF 1-7 CLASSES: HCPCS

## 2025-05-17 PROCEDURE — 80307 DRUG TEST PRSMV CHEM ANLYZR: CPT

## 2025-05-17 PROCEDURE — 99285 EMERGENCY DEPT VISIT HI MDM: CPT

## 2025-05-17 PROCEDURE — 80179 DRUG ASSAY SALICYLATE: CPT

## 2025-05-17 PROCEDURE — 74177 CT ABD & PELVIS W/CONTRAST: CPT

## 2025-05-17 PROCEDURE — 85025 COMPLETE CBC W/AUTO DIFF WBC: CPT

## 2025-05-17 PROCEDURE — 6360000004 HC RX CONTRAST MEDICATION: Performed by: RADIOLOGY

## 2025-05-17 RX ORDER — DIPHENHYDRAMINE HYDROCHLORIDE 50 MG/ML
25 INJECTION, SOLUTION INTRAMUSCULAR; INTRAVENOUS ONCE
Status: COMPLETED | OUTPATIENT
Start: 2025-05-17 | End: 2025-05-17

## 2025-05-17 RX ORDER — METOCLOPRAMIDE HYDROCHLORIDE 5 MG/ML
10 INJECTION INTRAMUSCULAR; INTRAVENOUS ONCE
Status: COMPLETED | OUTPATIENT
Start: 2025-05-17 | End: 2025-05-17

## 2025-05-17 RX ORDER — IOPAMIDOL 755 MG/ML
75 INJECTION, SOLUTION INTRAVASCULAR
Status: COMPLETED | OUTPATIENT
Start: 2025-05-17 | End: 2025-05-17

## 2025-05-17 RX ADMIN — IOPAMIDOL 75 ML: 755 INJECTION, SOLUTION INTRAVENOUS at 22:18

## 2025-05-17 RX ADMIN — METOCLOPRAMIDE 10 MG: 5 INJECTION, SOLUTION INTRAMUSCULAR; INTRAVENOUS at 21:22

## 2025-05-17 RX ADMIN — DIPHENHYDRAMINE HYDROCHLORIDE 25 MG: 50 INJECTION INTRAMUSCULAR; INTRAVENOUS at 21:23

## 2025-05-17 ASSESSMENT — PAIN - FUNCTIONAL ASSESSMENT: PAIN_FUNCTIONAL_ASSESSMENT: NONE - DENIES PAIN

## 2025-05-17 NOTE — ED NOTES
Patient was in urine soaked jeans an underwear. Took patient to a room and put him in a clean gown and dry pants. Assisted patient in using the urinal.

## 2025-05-18 VITALS
DIASTOLIC BLOOD PRESSURE: 85 MMHG | TEMPERATURE: 98 F | OXYGEN SATURATION: 97 % | HEART RATE: 85 BPM | SYSTOLIC BLOOD PRESSURE: 138 MMHG | RESPIRATION RATE: 17 BRPM

## 2025-05-18 NOTE — ED PROVIDER NOTES
°F (36.7 °C)   Resp 17   SpO2 97%   Oxygen Saturation Interpretation: Normal    The patient’s available past medical records and past encounters were reviewed by myself and ED attending        ------------------------------ ED COURSE/MEDICAL DECISION MAKING----------------------    Medical Decision Making/Differential Diagnosis:    CC/HPI Summary, Pertinent Physical Exam Findings, Social Determinants of health, Records Reviewed, DDx, testing done/not done, ED Course, Reassessment, disposition considerations/shared decision making with patient, consults, disposition:      Medical Decision Making/ED COURSE:    Chronic Conditions affecting care:    has a past medical history of Alcohol use, Clavicle fracture (12/10/2024), Headache, Hypertension, ICH (intracerebral hemorrhage) (HCC), Kidney stone, Memory disorder, Pseudocholinesterase deficiency, SAH (subarachnoid hemorrhage) (HCC) (12/10/2024), SDH (subdural hematoma) ([facility]) (12/10/2024), and Syncope and collapse.       Myself and ED attending interpreted findings during patient's stay.   Vital signs /85   Pulse 85   Temp 98 °F (36.7 °C)   Resp 17   SpO2 97%     Kirsty Thurston is a 54 y.o. male who presents to the emergency department after a fall .Patient states that he had been drinking prior to the fall.While in the ED patient was hemodynamically stable, afebrile, nontoxic-appearing, in no respiratory distress. Physical exam remarkable for spine tenderness, lumbar spine tenderness, and left hip pain.  There were no step-off deformities, no other signs of trauma was noted.  Patient had a cervical collar in place.  CT imaging of the head showed no acute intracranial hemorrhage, CT lumbar spine and cervical spine were negative for any fractures.  CT abdomen pelvis, CT chest did not show any acute pathology fractures.  CBC showed no leukocytosis, CMP with no electrolyte abnormalities.  Patient was screen was positive for an elevated ethanol level.  Patient was

## 2025-05-20 ENCOUNTER — OFFICE VISIT (OUTPATIENT)
Dept: FAMILY MEDICINE CLINIC | Age: 55
End: 2025-05-20
Payer: COMMERCIAL

## 2025-05-20 VITALS
BODY MASS INDEX: 31.65 KG/M2 | RESPIRATION RATE: 18 BRPM | WEIGHT: 190 LBS | HEIGHT: 65 IN | SYSTOLIC BLOOD PRESSURE: 163 MMHG | DIASTOLIC BLOOD PRESSURE: 79 MMHG | HEART RATE: 71 BPM | OXYGEN SATURATION: 99 % | TEMPERATURE: 98 F

## 2025-05-20 DIAGNOSIS — S06.2XAA: ICD-10-CM

## 2025-05-20 DIAGNOSIS — Z09 HOSPITAL DISCHARGE FOLLOW-UP: ICD-10-CM

## 2025-05-20 DIAGNOSIS — S06.A0XA: ICD-10-CM

## 2025-05-20 DIAGNOSIS — F33.9 RECURRENT MAJOR DEPRESSIVE DISORDER, REMISSION STATUS UNSPECIFIED: ICD-10-CM

## 2025-05-20 DIAGNOSIS — G43.709 CHRONIC MIGRAINE WITHOUT AURA WITHOUT STATUS MIGRAINOSUS, NOT INTRACTABLE: Primary | ICD-10-CM

## 2025-05-20 DIAGNOSIS — I10 ESSENTIAL HYPERTENSION: ICD-10-CM

## 2025-05-20 DIAGNOSIS — R26.0 ATAXIC GAIT: ICD-10-CM

## 2025-05-20 DIAGNOSIS — F51.01 PRIMARY INSOMNIA: ICD-10-CM

## 2025-05-20 PROCEDURE — G8417 CALC BMI ABV UP PARAM F/U: HCPCS

## 2025-05-20 PROCEDURE — 3077F SYST BP >= 140 MM HG: CPT

## 2025-05-20 PROCEDURE — 4004F PT TOBACCO SCREEN RCVD TLK: CPT

## 2025-05-20 PROCEDURE — 99214 OFFICE O/P EST MOD 30 MIN: CPT

## 2025-05-20 PROCEDURE — 1111F DSCHRG MED/CURRENT MED MERGE: CPT

## 2025-05-20 PROCEDURE — G8427 DOCREV CUR MEDS BY ELIG CLIN: HCPCS

## 2025-05-20 PROCEDURE — 3017F COLORECTAL CA SCREEN DOC REV: CPT

## 2025-05-20 PROCEDURE — 3078F DIAST BP <80 MM HG: CPT

## 2025-05-20 RX ORDER — RIBOFLAVIN (VITAMIN B2) 100 MG
TABLET ORAL
Qty: 30 TABLET | Refills: 3 | Status: SHIPPED | OUTPATIENT
Start: 2025-05-20

## 2025-05-20 RX ORDER — MAGNESIUM OXIDE 400 MG/1
400 TABLET ORAL DAILY
Qty: 30 TABLET | Refills: 1 | Status: SHIPPED | OUTPATIENT
Start: 2025-05-20

## 2025-05-20 RX ORDER — AMLODIPINE BESYLATE 10 MG/1
10 TABLET ORAL DAILY
Qty: 30 TABLET | Refills: 3 | Status: SHIPPED | OUTPATIENT
Start: 2025-05-20 | End: 2025-09-17

## 2025-05-20 NOTE — PROGRESS NOTES
S: 54 y.o. male here for transitional care visit for recent hospital discharge for suicidal ideation. He has there 5/10-5/15/25 and 5/18 for a fall. He had been drinking prior to the fall.  CT scans showed no bleeding or fracture.    Drinking prior to admission and level was 294 on admission. Cymbalta was added and changed to depakote 500 xr bid. Now meclizine bid.Mood ok today  Currently reports not drinking. Has insomnia. Asking about melatonin and trazodone. States has no follow up planned with psychiatry since hospital discharge.    Chronic balance problems and dizziness since brain hemorrhage/head trauma after drinking in Spring of 2024.     Chronic daily headache. Taking prn maxalt and nsaids, cut back.    Asking about grab bars for home.    O: VS: BP (!) 163/79 (BP Site: Left Upper Arm, Patient Position: Sitting, BP Cuff Size: Medium Adult)   Pulse 71   Temp 98 °F (36.7 °C) (Temporal)   Resp 18   Ht 1.651 m (5' 5\")   Wt 86.2 kg (190 lb)   SpO2 99%   BMI 31.62 kg/m²    General: NAD   CV:  RRR, no gallops, rubs; + murmur   Resp: CTAB no R/R/W   Abd:  Soft, nontender, no masses    Ext:  no C/C/E   Antalgic gait-using cane  Impression/Plan:   1. MDD-mood appears stable. Follow up with outpatient psychiatry. Needs referral. Discussed adding melatonin.  2. Recurrent falls-may be related to chronic dizziness/alcohol use. Referral to PT and home assistive equipment such as new cane/grab bars to prevent recurrent falls.  3. Dizziness-following with neuro. Referred to vestibular therapy, has meclizine  4. HTN-increase amlodipine to 10 mg.    Rto in 2-3 months    Attending Physician Statement  I have discussed the case, including pertinent history and exam findings with the resident. I also have seen the patient and performed key portions of the examination.  I agree with the documented assessment and plan.        Akanksha Hodge MD

## 2025-05-20 NOTE — PROGRESS NOTES
Post-Discharge Transitional Care  Follow Up      Kirsty Thurston   YOB: 1970    Date of Office Visit:  5/20/2025  Date of Hospital Admission: 5/17/25  Date of Hospital Discharge: 5/18/25  Risk of hospital readmission (high >=14%. Medium >=10%) :Readmission Risk Score: 10.1      Care management risk score Rising risk (score 2-5) and Complex Care (Scores >=6): No Risk Score On File     Non face to face  following discharge, date last encounter closed (first attempt may have been earlier): *No documented post hospital discharge outreach found in the last 14 days    Call initiated 2 business days of discharge: *No response recorded in the last 14 days    ASSESSMENT/PLAN:   Chronic migraine without aura without status migrainosus, not intractable  -     magnesium oxide (MAG-OX) 400 MG tablet; Take 1 tablet by mouth daily, Disp-30 tablet, R-1Normal  -     Riboflavin (B-2) 100 MG TABS; Take once daily, Disp-30 tablet, R-3Normal  -     Continue maxalt for abortive therapy. Add daily mag and b2 for preventive therapy  Ataxic gait  Dizziness  Hx of Traumatic hematoma of brain with compression and midline shift of brain (HCC)  -     [facility] to evaluate for home care needs.   -      Patient has history of recurrent falls likely secondary to history of hematoma vs alcohol abuse disorder.  -      Continue to follow with Neurology as scheduled.   -      Vestibular therapy   Essential hypertension  -     Increase amlodipine  -      amLODIPine (NORVASC) 10 MG tablet; Take 1 tablet by mouth daily, Disp-30 tablet, R-3Normal  -      Smoking cessation advised.   Recurrent major depressive disorder, remission status unspecified  -     External Referral To Psychiatry  -     Continue current medication management  Primary insomnia  -     melatonin (RA MELATONIN) 3 MG TABS tablet; Take 1 tablet by mouth nightly as needed (insomnia), Disp-30 tablet, R-3Normal      Medical Decision Making:

## 2025-05-20 NOTE — PROGRESS NOTES
States that the EMS misunderstood him leading to him being pink slipped.   Patient states that he still gets very dizzy.   Ataxia started after brain injury. Follows with neurolgy who recommended vestibular rehab. Referral was placed to physical therapy .   Applied for disability. Lives alone.   Needs bars and non-slip rug.   Slipped and fell yesterday. He bounced his head off the floor. No LOC, No blood thinners. Has chronic headaches. He took ibuprofen every morning and his neurologist took him off ibuprofen.   Takes maxalt 2x a week and it helps.   Meclizine helps.   Has 2 beers. He had quit for a year.     BP is high. BP is worse when he is in a   Took bp meds this morning.   Smokes about 1ppw.     Plan:   Increase amlodpine   Add mg and b2

## 2025-05-20 NOTE — PATIENT INSTRUCTIONS
[facility]    [facility]  [address]  [facility] [address]  Phone: [phone]    [facility]  [address]  [facility] [address]  Phone: [phone]    [facility] [address] [facility] [facility] [address]  Phone: [phone]    [facility]  [address]  [facility] [address]  Phone: [phone]    [facility] [employer]  [address] [facility] [facility] [address]  Phone: [phone]    [facility]  [address]  [facility] [address]  Phone: [phone]    [facility]  [address] [address]  Phone: [phone]    [facility]  [address]  [facility] [address]  Phone: [phone]    [facility]  [address]  [facility] [address]  Phone: [phone]    [facility] location (Español disponible)  [address] [address]  Phone: [phone]    [facility] location (Español disponible)  [address] [address]  Phone: [phone]    [facility]  [address]  [facility] [address]  Phone: [phone]    [facility] Behavioral [facility] location  [address], [place], [address]  Phone: [phone]    [facility] location  [address]  [facility] [address]  Phone: [phone]    [facility]    [facility] (Español disponible- Vaishnavi Garcia)   [address] [place] [address]  [phone]    [facility]  [address] [address]  Phone: [phone]    [facility]  [address] [facility] [address] [facility] [address]  Phone: [phone]    [facility] location  [address] [place] [address]  Phone: [phone]    [facility]

## 2025-05-27 ENCOUNTER — TELEPHONE (OUTPATIENT)
Dept: FAMILY MEDICINE CLINIC | Age: 55
End: 2025-05-27

## 2025-05-27 DIAGNOSIS — S06.2XAA: ICD-10-CM

## 2025-05-27 DIAGNOSIS — S06.A0XA: ICD-10-CM

## 2025-05-27 DIAGNOSIS — R26.0 ATAXIC GAIT: Primary | ICD-10-CM

## 2025-05-27 NOTE — TELEPHONE ENCOUNTER
LVM to schedule mammogram   Lily called in and is asking if you could please order him some grab bars for the shower and also a shower chair.  He is very unbalanced and has fallen once. Once ordered please fax to [facility].    Please advise    Thank you

## 2025-05-28 ENCOUNTER — TELEPHONE (OUTPATIENT)
Dept: FAMILY MEDICINE CLINIC | Age: 55
End: 2025-05-28

## 2025-05-28 NOTE — TELEPHONE ENCOUNTER
[facility] called to state they were not able to contact the pt and aren't going to be able to take him on as a patient.

## 2025-06-02 ENCOUNTER — TELEPHONE (OUTPATIENT)
Dept: FAMILY MEDICINE CLINIC | Age: 55
End: 2025-06-02

## 2025-06-02 DIAGNOSIS — S06.A0XA: ICD-10-CM

## 2025-06-02 DIAGNOSIS — S06.2XAA: ICD-10-CM

## 2025-06-02 DIAGNOSIS — R26.0 ATAXIC GAIT: Primary | ICD-10-CM

## 2025-06-02 NOTE — TELEPHONE ENCOUNTER
Called and advised patient [facility] will not accept him as a patient. Advised I would be referring him to alternative [facility] and to keep his phone available.

## 2025-06-04 ENCOUNTER — APPOINTMENT (OUTPATIENT)
Dept: CT IMAGING | Age: 55
End: 2025-06-04
Payer: COMMERCIAL

## 2025-06-04 ENCOUNTER — HOSPITAL ENCOUNTER (INPATIENT)
Age: 55
LOS: 3 days | Discharge: HOME | End: 2025-06-07
Attending: EMERGENCY MEDICINE | Admitting: FAMILY MEDICINE
Payer: COMMERCIAL

## 2025-06-04 ENCOUNTER — APPOINTMENT (OUTPATIENT)
Dept: GENERAL RADIOLOGY | Age: 55
End: 2025-06-04
Payer: COMMERCIAL

## 2025-06-04 DIAGNOSIS — R29.6 FREQUENT FALLS: Primary | ICD-10-CM

## 2025-06-04 DIAGNOSIS — R53.1 GENERAL WEAKNESS: ICD-10-CM
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
## 2025-06-04 DIAGNOSIS — F10.920 ACUTE ALCOHOLIC INTOXICATION WITHOUT COMPLICATION: ICD-10-CM

## 2025-06-04 DIAGNOSIS — S09.90XA CLOSED HEAD INJURY, INITIAL ENCOUNTER: ICD-10-CM

## 2025-06-04 LAB
ALBUMIN SERPL-MCNC: 4 G/DL (ref 3.5–5.2)
ALP SERPL-CCNC: 65 U/L (ref 40–129)
ALT SERPL-CCNC: 15 U/L (ref 0–40)
AMPHET UR QL SCN: NEGATIVE
ANION GAP SERPL CALCULATED.3IONS-SCNC: 18 MMOL/L (ref 7–16)
APAP SERPL-MCNC: <5 UG/ML (ref 10–30)
AST SERPL-CCNC: 17 U/L (ref 0–39)
BARBITURATES UR QL SCN: NEGATIVE
BASOPHILS # BLD: 0.06 K/UL (ref 0–0.2)
BASOPHILS NFR BLD: 1 % (ref 0–2)
BENZODIAZ UR QL: POSITIVE
BILIRUB DIRECT SERPL-MCNC: <0.2 MG/DL (ref 0–0.3)
BILIRUB INDIRECT SERPL-MCNC: NORMAL MG/DL (ref 0–1)
BILIRUB SERPL-MCNC: 0.4 MG/DL (ref 0–1.2)
BUN SERPL-MCNC: 6 MG/DL (ref 6–20)
BUPRENORPHINE UR QL: NEGATIVE
CALCIUM SERPL-MCNC: 9.4 MG/DL (ref 8.6–10)
CANNABINOIDS UR QL SCN: NEGATIVE
CHLORIDE SERPL-SCNC: 108 MMOL/L (ref 98–107)
CK SERPL-CCNC: 91 U/L (ref 20–200)
CO2 SERPL-SCNC: 19 MMOL/L (ref 22–29)
COCAINE UR QL SCN: NEGATIVE
CREAT SERPL-MCNC: 0.8 MG/DL (ref 0.7–1.2)
DATE LAST DOSE: ABNORMAL
EOSINOPHIL # BLD: 0.07 K/UL (ref 0.05–0.5)
EOSINOPHILS RELATIVE PERCENT: 1 % (ref 0–6)
ERYTHROCYTE [DISTWIDTH] IN BLOOD BY AUTOMATED COUNT: 15.9 % (ref 11.5–15)
ETHANOLAMINE SERPL-MCNC: 93 MG/DL (ref 0–0.08)
FENTANYL UR QL: NEGATIVE
GFR, ESTIMATED: >90 ML/MIN/1.73M2
GLUCOSE SERPL-MCNC: 81 MG/DL (ref 74–99)
HCT VFR BLD AUTO: 41 % (ref 37–54)
HGB BLD-MCNC: 12.9 G/DL (ref 12.5–16.5)
IMM GRANULOCYTES # BLD AUTO: <0.03 K/UL (ref 0–0.58)
IMM GRANULOCYTES NFR BLD: 0 % (ref 0–5)
LACTATE BLDV-SCNC: 2.8 MMOL/L (ref 0.5–2.2)
LYMPHOCYTES NFR BLD: 1.41 K/UL (ref 1.5–4)
LYMPHOCYTES RELATIVE PERCENT: 18 % (ref 20–42)
MCH RBC QN AUTO: 27.7 PG (ref 26–35)
MCHC RBC AUTO-ENTMCNC: 31.5 G/DL (ref 32–34.5)
MCV RBC AUTO: 88.2 FL (ref 80–99.9)
METHADONE UR QL: NEGATIVE
MONOCYTES NFR BLD: 0.97 K/UL (ref 0.1–0.95)
MONOCYTES NFR BLD: 12 % (ref 2–12)
NEUTROPHILS NFR BLD: 69 % (ref 43–80)
NEUTS SEG NFR BLD: 5.5 K/UL (ref 1.8–7.3)
OPIATES UR QL SCN: NEGATIVE
OXYCODONE UR QL SCN: NEGATIVE
PCP UR QL SCN: NEGATIVE
PLATELET # BLD AUTO: 328 K/UL (ref 130–450)
PMV BLD AUTO: 9.6 FL (ref 7–12)
POTASSIUM SERPL-SCNC: 4.8 MMOL/L (ref 3.5–5.1)
PROT SERPL-MCNC: 6.4 G/DL (ref 6.4–8.3)
RBC # BLD AUTO: 4.65 M/UL (ref 3.8–5.8)
SALICYLATES SERPL-MCNC: <0.3 MG/DL (ref 0–30)
SODIUM SERPL-SCNC: 145 MMOL/L (ref 136–145)
TEST INFORMATION: ABNORMAL
TME LAST DOSE: ABNORMAL H
TOXIC TRICYCLIC SC,BLOOD: NEGATIVE
TROPONIN I SERPL HS-MCNC: 22 NG/L (ref 0–22)
TROPONIN I SERPL HS-MCNC: 23 NG/L (ref 0–22)
VALPROATE SERPL-MCNC: 5 UG/ML (ref 50–100)
VANCOMYCIN DOSE: ABNORMAL MG
WBC OTHER # BLD: 8 K/UL (ref 4.5–11.5)

## 2025-06-04 PROCEDURE — 93005 ELECTROCARDIOGRAM TRACING: CPT

## 2025-06-04 PROCEDURE — 72125 CT NECK SPINE W/O DYE: CPT

## 2025-06-04 PROCEDURE — 90471 IMMUNIZATION ADMIN: CPT

## 2025-06-04 PROCEDURE — 6360000002 HC RX W HCPCS

## 2025-06-04 PROCEDURE — 80164 ASSAY DIPROPYLACETIC ACD TOT: CPT

## 2025-06-04 PROCEDURE — 71046 X-RAY EXAM CHEST 2 VIEWS: CPT

## 2025-06-04 PROCEDURE — 6370000000 HC RX 637 (ALT 250 FOR IP)

## 2025-06-04 PROCEDURE — 80053 COMPREHEN METABOLIC PANEL: CPT

## 2025-06-04 PROCEDURE — HZ2ZZZZ DETOXIFICATION SERVICES FOR SUBSTANCE ABUSE TREATMENT: ICD-10-PCS

## 2025-06-04 PROCEDURE — 80143 DRUG ASSAY ACETAMINOPHEN: CPT

## 2025-06-04 PROCEDURE — 82248 BILIRUBIN DIRECT: CPT

## 2025-06-04 PROCEDURE — 80307 DRUG TEST PRSMV CHEM ANLYZR: CPT

## 2025-06-04 PROCEDURE — 80179 DRUG ASSAY SALICYLATE: CPT

## 2025-06-04 PROCEDURE — 83605 ASSAY OF LACTIC ACID: CPT

## 2025-06-04 PROCEDURE — 99285 EMERGENCY DEPT VISIT HI MDM: CPT

## 2025-06-04 PROCEDURE — 1200000000 HC SEMI PRIVATE

## 2025-06-04 PROCEDURE — 90714 TD VACC NO PRESV 7 YRS+ IM: CPT

## 2025-06-04 PROCEDURE — 82550 ASSAY OF CK (CPK): CPT

## 2025-06-04 PROCEDURE — 2580000003 HC RX 258

## 2025-06-04 PROCEDURE — 84484 ASSAY OF TROPONIN QUANT: CPT

## 2025-06-04 PROCEDURE — G0480 DRUG TEST DEF 1-7 CLASSES: HCPCS

## 2025-06-04 PROCEDURE — 70450 CT HEAD/BRAIN W/O DYE: CPT

## 2025-06-04 PROCEDURE — 36415 COLL VENOUS BLD VENIPUNCTURE: CPT

## 2025-06-04 PROCEDURE — 84207 ASSAY OF VITAMIN B-6: CPT

## 2025-06-04 PROCEDURE — 2500000003 HC RX 250 WO HCPCS

## 2025-06-04 PROCEDURE — 85025 COMPLETE CBC W/AUTO DIFF WBC: CPT

## 2025-06-04 RX ORDER — POTASSIUM CHLORIDE 7.45 MG/ML
10 INJECTION INTRAVENOUS PRN
Status: DISCONTINUED | OUTPATIENT
Start: 2025-06-04 | End: 2025-06-07 | Stop reason: HOSPADM

## 2025-06-04 RX ORDER — LANOLIN ALCOHOL/MO/W.PET/CERES
1000 CREAM (GRAM) TOPICAL DAILY
COMMUNITY

## 2025-06-04 RX ORDER — ACETAMINOPHEN 500 MG
500 TABLET ORAL 4 TIMES DAILY PRN
Status: DISCONTINUED | OUTPATIENT
Start: 2025-06-04 | End: 2025-06-05 | Stop reason: ALTCHOICE

## 2025-06-04 RX ORDER — LANOLIN ALCOHOL/MO/W.PET/CERES
500 CREAM (GRAM) TOPICAL DAILY
Status: DISCONTINUED | OUTPATIENT
Start: 2025-06-04 | End: 2025-06-04

## 2025-06-04 RX ORDER — LORAZEPAM 2 MG/ML
3 INJECTION INTRAMUSCULAR
Status: DISCONTINUED | OUTPATIENT
Start: 2025-06-04 | End: 2025-06-07 | Stop reason: HOSPADM

## 2025-06-04 RX ORDER — THIAMINE HYDROCHLORIDE 100 MG/ML
500 INJECTION, SOLUTION INTRAMUSCULAR; INTRAVENOUS EVERY 8 HOURS
Status: COMPLETED | OUTPATIENT
Start: 2025-06-04 | End: 2025-06-06

## 2025-06-04 RX ORDER — HYDROCHLOROTHIAZIDE 25 MG/1
12.5 TABLET ORAL EVERY MORNING
Status: DISCONTINUED | OUTPATIENT
Start: 2025-06-05 | End: 2025-06-07 | Stop reason: HOSPADM

## 2025-06-04 RX ORDER — ENOXAPARIN SODIUM 100 MG/ML
40 INJECTION SUBCUTANEOUS DAILY
Status: DISCONTINUED | OUTPATIENT
Start: 2025-06-04 | End: 2025-06-07 | Stop reason: HOSPADM

## 2025-06-04 RX ORDER — SODIUM CHLORIDE 9 MG/ML
INJECTION, SOLUTION INTRAVENOUS PRN
Status: DISCONTINUED | OUTPATIENT
Start: 2025-06-04 | End: 2025-06-07 | Stop reason: HOSPADM

## 2025-06-04 RX ORDER — LIDOCAINE 4 G/G
1 PATCH TOPICAL DAILY
Status: DISCONTINUED | OUTPATIENT
Start: 2025-06-04 | End: 2025-06-07 | Stop reason: HOSPADM

## 2025-06-04 RX ORDER — POLYETHYLENE GLYCOL 3350 17 G/17G
17 POWDER, FOR SOLUTION ORAL DAILY PRN
Status: DISCONTINUED | OUTPATIENT
Start: 2025-06-04 | End: 2025-06-07 | Stop reason: HOSPADM

## 2025-06-04 RX ORDER — LANOLIN ALCOHOL/MO/W.PET/CERES
100 CREAM (GRAM) TOPICAL DAILY
Status: DISCONTINUED | OUTPATIENT
Start: 2025-06-04 | End: 2025-06-07 | Stop reason: HOSPADM

## 2025-06-04 RX ORDER — SUMATRIPTAN 50 MG/1
100 TABLET, FILM COATED ORAL DAILY PRN
Status: DISCONTINUED | OUTPATIENT
Start: 2025-06-04 | End: 2025-06-07 | Stop reason: HOSPADM

## 2025-06-04 RX ORDER — CETIRIZINE HYDROCHLORIDE 10 MG/1
10 TABLET ORAL DAILY
Status: DISCONTINUED | OUTPATIENT
Start: 2025-06-04 | End: 2025-06-07 | Stop reason: HOSPADM

## 2025-06-04 RX ORDER — GAUZE BANDAGE 2" X 2"
100 BANDAGE TOPICAL DAILY
Status: DISCONTINUED | OUTPATIENT
Start: 2025-06-04 | End: 2025-06-07 | Stop reason: HOSPADM

## 2025-06-04 RX ORDER — THIAMINE HYDROCHLORIDE 100 MG/ML
250 INJECTION, SOLUTION INTRAMUSCULAR; INTRAVENOUS EVERY 24 HOURS
Status: DISCONTINUED | OUTPATIENT
Start: 2025-06-06 | End: 2025-06-07 | Stop reason: HOSPADM

## 2025-06-04 RX ORDER — ATORVASTATIN CALCIUM 40 MG/1
40 TABLET, FILM COATED ORAL NIGHTLY
Status: DISCONTINUED | OUTPATIENT
Start: 2025-06-04 | End: 2025-06-07 | Stop reason: HOSPADM

## 2025-06-04 RX ORDER — MAGNESIUM SULFATE IN WATER 40 MG/ML
2000 INJECTION, SOLUTION INTRAVENOUS PRN
Status: DISCONTINUED | OUTPATIENT
Start: 2025-06-04 | End: 2025-06-07 | Stop reason: HOSPADM

## 2025-06-04 RX ORDER — GAUZE BANDAGE 2" X 2"
100 BANDAGE TOPICAL DAILY
Status: ON HOLD | COMMUNITY
Start: 2025-03-28 | End: 2025-06-06 | Stop reason: HOSPADM

## 2025-06-04 RX ORDER — POLYETHYLENE GLYCOL 3350 17 G
4 POWDER IN PACKET (EA) ORAL
Status: DISCONTINUED | OUTPATIENT
Start: 2025-06-04 | End: 2025-06-07 | Stop reason: HOSPADM

## 2025-06-04 RX ORDER — ENOXAPARIN SODIUM 100 MG/ML
40 INJECTION SUBCUTANEOUS DAILY
Status: DISCONTINUED | OUTPATIENT
Start: 2025-06-04 | End: 2025-06-04

## 2025-06-04 RX ORDER — LORAZEPAM 1 MG/1
1 TABLET ORAL
Status: DISCONTINUED | OUTPATIENT
Start: 2025-06-04 | End: 2025-06-07 | Stop reason: HOSPADM

## 2025-06-04 RX ORDER — SODIUM CHLORIDE 0.9 % (FLUSH) 0.9 %
5-40 SYRINGE (ML) INJECTION PRN
Status: DISCONTINUED | OUTPATIENT
Start: 2025-06-04 | End: 2025-06-07 | Stop reason: HOSPADM

## 2025-06-04 RX ORDER — LORAZEPAM 2 MG/ML
4 INJECTION INTRAMUSCULAR
Status: DISCONTINUED | OUTPATIENT
Start: 2025-06-04 | End: 2025-06-07 | Stop reason: HOSPADM

## 2025-06-04 RX ORDER — LANOLIN ALCOHOL/MO/W.PET/CERES
1000 CREAM (GRAM) TOPICAL DAILY
Status: DISCONTINUED | OUTPATIENT
Start: 2025-06-04 | End: 2025-06-07 | Stop reason: HOSPADM

## 2025-06-04 RX ORDER — ACETAMINOPHEN 650 MG/1
650 SUPPOSITORY RECTAL EVERY 6 HOURS PRN
Status: DISCONTINUED | OUTPATIENT
Start: 2025-06-04 | End: 2025-06-07 | Stop reason: HOSPADM

## 2025-06-04 RX ORDER — MECLIZINE HCL 12.5 MG 12.5 MG/1
25 TABLET ORAL 2 TIMES DAILY
Status: DISCONTINUED | OUTPATIENT
Start: 2025-06-04 | End: 2025-06-07 | Stop reason: HOSPADM

## 2025-06-04 RX ORDER — RIBOFLAVIN (VITAMIN B2) 100 MG
100 TABLET ORAL DAILY
Status: DISCONTINUED | OUTPATIENT
Start: 2025-06-04 | End: 2025-06-04 | Stop reason: CLARIF

## 2025-06-04 RX ORDER — LORAZEPAM 2 MG/ML
2 INJECTION INTRAMUSCULAR
Status: DISCONTINUED | OUTPATIENT
Start: 2025-06-04 | End: 2025-06-07 | Stop reason: HOSPADM

## 2025-06-04 RX ORDER — LORAZEPAM 2 MG/ML
1 INJECTION INTRAMUSCULAR
Status: DISCONTINUED | OUTPATIENT
Start: 2025-06-04 | End: 2025-06-07 | Stop reason: HOSPADM

## 2025-06-04 RX ORDER — SODIUM CHLORIDE 0.9 % (FLUSH) 0.9 %
5-40 SYRINGE (ML) INJECTION EVERY 12 HOURS SCHEDULED
Status: DISCONTINUED | OUTPATIENT
Start: 2025-06-04 | End: 2025-06-07 | Stop reason: HOSPADM

## 2025-06-04 RX ORDER — NICOTINE 21 MG/24HR
1 PATCH, TRANSDERMAL 24 HOURS TRANSDERMAL DAILY
Status: DISCONTINUED | OUTPATIENT
Start: 2025-06-04 | End: 2025-06-07 | Stop reason: HOSPADM

## 2025-06-04 RX ORDER — LORAZEPAM 1 MG/1
4 TABLET ORAL
Status: DISCONTINUED | OUTPATIENT
Start: 2025-06-04 | End: 2025-06-07 | Stop reason: HOSPADM

## 2025-06-04 RX ORDER — DULOXETIN HYDROCHLORIDE 20 MG/1
20 CAPSULE, DELAYED RELEASE ORAL DAILY
Status: DISCONTINUED | OUTPATIENT
Start: 2025-06-04 | End: 2025-06-07 | Stop reason: HOSPADM

## 2025-06-04 RX ORDER — LANOLIN ALCOHOL/MO/W.PET/CERES
100 CREAM (GRAM) TOPICAL DAILY
Status: DISCONTINUED | OUTPATIENT
Start: 2025-06-11 | End: 2025-06-07 | Stop reason: HOSPADM

## 2025-06-04 RX ORDER — DIVALPROEX SODIUM 250 MG/1
500 TABLET, DELAYED RELEASE ORAL 2 TIMES DAILY
Status: DISCONTINUED | OUTPATIENT
Start: 2025-06-04 | End: 2025-06-07 | Stop reason: HOSPADM

## 2025-06-04 RX ORDER — ACETAMINOPHEN 325 MG/1
650 TABLET ORAL EVERY 6 HOURS PRN
Status: DISCONTINUED | OUTPATIENT
Start: 2025-06-04 | End: 2025-06-07 | Stop reason: HOSPADM

## 2025-06-04 RX ORDER — PROCHLORPERAZINE MALEATE 5 MG/1
5 TABLET ORAL EVERY 6 HOURS PRN
Status: DISCONTINUED | OUTPATIENT
Start: 2025-06-04 | End: 2025-06-07 | Stop reason: HOSPADM

## 2025-06-04 RX ORDER — LANOLIN ALCOHOL/MO/W.PET/CERES
400 CREAM (GRAM) TOPICAL DAILY
Status: DISCONTINUED | OUTPATIENT
Start: 2025-06-04 | End: 2025-06-07 | Stop reason: HOSPADM

## 2025-06-04 RX ORDER — LORAZEPAM 1 MG/1
2 TABLET ORAL
Status: DISCONTINUED | OUTPATIENT
Start: 2025-06-04 | End: 2025-06-07 | Stop reason: HOSPADM

## 2025-06-04 RX ORDER — AMLODIPINE BESYLATE 10 MG/1
10 TABLET ORAL DAILY
Status: DISCONTINUED | OUTPATIENT
Start: 2025-06-04 | End: 2025-06-07 | Stop reason: HOSPADM

## 2025-06-04 RX ORDER — LORAZEPAM 1 MG/1
3 TABLET ORAL
Status: DISCONTINUED | OUTPATIENT
Start: 2025-06-04 | End: 2025-06-07 | Stop reason: HOSPADM

## 2025-06-04 RX ORDER — POTASSIUM CHLORIDE 1500 MG/1
40 TABLET, EXTENDED RELEASE ORAL PRN
Status: DISCONTINUED | OUTPATIENT
Start: 2025-06-04 | End: 2025-06-07 | Stop reason: HOSPADM

## 2025-06-04 RX ORDER — BACLOFEN 10 MG/1
5 TABLET ORAL 3 TIMES DAILY
Status: DISCONTINUED | OUTPATIENT
Start: 2025-06-04 | End: 2025-06-07 | Stop reason: HOSPADM

## 2025-06-04 RX ORDER — FOLIC ACID 1 MG/1
1000 TABLET ORAL DAILY
Status: DISCONTINUED | OUTPATIENT
Start: 2025-06-04 | End: 2025-06-07 | Stop reason: HOSPADM

## 2025-06-04 RX ORDER — 0.9 % SODIUM CHLORIDE 0.9 %
1000 INTRAVENOUS SOLUTION INTRAVENOUS ONCE
Status: COMPLETED | OUTPATIENT
Start: 2025-06-04 | End: 2025-06-05

## 2025-06-04 RX ADMIN — AMLODIPINE BESYLATE 10 MG: 10 TABLET ORAL at 20:26

## 2025-06-04 RX ADMIN — ENOXAPARIN SODIUM 40 MG: 100 INJECTION SUBCUTANEOUS at 20:25

## 2025-06-04 RX ADMIN — DULOXETINE 20 MG: 20 CAPSULE, DELAYED RELEASE ORAL at 22:41

## 2025-06-04 RX ADMIN — SODIUM CHLORIDE 1000 ML: 9 INJECTION, SOLUTION INTRAVENOUS at 22:54

## 2025-06-04 RX ADMIN — ACETAMINOPHEN 650 MG: 325 TABLET ORAL at 20:26

## 2025-06-04 RX ADMIN — MECLIZINE 25 MG: 12.5 TABLET ORAL at 20:25

## 2025-06-04 RX ADMIN — Medication 3 MG: at 20:27

## 2025-06-04 RX ADMIN — FOLIC ACID 1000 MCG: 1 TABLET ORAL at 20:26

## 2025-06-04 RX ADMIN — SODIUM CHLORIDE, PRESERVATIVE FREE 10 ML: 5 INJECTION INTRAVENOUS at 20:32

## 2025-06-04 RX ADMIN — CLOSTRIDIUM TETANI TOXOID ANTIGEN (FORMALDEHYDE INACTIVATED) AND CORYNEBACTERIUM DIPHTHERIAE TOXOID ANTIGEN (FORMALDEHYDE INACTIVATED) 0.5 ML: 5; 2 INJECTION, SUSPENSION INTRAMUSCULAR at 09:47

## 2025-06-04 RX ADMIN — DIVALPROEX SODIUM 500 MG: 500 TABLET, DELAYED RELEASE ORAL at 20:26

## 2025-06-04 RX ADMIN — THIAMINE HYDROCHLORIDE 500 MG: 100 INJECTION, SOLUTION INTRAMUSCULAR; INTRAVENOUS at 20:37

## 2025-06-04 RX ADMIN — CYANOCOBALAMIN TAB 1000 MCG 1000 MCG: 1000 TAB at 22:41

## 2025-06-04 RX ADMIN — ATORVASTATIN CALCIUM 40 MG: 40 TABLET, FILM COATED ORAL at 20:26

## 2025-06-04 RX ADMIN — Medication 400 MG: at 22:41

## 2025-06-04 ASSESSMENT — PAIN SCALES - GENERAL: PAINLEVEL_OUTOF10: 6

## 2025-06-04 NOTE — H&P
SE - Family Medicine Resident Inpatient  History and Physical      CC: Fall (States he was sitting in a lawn chair in his living room drinking alcohol last night and somehow fell through the lawn chair, lac to left eyebrow, unsure of loc, states he went to bed after fall, denies thinner, +right chest tenderness and right eye blurry)  .    HPI: History obtained from patient. Patient is oriented to time, place, person. Kirsty Thurston is a 54 y.o. male with a PMH of Tramautic Brain Injury with ICH in 2024, Chronic Migraines, Alcohol Use Disorder, HTN, HLD, Ataxic Gait, gastric bypass MDD, Suicidal Ideation and Persistent Vertigo who presents to ED after a fall.  Per patient he fell last night at approximately 10:30 PM after drinking 2 beers.  Fell from folding chair which he is using his furniture while reaching for his phone and his head on the ceramic floor.  Unsure if he lost consciousness.  Was able to text his friend to inform her that he fell, crawled from the living room to the bed and fell asleep.  Woke up this morning with bloodstained sheets and called EMS to present to the hospital.  States that after his TBI in 2024 he has been intermittently falling and has had permanent vertigo made worse with left right rotation of his head.  Typically feels like he is spinning.  Recently established with neurology and was supposed to attend vestibular rehab but states that he has not established care yet.  Has a long history of alcohol use disorder.  States that a few years ago he attended rehab for approximately 30 days.  Shortly after leaving rehab began to consume alcohol again.  Longest period that he has been sober is 1 year.  States that he currently drinks a few times a month.  Unsure of quantity but states that alcohol use is due to pain. Does not believe that he has a problem with alcohol. He currently lives alone and is estranged from his mother and children.  Remaining close contacts are Rimma, his ex fiancé

## 2025-06-04 NOTE — ED PROVIDER NOTES
case with family medicine attending who accepts for admission.      CONSULTS: (Who and What was discussed)  IP CONSULT TO FAMILY MEDICINE      I am the Primary Clinician of Record.    FINAL IMPRESSION      1. Frequent falls    2. Fall, initial encounter    3. Closed head injury, initial encounter    4. Acute alcoholic intoxication without complication          DISPOSITION/PLAN     DISPOSITION Decision To Admit 06/04/2025 12:31:38 PM      PATIENT REFERRED TO:  No follow-up provider specified.    DISCHARGE MEDICATIONS:  New Prescriptions    No medications on file       DISCONTINUED MEDICATIONS:  Discontinued Medications    No medications on file              (Please note that portions of this note were completed with a voice recognition program.  Efforts were made to edit the dictations but occasionally words are mis-transcribed.)    Yvette Lanier MD (electronically signed)           ATTENDING PROVIDER ATTESTATION:     Kirsty Thurston presented to the emergency department for evaluation of Fall (States he was sitting in a lawn chair in his living room drinking alcohol last night and somehow fell through the lawn chair, lac to left eyebrow, unsure of loc, states he went to bed after fall, denies thinner, +right chest tenderness and right eye blurry)    I have reviewed and discussed the case, including pertinent history (medical, surgical, family and social) and exam findings with the Resident and the Nurse assigned to Kirsty Thurston.  I have personally performed and/or participated in the history, exam, medical decision making, and procedures and agree with all pertinent clinical information.       I have reviewed my findings and recommendations with Kirsty Thurston and members of family present at the time of disposition.      ED Course as of 06/04/25 1658   Wed Jun 04, 2025   0924 EKG @ 0921:  This EKG is signed and interpreted by Dr Olvin Duarte DO.    Rate: 81  Rhythm: Sinus  Interpretation: Sinus rhythm, normal axis,  management.  Decision regarding hospitalization.          My findings/plan: The primary encounter diagnosis was Frequent falls. Diagnoses of Fall, initial encounter, Closed head injury, initial encounter, Acute alcoholic intoxication without complication, and General weakness were also pertinent to this visit.  New Prescriptions    No medications on file     DO Felicia Ceja Matthew D, DO  06/04/25 8895

## 2025-06-04 NOTE — PROGRESS NOTES
Pharmacy Note    Kirsty Thurston was ordered Riboflavin.  As per the [facility] Formulary Committee Policy, herbals and certain dietary supplements will be discontinued.  The herbal or dietary supplement may be continued after discharge from the hospital.

## 2025-06-05 PROBLEM — R29.6 FREQUENT FALLS: Status: ACTIVE | Noted: 2025-06-05

## 2025-06-05 LAB
25(OH)D3 SERPL-MCNC: 12.1 NG/ML (ref 30–100)
ANION GAP SERPL CALCULATED.3IONS-SCNC: 12 MMOL/L (ref 7–16)
BASOPHILS # BLD: 0.04 K/UL (ref 0–0.2)
BASOPHILS NFR BLD: 1 % (ref 0–2)
BUN SERPL-MCNC: 15 MG/DL (ref 6–20)
CALCIUM SERPL-MCNC: 8.7 MG/DL (ref 8.6–10)
CHLORIDE SERPL-SCNC: 107 MMOL/L (ref 98–107)
CO2 SERPL-SCNC: 22 MMOL/L (ref 22–29)
CREAT SERPL-MCNC: 0.9 MG/DL (ref 0.7–1.2)
EKG ATRIAL RATE: 81 BPM
EKG P AXIS: 32 DEGREES
EKG P-R INTERVAL: 132 MS
EKG Q-T INTERVAL: 384 MS
EKG QRS DURATION: 96 MS
EKG QTC CALCULATION (BAZETT): 446 MS
EKG R AXIS: -10 DEGREES
EKG T AXIS: 39 DEGREES
EKG VENTRICULAR RATE: 81 BPM
EOSINOPHIL # BLD: 0.08 K/UL (ref 0.05–0.5)
EOSINOPHILS RELATIVE PERCENT: 1 % (ref 0–6)
ERYTHROCYTE [DISTWIDTH] IN BLOOD BY AUTOMATED COUNT: 15.8 % (ref 11.5–15)
FERRITIN SERPL-MCNC: 25 NG/ML
FOLATE SERPL-MCNC: 21 NG/ML (ref 4.6–34.8)
GFR, ESTIMATED: >90 ML/MIN/1.73M2
GLUCOSE SERPL-MCNC: 100 MG/DL (ref 74–99)
HCT VFR BLD AUTO: 36 % (ref 37–54)
HGB BLD-MCNC: 11.6 G/DL (ref 12.5–16.5)
IMM GRANULOCYTES # BLD AUTO: <0.03 K/UL (ref 0–0.58)
IMM GRANULOCYTES NFR BLD: 0 % (ref 0–5)
IRON SATN MFR SERPL: 17 % (ref 20–55)
IRON SERPL-MCNC: 67 UG/DL (ref 61–157)
LACTATE BLDV-SCNC: 0.2 MMOL/L (ref 0.5–2.2)
LACTATE BLDV-SCNC: 0.5 MMOL/L (ref 0.5–2.2)
LACTATE BLDV-SCNC: 0.5 MMOL/L (ref 0.5–2.2)
LYMPHOCYTES NFR BLD: 1.54 K/UL (ref 1.5–4)
LYMPHOCYTES RELATIVE PERCENT: 22 % (ref 20–42)
MAGNESIUM SERPL-MCNC: 2.3 MG/DL (ref 1.6–2.6)
MCH RBC QN AUTO: 28.2 PG (ref 26–35)
MCHC RBC AUTO-ENTMCNC: 32.2 G/DL (ref 32–34.5)
MCV RBC AUTO: 87.6 FL (ref 80–99.9)
MONOCYTES NFR BLD: 1.02 K/UL (ref 0.1–0.95)
MONOCYTES NFR BLD: 14 % (ref 2–12)
NEUTROPHILS NFR BLD: 62 % (ref 43–80)
NEUTS SEG NFR BLD: 4.45 K/UL (ref 1.8–7.3)
PHOSPHATE SERPL-MCNC: 3.2 MG/DL (ref 2.5–4.5)
PLATELET # BLD AUTO: 264 K/UL (ref 130–450)
PMV BLD AUTO: 9.8 FL (ref 7–12)
POTASSIUM SERPL-SCNC: 4 MMOL/L (ref 3.5–5.1)
RBC # BLD AUTO: 4.11 M/UL (ref 3.8–5.8)
SODIUM SERPL-SCNC: 141 MMOL/L (ref 136–145)
TIBC SERPL-MCNC: 390 UG/DL (ref 250–450)
VIT B12 SERPL-MCNC: 966 PG/ML (ref 232–1245)
WBC OTHER # BLD: 7.2 K/UL (ref 4.5–11.5)

## 2025-06-05 PROCEDURE — 83540 ASSAY OF IRON: CPT

## 2025-06-05 PROCEDURE — 6370000000 HC RX 637 (ALT 250 FOR IP)

## 2025-06-05 PROCEDURE — 84100 ASSAY OF PHOSPHORUS: CPT

## 2025-06-05 PROCEDURE — 82728 ASSAY OF FERRITIN: CPT

## 2025-06-05 PROCEDURE — 83605 ASSAY OF LACTIC ACID: CPT

## 2025-06-05 PROCEDURE — 85025 COMPLETE CBC W/AUTO DIFF WBC: CPT

## 2025-06-05 PROCEDURE — 99222 1ST HOSP IP/OBS MODERATE 55: CPT | Performed by: FAMILY MEDICINE

## 2025-06-05 PROCEDURE — 93010 ELECTROCARDIOGRAM REPORT: CPT | Performed by: INTERNAL MEDICINE

## 2025-06-05 PROCEDURE — 1200000000 HC SEMI PRIVATE

## 2025-06-05 PROCEDURE — 82607 VITAMIN B-12: CPT

## 2025-06-05 PROCEDURE — 6360000002 HC RX W HCPCS

## 2025-06-05 PROCEDURE — 83550 IRON BINDING TEST: CPT

## 2025-06-05 PROCEDURE — 83735 ASSAY OF MAGNESIUM: CPT

## 2025-06-05 PROCEDURE — 36415 COLL VENOUS BLD VENIPUNCTURE: CPT

## 2025-06-05 PROCEDURE — 2500000003 HC RX 250 WO HCPCS

## 2025-06-05 PROCEDURE — 82746 ASSAY OF FOLIC ACID SERUM: CPT

## 2025-06-05 PROCEDURE — 80048 BASIC METABOLIC PNL TOTAL CA: CPT

## 2025-06-05 PROCEDURE — 82306 VITAMIN D 25 HYDROXY: CPT

## 2025-06-05 RX ORDER — ERGOCALCIFEROL 1.25 MG/1
50000 CAPSULE, LIQUID FILLED ORAL WEEKLY
Status: DISCONTINUED | OUTPATIENT
Start: 2025-06-05 | End: 2025-06-07 | Stop reason: HOSPADM

## 2025-06-05 RX ORDER — LIDOCAINE 4 G/G
4 PATCH TOPICAL DAILY
Status: DISCONTINUED | OUTPATIENT
Start: 2025-06-05 | End: 2025-06-07 | Stop reason: HOSPADM

## 2025-06-05 RX ADMIN — HYDROCHLOROTHIAZIDE 12.5 MG: 25 TABLET ORAL at 09:18

## 2025-06-05 RX ADMIN — SUMATRIPTAN SUCCINATE 100 MG: 50 TABLET ORAL at 22:03

## 2025-06-05 RX ADMIN — Medication 400 MG: at 09:19

## 2025-06-05 RX ADMIN — AMLODIPINE BESYLATE 10 MG: 10 TABLET ORAL at 09:18

## 2025-06-05 RX ADMIN — FOLIC ACID 1000 MCG: 1 TABLET ORAL at 09:19

## 2025-06-05 RX ADMIN — MECLIZINE 25 MG: 12.5 TABLET ORAL at 09:18

## 2025-06-05 RX ADMIN — THIAMINE HYDROCHLORIDE 500 MG: 100 INJECTION, SOLUTION INTRAMUSCULAR; INTRAVENOUS at 03:02

## 2025-06-05 RX ADMIN — ERGOCALCIFEROL 50000 UNITS: 1.25 CAPSULE ORAL at 18:11

## 2025-06-05 RX ADMIN — SODIUM CHLORIDE, PRESERVATIVE FREE 10 ML: 5 INJECTION INTRAVENOUS at 21:56

## 2025-06-05 RX ADMIN — DIVALPROEX SODIUM 500 MG: 500 TABLET, DELAYED RELEASE ORAL at 09:19

## 2025-06-05 RX ADMIN — ACETAMINOPHEN 650 MG: 325 TABLET ORAL at 15:13

## 2025-06-05 RX ADMIN — DIVALPROEX SODIUM 500 MG: 500 TABLET, DELAYED RELEASE ORAL at 21:54

## 2025-06-05 RX ADMIN — ACETAMINOPHEN 650 MG: 325 TABLET ORAL at 04:50

## 2025-06-05 RX ADMIN — ACETAMINOPHEN 650 MG: 325 TABLET ORAL at 21:54

## 2025-06-05 RX ADMIN — THIAMINE HYDROCHLORIDE 500 MG: 100 INJECTION, SOLUTION INTRAMUSCULAR; INTRAVENOUS at 11:30

## 2025-06-05 RX ADMIN — MECLIZINE 25 MG: 12.5 TABLET ORAL at 21:54

## 2025-06-05 RX ADMIN — ENOXAPARIN SODIUM 40 MG: 100 INJECTION SUBCUTANEOUS at 09:19

## 2025-06-05 RX ADMIN — CYANOCOBALAMIN TAB 1000 MCG 1000 MCG: 1000 TAB at 09:18

## 2025-06-05 RX ADMIN — SODIUM CHLORIDE, PRESERVATIVE FREE 10 ML: 5 INJECTION INTRAVENOUS at 09:19

## 2025-06-05 RX ADMIN — THIAMINE HYDROCHLORIDE 500 MG: 100 INJECTION, SOLUTION INTRAMUSCULAR; INTRAVENOUS at 21:54

## 2025-06-05 RX ADMIN — Medication 3 MG: at 21:54

## 2025-06-05 RX ADMIN — ATORVASTATIN CALCIUM 40 MG: 40 TABLET, FILM COATED ORAL at 21:54

## 2025-06-05 RX ADMIN — DULOXETINE 20 MG: 20 CAPSULE, DELAYED RELEASE ORAL at 11:30

## 2025-06-05 ASSESSMENT — PAIN SCALES - GENERAL
PAINLEVEL_OUTOF10: 9
PAINLEVEL_OUTOF10: 8
PAINLEVEL_OUTOF10: 4
PAINLEVEL_OUTOF10: 7
PAINLEVEL_OUTOF10: 5
PAINLEVEL_OUTOF10: 9

## 2025-06-05 ASSESSMENT — PAIN DESCRIPTION - LOCATION
LOCATION: FLANK
LOCATION: HEAD
LOCATION: HEAD

## 2025-06-05 ASSESSMENT — PAIN - FUNCTIONAL ASSESSMENT
PAIN_FUNCTIONAL_ASSESSMENT: ACTIVITIES ARE NOT PREVENTED
PAIN_FUNCTIONAL_ASSESSMENT: NONE - DENIES PAIN
PAIN_FUNCTIONAL_ASSESSMENT: NONE - DENIES PAIN
PAIN_FUNCTIONAL_ASSESSMENT: ACTIVITIES ARE NOT PREVENTED

## 2025-06-05 ASSESSMENT — PAIN DESCRIPTION - DESCRIPTORS
DESCRIPTORS: ACHING;DISCOMFORT;GNAWING
DESCRIPTORS: ACHING

## 2025-06-05 ASSESSMENT — PAIN DESCRIPTION - ORIENTATION: ORIENTATION: RIGHT

## 2025-06-05 NOTE — PLAN OF CARE
Problem: Safety - Adult  Goal: Free from fall injury  Outcome: Progressing     Problem: Skin/Tissue Integrity  Goal: Skin integrity remains intact  Description: 1.  Monitor for areas of redness and/or skin breakdown2.  Assess vascular access sites hourly3.  Every 4-6 hours minimum:  Change oxygen saturation probe site4.  Every 4-6 hours:  If on nasal continuous positive airway pressure, respiratory therapy assess nares and determine need for appliance change or resting period  Outcome: Progressing     Problem: Chronic Conditions and Co-morbidities  Goal: Patient's chronic conditions and co-morbidity symptoms are monitored and maintained or improved  Outcome: Progressing

## 2025-06-06 LAB
ANION GAP SERPL CALCULATED.3IONS-SCNC: 13 MMOL/L (ref 7–16)
BASOPHILS # BLD: 0.06 K/UL (ref 0–0.2)
BASOPHILS NFR BLD: 1 % (ref 0–2)
BUN SERPL-MCNC: 14 MG/DL (ref 6–20)
CALCIUM SERPL-MCNC: 8.8 MG/DL (ref 8.6–10)
CHLORIDE SERPL-SCNC: 104 MMOL/L (ref 98–107)
CO2 SERPL-SCNC: 21 MMOL/L (ref 22–29)
CREAT SERPL-MCNC: 0.9 MG/DL (ref 0.7–1.2)
EOSINOPHIL # BLD: 0.22 K/UL (ref 0.05–0.5)
EOSINOPHILS RELATIVE PERCENT: 3 % (ref 0–6)
ERYTHROCYTE [DISTWIDTH] IN BLOOD BY AUTOMATED COUNT: 15.5 % (ref 11.5–15)
GFR, ESTIMATED: >90 ML/MIN/1.73M2
GLUCOSE SERPL-MCNC: 92 MG/DL (ref 74–99)
HCT VFR BLD AUTO: 34.9 % (ref 37–54)
HGB BLD-MCNC: 11.3 G/DL (ref 12.5–16.5)
IMM GRANULOCYTES # BLD AUTO: 0.04 K/UL (ref 0–0.58)
IMM GRANULOCYTES NFR BLD: 1 % (ref 0–5)
LYMPHOCYTES NFR BLD: 1.78 K/UL (ref 1.5–4)
LYMPHOCYTES RELATIVE PERCENT: 26 % (ref 20–42)
MAGNESIUM SERPL-MCNC: 2.2 MG/DL (ref 1.6–2.6)
MCH RBC QN AUTO: 28 PG (ref 26–35)
MCHC RBC AUTO-ENTMCNC: 32.4 G/DL (ref 32–34.5)
MCV RBC AUTO: 86.6 FL (ref 80–99.9)
MONOCYTES NFR BLD: 0.84 K/UL (ref 0.1–0.95)
MONOCYTES NFR BLD: 12 % (ref 2–12)
NEUTROPHILS NFR BLD: 57 % (ref 43–80)
NEUTS SEG NFR BLD: 3.85 K/UL (ref 1.8–7.3)
PHOSPHATE SERPL-MCNC: 4 MG/DL (ref 2.5–4.5)
PLATELET # BLD AUTO: 253 K/UL (ref 130–450)
PMV BLD AUTO: 9.9 FL (ref 7–12)
POTASSIUM SERPL-SCNC: 3.7 MMOL/L (ref 3.5–5.1)
RBC # BLD AUTO: 4.03 M/UL (ref 3.8–5.8)
SODIUM SERPL-SCNC: 138 MMOL/L (ref 136–145)
WBC OTHER # BLD: 6.8 K/UL (ref 4.5–11.5)

## 2025-06-06 PROCEDURE — 2500000003 HC RX 250 WO HCPCS

## 2025-06-06 PROCEDURE — 1200000000 HC SEMI PRIVATE

## 2025-06-06 PROCEDURE — 80048 BASIC METABOLIC PNL TOTAL CA: CPT

## 2025-06-06 PROCEDURE — 83735 ASSAY OF MAGNESIUM: CPT

## 2025-06-06 PROCEDURE — 97161 PT EVAL LOW COMPLEX 20 MIN: CPT

## 2025-06-06 PROCEDURE — 97165 OT EVAL LOW COMPLEX 30 MIN: CPT

## 2025-06-06 PROCEDURE — 85025 COMPLETE CBC W/AUTO DIFF WBC: CPT

## 2025-06-06 PROCEDURE — 84100 ASSAY OF PHOSPHORUS: CPT

## 2025-06-06 PROCEDURE — 6360000002 HC RX W HCPCS

## 2025-06-06 PROCEDURE — 97530 THERAPEUTIC ACTIVITIES: CPT

## 2025-06-06 PROCEDURE — 99238 HOSP IP/OBS DSCHRG MGMT 30/<: CPT | Performed by: FAMILY MEDICINE

## 2025-06-06 PROCEDURE — 6370000000 HC RX 637 (ALT 250 FOR IP)

## 2025-06-06 PROCEDURE — 36415 COLL VENOUS BLD VENIPUNCTURE: CPT

## 2025-06-06 RX ORDER — BACITRACIN ZINC AND POLYMYXIN B SULFATE 500; 1000 [USP'U]/G; [USP'U]/G
OINTMENT TOPICAL
Qty: 15 G | Refills: 1 | Status: SHIPPED | OUTPATIENT
Start: 2025-06-06 | End: 2025-06-13

## 2025-06-06 RX ORDER — DIVALPROEX SODIUM 500 MG/1
500 TABLET, DELAYED RELEASE ORAL 2 TIMES DAILY
Qty: 90 TABLET | Refills: 3 | Status: SHIPPED | OUTPATIENT
Start: 2025-06-06

## 2025-06-06 RX ORDER — BACITRACIN ZINC AND POLYMYXIN B SULFATE 500; 1000 [USP'U]/G; [USP'U]/G
OINTMENT TOPICAL 2 TIMES DAILY
Status: DISCONTINUED | OUTPATIENT
Start: 2025-06-06 | End: 2025-06-07 | Stop reason: HOSPADM

## 2025-06-06 RX ORDER — DULOXETIN HYDROCHLORIDE 20 MG/1
20 CAPSULE, DELAYED RELEASE ORAL DAILY
Qty: 30 CAPSULE | Refills: 3 | Status: SHIPPED | OUTPATIENT
Start: 2025-06-07

## 2025-06-06 RX ORDER — ERGOCALCIFEROL 1.25 MG/1
50000 CAPSULE ORAL WEEKLY
Qty: 5 CAPSULE | Refills: 0 | Status: SHIPPED | OUTPATIENT
Start: 2025-06-12

## 2025-06-06 RX ORDER — LIDOCAINE 4 G/G
4 PATCH TOPICAL DAILY
Qty: 16 EACH | Refills: 0 | Status: ON HOLD | OUTPATIENT
Start: 2025-06-07 | End: 2025-06-13 | Stop reason: HOSPADM

## 2025-06-06 RX ORDER — FERROUS GLUCONATE 324(38)MG
324 TABLET ORAL
Qty: 30 TABLET | Refills: 3 | Status: SHIPPED | OUTPATIENT
Start: 2025-06-07

## 2025-06-06 RX ORDER — FERROUS GLUCONATE 324(38)MG
324 TABLET ORAL
Status: DISCONTINUED | OUTPATIENT
Start: 2025-06-07 | End: 2025-06-07 | Stop reason: HOSPADM

## 2025-06-06 RX ADMIN — CYANOCOBALAMIN TAB 1000 MCG 1000 MCG: 1000 TAB at 08:23

## 2025-06-06 RX ADMIN — DIVALPROEX SODIUM 500 MG: 500 TABLET, DELAYED RELEASE ORAL at 21:05

## 2025-06-06 RX ADMIN — THIAMINE HYDROCHLORIDE 500 MG: 100 INJECTION, SOLUTION INTRAMUSCULAR; INTRAVENOUS at 12:41

## 2025-06-06 RX ADMIN — HYDROCHLOROTHIAZIDE 12.5 MG: 25 TABLET ORAL at 08:23

## 2025-06-06 RX ADMIN — ATORVASTATIN CALCIUM 40 MG: 40 TABLET, FILM COATED ORAL at 21:05

## 2025-06-06 RX ADMIN — DICLOFENAC SODIUM 4 G: 10 GEL TOPICAL at 12:42

## 2025-06-06 RX ADMIN — MECLIZINE 25 MG: 12.5 TABLET ORAL at 08:23

## 2025-06-06 RX ADMIN — THIAMINE HYDROCHLORIDE 250 MG: 100 INJECTION, SOLUTION INTRAMUSCULAR; INTRAVENOUS at 21:04

## 2025-06-06 RX ADMIN — ENOXAPARIN SODIUM 40 MG: 100 INJECTION SUBCUTANEOUS at 08:23

## 2025-06-06 RX ADMIN — FOLIC ACID 1000 MCG: 1 TABLET ORAL at 08:23

## 2025-06-06 RX ADMIN — DICLOFENAC SODIUM 4 G: 10 GEL TOPICAL at 21:03

## 2025-06-06 RX ADMIN — SODIUM CHLORIDE, PRESERVATIVE FREE 10 ML: 5 INJECTION INTRAVENOUS at 21:05

## 2025-06-06 RX ADMIN — SODIUM CHLORIDE, PRESERVATIVE FREE 10 ML: 5 INJECTION INTRAVENOUS at 08:28

## 2025-06-06 RX ADMIN — PROCHLORPERAZINE MALEATE 5 MG: 5 TABLET ORAL at 08:24

## 2025-06-06 RX ADMIN — MECLIZINE 25 MG: 12.5 TABLET ORAL at 21:05

## 2025-06-06 RX ADMIN — BACITRACIN ZINC AND POLYMYXIN B SULFATE: at 10:29

## 2025-06-06 RX ADMIN — Medication 400 MG: at 08:23

## 2025-06-06 RX ADMIN — THIAMINE HYDROCHLORIDE 500 MG: 100 INJECTION, SOLUTION INTRAMUSCULAR; INTRAVENOUS at 04:50

## 2025-06-06 RX ADMIN — BACITRACIN ZINC AND POLYMYXIN B SULFATE: at 21:04

## 2025-06-06 RX ADMIN — Medication 3 MG: at 21:05

## 2025-06-06 RX ADMIN — DIVALPROEX SODIUM 500 MG: 500 TABLET, DELAYED RELEASE ORAL at 08:23

## 2025-06-06 RX ADMIN — SODIUM CHLORIDE, PRESERVATIVE FREE 10 ML: 5 INJECTION INTRAVENOUS at 04:50

## 2025-06-06 RX ADMIN — ACETAMINOPHEN 650 MG: 325 TABLET ORAL at 08:36

## 2025-06-06 RX ADMIN — DULOXETINE 20 MG: 20 CAPSULE, DELAYED RELEASE ORAL at 08:24

## 2025-06-06 RX ADMIN — AMLODIPINE BESYLATE 10 MG: 10 TABLET ORAL at 08:23

## 2025-06-06 ASSESSMENT — PAIN SCALES - GENERAL
PAINLEVEL_OUTOF10: 5
PAINLEVEL_OUTOF10: 7
PAINLEVEL_OUTOF10: 5

## 2025-06-06 ASSESSMENT — PAIN DESCRIPTION - ORIENTATION: ORIENTATION: RIGHT

## 2025-06-06 ASSESSMENT — PAIN DESCRIPTION - LOCATION
LOCATION: ABDOMEN
LOCATION: GENERALIZED

## 2025-06-06 ASSESSMENT — PAIN DESCRIPTION - DESCRIPTORS: DESCRIPTORS: ACHING;DISCOMFORT;GNAWING

## 2025-06-06 NOTE — PROGRESS NOTES
[facility] - Family Medicine Inpatient   Resident Progress Note    S:  Hospital day: 2   Brief Synopsis: Kirsty Thurston is a 54 y.o. male with a PMH of Tramautic Brain Injury with ICH in 2024, Chronic Migraines, Alcohol Use Disorder, HTN, HLD, Ataxic Gait, gastric bypass MDD, Suicidal Ideation and Persistent Vertigo who presented to ED after a fall.  Fell from a chair onto the concrete floor and was able to get into bed but woke up in blood soaked sheet.  Has a long history of alcohol use disorder.  States that a few years ago he attended rehab for approximately 30 days.  Shortly after leaving rehab began to consume alcohol again.  Longest period that is of been sober has been for 1 year.  States that he currently drinks a few times a month.  Unsure of quantity but states that alcohol use is due to pain. Does not believe that he has a problem with alcohol. He currently lives alone and is estranged from his mother and children.  Remaining close contacts are Rimma, his ex fiancé and friend Kendy. Has had multiple falls after his TBI. Established with Neurology and was recommended for vestibular therapy but has not established care. recently establish with psychiatry at [facility] and had an appointment a week ago where he was started on Cymbalta. He ambulates with a cane. ED course was unremarkable. Ethanol level was 93. CT Cervical spine showed mild anterior spurring involving the C6/C7 level with minimal degenerative changes within C1/C2.  He was admitted for generalized weakness and for placement     Overnight/interim:   Patient seen and evaluated at bedside this AM   No acute overnight events   Continues to endorse right sided rib pain   Denies F/C/NV/CP/SOB/Abd pain       Cont meds:    sodium chloride       Scheduled meds:    lidocaine  4 patch TransDERmal Daily    vitamin D  50,000 Units Oral Weekly    atorvastatin  40 mg Oral Nightly    amLODIPine  10 mg Oral Daily    [Held by provider] cetirizine  10 mg Oral Daily

## 2025-06-06 NOTE — PROGRESS NOTES
Notified pt that he was discharged and he stated he does not feel safe going home tonight and would like to tomorrow morning when he can get someone to go to pharmacy for him. Notified resident, discharge to be discontinued until tomorrow.

## 2025-06-06 NOTE — DISCHARGE INSTRUCTIONS
=====================================  [facility]  DISCHARGE INSTRUCTIONS  =====================================    Take your medications as directed in this summary    Future scheduled appointments are listed below or recommended appointments are mentioned above.    Future Appointments   Date Time Provider Department Center   6/19/2025  2:00 PM [facility]   7/3/2025  8:00 AM SCHEDULE, SE ORTHO APC SE Ortho [facility]   7/3/2025  1:15 PM Radha Petty MD [facility] NEURO Neurology -   8/20/2025  1:40 PM Pierce Andrew MD [facility] DEP       Call [phone] to confirm or schedule your appointment with Dr. Rocha as soon as possible and/or if there are any appointment changes or other issues.    It is important that you follow up with Dr. Rocha for better monitoring of the reason of your hospitalization. Follow up with the specialists that saw you during your stay as well. If you have any questions call your PCP at [phone].    Once discharged from [facility], you can :     Return to work : Yes, you may return to work  Activity : As tolerated  Stairs : As tolerated  Exercise : As tolerated  Lifting : As tolerated   Sexual activity : Yes  Driving : With seat belt on. NO driving on narcotic pain medication if prescribed   Medications : Always take your medications as prescribed  Wound Care: none needed  Diet : You are asked to make an attempt to improve diet and exercise patterns to aid in medical management of your medical condition/problem.     Call Critical access hospital with any further questions. Return to Emergency Department with any worsening of your condition and/or fever greater than 101 degrees, new weakness, shortness of breath or chest pain.

## 2025-06-06 NOTE — PROGRESS NOTES
Positioning to prevent skin breakdown and contractures  [x] Safety and Education Training   [x] Patient/Caregiver Education   [] HEP  [x] Other     PT long term treatment goals are located in above grid    Frequency of treatments: 2-5x/week x 1-2 weeks.    Time in  1345  Time out  1408    Total Treatment Time  8 minutes     Evaluation Time includes thorough review of current medical information, gathering information on past medical history/social history and prior level of function, completion of standardized testing/informal observation of tasks, assessment of data and education on plan of care and goals.    CPT codes:  [x] Low Complexity PT evaluation 06376  [] Moderate Complexity PT evaluation 07241  [] High Complexity PT evaluation 35757  [] PT Re-evaluation 68435  [] Gait training 86850 0 minutes  [] Manual therapy 51182 0 minutes  [x] Therapeutic activities 94243 8 minutes  [] Therapeutic exercises 56006 0 minutes  [] Neuromuscular reeducation 71638 0 minutes     Desiree Acevedo PT, DPT  [id number]

## 2025-06-06 NOTE — CARE COORDINATION
FELISA transition of care.  Patient presented to [facility] ER secondary to fall at home.  Admitted inpatient with generalized weakness.  PT/OT am-pac is 16.  Met with patient at the bedside to discuss discharge planning.  Patient lives alone in an apartment with 7 steps to enter.  PTA is independent.  Has no DME/C history.  Has been to [facility] in the past.  PCP is Dr Andrew and uses [facility].  Patient denies any problem with alcohol.  Reports he drinks alcohol occasionally.  Patient plans to return home at discharge.  Agreeable to home care.  No preference for home care company.   Referrals sent via [facility].  Home Care orders placed.  Patient will need transport home.  His insurance provides transport and be called at [phone].  [facility] has accepted with Start of care on Wednesday 6/11/25.  Pavan Dickinson RN  [phone].     Case Management Assessment  Initial Evaluation    Date/Time of Evaluation: 6/6/2025 2:41 PM  Assessment Completed by: Ailyn Dickinson RN    If patient is discharged prior to next notation, then this note serves as note for discharge by case management.    Patient Name: Kirsty Thurston                   YOB: 1970  Diagnosis: General weakness [R53.1]  Frequent falls [R29.6]  Closed head injury, initial encounter [S09.90XA]  Fall, initial encounter [W19.XXXA]  Acute alcoholic intoxication without complication [F10.920]                   Date / Time: 6/4/2025  8:16 AM    Patient Admission Status: Inpatient   Readmission Risk (Low < 19, Mod (19-27), High > 27): Readmission Risk Score: 16.8    Current PCP: Pierce Andrew MD  PCP verified by ? Yes    Chart Reviewed: Yes      History Provided by: Patient  Patient Orientation: Alert and Oriented    Patient Cognition: Alert    Hospitalization in the last 30 days (Readmission):  No    If yes, Readmission Assessment in  Navigator will be completed.    Advance Directives:      Code Status: Full Code   Patient's Primary  injury, initial encounter [S09.90XA]  Fall, initial encounter [W19.XXXA]  Acute alcoholic intoxication without complication [F10.920]    IF APPLICABLE: The Patient and/or patient representative Kirsty and his family were provided with a choice of provider and agrees with the discharge plan. Freedom of choice list with basic dialogue that supports the patient's individualized plan of care/goals and shares the quality data associated with the providers was provided to: Patient   Patient Representative Name:       The Patient and/or Patient Representative Agree with the Discharge Plan? Yes    Ailyn Dickinson RN  Case Management Department  Ph: [phone] Fax:

## 2025-06-06 NOTE — PLAN OF CARE
Problem: Safety - Adult  Goal: Free from fall injury  Outcome: Progressing     Problem: Skin/Tissue Integrity  Goal: Skin integrity remains intact  Description: 1.  Monitor for areas of redness and/or skin breakdown2.  Assess vascular access sites hourly3.  Every 4-6 hours minimum:  Change oxygen saturation probe site4.  Every 4-6 hours:  If on nasal continuous positive airway pressure, respiratory therapy assess nares and determine need for appliance change or resting period  Outcome: Progressing     Problem: Chronic Conditions and Co-morbidities  Goal: Patient's chronic conditions and co-morbidity symptoms are monitored and maintained or improved  Outcome: Progressing     Problem: Pain  Goal: Verbalizes/displays adequate comfort level or baseline comfort level  Outcome: Progressing

## 2025-06-06 NOTE — PLAN OF CARE
Problem: Safety - Adult  Goal: Free from fall injury  6/6/2025 1038 by Brooklyn Traylor RN  Outcome: Progressing     Problem: Skin/Tissue Integrity  Goal: Skin integrity remains intact  Description: 1.  Monitor for areas of redness and/or skin breakdown2.  Assess vascular access sites hourly3.  Every 4-6 hours minimum:  Change oxygen saturation probe site4.  Every 4-6 hours:  If on nasal continuous positive airway pressure, respiratory therapy assess nares and determine need for appliance change or resting period  6/6/2025 1038 by Brooklyn Traylor RN  Outcome: Progressing     Problem: Chronic Conditions and Co-morbidities  Goal: Patient's chronic conditions and co-morbidity symptoms are monitored and maintained or improved  6/6/2025 1038 by Brooklyn Traylor RN  Outcome: Progressing     Problem: Pain  Goal: Verbalizes/displays adequate comfort level or baseline comfort level  6/6/2025 1038 by Brooklyn Tralyor RN  Outcome: Progressing

## 2025-06-06 NOTE — PROGRESS NOTES
Occupational Therapy  OCCUPATIONAL THERAPY INITIAL EVALUATION    [facility]  [address], [place]      Date:2025                                                Patient Name: Kirsty Thurston  MRN: [id number]  : 1970  Room: [address] [place] [address]    Evaluating OT:Kianna Atkinson OTR/L #[id number]    Referring Provider: Akanksha Hodge MD   Specific Provider Orders/Date: OT eval and treat 25     Diagnosis: General weakness [R53.1]  Frequent falls [R29.6]  Closed head injury, initial encounter [S09.90XA]  Fall, initial encounter [W19.XXXA]  Acute alcoholic intoxication without complication [F10.920]   Pt admitted to hospital on 25 following fall while seated on lawn chair in living room.    CT cervical spine: Minimal multilevel degenerative changes seen within the cervical spine with  no acute abnormality of the cervical spine.     Head CT: Seen chronic changes seen within the brain no acute intracranial abnormality.   X-Ray chest: Underlying chronic changes with no acute cardiopulmonary disease.     Pertinent Medical History:  has a past medical history of Alcohol use, Clavicle fracture, Headache, Hypertension, ICH (intracerebral hemorrhage) (HCC), Kidney stone, Memory disorder, Pseudocholinesterase deficiency, SAH (subarachnoid hemorrhage) (HCC), SDH (subdural hematoma) (HCC), and Syncope and collapse.       Precautions:  Fall Risk, TSM, +alarms    Assessment of current deficits    [x] Functional mobility  [x]ADLs  [x] Strength               []Cognition    [x] Functional transfers   [x] IADLs         [x] Safety Awareness   [x]Endurance    [] Fine Coordination              [x] Balance      [] Vision/perception   []Sensation     []Gross Motor Coordination  [] ROM  [] Delirium                   [] Motor Control     OT PLAN OF CARE   OT POC based on physician orders, patient diagnosis and results of clinical assessment    Frequency/Duration 1-3    Grooming Minimal Assist   Standing position  Modified [place]    UB Dressing Stand by Assist   Modified [place]    LB Dressing Moderate Assist   Supervision    Bathing Moderate Assist  Supervision    Toileting Minimal Assist   Modified [place]    Bed Mobility  Supine to sit: Stand by Assist   Sit to supine: Stand by Assist   Supine to sit: Modified [place]   Sit to supine: Modified [place]    Functional Transfers Sit>Stand: SBA  Stand>Sit: SBA  Stand pivot: Minimal Assist   Modified [place]    Functional Mobility Minimal Assist w/ SPC  Light mobility  Modified [place]    Balance Sitting:     Static: Supervision    Dynamic: SBA  Standing: Min A w/ SPC     Activity Tolerance Fair  Good   Visual/  Perceptual Glasses: yes however not present                  Hand Dominance R   AROM (PROM) Strength Additional Info:    RUE  WFL Distal: 4-/5  Proximal: deferred d/t R side pain good  and wfl FMC/dexterity noted during ADL tasks       LUE WFL 4-/5 good  and wfl FMC/dexterity noted during ADL tasks       Hearing: WFL   Sensation:  No c/o numbness or tingling   Tone: WFL   Edema: none noted  Laceration R forehard    Comments: Upon arrival patient lying in bed.  Therapist educated pt on role of OT. RN clearance. At end of session, patient semi-supine in bed (per pt request. Bed alarm on, TSM) with call light and phone within reach, all lines and tubes intact.  Overall patient demonstrated decreased independence and safety during completion of ADL/functional transfer/mobility tasks.  Pt would benefit from continued skilled OT to increase safety and independence with completion of ADL/IADL tasks for functional independence and quality of life.    Treatment: OT treatment provided this date includes: Facilitation of bed mobility (education/cues for body mechanics), unsupported sitting balance (addressing posture, weight shifting, dynamic reaching to prep for ADL's), functional transfers

## 2025-06-07 VITALS
DIASTOLIC BLOOD PRESSURE: 71 MMHG | TEMPERATURE: 98 F | RESPIRATION RATE: 18 BRPM | WEIGHT: 190 LBS | HEIGHT: 65 IN | SYSTOLIC BLOOD PRESSURE: 133 MMHG | HEART RATE: 88 BPM | OXYGEN SATURATION: 94 % | BODY MASS INDEX: 31.65 KG/M2

## 2025-06-07 LAB
ANION GAP SERPL CALCULATED.3IONS-SCNC: 11 MMOL/L (ref 7–16)
BASOPHILS # BLD: 0.04 K/UL (ref 0–0.2)
BASOPHILS NFR BLD: 1 % (ref 0–2)
BUN SERPL-MCNC: 18 MG/DL (ref 6–20)
CALCIUM SERPL-MCNC: 8.7 MG/DL (ref 8.6–10)
CHLORIDE SERPL-SCNC: 105 MMOL/L (ref 98–107)
CO2 SERPL-SCNC: 22 MMOL/L (ref 22–29)
CREAT SERPL-MCNC: 0.9 MG/DL (ref 0.7–1.2)
EOSINOPHIL # BLD: 0.23 K/UL (ref 0.05–0.5)
EOSINOPHILS RELATIVE PERCENT: 3 % (ref 0–6)
ERYTHROCYTE [DISTWIDTH] IN BLOOD BY AUTOMATED COUNT: 15.7 % (ref 11.5–15)
GFR, ESTIMATED: >90 ML/MIN/1.73M2
GLUCOSE SERPL-MCNC: 98 MG/DL (ref 74–99)
HCT VFR BLD AUTO: 35.5 % (ref 37–54)
HGB BLD-MCNC: 11.2 G/DL (ref 12.5–16.5)
IMM GRANULOCYTES # BLD AUTO: 0.04 K/UL (ref 0–0.58)
IMM GRANULOCYTES NFR BLD: 1 % (ref 0–5)
LYMPHOCYTES NFR BLD: 1.89 K/UL (ref 1.5–4)
LYMPHOCYTES RELATIVE PERCENT: 27 % (ref 20–42)
MAGNESIUM SERPL-MCNC: 2.4 MG/DL (ref 1.6–2.6)
MCH RBC QN AUTO: 27.5 PG (ref 26–35)
MCHC RBC AUTO-ENTMCNC: 31.5 G/DL (ref 32–34.5)
MCV RBC AUTO: 87 FL (ref 80–99.9)
MONOCYTES NFR BLD: 0.74 K/UL (ref 0.1–0.95)
MONOCYTES NFR BLD: 11 % (ref 2–12)
NEUTROPHILS NFR BLD: 58 % (ref 43–80)
NEUTS SEG NFR BLD: 4.11 K/UL (ref 1.8–7.3)
PHOSPHATE SERPL-MCNC: 4.6 MG/DL (ref 2.5–4.5)
PLATELET # BLD AUTO: 242 K/UL (ref 130–450)
PMV BLD AUTO: 10.2 FL (ref 7–12)
POTASSIUM SERPL-SCNC: 3.6 MMOL/L (ref 3.5–5.1)
RBC # BLD AUTO: 4.08 M/UL (ref 3.8–5.8)
SODIUM SERPL-SCNC: 139 MMOL/L (ref 136–145)
WBC OTHER # BLD: 7.1 K/UL (ref 4.5–11.5)

## 2025-06-07 PROCEDURE — 80048 BASIC METABOLIC PNL TOTAL CA: CPT

## 2025-06-07 PROCEDURE — 85025 COMPLETE CBC W/AUTO DIFF WBC: CPT

## 2025-06-07 PROCEDURE — 84100 ASSAY OF PHOSPHORUS: CPT

## 2025-06-07 PROCEDURE — 36415 COLL VENOUS BLD VENIPUNCTURE: CPT

## 2025-06-07 PROCEDURE — 6370000000 HC RX 637 (ALT 250 FOR IP)

## 2025-06-07 PROCEDURE — 99239 HOSP IP/OBS DSCHRG MGMT >30: CPT | Performed by: FAMILY MEDICINE

## 2025-06-07 PROCEDURE — 2500000003 HC RX 250 WO HCPCS

## 2025-06-07 PROCEDURE — 83735 ASSAY OF MAGNESIUM: CPT

## 2025-06-07 RX ADMIN — AMLODIPINE BESYLATE 10 MG: 10 TABLET ORAL at 08:13

## 2025-06-07 RX ADMIN — HYDROCHLOROTHIAZIDE 12.5 MG: 25 TABLET ORAL at 08:13

## 2025-06-07 RX ADMIN — CYANOCOBALAMIN TAB 1000 MCG 1000 MCG: 1000 TAB at 08:13

## 2025-06-07 RX ADMIN — DIVALPROEX SODIUM 500 MG: 500 TABLET, DELAYED RELEASE ORAL at 08:12

## 2025-06-07 RX ADMIN — FOLIC ACID 1000 MCG: 1 TABLET ORAL at 08:13

## 2025-06-07 RX ADMIN — SODIUM CHLORIDE, PRESERVATIVE FREE 10 ML: 5 INJECTION INTRAVENOUS at 08:15

## 2025-06-07 RX ADMIN — DULOXETINE 20 MG: 20 CAPSULE, DELAYED RELEASE ORAL at 08:14

## 2025-06-07 RX ADMIN — BACITRACIN ZINC AND POLYMYXIN B SULFATE 28.3 G: at 08:21

## 2025-06-07 RX ADMIN — Medication 400 MG: at 08:13

## 2025-06-07 RX ADMIN — FERROUS GLUCONATE 324 MG: 324 TABLET ORAL at 08:14

## 2025-06-07 RX ADMIN — MECLIZINE 25 MG: 12.5 TABLET ORAL at 08:12

## 2025-06-07 NOTE — PROGRESS NOTES
Pt discharged from [facility]. Dc instructions reviewed w/Pt. Pt verbalized understanding. Pt leaving unit via wheelchair w/ all belongings. Taxi set up by  (voucher provided to Pt). Pt assisted into cab.

## 2025-06-07 NOTE — CARE COORDINATION
Care coordination:  discharge order noted.  [facility] transport for discharge not available .  Spoke to patient.  Verified he has no local support for transport.  He has no cash for taxi.  Voucher provided to facilitate discharge today.

## 2025-06-07 NOTE — PLAN OF CARE
Problem: Safety - Adult  Goal: Free from fall injury  Outcome: Completed     Problem: Skin/Tissue Integrity  Goal: Skin integrity remains intact  Description: 1.  Monitor for areas of redness and/or skin breakdown2.  Assess vascular access sites hourly3.  Every 4-6 hours minimum:  Change oxygen saturation probe site4.  Every 4-6 hours:  If on nasal continuous positive airway pressure, respiratory therapy assess nares and determine need for appliance change or resting period  Outcome: Completed     Problem: Chronic Conditions and Co-morbidities  Goal: Patient's chronic conditions and co-morbidity symptoms are monitored and maintained or improved  Outcome: Completed     Problem: Pain  Goal: Verbalizes/displays adequate comfort level or baseline comfort level  Outcome: Completed

## 2025-06-07 NOTE — PROGRESS NOTES
Attending Physician Attestation Statement  I have reviewed the chart and have seen the patient with the resident(s). I personally reviewed images, EKG's, and similar tests, if present. I personally reviewed and performed key elements and critical components of the history and exam on this date.  I have reviewed and confirmed the Family Medicine admitting team resident history and exam.  I agree with the assessment, plan, and orders as documented by the  admitting team resident(s) Dr. Mouna Rocha and Dr. Kianna Vargas, with the following additions or changes:      LANNY. Did not want to leave last night 2/2 avoiding driving at night.     Life Alert per PCP.                                                                                                       Isac Kendrick MD  6/7/2025

## 2025-06-07 NOTE — PLAN OF CARE
Problem: Safety - Adult  Goal: Free from fall injury  6/6/2025 2254 by Tana Escalante RN  Outcome: Progressing  6/6/2025 1038 by Brooklyn Traylor RN  Outcome: Progressing     Problem: Skin/Tissue Integrity  Goal: Skin integrity remains intact  Description: 1.  Monitor for areas of redness and/or skin breakdown2.  Assess vascular access sites hourly3.  Every 4-6 hours minimum:  Change oxygen saturation probe site4.  Every 4-6 hours:  If on nasal continuous positive airway pressure, respiratory therapy assess nares and determine need for appliance change or resting period  6/6/2025 2254 by Tana Escalante RN  Outcome: Progressing  6/6/2025 1038 by Brooklyn Traylor RN  Outcome: Progressing  Flowsheets (Taken 6/6/2025 1038)  Skin Integrity Remains Intact: Monitor for areas of redness and/or skin breakdown     Problem: Chronic Conditions and Co-morbidities  Goal: Patient's chronic conditions and co-morbidity symptoms are monitored and maintained or improved  6/6/2025 2254 by Tana Escalante RN  Outcome: Progressing  6/6/2025 1038 by Brooklyn Traylor RN  Outcome: Progressing     Problem: Pain  Goal: Verbalizes/displays adequate comfort level or baseline comfort level  6/6/2025 1038 by Brooklyn Traylor RN  Outcome: Progressing

## 2025-06-09 ENCOUNTER — TELEPHONE (OUTPATIENT)
Dept: FAMILY MEDICINE CLINIC | Age: 55
End: 2025-06-09

## 2025-06-09 LAB — PYRIDOXAL PHOS SERPL-SCNC: 41.8 NMOL/L (ref 20–125)

## 2025-06-09 NOTE — TELEPHONE ENCOUNTER
Care Transitions Initial Follow Up Call    Outreach made within 2 business days of discharge: Yes    Patient: Kirsty Thurston Patient : 1970   MRN: [id number]  Reason for Admission: Weakness  Discharge Date: 25       Spoke with: message left    Discharge department/facility: [facility]        Additional needs identified to be addressed with provider  Unable to reach, message left requesting return call and remiinding of appt on 2025             Scheduled appointment with PCP within 7-14 days    Follow Up  Future Appointments   Date Time Provider Department Center   2025  2:00 PM Mouna Rocha Chi, MD Fam [place] [facility] ECC DEP   7/3/2025  8:00 AM SCHEDULE, SE ORTHO APC SE Ortho [facility]   7/3/2025  1:15 PM Radha Petty MD [facility] NEURO Neurology -   2025  1:40 PM Pierce Andrew MD Fam [place] [facility] ECC DEP       Bello Griffin RN

## 2025-06-10 ENCOUNTER — APPOINTMENT (OUTPATIENT)
Dept: GENERAL RADIOLOGY | Age: 55
End: 2025-06-10
Payer: COMMERCIAL

## 2025-06-10 ENCOUNTER — HOSPITAL ENCOUNTER (INPATIENT)
Age: 55
LOS: 3 days | Discharge: NURSING, REHAB OR LONG-TERM CARE | End: 2025-06-13
Attending: STUDENT IN AN ORGANIZED HEALTH CARE EDUCATION/TRAINING PROGRAM | Admitting: SURGERY
Payer: COMMERCIAL

## 2025-06-10 ENCOUNTER — APPOINTMENT (OUTPATIENT)
Dept: CT IMAGING | Age: 55
End: 2025-06-10
Payer: COMMERCIAL

## 2025-06-10 DIAGNOSIS — S09.90XA CLOSED HEAD INJURY, INITIAL ENCOUNTER: ICD-10-CM

## 2025-06-10 DIAGNOSIS — F10.920 ACUTE ALCOHOLIC INTOXICATION WITHOUT COMPLICATION: ICD-10-CM

## 2025-06-10 DIAGNOSIS — S22.49XA MULTIPLE RIB FRACTURES INVOLVING FOUR OR MORE RIBS: ICD-10-CM

## 2025-06-10 DIAGNOSIS — R29.6 FREQUENT FALLS: ICD-10-CM

## 2025-06-10 DIAGNOSIS — M79.10 MYALGIA: ICD-10-CM

## 2025-06-10 DIAGNOSIS — S22.42XA CLOSED FRACTURE OF MULTIPLE RIBS OF LEFT SIDE, INITIAL ENCOUNTER: Primary | ICD-10-CM

## 2025-06-10 DIAGNOSIS — F10.10 ALCOHOL ABUSE: ICD-10-CM

## 2025-06-10 DIAGNOSIS — S32.020A CLOSED COMPRESSION FRACTURE OF L2 LUMBAR VERTEBRA, INITIAL ENCOUNTER (HCC): ICD-10-CM

## 2025-06-10 PROBLEM — S32.001A LUMBAR BURST FRACTURE, CLOSED, INITIAL ENCOUNTER (HCC): Status: ACTIVE | Noted: 2025-06-10

## 2025-06-10 LAB
ALBUMIN SERPL-MCNC: 4.1 G/DL (ref 3.5–5.2)
ALP SERPL-CCNC: 82 U/L (ref 40–129)
ALT SERPL-CCNC: 13 U/L (ref 0–50)
AMPHET UR QL SCN: NEGATIVE
ANION GAP SERPL CALCULATED.3IONS-SCNC: 19 MMOL/L (ref 7–16)
APAP SERPL-MCNC: <5 UG/ML (ref 10–30)
AST SERPL-CCNC: 20 U/L (ref 0–50)
BACTERIA URNS QL MICRO: ABNORMAL
BARBITURATES UR QL SCN: NEGATIVE
BASOPHILS # BLD: 0.03 K/UL (ref 0–0.2)
BASOPHILS NFR BLD: 0 % (ref 0–2)
BENZODIAZ UR QL: NEGATIVE
BILIRUB SERPL-MCNC: 0.4 MG/DL (ref 0–1.2)
BILIRUB UR QL STRIP: NEGATIVE
BUN SERPL-MCNC: 10 MG/DL (ref 6–20)
BUPRENORPHINE UR QL: NEGATIVE
CALCIUM SERPL-MCNC: 8.4 MG/DL (ref 8.6–10)
CANNABINOIDS UR QL SCN: NEGATIVE
CHLORIDE SERPL-SCNC: 103 MMOL/L (ref 98–107)
CLARITY UR: CLEAR
CO2 SERPL-SCNC: 18 MMOL/L (ref 22–29)
COCAINE UR QL SCN: NEGATIVE
COLOR UR: YELLOW
CREAT SERPL-MCNC: 0.8 MG/DL (ref 0.7–1.2)
EOSINOPHIL # BLD: 0.01 K/UL (ref 0.05–0.5)
EOSINOPHILS RELATIVE PERCENT: 0 % (ref 0–6)
EPI CELLS #/AREA URNS HPF: ABNORMAL /HPF
ERYTHROCYTE [DISTWIDTH] IN BLOOD BY AUTOMATED COUNT: 16 % (ref 11.5–15)
ETHANOLAMINE SERPL-MCNC: 232 MG/DL (ref 0–0.08)
FENTANYL UR QL: NEGATIVE
GFR, ESTIMATED: >90 ML/MIN/1.73M2
GLUCOSE SERPL-MCNC: 109 MG/DL (ref 74–99)
GLUCOSE UR STRIP-MCNC: 250 MG/DL
HCT VFR BLD AUTO: 35.2 % (ref 37–54)
HGB BLD-MCNC: 11.2 G/DL (ref 12.5–16.5)
HGB UR QL STRIP.AUTO: NEGATIVE
IMM GRANULOCYTES # BLD AUTO: 0.08 K/UL (ref 0–0.58)
IMM GRANULOCYTES NFR BLD: 1 % (ref 0–5)
KETONES UR STRIP-MCNC: NEGATIVE MG/DL
LEUKOCYTE ESTERASE UR QL STRIP: ABNORMAL
LYMPHOCYTES NFR BLD: 1.4 K/UL (ref 1.5–4)
LYMPHOCYTES RELATIVE PERCENT: 13 % (ref 20–42)
MCH RBC QN AUTO: 27.8 PG (ref 26–35)
MCHC RBC AUTO-ENTMCNC: 31.8 G/DL (ref 32–34.5)
MCV RBC AUTO: 87.3 FL (ref 80–99.9)
METHADONE UR QL: NEGATIVE
MONOCYTES NFR BLD: 1.59 K/UL (ref 0.1–0.95)
MONOCYTES NFR BLD: 15 % (ref 2–12)
NEUTROPHILS NFR BLD: 72 % (ref 43–80)
NEUTS SEG NFR BLD: 7.8 K/UL (ref 1.8–7.3)
NITRITE UR QL STRIP: NEGATIVE
OPIATES UR QL SCN: NEGATIVE
OXYCODONE UR QL SCN: NEGATIVE
PCP UR QL SCN: NEGATIVE
PH UR STRIP: 6 [PH] (ref 5–8)
PLATELET # BLD AUTO: 252 K/UL (ref 130–450)
PMV BLD AUTO: 9.9 FL (ref 7–12)
POTASSIUM SERPL-SCNC: 4 MMOL/L (ref 3.5–5.1)
PROT SERPL-MCNC: 6.8 G/DL (ref 6.4–8.3)
PROT UR STRIP-MCNC: NEGATIVE MG/DL
RBC # BLD AUTO: 4.03 M/UL (ref 3.8–5.8)
RBC # BLD: ABNORMAL 10*6/UL
RBC #/AREA URNS HPF: ABNORMAL /HPF
SALICYLATES SERPL-MCNC: <0.5 MG/DL (ref 0–30)
SODIUM SERPL-SCNC: 139 MMOL/L (ref 136–145)
SP GR UR STRIP: 1.01 (ref 1–1.03)
TEST INFORMATION: NORMAL
TOXIC TRICYCLIC SC,BLOOD: NEGATIVE
TROPONIN I SERPL HS-MCNC: 22 NG/L (ref 0–22)
TROPONIN I SERPL HS-MCNC: 25 NG/L (ref 0–22)
UROBILINOGEN UR STRIP-ACNC: 0.2 EU/DL (ref 0–1)
WBC #/AREA URNS HPF: ABNORMAL /HPF
WBC OTHER # BLD: 10.9 K/UL (ref 4.5–11.5)

## 2025-06-10 PROCEDURE — 99222 1ST HOSP IP/OBS MODERATE 55: CPT | Performed by: NEUROLOGICAL SURGERY

## 2025-06-10 PROCEDURE — 6360000002 HC RX W HCPCS: Performed by: SURGERY

## 2025-06-10 PROCEDURE — 72128 CT CHEST SPINE W/O DYE: CPT

## 2025-06-10 PROCEDURE — 73562 X-RAY EXAM OF KNEE 3: CPT

## 2025-06-10 PROCEDURE — 80307 DRUG TEST PRSMV CHEM ANLYZR: CPT

## 2025-06-10 PROCEDURE — 2580000003 HC RX 258: Performed by: NURSE PRACTITIONER

## 2025-06-10 PROCEDURE — 73070 X-RAY EXAM OF ELBOW: CPT

## 2025-06-10 PROCEDURE — 85025 COMPLETE CBC W/AUTO DIFF WBC: CPT

## 2025-06-10 PROCEDURE — 6370000000 HC RX 637 (ALT 250 FOR IP)

## 2025-06-10 PROCEDURE — 71045 X-RAY EXAM CHEST 1 VIEW: CPT

## 2025-06-10 PROCEDURE — G0480 DRUG TEST DEF 1-7 CLASSES: HCPCS

## 2025-06-10 PROCEDURE — 2700000000 HC OXYGEN THERAPY PER DAY

## 2025-06-10 PROCEDURE — 72125 CT NECK SPINE W/O DYE: CPT

## 2025-06-10 PROCEDURE — 74177 CT ABD & PELVIS W/CONTRAST: CPT

## 2025-06-10 PROCEDURE — 93005 ELECTROCARDIOGRAM TRACING: CPT | Performed by: NURSE PRACTITIONER

## 2025-06-10 PROCEDURE — 2500000003 HC RX 250 WO HCPCS

## 2025-06-10 PROCEDURE — 99285 EMERGENCY DEPT VISIT HI MDM: CPT

## 2025-06-10 PROCEDURE — 84484 ASSAY OF TROPONIN QUANT: CPT

## 2025-06-10 PROCEDURE — 6360000004 HC RX CONTRAST MEDICATION: Performed by: RADIOLOGY

## 2025-06-10 PROCEDURE — 70450 CT HEAD/BRAIN W/O DYE: CPT

## 2025-06-10 PROCEDURE — 81001 URINALYSIS AUTO W/SCOPE: CPT

## 2025-06-10 PROCEDURE — 72131 CT LUMBAR SPINE W/O DYE: CPT

## 2025-06-10 PROCEDURE — 80179 DRUG ASSAY SALICYLATE: CPT

## 2025-06-10 PROCEDURE — 99223 1ST HOSP IP/OBS HIGH 75: CPT | Performed by: SURGERY

## 2025-06-10 PROCEDURE — 2060000000 HC ICU INTERMEDIATE R&B

## 2025-06-10 PROCEDURE — 80053 COMPREHEN METABOLIC PANEL: CPT

## 2025-06-10 PROCEDURE — 80143 DRUG ASSAY ACETAMINOPHEN: CPT

## 2025-06-10 PROCEDURE — 71275 CT ANGIOGRAPHY CHEST: CPT

## 2025-06-10 RX ORDER — ENOXAPARIN SODIUM 100 MG/ML
30 INJECTION SUBCUTANEOUS 2 TIMES DAILY
Status: DISCONTINUED | OUTPATIENT
Start: 2025-06-10 | End: 2025-06-13 | Stop reason: HOSPADM

## 2025-06-10 RX ORDER — DIVALPROEX SODIUM 500 MG/1
500 TABLET, DELAYED RELEASE ORAL 2 TIMES DAILY
Status: DISCONTINUED | OUTPATIENT
Start: 2025-06-10 | End: 2025-06-13 | Stop reason: HOSPADM

## 2025-06-10 RX ORDER — LORAZEPAM 2 MG/ML
2 INJECTION INTRAMUSCULAR
Status: DISCONTINUED | OUTPATIENT
Start: 2025-06-10 | End: 2025-06-10

## 2025-06-10 RX ORDER — FOLIC ACID 1 MG/1
1000 TABLET ORAL DAILY
Status: DISCONTINUED | OUTPATIENT
Start: 2025-06-10 | End: 2025-06-13 | Stop reason: HOSPADM

## 2025-06-10 RX ORDER — PHENOBARBITAL 32.4 MG/1
64.8 TABLET ORAL EVERY 6 HOURS
Status: DISCONTINUED | OUTPATIENT
Start: 2025-06-10 | End: 2025-06-13

## 2025-06-10 RX ORDER — LORAZEPAM 2 MG/ML
4 INJECTION INTRAMUSCULAR
Status: DISCONTINUED | OUTPATIENT
Start: 2025-06-10 | End: 2025-06-10

## 2025-06-10 RX ORDER — LORAZEPAM 2 MG/ML
3 INJECTION INTRAMUSCULAR
Status: DISCONTINUED | OUTPATIENT
Start: 2025-06-10 | End: 2025-06-10

## 2025-06-10 RX ORDER — SODIUM CHLORIDE 0.9 % (FLUSH) 0.9 %
5-40 SYRINGE (ML) INJECTION EVERY 12 HOURS SCHEDULED
Status: DISCONTINUED | OUTPATIENT
Start: 2025-06-10 | End: 2025-06-13 | Stop reason: HOSPADM

## 2025-06-10 RX ORDER — IOPAMIDOL 755 MG/ML
75 INJECTION, SOLUTION INTRAVASCULAR
Status: COMPLETED | OUTPATIENT
Start: 2025-06-10 | End: 2025-06-10

## 2025-06-10 RX ORDER — LANOLIN ALCOHOL/MO/W.PET/CERES
100 CREAM (GRAM) TOPICAL DAILY
Status: DISCONTINUED | OUTPATIENT
Start: 2025-06-10 | End: 2025-06-13 | Stop reason: HOSPADM

## 2025-06-10 RX ORDER — LORAZEPAM 2 MG/ML
1 INJECTION INTRAMUSCULAR
Status: DISCONTINUED | OUTPATIENT
Start: 2025-06-10 | End: 2025-06-10

## 2025-06-10 RX ORDER — DULOXETIN HYDROCHLORIDE 20 MG/1
20 CAPSULE, DELAYED RELEASE ORAL DAILY
Status: DISCONTINUED | OUTPATIENT
Start: 2025-06-10 | End: 2025-06-13 | Stop reason: HOSPADM

## 2025-06-10 RX ORDER — LORAZEPAM 1 MG/1
2 TABLET ORAL
Status: DISCONTINUED | OUTPATIENT
Start: 2025-06-10 | End: 2025-06-10

## 2025-06-10 RX ORDER — ONDANSETRON 2 MG/ML
4 INJECTION INTRAMUSCULAR; INTRAVENOUS EVERY 6 HOURS PRN
Status: DISCONTINUED | OUTPATIENT
Start: 2025-06-10 | End: 2025-06-13 | Stop reason: HOSPADM

## 2025-06-10 RX ORDER — OXYCODONE HYDROCHLORIDE 10 MG/1
10 TABLET ORAL EVERY 4 HOURS PRN
Status: DISCONTINUED | OUTPATIENT
Start: 2025-06-10 | End: 2025-06-13 | Stop reason: HOSPADM

## 2025-06-10 RX ORDER — LANOLIN ALCOHOL/MO/W.PET/CERES
100 CREAM (GRAM) TOPICAL DAILY
Status: DISCONTINUED | OUTPATIENT
Start: 2025-06-10 | End: 2025-06-10

## 2025-06-10 RX ORDER — SODIUM CHLORIDE 0.9 % (FLUSH) 0.9 %
10 SYRINGE (ML) INJECTION PRN
Status: DISCONTINUED | OUTPATIENT
Start: 2025-06-10 | End: 2025-06-13 | Stop reason: HOSPADM

## 2025-06-10 RX ORDER — SODIUM CHLORIDE 9 MG/ML
INJECTION, SOLUTION INTRAVENOUS PRN
Status: DISCONTINUED | OUTPATIENT
Start: 2025-06-10 | End: 2025-06-13 | Stop reason: HOSPADM

## 2025-06-10 RX ORDER — 0.9 % SODIUM CHLORIDE 0.9 %
1000 INTRAVENOUS SOLUTION INTRAVENOUS ONCE
Status: COMPLETED | OUTPATIENT
Start: 2025-06-10 | End: 2025-06-10

## 2025-06-10 RX ORDER — SODIUM CHLORIDE 0.9 % (FLUSH) 0.9 %
10 SYRINGE (ML) INJECTION EVERY 12 HOURS SCHEDULED
Status: DISCONTINUED | OUTPATIENT
Start: 2025-06-10 | End: 2025-06-13 | Stop reason: HOSPADM

## 2025-06-10 RX ORDER — ENOXAPARIN SODIUM 100 MG/ML
INJECTION SUBCUTANEOUS
Status: DISPENSED
Start: 2025-06-10 | End: 2025-06-11

## 2025-06-10 RX ORDER — ACETAMINOPHEN 325 MG/1
650 TABLET ORAL EVERY 6 HOURS
Status: DISCONTINUED | OUTPATIENT
Start: 2025-06-10 | End: 2025-06-13 | Stop reason: HOSPADM

## 2025-06-10 RX ORDER — AMLODIPINE BESYLATE 10 MG/1
10 TABLET ORAL DAILY
Status: DISCONTINUED | OUTPATIENT
Start: 2025-06-10 | End: 2025-06-13 | Stop reason: HOSPADM

## 2025-06-10 RX ORDER — NICOTINE 21 MG/24HR
1 PATCH, TRANSDERMAL 24 HOURS TRANSDERMAL DAILY
Status: DISCONTINUED | OUTPATIENT
Start: 2025-06-10 | End: 2025-06-13 | Stop reason: HOSPADM

## 2025-06-10 RX ORDER — POLYETHYLENE GLYCOL 3350 17 G/17G
17 POWDER, FOR SOLUTION ORAL DAILY
Status: DISCONTINUED | OUTPATIENT
Start: 2025-06-10 | End: 2025-06-13 | Stop reason: HOSPADM

## 2025-06-10 RX ORDER — SODIUM CHLORIDE 0.9 % (FLUSH) 0.9 %
5-40 SYRINGE (ML) INJECTION PRN
Status: DISCONTINUED | OUTPATIENT
Start: 2025-06-10 | End: 2025-06-13 | Stop reason: HOSPADM

## 2025-06-10 RX ORDER — SENNA AND DOCUSATE SODIUM 50; 8.6 MG/1; MG/1
1 TABLET, FILM COATED ORAL 2 TIMES DAILY
Status: DISCONTINUED | OUTPATIENT
Start: 2025-06-10 | End: 2025-06-13

## 2025-06-10 RX ORDER — LORAZEPAM 1 MG/1
1 TABLET ORAL
Status: DISCONTINUED | OUTPATIENT
Start: 2025-06-10 | End: 2025-06-10

## 2025-06-10 RX ORDER — LORAZEPAM 1 MG/1
3 TABLET ORAL
Status: DISCONTINUED | OUTPATIENT
Start: 2025-06-10 | End: 2025-06-10

## 2025-06-10 RX ORDER — ONDANSETRON 4 MG/1
4 TABLET, ORALLY DISINTEGRATING ORAL EVERY 8 HOURS PRN
Status: DISCONTINUED | OUTPATIENT
Start: 2025-06-10 | End: 2025-06-13 | Stop reason: HOSPADM

## 2025-06-10 RX ORDER — OXYCODONE HYDROCHLORIDE 5 MG/1
5 TABLET ORAL EVERY 4 HOURS PRN
Status: DISCONTINUED | OUTPATIENT
Start: 2025-06-10 | End: 2025-06-13 | Stop reason: HOSPADM

## 2025-06-10 RX ORDER — LORAZEPAM 1 MG/1
4 TABLET ORAL
Status: DISCONTINUED | OUTPATIENT
Start: 2025-06-10 | End: 2025-06-10

## 2025-06-10 RX ADMIN — Medication 100 MG: at 09:50

## 2025-06-10 RX ADMIN — PHENOBARBITAL 64.8 MG: 32.4 TABLET ORAL at 20:16

## 2025-06-10 RX ADMIN — DIVALPROEX SODIUM 500 MG: 500 TABLET, DELAYED RELEASE ORAL at 20:20

## 2025-06-10 RX ADMIN — OXYCODONE 5 MG: 5 TABLET ORAL at 10:38

## 2025-06-10 RX ADMIN — ACETAMINOPHEN 650 MG: 325 TABLET ORAL at 15:47

## 2025-06-10 RX ADMIN — AMLODIPINE BESYLATE 10 MG: 10 TABLET ORAL at 09:50

## 2025-06-10 RX ADMIN — PHENOBARBITAL 64.8 MG: 32.4 TABLET ORAL at 15:47

## 2025-06-10 RX ADMIN — OXYCODONE HYDROCHLORIDE 10 MG: 10 TABLET ORAL at 20:16

## 2025-06-10 RX ADMIN — SENNOSIDES AND DOCUSATE SODIUM 1 TABLET: 8.6; 5 TABLET ORAL at 20:16

## 2025-06-10 RX ADMIN — SODIUM CHLORIDE, PRESERVATIVE FREE 10 ML: 5 INJECTION INTRAVENOUS at 09:52

## 2025-06-10 RX ADMIN — ENOXAPARIN SODIUM 30 MG: 100 INJECTION SUBCUTANEOUS at 20:16

## 2025-06-10 RX ADMIN — ACETAMINOPHEN 650 MG: 325 TABLET ORAL at 20:16

## 2025-06-10 RX ADMIN — PHENOBARBITAL 64.8 MG: 32.4 TABLET ORAL at 09:49

## 2025-06-10 RX ADMIN — ACETAMINOPHEN 650 MG: 325 TABLET ORAL at 09:50

## 2025-06-10 RX ADMIN — SODIUM CHLORIDE, PRESERVATIVE FREE 10 ML: 5 INJECTION INTRAVENOUS at 20:20

## 2025-06-10 RX ADMIN — FOLIC ACID 1000 MCG: 1 TABLET ORAL at 09:50

## 2025-06-10 RX ADMIN — DIVALPROEX SODIUM 500 MG: 500 TABLET, DELAYED RELEASE ORAL at 09:50

## 2025-06-10 RX ADMIN — ENOXAPARIN SODIUM 30 MG: 100 INJECTION SUBCUTANEOUS at 13:08

## 2025-06-10 RX ADMIN — DULOXETINE 20 MG: 20 CAPSULE, DELAYED RELEASE ORAL at 09:50

## 2025-06-10 RX ADMIN — SODIUM CHLORIDE 1000 ML: 9 INJECTION, SOLUTION INTRAVENOUS at 03:15

## 2025-06-10 RX ADMIN — OXYCODONE HYDROCHLORIDE 10 MG: 10 TABLET ORAL at 14:29

## 2025-06-10 RX ADMIN — IOPAMIDOL 75 ML: 755 INJECTION, SOLUTION INTRAVENOUS at 05:09

## 2025-06-10 ASSESSMENT — ENCOUNTER SYMPTOMS
SHORTNESS OF BREATH: 1
ABDOMINAL PAIN: 0
TROUBLE SWALLOWING: 0
BACK PAIN: 1
PHOTOPHOBIA: 0

## 2025-06-10 ASSESSMENT — PAIN SCALES - GENERAL
PAINLEVEL_OUTOF10: 4
PAINLEVEL_OUTOF10: 8
PAINLEVEL_OUTOF10: 10
PAINLEVEL_OUTOF10: 0
PAINLEVEL_OUTOF10: 10
PAINLEVEL_OUTOF10: 8
PAINLEVEL_OUTOF10: 6
PAINLEVEL_OUTOF10: 9

## 2025-06-10 ASSESSMENT — PAIN DESCRIPTION - DESCRIPTORS
DESCRIPTORS: ACHING;DISCOMFORT;DULL
DESCRIPTORS: ACHING;SORE;TENDER
DESCRIPTORS: ACHING;DISCOMFORT
DESCRIPTORS: ACHING;DISCOMFORT;TENDER;SORE

## 2025-06-10 ASSESSMENT — PAIN DESCRIPTION - ORIENTATION
ORIENTATION: LEFT
ORIENTATION: LEFT;LOWER

## 2025-06-10 ASSESSMENT — PAIN SCALES - WONG BAKER: WONGBAKER_NUMERICALRESPONSE: NO HURT

## 2025-06-10 ASSESSMENT — PAIN DESCRIPTION - LOCATION
LOCATION: CHEST;BACK
LOCATION: BACK;CHEST
LOCATION: RIB CAGE;BACK
LOCATION: CHEST;BACK
LOCATION: CHEST;BACK
LOCATION: BACK;CHEST

## 2025-06-10 ASSESSMENT — PAIN - FUNCTIONAL ASSESSMENT: PAIN_FUNCTIONAL_ASSESSMENT: 0-10

## 2025-06-10 ASSESSMENT — LIFESTYLE VARIABLES: HOW OFTEN DO YOU HAVE A DRINK CONTAINING ALCOHOL: 2-4 TIMES A MONTH

## 2025-06-10 NOTE — FLOWSHEET NOTE
06/10/25 1707   Belongings   Dental Appliances None   Vision - Corrective Lenses Eyeglasses   Hearing Aid None   Clothing Pants;Shirt;Footwear   Jewelry Earrings;Watch;Necklace   Body Piercings Removed No   Electronic Devices None   Weapons (Notify Protective Services/Security) None   Other Valuables Wallet   Home Medications None   Valuables Given To Patient   Provide Name(s) of Who Valuable(s) Were Given To patient

## 2025-06-10 NOTE — ED PROVIDER NOTES
involved. There is no subluxation.   Minimal surrounding Hilaria spinous hematoma.  Consider further characterization   by MRI.   2. Cholelithiasis.   3. Benign, mildly complicated cyst left renal lower pole measuring 8.5 cm.         XR CHEST PORTABLE   Final Result   No acute cardiopulmonary process.         XR KNEE RIGHT (3 VIEWS)   Final Result   No fracture or dislocation. Osteopenia.         XR KNEE LEFT (3 VIEWS)   Final Result   No fracture or dislocation. Osteopenia.             ------------------------- NURSING NOTES AND VITALS REVIEWED ---------------------------   The nursing notes within the ED encounter and vital signs as below have been reviewed.   /89   Pulse (!) 101   Temp 97.6 °F (36.4 °C) (Oral)   Resp 17   Ht 1.702 m (5' 7\")   Wt 86.2 kg (190 lb)   SpO2 (!) 89%   BMI 29.76 kg/m²   Oxygen Saturation Interpretation: Improved after treatment      ---------------------------------------------------PHYSICAL EXAM--------------------------------------      Constitutional/General: Alert and oriented x3,  moderately uncomfortable, Unkept  Head: Normocephalic and atraumatic  Eyes: PERRL, EOMI  Mouth: Oropharynx clear, handling secretions, no trismus  Neck: Supple, full ROM,   Pulmonary: Lungs clear to auscultation bilaterally, no wheezes, rales, or rhonchi. Not in respiratory distress, equal chest wall excursion but does have notable point tenderness across the anterior chest wall no flailing of the chest.  Cardiovascular:  Regular rate and rhythm, no murmurs, gallops, or rubs. 2+ distal pulses  Abdomen: Soft, tender, non distended, point tenderness to entire lower abdomen.  Extremities: Moves all extremities x 4. Warm and well perfused, cervical, thoracic, lumbar sacral spine are all midline.  No step-off noted but did express point tenderness to all lateral aspects.  Skin: warm and dry without rash, healing lacerations forehead and right cheek, dried blood on right ear no active or new  laceration noted in the scalp or at the ear, significant bruising across the anterior chest left side.  Significant point tenderness more on the left side versus the right  Neurologic: GCS 15, cranial nerves II through XII grossly intact no acute neurovascular deficit noted.  Speech clear and coherent strength strong and equal bilaterally.  Patient does have generalized weakness secondary to pain.  Psych: Normal Affect      ------------------------------ ED COURSE/MEDICAL DECISION MAKING----------------------  Medications   sodium chloride flush 0.9 % injection 5-40 mL (has no administration in time range)   sodium chloride flush 0.9 % injection 5-40 mL (has no administration in time range)   0.9 % sodium chloride infusion (has no administration in time range)   thiamine tablet 100 mg (has no administration in time range)   LORazepam (ATIVAN) tablet 1 mg (has no administration in time range)     Or   LORazepam (ATIVAN) injection 1 mg (has no administration in time range)     Or   LORazepam (ATIVAN) tablet 2 mg (has no administration in time range)     Or   LORazepam (ATIVAN) injection 2 mg (has no administration in time range)     Or   LORazepam (ATIVAN) tablet 3 mg (has no administration in time range)     Or   LORazepam (ATIVAN) injection 3 mg (has no administration in time range)     Or   LORazepam (ATIVAN) tablet 4 mg (has no administration in time range)     Or   LORazepam (ATIVAN) injection 4 mg (has no administration in time range)   sodium chloride 0.9 % bolus 1,000 mL (0 mLs IntraVENous Stopped 6/10/25 0613)   iopamidol (ISOVUE-370) 76 % injection 75 mL (75 mLs IntraVENous Given 6/10/25 0285)         ED COURSE: Twelve-lead EKG as interpreted by myself as well as the emergency room attending showing heart rate of 91, normal sinus rhythm, no acute ST elevation depression noted.  Left axis deviation.       Medical Decision Making:      Kirsty Thurston is a 54 y.o. male presenting to the ED for fall with injury.

## 2025-06-10 NOTE — PROGRESS NOTES
Spiritual Health History and Assessment/Progress Note  [facility] Melyssa Corbett    (P) Spiritual/Emotional Needs,  ,  ,      Name: Kirsty Thurston MRN: [id number]    Age: 54 y.o.     Sex: male   Language: English   Latter-day: Hoahaoism   Lumbar burst fracture, closed, initial encounter ([facility])     Date: 6/10/2025                           Spiritual Assessment began in [facility] 8SE IMCU/NEURO        Referral/Consult From: (P) Rounding   Encounter Overview/Reason: (P) Spiritual/Emotional Needs  Service Provided For: (P) Patient    Dai, Belief, Meaning:   Patient has beliefs or practices that help with coping during difficult times  Family/Friends No family/friends present      Importance and Influence:  Patient has no beliefs influential to healthcare decision-making identified during this visit  Family/Friends No family/friends present    Community:  Patient feels well-supported. Support system includes: Parent/s, Friends, and Extended family  Family/Friends No family/friends present    Assessment and Plan of Care:     Patient Interventions include: Facilitated expression of thoughts and feelings  Family/Friends Interventions include: No family/friends present    Patient Plan of Care: Spiritual Care available upon further referral  Family/Friends Plan of Care: No family/friends present    Electronically signed by Chaplain Liam on 6/10/2025 at 6:51 PM

## 2025-06-10 NOTE — H&P
None    Social History:   Tobacco use:  positive for approximately 1 packs per day.  Patient advised to quit smoking  E-cigarette/Vaping:  Not applicable  Alcohol use:  social drinker  Illicit drug use:  no history of illicit drug use    NSAID use in last 72 hours: no  Taken PCN in past:  allergic  Last food/drink: 6/9  Last tetanus: within 5 years     Complaints:   Head:  None  Neck:   None  Chest:   Mild  Back:   Mild  Abdomen:   None  Extremities:   None  Comments:     SECONDARY SURVEY    Physical Exam    Head/scalp: Atraumatic      Face: Atraumatic    Eyes/ears/nose: Atraumatic    Pharynx/mouth: Atraumatic    Neck:  Atraumatic    Cervical immobilization device:   Cervical collar not indicated  Cervical spine tenderness:  None  Pain:  none  ROM:  Not indicated    Chest wall:   Tender to palpation over L chest     SMI 1500 mL.      Heart:  Regular rate & rhythm    Abdomen: Atraumatic   Distention:   None  Tenderness:  None    Pelvis:   Atraumatic       Thoracolumbar spine:   Atraumatic     Tenderness:  None    Genitourinary:   Atraumatic .    Blood:  None  Urine:  None    Rectum:  Atraumatic     Blood:   None  Rectal tone:  not applicable    Extremities:   Right upper extremity:  Atraumatic    Pulses:  normal  Capillary refill:  normal   Sensory normal  Motor normal    Left upper extremity:  Atraumatic    Pulses:  normal  Capillary refill:  normal   Sensory normal  Motor normal    Right lower extremity:  Atraumatic    Pulses:  normal  Capillary refill:  normal   Sensory normal  Motor normal    Left lower extremity:  Atraumatic    Pulses:  normal  Capillary refill:  normal   Sensory normal  Motor normal    Procedures in ED:   none    FAST EXAM: Deferred    In the event of Emergency Blood Transfusion:  Due to the critical condition of this patient, I request the immediate release of blood products for emergency transfusion secondary to shock. I understand the increased risks incurred by the lack of complete  transfusion testing.      Radiology:  CT Head , CT Cervical spine , CT Chest , CT Abdomen , CT Thoracic , and CT Lumbar     Consultations:  Neurosurgery    Admission/Diagnosis: GLF, mutliple rib fractures, L2 fracture    Plan of Treatment:  - Admit to monitored floor  - Neurosurgery consulted for lumbar compression fracture, appreciate recommendations  - Multimodal analgesia  - Encourage deep breathing and incentive spirometry, pulmonary toilet  - Social work for alcohol use  - Patient currently denies daily drinking however will start low-dose phenobarbital given significant history of alcohol use disorder per chart review and elevated ethanol levels on admission  - Continue thiamine and folate  - Continue home medications  - PCDs, will start chemoprophylaxis once cleared by neurosurgery    Plan discussed with Dr. JESSICA Keene at 6/10/2025 on 7:53 AM    Electronically signed by Betty Johnson MD on 6/10/2025 at 7:53 AM

## 2025-06-10 NOTE — PROGRESS NOTES
Physician Progress Note      PATIENT:               MICHAEL ATWOOD  CSN #:                  [id number]  :                       1970  ADMIT DATE:       2025 8:16 AM  DISCH DATE:        2025 3:07 PM  RESPONDING  PROVIDER #:        Isac Kendrick MD          QUERY TEXT:    Acute alcoholic intoxication without complication was documented in the   medical record 25 Dr. Duarte, emergency Medicine The diagnosis was not   noted in subsequent documentation.  Please clarify the status of this   condition.    The clinical indicators include:  -Fall.  States he was sitting in a lawn chair in his living room drinking   alcohol last night and somehow fell through the lawn chair, lac to left   eyebrow, unsure of loc, states he went to bed after fall, denies thinner,   +right chest tenderness and right eye blurry.  Patient states he did have a   few beers. Final Impression: fall, closed head injury. Acute alcoholic   intoxication without complication (Dr. Lanier, : Dr. Duarte, Emergency   Medicine 25)  -  States that after his TBI in  he has been intermittently falling and   has had permanent vertigo made worse with left right rotation of his head.    Typically feels like he is spinning.  Has a long history of alcohol use   disorder.  States that a few years ago he attended rehab for approximately 30   days.  Shortly after leaving rehab began to consume alcohol again.  Longest   period that he has been sober is 1 year.  States that he currently drinks a   few times a month. Unsure of quantity but states that alcohol use is due to   pain.  Assessment and Plan: Fall 2/2 Alcohol Use vs  -Mild tremors in BUE ([facility], [facility])  -LABS: Ethanol Level mg/dL: 93 (25); urine drug screen: positive   benzodiazepines (25)  -CT Head 25): Seen chronic changes seen within the brain no acute   intracranial abnormality  -CIWA -AR score: 2 (25 @ 20:03);

## 2025-06-10 NOTE — CARE COORDINATION
06/10/25 Case Management note: Chart reviewed as patient is a return to the ER within 30 days of discharge to home. He has now sustained a fall and found to have burst fracture of L2. ETOH 232 on arriva. Neurosurgery consult. Patient is currently in the ER pending bed assignment. Electronically signed by Peggy Villar RN CM on 6/10/2025 at 12:12 PM

## 2025-06-10 NOTE — PROGRESS NOTES
4 Eyes Skin Assessment     NAME:  Kirsty Thurston  YOB: 1970  MEDICAL RECORD NUMBER:  [id number]    The patient is being assessed for  Admission    I agree that at least one RN has performed a thorough Head to Toe Skin Assessment on the patient. ALL assessment sites listed below have been assessed.      Areas assessed by both nurses:    Other Refused full skin check        Does the Patient have a Wound? Laceration forehead       Refugio Prevention initiated by RN: No  Wound Care Orders initiated by RN: No    Pressure Injury (Stage 3,4, Unstageable, DTI, NWPT, and Complex wounds) if present, place Wound referral order by RN under : No    New Ostomies, if present place, Ostomy referral order under : No     Nurse 1 eSignature: Electronically signed by Sari Dinero RN on 6/10/25 at 6:03 PM EDT    **SHARE this note so that the co-signing nurse can place an eSignature**    Nurse 2 eSignature: Electronically signed by Edna Petty RN on 6/10/25 at 6:39 PM EDT

## 2025-06-10 NOTE — CARE COORDINATION
Social Work /Transition of Care;    Pt presents to the ED secondary to a fall at home.  Pt recently discharged from this hospital on 6/7.  Pt's ETOH upon arrival to the ED was 232.  Pt's UDS was negative.  Pt is admitted inpatient with lumbar burst fracture.  Pt had a neurosurgery consult and they are recommending a TLSO brace for pt upon discharge.  Pt is currently on 3L oxygen and reports he does not have home oxygen.    OG met with pt who was alert and oriented x4, sitting up in bed.  Pt reports living alone in an apartment with 6 or 7 steps down to the entrance.  Pt has a cane at home.  Pt reports no other DME.  Pt has hx at [place] acute re[facility].  SW discussed pt going to rehab upon discharge but pt states he will be returning home.  Pt reports a referral was made to [facility] upon last discharge but they were scheduled to start tomorrow 6/11.  Pt reports he still plans to return home upon discharge and have the [facility] come out.  OG spoke to Aylin with [facility] who confirms they were supposed to start tomorrow but they can follow pt and SOC will now be 6/13.  Pt's PCP is Dr Andrew and he uses [facility] outpatient pharmacy to fill his prescriptions.      Pt reports he had 3 beers and \"does not drink very much\".  Pt declined any referrals to treatment and/or detox.      OG/FELISA will follow.

## 2025-06-10 NOTE — CONSULTS
NEUROSURGERY INPATIENT CONSULT NOTE    Chief Complaint: L2 burst fracture    HPI:   Kirsty Thurston is a 54 y.o.  male who presents to the ED c/o back pain after falling. Describes the pain as sharp and stabbing. Pain is worse with movement. CT lumbar spine demonstrates L2 burst fracture for which Neurosurgery was consulted. Denies loss of bowel or bladder, saddle anesthesia, pain down the legs, numbness, tingling, headache, loss of dexterity, abnormal gait, fever, chills, N/V, SOB, or chest pain.      Past Medical History:   Diagnosis Date    Alcohol use     Clavicle fracture 12/10/2024    left- wears a sling    Headache     Hypertension     ICH (intracerebral hemorrhage) (HCC)     after fall    Kidney stone     Memory disorder     Pseudocholinesterase deficiency     SAH (subarachnoid hemorrhage) (HCC) 12/10/2024    after fall    SDH (subdural hematoma) (HCC) 12/10/2024    after fall    Syncope and collapse      Past Surgical History:   Procedure Laterality Date    COLONOSCOPY N/A 1/10/2025    COLORECTAL CANCER SCREENING, NOT HIGH RISK performed by Ulises Pate MD at [facility] ENDOSCOPY    GASTRIC BYPASS SURGERY  2001    [facility]      Family History   Problem Relation Age of Onset    Heart Disease Maternal Uncle         Medications:   Current Facility-Administered Medications   Medication Dose Route Frequency Provider Last Rate Last Admin    sodium chloride flush 0.9 % injection 5-40 mL  5-40 mL IntraVENous 2 times per day Betty Johnson MD        sodium chloride flush 0.9 % injection 5-40 mL  5-40 mL IntraVENous PRN Betty Johnson MD        0.9 % sodium chloride infusion   IntraVENous PRN Betty Johnson MD        amLODIPine (NORVASC) tablet 10 mg  10 mg Oral Daily Betty Johnson MD   10 mg at 06/10/25 0950    divalproex (DEPAKOTE) DR tablet 500 mg  500 mg Oral BID Betty Johnson MD   500 mg at 06/10/25 0950    DULoxetine (CYMBALTA) extended release capsule 20 mg  20 mg Oral Daily Elizabeth

## 2025-06-11 PROBLEM — S22.42XA CLOSED FRACTURE OF MULTIPLE RIBS OF LEFT SIDE: Status: ACTIVE | Noted: 2025-06-11

## 2025-06-11 LAB
ANION GAP SERPL CALCULATED.3IONS-SCNC: 10 MMOL/L (ref 7–16)
BASOPHILS # BLD: 0.05 K/UL (ref 0–0.2)
BASOPHILS NFR BLD: 1 % (ref 0–2)
BUN SERPL-MCNC: 15 MG/DL (ref 6–20)
CALCIUM SERPL-MCNC: 8.7 MG/DL (ref 8.6–10)
CHLORIDE SERPL-SCNC: 102 MMOL/L (ref 98–107)
CO2 SERPL-SCNC: 24 MMOL/L (ref 22–29)
CREAT SERPL-MCNC: 0.8 MG/DL (ref 0.7–1.2)
EKG ATRIAL RATE: 91 BPM
EKG P AXIS: 29 DEGREES
EKG P-R INTERVAL: 150 MS
EKG Q-T INTERVAL: 364 MS
EKG QRS DURATION: 90 MS
EKG QTC CALCULATION (BAZETT): 447 MS
EKG R AXIS: -18 DEGREES
EKG T AXIS: 28 DEGREES
EKG VENTRICULAR RATE: 91 BPM
EOSINOPHIL # BLD: 0.1 K/UL (ref 0.05–0.5)
EOSINOPHILS RELATIVE PERCENT: 1 % (ref 0–6)
ERYTHROCYTE [DISTWIDTH] IN BLOOD BY AUTOMATED COUNT: 16.1 % (ref 11.5–15)
GFR, ESTIMATED: >90 ML/MIN/1.73M2
GLUCOSE SERPL-MCNC: 92 MG/DL (ref 74–99)
HCT VFR BLD AUTO: 32.9 % (ref 37–54)
HGB BLD-MCNC: 10.4 G/DL (ref 12.5–16.5)
IMM GRANULOCYTES # BLD AUTO: 0.04 K/UL (ref 0–0.58)
IMM GRANULOCYTES NFR BLD: 0 % (ref 0–5)
LYMPHOCYTES NFR BLD: 1.76 K/UL (ref 1.5–4)
LYMPHOCYTES RELATIVE PERCENT: 18 % (ref 20–42)
MCH RBC QN AUTO: 28.3 PG (ref 26–35)
MCHC RBC AUTO-ENTMCNC: 31.6 G/DL (ref 32–34.5)
MCV RBC AUTO: 89.4 FL (ref 80–99.9)
MONOCYTES NFR BLD: 1.25 K/UL (ref 0.1–0.95)
MONOCYTES NFR BLD: 13 % (ref 2–12)
NEUTROPHILS NFR BLD: 67 % (ref 43–80)
NEUTS SEG NFR BLD: 6.39 K/UL (ref 1.8–7.3)
PLATELET # BLD AUTO: 210 K/UL (ref 130–450)
PMV BLD AUTO: 10.1 FL (ref 7–12)
POTASSIUM SERPL-SCNC: 4 MMOL/L (ref 3.5–5.1)
RBC # BLD AUTO: 3.68 M/UL (ref 3.8–5.8)
SODIUM SERPL-SCNC: 136 MMOL/L (ref 136–145)
WBC OTHER # BLD: 9.6 K/UL (ref 4.5–11.5)

## 2025-06-11 PROCEDURE — 6370000000 HC RX 637 (ALT 250 FOR IP): Performed by: STUDENT IN AN ORGANIZED HEALTH CARE EDUCATION/TRAINING PROGRAM

## 2025-06-11 PROCEDURE — 6370000000 HC RX 637 (ALT 250 FOR IP)

## 2025-06-11 PROCEDURE — 85025 COMPLETE CBC W/AUTO DIFF WBC: CPT

## 2025-06-11 PROCEDURE — 2060000000 HC ICU INTERMEDIATE R&B

## 2025-06-11 PROCEDURE — 97166 OT EVAL MOD COMPLEX 45 MIN: CPT

## 2025-06-11 PROCEDURE — 6360000002 HC RX W HCPCS: Performed by: STUDENT IN AN ORGANIZED HEALTH CARE EDUCATION/TRAINING PROGRAM

## 2025-06-11 PROCEDURE — 97535 SELF CARE MNGMENT TRAINING: CPT

## 2025-06-11 PROCEDURE — 2700000000 HC OXYGEN THERAPY PER DAY

## 2025-06-11 PROCEDURE — 97161 PT EVAL LOW COMPLEX 20 MIN: CPT

## 2025-06-11 PROCEDURE — 97530 THERAPEUTIC ACTIVITIES: CPT

## 2025-06-11 PROCEDURE — 6360000002 HC RX W HCPCS

## 2025-06-11 PROCEDURE — 93010 ELECTROCARDIOGRAM REPORT: CPT | Performed by: INTERNAL MEDICINE

## 2025-06-11 PROCEDURE — 80048 BASIC METABOLIC PNL TOTAL CA: CPT

## 2025-06-11 PROCEDURE — 2500000003 HC RX 250 WO HCPCS

## 2025-06-11 PROCEDURE — 99232 SBSQ HOSP IP/OBS MODERATE 35: CPT | Performed by: SURGERY

## 2025-06-11 PROCEDURE — 6360000002 HC RX W HCPCS: Performed by: SURGERY

## 2025-06-11 PROCEDURE — 36415 COLL VENOUS BLD VENIPUNCTURE: CPT

## 2025-06-11 RX ORDER — METHOCARBAMOL 500 MG/1
1500 TABLET, FILM COATED ORAL 4 TIMES DAILY
Status: DISCONTINUED | OUTPATIENT
Start: 2025-06-11 | End: 2025-06-13 | Stop reason: HOSPADM

## 2025-06-11 RX ADMIN — OXYCODONE HYDROCHLORIDE 10 MG: 10 TABLET ORAL at 12:06

## 2025-06-11 RX ADMIN — Medication 100 MG: at 07:59

## 2025-06-11 RX ADMIN — SENNOSIDES AND DOCUSATE SODIUM 1 TABLET: 8.6; 5 TABLET ORAL at 07:59

## 2025-06-11 RX ADMIN — SODIUM CHLORIDE, PRESERVATIVE FREE 10 ML: 5 INJECTION INTRAVENOUS at 21:04

## 2025-06-11 RX ADMIN — HYDROMORPHONE HYDROCHLORIDE 0.5 MG: 1 INJECTION, SOLUTION INTRAMUSCULAR; INTRAVENOUS; SUBCUTANEOUS at 14:50

## 2025-06-11 RX ADMIN — FOLIC ACID 1000 MCG: 1 TABLET ORAL at 07:59

## 2025-06-11 RX ADMIN — SODIUM CHLORIDE, PRESERVATIVE FREE 10 ML: 5 INJECTION INTRAVENOUS at 08:02

## 2025-06-11 RX ADMIN — DULOXETINE 20 MG: 20 CAPSULE, DELAYED RELEASE ORAL at 07:59

## 2025-06-11 RX ADMIN — OXYCODONE HYDROCHLORIDE 10 MG: 10 TABLET ORAL at 02:06

## 2025-06-11 RX ADMIN — METHOCARBAMOL 1500 MG: 500 TABLET ORAL at 07:59

## 2025-06-11 RX ADMIN — ACETAMINOPHEN 650 MG: 325 TABLET ORAL at 20:59

## 2025-06-11 RX ADMIN — DIVALPROEX SODIUM 500 MG: 500 TABLET, DELAYED RELEASE ORAL at 08:00

## 2025-06-11 RX ADMIN — PHENOBARBITAL 64.8 MG: 32.4 TABLET ORAL at 02:04

## 2025-06-11 RX ADMIN — OXYCODONE HYDROCHLORIDE 10 MG: 10 TABLET ORAL at 08:05

## 2025-06-11 RX ADMIN — METHOCARBAMOL 1500 MG: 500 TABLET ORAL at 17:12

## 2025-06-11 RX ADMIN — OXYCODONE HYDROCHLORIDE 10 MG: 10 TABLET ORAL at 17:12

## 2025-06-11 RX ADMIN — PHENOBARBITAL 64.8 MG: 32.4 TABLET ORAL at 13:19

## 2025-06-11 RX ADMIN — DIVALPROEX SODIUM 500 MG: 500 TABLET, DELAYED RELEASE ORAL at 20:58

## 2025-06-11 RX ADMIN — ACETAMINOPHEN 650 MG: 325 TABLET ORAL at 02:03

## 2025-06-11 RX ADMIN — ENOXAPARIN SODIUM 30 MG: 100 INJECTION SUBCUTANEOUS at 07:58

## 2025-06-11 RX ADMIN — POLYETHYLENE GLYCOL 3350 17 G: 17 POWDER, FOR SOLUTION ORAL at 08:00

## 2025-06-11 RX ADMIN — METHOCARBAMOL 1500 MG: 500 TABLET ORAL at 12:06

## 2025-06-11 RX ADMIN — PHENOBARBITAL 64.8 MG: 32.4 TABLET ORAL at 20:58

## 2025-06-11 RX ADMIN — AMLODIPINE BESYLATE 10 MG: 10 TABLET ORAL at 07:59

## 2025-06-11 RX ADMIN — METHOCARBAMOL 1500 MG: 500 TABLET ORAL at 20:58

## 2025-06-11 RX ADMIN — ACETAMINOPHEN 650 MG: 325 TABLET ORAL at 07:59

## 2025-06-11 RX ADMIN — ENOXAPARIN SODIUM 30 MG: 100 INJECTION SUBCUTANEOUS at 20:59

## 2025-06-11 RX ADMIN — PHENOBARBITAL 64.8 MG: 32.4 TABLET ORAL at 07:59

## 2025-06-11 RX ADMIN — ONDANSETRON 4 MG: 2 INJECTION, SOLUTION INTRAMUSCULAR; INTRAVENOUS at 12:07

## 2025-06-11 RX ADMIN — SENNOSIDES AND DOCUSATE SODIUM 1 TABLET: 8.6; 5 TABLET ORAL at 20:59

## 2025-06-11 RX ADMIN — ACETAMINOPHEN 650 MG: 325 TABLET ORAL at 13:19

## 2025-06-11 ASSESSMENT — PAIN SCALES - GENERAL
PAINLEVEL_OUTOF10: 8
PAINLEVEL_OUTOF10: 10
PAINLEVEL_OUTOF10: 0
PAINLEVEL_OUTOF10: 10
PAINLEVEL_OUTOF10: 8
PAINLEVEL_OUTOF10: 8
PAINLEVEL_OUTOF10: 3
PAINLEVEL_OUTOF10: 2
PAINLEVEL_OUTOF10: 0

## 2025-06-11 ASSESSMENT — PAIN DESCRIPTION - DESCRIPTORS
DESCRIPTORS: ACHING;DISCOMFORT;SORE
DESCRIPTORS: ACHING;TENDER;THROBBING;DISCOMFORT
DESCRIPTORS: ACHING;DISCOMFORT;SORE

## 2025-06-11 ASSESSMENT — PAIN DESCRIPTION - LOCATION
LOCATION: BACK
LOCATION: BACK;CHEST
LOCATION: BACK

## 2025-06-11 ASSESSMENT — PAIN SCALES - WONG BAKER: WONGBAKER_NUMERICALRESPONSE: NO HURT

## 2025-06-11 NOTE — PROGRESS NOTES
Educated pt on importance of placement of low airloss module to help prevent skin breakdown, pt refused placement

## 2025-06-11 NOTE — PROGRESS NOTES
OCCUPATIONAL THERAPY INITIAL EVALUATION    [facility] [address], [place]       Date:2025                                                  Patient Name: Kirsty Thurston  MRN: [id number]  : 1970  [facility]: [facility]    Evaluating OT: Justino Tolentino OTR/L [id number]    Referring Provider: Betty Johnson MD      Specific Provider Orders/Date: OT evaluation and treatment 6/10/25 0900    Diagnosis:  Myalgia [M79.10]  Alcohol abuse [F10.10]  Frequent falls [R29.6]  Closed head injury, initial encounter [S09.90XA]  Lumbar burst fracture, closed, initial encounter ([facility]) [S32.001A]  Acute alcoholic intoxication without complication [F10.920]  Closed compression fracture of L2 lumbar vertebra, initial encounter ([facility]) [S32.020A]  Closed fracture of multiple ribs of left side, initial encounter [S22.42XA]      Pertinent Medical History:  has a past medical history of Alcohol use, Clavicle fracture, Headache, Hypertension, ICH (intracerebral hemorrhage) ([facility]), Kidney stone, Memory disorder, Pseudocholinesterase deficiency, SAH (subarachnoid hemorrhage) ([facility]), SDH (subdural hematoma) ([facility]), and Syncope and collapse.   Past Surgical History:   Procedure Laterality Date    COLONOSCOPY N/A 1/10/2025    COLORECTAL CANCER SCREENING, NOT HIGH RISK performed by Ulises Pate MD at [facility] ENDOSCOPY    GASTRIC BYPASS SURGERY      [facility]       Pt presented to the hospital on 6/10 s/p fall at home     Orders received, chart reviewed, eval complete.     Precautions:  Fall Risk, hard shell TLSO, spinal precautions, O2     Assessment of current deficits   [x] Functional mobility   [x]ADLs  [x] Strength               []Cognition   [x] Functional transfers   [x] IADLs         [x] Safety Awareness   [x]Endurance   [] Fine Motor Coordination [x] Balance [] Vision/perception   []Sensation    [] Gross Motor Coordination [] ROM  [] Delirium          ability to locate and press call button.     Rehab Potential: Fair  for established goals     Patient / Family Goal: to get better     Patient and/or family were instructed on functional diagnosis, prognosis/goals and OT plan of care. Pt/family demonstrated understanding.      Eval Complexity:      Description  Performance deficits  Clinical decision making  Co-morbidities affecting occupational performance  Modification or assistance to complete eval    Low Complexity   1 to 3 []  Low []  None []  None []   Moderate Complexity   3 to 5 [x]  Mod []  Maybe []  Min to Mod [x]   High Complexity   5 or more []  High [x]  Yes [x]  Max []     The above evaluation is classified as moderate  complexity based off the noted performance deficits, personal factors, co-morbidities, assistance required, and other factors as noted in the clinical evaluation and functional testing.     Time In: 1404  Time Out: 1442  Total Treatment Time: 23 minutes    Min Units   OT Eval Low 55474       OT Eval Moderate 97166  x 1   OT Eval High 26710       OT Re-Eval 78500       Therapeutic Ex 33841       Therapeutic Activities 08437      ADL/Self Care 23343  23 2   Orthotic Management 06406       Neuro Re-Ed 16336       Non-Billable Time          Evaluation Time includes thorough review of current medical information, gathering information on past medical history/social history and prior level of function, completion of standardized testing/informal observation of tasks, assessment of data and education on plan of care and goals.          Justino Tolentino OTR/L [id number]

## 2025-06-11 NOTE — PROGRESS NOTES
Trauma Tertiary Survey    Admit Date: 6/10/2025  Hospital day 1    CC:  Mechanical fall    Alcohol pre-screening:  Men: How many times in the past year have you had 5 or more drinks in a day?  1 or more  How much do you drink on a daily basis? 4-5 beers daily    Drug Pre-screening:    How many times in the past year have you used a recreational drug or used a prescription medication for non medical reasons?  none    Mood Prescreening:    During the past two weeks, have you been bothered by little interest or pleasure doing things?  No  During the past two weeks, have you been bothered by feeling down, depressed or hopeless?  No      Scheduled Meds:   sodium chloride flush  5-40 mL IntraVENous 2 times per day    amLODIPine  10 mg Oral Daily    divalproex  500 mg Oral BID    DULoxetine  20 mg Oral Daily    folic acid  1,000 mcg Oral Daily    nicotine  1 patch TransDERmal Daily    thiamine  100 mg Oral Daily    sodium chloride flush  10 mL IntraVENous 2 times per day    acetaminophen  650 mg Oral Q6H    polyethylene glycol  17 g Oral Daily    sennosides-docusate sodium  1 tablet Oral BID    PHENobarbital  64.8 mg Oral Q6H    enoxaparin  30 mg SubCUTAneous BID     Continuous Infusions:   sodium chloride      sodium chloride       PRN Meds:sodium chloride flush, sodium chloride, melatonin, sodium chloride flush, sodium chloride, oxyCODONE **OR** oxyCODONE, ondansetron **OR** ondansetron    Subjective:     Pt states that he is having some pain along his chest wall and lower back. No other issues.    Objective:   Patient Vitals for the past 8 hrs:   BP Temp Temp src Pulse Resp SpO2   06/11/25 0500 122/88 98.4 °F (36.9 °C) Oral 87 17 96 %   06/11/25 0236 -- -- -- -- 20 --   06/11/25 0206 -- -- -- -- 22 --   06/10/25 2237 (!) 147/96 97.9 °F (36.6 °C) Oral 88 16 98 %       No intake/output data recorded.  I/O this shift:  In: 100 [P.O.:100]  Out: -     Past Medical History:   Diagnosis Date    Alcohol use     Clavicle  ankle absent absent normal   Left ankle absent absent normal   Right foot Absent absent normal   Left foot absent absent normal       CONSULTS: Neurosurgery    PROCEDURES: None    INJURIES:      Principal Problem:    Lumbar burst fracture, closed, initial encounter ([facility])  Active Problems:    Closed fracture of multiple ribs of left side  Resolved Problems:    * No resolved hospital problems. *        Assessment/Plan:       Neuro:  Acute pain secondary to trauma, pain control PRN, L2 burst fx, NSG recs appreciated, awaiting TLSO. Psych history, continue depakote & cymbalta. Hx EtOH abuse, continue PNB protocol  CV: No acute issues. Hx HTN, resume home Norvasc  Pulm: Acute hypoxemic respiratory failure, on 3L NC, wean as able. L 3-7 rib fx, pulmonary toilet, encourage IS, wean O2 as able  GI: No acute issues  Renal: no acute issues  ID: afebrile, no acute issues    Endocrine: no acute issues  MSK: no acute issues    Heme: Acute blood loss anemia, monitor    Bowel regime: Glycolax, senokot  Pain control/Sedation: Tylenol, Robaxin, Gayle, Dilaudid  DVT prophylaxis: Lovenox    GI: diet  Glucose protocol: None  Mouth/Eye care: per patient.   Hughes: none     Code status:    Full Code    Patient/Family update:  As available     Disposition:  Tele      Electronically signed by Sarkis Martinez MD on 6/11/25 at 6:02 AM [facility]  SURGICAL INTENSIVE CARE UNIT (SICU)  ATTENDING PHYSICIAN CRITICAL CARE PROGRESS NOTE     I have examined the patient, reviewed the record,and discussed the case with the APN/  Resident. I have reviewed all relevant labs and imaging data. Please refer to the  APN/ resident's note. I agree with the  assessment and plan with the following corrections/ additions made above    Alfredo Keene MD

## 2025-06-11 NOTE — PROGRESS NOTES
Educated pt on importance of turning q2hrs to prevent skin breakdown, pt continued to refuse despite education

## 2025-06-11 NOTE — CARE COORDINATION
Transition of care Readmit r/t  fall.Pt had a neurosurgery consult and they are recommending a custom clamshell TLSO brace for pt upon discharge.Await  PT OT  however plan is home Patient will need transport home. His insurance provides transport and be called at [phone].  [facility]in with [facility]  here - need new [facility] orders placed and sent referral thru careport.  Electronically signed by Judith Alcantara RN on 6/11/2025 at 12:16 PM    SNF list given to patient- await choices.Electronically signed by Judith Alcantara RN on 6/11/2025 at 2:49 PM    Case Management Assessment  Initial Evaluation    Date/Time of Evaluation: 6/11/2025 12:25 PM  Assessment Completed by: Judith Alcantara RN    If patient is discharged prior to next notation, then this note serves as note for discharge by case management.    Patient Name: Kirsty Thurston                   YOB: 1970  Diagnosis: Myalgia [M79.10]  Alcohol abuse [F10.10]  Frequent falls [R29.6]  Closed head injury, initial encounter [S09.90XA]  Lumbar burst fracture, closed, initial encounter ([facility]) [S32.001A]  Acute alcoholic intoxication without complication [F10.920]  Closed compression fracture of L2 lumbar vertebra, initial encounter ([facility]) [S32.020A]  Closed fracture of multiple ribs of left side, initial encounter [S22.42XA]                   Date / Time: 6/10/2025  2:35 AM    Patient Admission Status: Inpatient   Readmission Risk (Low < 19, Mod (19-27), High > 27): Readmission Risk Score: 24.4    Current PCP: Pierce Andrew MD  PCP verified by ? Yes    Chart Reviewed: Yes      History Provided by: Patient  Patient Orientation: Alert and Oriented    Patient Cognition: Alert    Hospitalization in the last 30 days (Readmission):  Yes    If yes, Readmission Assessment in  Navigator will be completed.    Advance Directives:      Code Status: Full Code   Patient's Primary Decision Maker is: Patient Declined (Legal Next of Kin Remains as Decision

## 2025-06-11 NOTE — PLAN OF CARE
Problem: Discharge Planning  Goal: Discharge to home or other facility with appropriate resources  Outcome: Progressing     Problem: Seizure Precautions  Goal: Remains free of injury related to seizures activity  Outcome: Progressing     Problem: Safety - Adult  Goal: Free from fall injury  Outcome: Progressing     Problem: Pain  Goal: Verbalizes/displays adequate comfort level or baseline comfort level  Outcome: Progressing     Problem: ABCDS Injury Assessment  Goal: Absence of physical injury  Outcome: Progressing

## 2025-06-11 NOTE — DISCHARGE SUMMARY
Physician Discharge Summary     Patient ID:  Kirsty Thurston  [id number]  54 y.o.  1970    Admit date: 6/10/2025    Discharge date and time: 06/13/25 11:11 AM     Admitting Physician: Alfredo Keene MD     Admission Diagnoses: Myalgia [M79.10]  Alcohol abuse [F10.10]  Frequent falls [R29.6]  Closed head injury, initial encounter [S09.90XA]  Lumbar burst fracture, closed, initial encounter ([facility]) [S32.001A]  Acute alcoholic intoxication without complication [F10.920]  Closed compression fracture of L2 lumbar vertebra, initial encounter ([facility]) [S32.020A]  Closed fracture of multiple ribs of left side, initial encounter [S22.42XA]    Discharge Diagnoses: Principal Problem:    Lumbar burst fracture, closed, initial encounter ([facility])  Active Problems:    Closed fracture of multiple ribs of left side  Resolved Problems:    * No resolved hospital problems. *    Admission Condition: fair    Discharged Condition: stable    Indication for Admission: Fall, L2 burst fx, L rib fx    Hospital Course/Procedures/Operation/treatments:   6/10: 55 y/o M that sustained a mechanical fall. Found to have multiple left sided rib fx and L2 burst fx. Admitted to [facility], pain control PRN, PNB protocol for EtOH abuse  6/11: No new issues on tert. PT/OT  6/12 No new complaints or overnight events.  Patient now has TLSO brace.  He worked with physical therapy and Occupational Therapy yesterday.  He is tolerating regular diet.    6/13: No new issues. Stable for discharge to SNF.    Consults:   IP CONSULT TO SOCIAL WORK  IP CONSULT TO TRAUMA SURGERY  IP CONSULT TO NEUROSURGERY  INPATIENT CONSULT TO ORTHOTIST/PROSTHETIST  IP CONSULT TO SOCIAL WORK  IP CONSULT TO HOME CARE NEEDS  IP CONSULT TO CASE MANAGEMENT  IP CONSULT TO CASE MANAGEMENT    Significant Diagnostic Studies:   XR ELBOW LEFT (2 VIEWS)  Result Date: 6/10/2025  EXAMINATION: TWO XRAY VIEWS OF THE LEFT ELBOW 6/10/2025 8:35 am COMPARISON: None. HISTORY: ORDERING SYSTEM PROVIDED HISTORY:

## 2025-06-11 NOTE — PROGRESS NOTES
Physical Therapy  Initial Assessment     Name: Kirsty Thurston  : 1970  MRN: [id number]      Date of Service: 2025    Evaluating PT: Amador Ewing, PT, DPT, [id number]      Room #:  8514/8514-A  Diagnosis:  Myalgia [M79.10]  Alcohol abuse [F10.10]  Frequent falls [R29.6]  Closed head injury, initial encounter [S09.90XA]  Lumbar burst fracture, closed, initial encounter ([facility]) [S32.001A]  Acute alcoholic intoxication without complication [F10.920]  Closed compression fracture of L2 lumbar vertebra, initial encounter ([facility]) [S32.020A]  Closed fracture of multiple ribs of left side, initial encounter [S22.42XA]  PMHx/PSHx:   has a past medical history of Alcohol use, Clavicle fracture, Headache, Hypertension, ICH (intracerebral hemorrhage) ([facility]), Kidney stone, Memory disorder, Pseudocholinesterase deficiency, SAH (subarachnoid hemorrhage) ([facility]), SDH (subdural hematoma) ([facility]), and Syncope and collapse.  Procedure/Surgery:  N/A  Precautions:  Fall risk, spinal precautions, clamshell TLSO brace, alarm, O2  Equipment Needs:  FWW    SUBJECTIVE:  Pt lives alone in a basement apartment with 1 stairs to enter building and 7 steps and 1 rail down to apartment level.  Bed is on 1 floor and bath is on 1 floor.  Pt ambulated with SPC PTA.     OBJECTIVE:   Initial Evaluation  Date: 25 Treatment Date: Short Term/ Long Term   Goals   AM-PAC 6 Clicks      Was pt agreeable to Eval/treatment? yes     Does pt have pain? 7/10 LBP     Bed Mobility  Rolling: MaxA  Supine to sit: MaxA  Sit to supine: MaxA  Scooting: maxA  Rolling: Independent  Supine to sit: Independent  Sit to supine: Independent  Scooting: Independent   Transfers Sit to stand: Elbert  Stand to sit: Elbert  Stand pivot: Elbert with WW  Sit to stand: Independent  Stand to sit: Independent  Stand pivot: Independent   Ambulation   15 x 2 feet with Elbert with WW  300 feet with Independent with LRD   Stair negotiation: ascended and descended NT  4 step(s) with 1 rail(s)  skin breakdown and contractures  [x] Safety and Education Training   [x] Patient/Caregiver Education   [x] HEP  [] Other     PT long term treatment goals are located in above grid    Frequency of treatments: 2-5x/week x 1-2 weeks.    Time in  1404  Time out  1444    Total Treatment Time  25 minutes     Evaluation Time includes thorough review of current medical information, gathering information on past medical history/social history and prior level of function, completion of standardized testing/informal observation of tasks, assessment of data and education on plan of care and goals.    CPT codes:  [x] Low Complexity PT evaluation 78199  [] Moderate Complexity PT evaluation 98933  [] High Complexity PT evaluation 99181  [] PT Re-evaluation 01119  [] Gait training 94013 0 minutes  [] Manual therapy 42062 0 minutes  [x] Therapeutic activities 63471 25 minutes  [] Therapeutic exercises 61091 0 minutes  [] Neuromuscular reeducation 65632 0 minutes     Amador Ewing, PT, DPT  [id number]

## 2025-06-11 NOTE — DISCHARGE INSTRUCTIONS
TRAUMA SERVICES DISCHARGE INSTRUCTIONS    Call [phone], option 2, for any questions/concerns and for follow-up appointment in 1 week(s).    Please follow the instructions checked below:    During the course of your workup, we identified an incidental finding of:  left renal cyst  Please follow-up with your primary care provider.    ACTIVITY INSTRUCTIONS  Increase activity as tolerated  No heavy lifting or strenuous activity  Take your incentive spirometer home and use 4-6 times/day   [x]  No driving until cleared by neurosurgery    WOUND/DRESSING INSTRUCTIONS:  You may shower.  No sitting in bath tub, hot tub or swimming until cleared by physician.  Ice to areas of pain for first 24 hours.  Heat to areas of pain after that.  Wash areas of lacerations/abrasions with soap & water.  Rinse well.  Pat dry with clean towel.  Apply thin layer of Bacitracin, Neosporin, or triple antibiotic cream to affected area 2-3 times per day.  Keep wounds clean and dry.   []  Sutures/Staples are to be removed    MEDICATION INSTRUCTIONS  Take medication as prescribed.  When taking pain medications, you may experience dizziness or drowsiness.  Do not drink alcohol or drive when taking these medications.  You may experience constipation while taking pain medication.  You may take over the counter stool softeners such as docusate (Colace), sennosides S (Senokot-S), or Miralax.   []  You may take Ibuprofen (over the counter) as directed for mild pain.     --You may take up to 800mg every 8 hours for pain, please take with food or milk.   [x]  You may take acetaminophen (Tylenol) products.  Do NOT take more than 4000mg of Tylenol in 24h.   [x]  Do not take any other acetaminophen (Tylenol) products if you are taking Percocet or Norco, as these contain Tylenol.   --Do NOT take more than 4000mg of Tylenol in 24h.    OPIOID MEDICATION INSTRUCTIONS  Read the medication guide that is included with your prescription.  Take your medication

## 2025-06-12 ENCOUNTER — APPOINTMENT (OUTPATIENT)
Dept: GENERAL RADIOLOGY | Age: 55
End: 2025-06-12
Payer: COMMERCIAL

## 2025-06-12 LAB
ANION GAP SERPL CALCULATED.3IONS-SCNC: 8 MMOL/L (ref 7–16)
BASOPHILS # BLD: 0.03 K/UL (ref 0–0.2)
BASOPHILS NFR BLD: 0 % (ref 0–2)
BUN SERPL-MCNC: 15 MG/DL (ref 6–20)
CALCIUM SERPL-MCNC: 8.5 MG/DL (ref 8.6–10)
CHLORIDE SERPL-SCNC: 101 MMOL/L (ref 98–107)
CO2 SERPL-SCNC: 25 MMOL/L (ref 22–29)
CREAT SERPL-MCNC: 0.8 MG/DL (ref 0.7–1.2)
EOSINOPHIL # BLD: 0.18 K/UL (ref 0.05–0.5)
EOSINOPHILS RELATIVE PERCENT: 3 % (ref 0–6)
ERYTHROCYTE [DISTWIDTH] IN BLOOD BY AUTOMATED COUNT: 16.1 % (ref 11.5–15)
GFR, ESTIMATED: >90 ML/MIN/1.73M2
GLUCOSE SERPL-MCNC: 92 MG/DL (ref 74–99)
HCT VFR BLD AUTO: 32.4 % (ref 37–54)
HGB BLD-MCNC: 9.9 G/DL (ref 12.5–16.5)
IMM GRANULOCYTES # BLD AUTO: 0.04 K/UL (ref 0–0.58)
IMM GRANULOCYTES NFR BLD: 1 % (ref 0–5)
LYMPHOCYTES NFR BLD: 1.41 K/UL (ref 1.5–4)
LYMPHOCYTES RELATIVE PERCENT: 21 % (ref 20–42)
MCH RBC QN AUTO: 28 PG (ref 26–35)
MCHC RBC AUTO-ENTMCNC: 30.6 G/DL (ref 32–34.5)
MCV RBC AUTO: 91.8 FL (ref 80–99.9)
MONOCYTES NFR BLD: 0.7 K/UL (ref 0.1–0.95)
MONOCYTES NFR BLD: 10 % (ref 2–12)
NEUTROPHILS NFR BLD: 65 % (ref 43–80)
NEUTS SEG NFR BLD: 4.37 K/UL (ref 1.8–7.3)
PLATELET # BLD AUTO: 200 K/UL (ref 130–450)
PMV BLD AUTO: 10.1 FL (ref 7–12)
POTASSIUM SERPL-SCNC: 4.2 MMOL/L (ref 3.5–5.1)
RBC # BLD AUTO: 3.53 M/UL (ref 3.8–5.8)
SODIUM SERPL-SCNC: 134 MMOL/L (ref 136–145)
WBC OTHER # BLD: 6.7 K/UL (ref 4.5–11.5)

## 2025-06-12 PROCEDURE — 36415 COLL VENOUS BLD VENIPUNCTURE: CPT

## 2025-06-12 PROCEDURE — 6370000000 HC RX 637 (ALT 250 FOR IP)

## 2025-06-12 PROCEDURE — 6370000000 HC RX 637 (ALT 250 FOR IP): Performed by: STUDENT IN AN ORGANIZED HEALTH CARE EDUCATION/TRAINING PROGRAM

## 2025-06-12 PROCEDURE — 71045 X-RAY EXAM CHEST 1 VIEW: CPT

## 2025-06-12 PROCEDURE — 99232 SBSQ HOSP IP/OBS MODERATE 35: CPT | Performed by: SURGERY

## 2025-06-12 PROCEDURE — 6360000002 HC RX W HCPCS: Performed by: SURGERY

## 2025-06-12 PROCEDURE — 97530 THERAPEUTIC ACTIVITIES: CPT

## 2025-06-12 PROCEDURE — 2700000000 HC OXYGEN THERAPY PER DAY

## 2025-06-12 PROCEDURE — 2060000000 HC ICU INTERMEDIATE R&B

## 2025-06-12 PROCEDURE — 80048 BASIC METABOLIC PNL TOTAL CA: CPT

## 2025-06-12 PROCEDURE — 97535 SELF CARE MNGMENT TRAINING: CPT

## 2025-06-12 PROCEDURE — 2500000003 HC RX 250 WO HCPCS

## 2025-06-12 PROCEDURE — 85025 COMPLETE CBC W/AUTO DIFF WBC: CPT

## 2025-06-12 RX ADMIN — PHENOBARBITAL 64.8 MG: 32.4 TABLET ORAL at 20:22

## 2025-06-12 RX ADMIN — DULOXETINE 20 MG: 20 CAPSULE, DELAYED RELEASE ORAL at 10:02

## 2025-06-12 RX ADMIN — ACETAMINOPHEN 650 MG: 325 TABLET ORAL at 20:21

## 2025-06-12 RX ADMIN — SODIUM CHLORIDE, PRESERVATIVE FREE 10 ML: 5 INJECTION INTRAVENOUS at 20:20

## 2025-06-12 RX ADMIN — PHENOBARBITAL 64.8 MG: 32.4 TABLET ORAL at 03:27

## 2025-06-12 RX ADMIN — ACETAMINOPHEN 650 MG: 325 TABLET ORAL at 03:27

## 2025-06-12 RX ADMIN — ENOXAPARIN SODIUM 30 MG: 100 INJECTION SUBCUTANEOUS at 20:21

## 2025-06-12 RX ADMIN — SODIUM CHLORIDE, PRESERVATIVE FREE 10 ML: 5 INJECTION INTRAVENOUS at 10:02

## 2025-06-12 RX ADMIN — Medication 3 MG: at 20:22

## 2025-06-12 RX ADMIN — Medication 100 MG: at 09:59

## 2025-06-12 RX ADMIN — SENNOSIDES AND DOCUSATE SODIUM 1 TABLET: 8.6; 5 TABLET ORAL at 20:22

## 2025-06-12 RX ADMIN — OXYCODONE HYDROCHLORIDE 10 MG: 10 TABLET ORAL at 09:55

## 2025-06-12 RX ADMIN — ACETAMINOPHEN 650 MG: 325 TABLET ORAL at 14:57

## 2025-06-12 RX ADMIN — ACETAMINOPHEN 650 MG: 325 TABLET ORAL at 09:55

## 2025-06-12 RX ADMIN — OXYCODONE HYDROCHLORIDE 10 MG: 10 TABLET ORAL at 18:54

## 2025-06-12 RX ADMIN — METHOCARBAMOL 1500 MG: 500 TABLET ORAL at 15:03

## 2025-06-12 RX ADMIN — PHENOBARBITAL 64.8 MG: 32.4 TABLET ORAL at 10:05

## 2025-06-12 RX ADMIN — DIVALPROEX SODIUM 500 MG: 500 TABLET, DELAYED RELEASE ORAL at 10:02

## 2025-06-12 RX ADMIN — PHENOBARBITAL 64.8 MG: 32.4 TABLET ORAL at 14:57

## 2025-06-12 RX ADMIN — SODIUM CHLORIDE, PRESERVATIVE FREE 10 ML: 5 INJECTION INTRAVENOUS at 10:01

## 2025-06-12 RX ADMIN — FOLIC ACID 1000 MCG: 1 TABLET ORAL at 10:00

## 2025-06-12 RX ADMIN — AMLODIPINE BESYLATE 10 MG: 10 TABLET ORAL at 09:59

## 2025-06-12 RX ADMIN — ENOXAPARIN SODIUM 30 MG: 100 INJECTION SUBCUTANEOUS at 10:00

## 2025-06-12 RX ADMIN — METHOCARBAMOL 1500 MG: 500 TABLET ORAL at 09:55

## 2025-06-12 RX ADMIN — OXYCODONE HYDROCHLORIDE 10 MG: 10 TABLET ORAL at 14:57

## 2025-06-12 RX ADMIN — METHOCARBAMOL 1500 MG: 500 TABLET ORAL at 20:21

## 2025-06-12 RX ADMIN — DIVALPROEX SODIUM 500 MG: 500 TABLET, DELAYED RELEASE ORAL at 20:21

## 2025-06-12 ASSESSMENT — PAIN DESCRIPTION - ONSET
ONSET: ON-GOING

## 2025-06-12 ASSESSMENT — PAIN SCALES - GENERAL
PAINLEVEL_OUTOF10: 9
PAINLEVEL_OUTOF10: 7
PAINLEVEL_OUTOF10: 8
PAINLEVEL_OUTOF10: 10
PAINLEVEL_OUTOF10: 8
PAINLEVEL_OUTOF10: 8
PAINLEVEL_OUTOF10: 6
PAINLEVEL_OUTOF10: 7
PAINLEVEL_OUTOF10: 8

## 2025-06-12 ASSESSMENT — PAIN DESCRIPTION - LOCATION
LOCATION: RIB CAGE;BACK
LOCATION: BACK;RIB CAGE
LOCATION: BACK;RIB CAGE
LOCATION: BACK
LOCATION: BACK;RIB CAGE

## 2025-06-12 ASSESSMENT — PAIN DESCRIPTION - ORIENTATION
ORIENTATION: POSTERIOR
ORIENTATION: MID;LEFT
ORIENTATION: LEFT;MID
ORIENTATION: LEFT;POSTERIOR
ORIENTATION: LEFT;POSTERIOR

## 2025-06-12 ASSESSMENT — PAIN DESCRIPTION - DESCRIPTORS
DESCRIPTORS: DISCOMFORT;STABBING;THROBBING
DESCRIPTORS: ACHING;DISCOMFORT;SORE
DESCRIPTORS: STABBING;SHARP;DISCOMFORT
DESCRIPTORS: ACHING;DISCOMFORT;THROBBING

## 2025-06-12 ASSESSMENT — PAIN DESCRIPTION - PAIN TYPE
TYPE: ACUTE PAIN

## 2025-06-12 ASSESSMENT — PAIN DESCRIPTION - FREQUENCY
FREQUENCY: INTERMITTENT
FREQUENCY: INTERMITTENT
FREQUENCY: CONTINUOUS

## 2025-06-12 ASSESSMENT — PAIN - FUNCTIONAL ASSESSMENT
PAIN_FUNCTIONAL_ASSESSMENT: PREVENTS OR INTERFERES SOME ACTIVE ACTIVITIES AND ADLS

## 2025-06-12 NOTE — CARE COORDINATION
Transition of care Readmit r/t  fall.left rib 3 through 7 fractures, L2 burst fracture with small hematoma. Pt had a neurosurgery consult and they are recommending a custom clamshell TLSO brace for pt upon discharge PT 14 OT 14  continues on phenobarb  SNF chocies per patient 1 [facility] 2. [facility] 3  [facility] referrals sent thru [facility] yuliana/margret to follow.Electronically signed by Judith Alcantara RN on 6/12/2025 at 9:42 AM    Offered peer recovery and outpt resources for alcohol patient declining- states he does not have drinking problem.Electronically signed by Judith Alcantara RN on 6/12/2025 at 11:07 AM  .  Call from Aminah- no beds @ [facility] but  will look @ him for [facility] 3 rd choice  Call from Viola @ [facility]- unable to accept.Has been accepted to [facility] and Christina bridges assistant  ntfd to start precert. 7000 completed and envelope and ambulette form in soft chart.Electronically signed by Judith Alcantara RN on 6/12/2025 at 11:13 AM

## 2025-06-12 NOTE — PROGRESS NOTES
3TRAUMA SURGERY  DAILY PROGRESS NOTE  6/12/2025    Subjective:  No new complaints or overnight events.  Patient now has TLSO brace.  He worked with physical therapy and Occupational Therapy yesterday.  He is tolerating regular diet.      Objective:  /74   Pulse 100   Temp 98.2 °F (36.8 °C) (Temporal)   Resp 18   Ht 1.702 m (5' 7\")   Wt 86.2 kg (190 lb)   SpO2 94%   BMI 29.76 kg/m²     General Appearance:  awake, alert, oriented, in no acute distress  Skin:  Skin color, texture, turgor normal  Head/face:  NCAT  Eyes:  No gross abnormalities. Sclera nonicteric  Lungs/Chest:  Normal expansion. No respiratory distress.   Heart: Warm throughout. Regular rate   Abdomen:  Soft, no tenderness, non distended.    Extremities: Extremities warm to touch, pink      I have personally reviewed all relevant labs and imaging.    Assessment/Plan:  54 y.o. male with left rib 3 through 7 fractures, L2 burst fracture with small hematoma.  Daily alcohol use    -Neurosurgery consult: TLSO  -Pulmonary hygiene, incentive spirometry as able  -multimodal pain control  -Discharge plan: Patient states that he would be willing to go to rehab for physical therapy  -PT/OT: [facility] 14/24 on 6/11  - Continue phenobarbital protocol, serum level to be checked 6/13    Electronically signed by Shekhar Woodall DO on 6/12/2025 at 6:05 AM     [facility]  SURGICAL INTENSIVE CARE UNIT (SICU)  ATTENDING PHYSICIAN CRITICAL CARE PROGRESS NOTE     I have examined the patient, reviewed the record,and discussed the case with the APN/  Resident. I have reviewed all relevant labs and imaging data. Please refer to the  APN/ resident's note. I agree with the  assessment and plan with the following corrections/ additions made above    BMP CBC unremarkable and stable stop daily labs   Chest Xray considering Rib Fractures r/o delayed hemothorax   Wean Oxygen as able   Home cymbalta, norvasc,   Lovenox DVT prophylaxis   Phenobarb for ETOH  thiamine and folate   Continue tylenol Robaxin and Oxycodone stop dilaudid   Multiple falls recommend placement     Alfredo Keene MD

## 2025-06-12 NOTE — DISCHARGE INSTR - COC
Continuity of Care Form    Patient Name: Kirsty Thurston   :  1970  MRN:  [id number]    Admit date:  6/10/2025  Discharge date:  25    Code Status Order: Full Code   Advance Directives:     Admitting Physician:  Alfredo Keene MD  PCP: Pierce Andrew MD    Discharging Nurse: TANYA  Discharging Hospital Unit/Room#: 8514/8514-A  Discharging Unit Phone Number: [phone]    Emergency Contact:   Extended Emergency Contact Information  Primary Emergency Contact: Rimma Mishra  Address: [address] [place] [address]  Home Phone: [phone]  Relation: Domestic Partner  Secondary Emergency Contact: Kendy Long  Home Phone: [phone]  Mobile Phone: [phone]  Relation: Domestic Partner  Preferred language: English   needed? No    Past Surgical History:  Past Surgical History:   Procedure Laterality Date    COLONOSCOPY N/A 1/10/2025    COLORECTAL CANCER SCREENING, NOT HIGH RISK performed by Ulises Pate MD at [facility]    GASTRIC BYPASS SURGERY      [facility]       Immunization History:   Immunization History   Administered Date(s) Administered    Influenza, FLUCELVAX, (age 6 mo+) IM, Trivalent PF, 0.5mL 2024    TD 5LF, TENIVAC, (age 7y+), IM, 0.5mL 2024, 2025       Active Problems:  Patient Active Problem List   Diagnosis Code    Multiple rib fractures involving four or more ribs S22.49XA    Closed fracture of multiple ribs of left side, initial encounter S22.42XA    Epidural hematoma (HCC) S06.4XAA    Subdural hematoma (HCC) S06.5XAA    Traumatic hematoma of brain with compression and midline shift of brain (HCC) S06.2XAA, S06.A0XA    Closed fracture of parietal bone of skull (HCC) S02.0XXA    Intracranial hemorrhage (HCC) I62.9    Closed skull fracture (HCC) S02.91XA    Fall from standing, initial encounter W19.XXXA    Stroke-like symptoms R29.90    Chest pain R07.9    Ataxic gait  to Appropriate Level of Care:48190} for {GREATER/LESS:618644066} 30 days.     Update Admission H&P: {CHP DME Changes in HandP:426364208}    PHYSICIAN SIGNATURE:  {Esignature:[id number]}

## 2025-06-12 NOTE — PROGRESS NOTES
OCCUPATIONAL THERAPY TREATMENT NOTE  [facility] [address], [place]      Date:2025  Patient Name: Kirsty Thurston  MRN: [id number]  : 1970  Room: 85[facility]85-A     Per OT Eval:   Evaluating OT: Justino Tolentino OTR/L [id number]     Referring Provider: Betty Johnson MD                             Specific Provider Orders/Date: OT evaluation and treatment 6/10/25 0900     Diagnosis:  Myalgia [M79.10]  Alcohol abuse [F10.10]  Frequent falls [R29.6]  Closed head injury, initial encounter [S09.90XA]  Lumbar burst fracture, closed, initial encounter ([facility]) [S32.001A]  Acute alcoholic intoxication without complication [F10.920]  Closed compression fracture of L2 lumbar vertebra, initial encounter ([facility]) [S32.020A]  Closed fracture of multiple ribs of left side, initial encounter [S22.42XA]       Pertinent Medical History:  has a past medical history of Alcohol use, Clavicle fracture, Headache, Hypertension, ICH (intracerebral hemorrhage) (HCC), Kidney stone, Memory disorder, Pseudocholinesterase deficiency, SAH (subarachnoid hemorrhage) (HCC), SDH (subdural hematoma) (HCC), and Syncope and collapse.   Past Surgical History         Past Surgical History:   Procedure Laterality Date    COLONOSCOPY N/A 1/10/2025     COLORECTAL CANCER SCREENING, NOT HIGH RISK performed by Ulises Pate MD at [facility]    GASTRIC BYPASS SURGERY        [facility]        Pt presented to the hospital on 6/10 s/p fall at home      Orders received, chart reviewed, eval complete.      Precautions:  Fall Risk, hard shell TLSO, spinal precautions, O2      Assessment of current deficits   [x] Functional mobility             [x]ADLs           [x] Strength                  []Cognition   [x] Functional transfers           [x] IADLs         [x] Safety Awareness   [x]Endurance   [] Fine Motor Coordination    [x] Balance      [] Vision/perception     Therapy      Orthotic manage/training  22255     Non-Billable Time     Total Timed Treatment 30 2     [place] MCGARRY/JESSICA [id number]

## 2025-06-12 NOTE — PROGRESS NOTES
Physical Therapy  Treatment Note      Name: Kirsty Thurston  : 1970  MRN: [id number]      Date of Service: 2025    Evaluating PT: Amador Ewing, PT, DPT, [id number]      Room #:  8514/8514-A  Diagnosis:  Myalgia [M79.10]  Alcohol abuse [F10.10]  Frequent falls [R29.6]  Closed head injury, initial encounter [S09.90XA]  Lumbar burst fracture, closed, initial encounter ([facility]) [S32.001A]  Acute alcoholic intoxication without complication [F10.920]  Closed compression fracture of L2 lumbar vertebra, initial encounter ([facility]) [S32.020A]  Closed fracture of multiple ribs of left side, initial encounter [S22.42XA]  PMHx/PSHx:   has a past medical history of Alcohol use, Clavicle fracture, Headache, Hypertension, ICH (intracerebral hemorrhage) ([facility]), Kidney stone, Memory disorder, Pseudocholinesterase deficiency, SAH (subarachnoid hemorrhage) ([facility]), SDH (subdural hematoma) ([facility]), and Syncope and collapse.  Procedure/Surgery:  N/A  Precautions:  Fall risk, spinal precautions, clamshell TLSO brace, alarm, O2  Equipment Needs:  FWW    SUBJECTIVE:  Pt lives alone in a basement apartment with 1 stairs to enter building and 7 steps and 1 rail down to apartment level.  Bed is on 1 floor and bath is on 1 floor.  Pt ambulated with SPC PTA.     OBJECTIVE:   Initial Evaluation  Date: 25 Treatment Date:  25 Short Term/ Long Term   Goals   AM-PAC 6 Clicks     Was pt agreeable to Eval/treatment? yes Yes     Does pt have pain? 7/10 LBP 7/10 low back pain     Bed Mobility  Rolling: MaxA  Supine to sit: MaxA  Sit to supine: MaxA  Scooting: maxA Rolling mod A  Supine to sit mod A  Scooting mod A Rolling: Independent  Supine to sit: Independent  Sit to supine: Independent  Scooting: Independent   Transfers Sit to stand: Elbert  Stand to sit: Elbert  Stand pivot: Elbert with WW Sit to stand mod A   Stand to sit mod A  Stand pivot with ww with mod A Sit to stand: Independent  Stand to sit: Independent  Stand pivot: Independent  Therapeutic exercises 94324  minutes  [] Neuromuscular reeducation 05794  minutes    Beth Lombardo [id number]

## 2025-06-12 NOTE — PLAN OF CARE
Problem: Discharge Planning  Goal: Discharge to home or other facility with appropriate resources  Outcome: Progressing     Problem: Seizure Precautions  Goal: Remains free of injury related to seizures activity  Outcome: Progressing     Problem: Safety - Adult  Goal: Free from fall injury  Outcome: Progressing     Problem: Pain  Goal: Verbalizes/displays adequate comfort level or baseline comfort level  Outcome: Progressing     Problem: ABCDS Injury Assessment  Goal: Absence of physical injury  Outcome: Progressing     Problem: Skin/Tissue Integrity  Goal: Skin integrity remains intact  Description: 1.  Monitor for areas of redness and/or skin breakdown2.  Assess vascular access sites hourly3.  Every 4-6 hours minimum:  Change oxygen saturation probe site4.  Every 4-6 hours:  If on nasal continuous positive airway pressure, respiratory therapy assess nares and determine need for appliance change or resting period  Outcome: Progressing

## 2025-06-13 ENCOUNTER — APPOINTMENT (OUTPATIENT)
Dept: GENERAL RADIOLOGY | Age: 55
End: 2025-06-13
Payer: COMMERCIAL

## 2025-06-13 VITALS
OXYGEN SATURATION: 98 % | HEART RATE: 76 BPM | RESPIRATION RATE: 21 BRPM | WEIGHT: 190 LBS | DIASTOLIC BLOOD PRESSURE: 75 MMHG | SYSTOLIC BLOOD PRESSURE: 128 MMHG | TEMPERATURE: 97 F | BODY MASS INDEX: 29.82 KG/M2 | HEIGHT: 67 IN

## 2025-06-13 LAB
PHENOBARBITAL DATE LAST DOSE: NORMAL
PHENOBARBITAL DOSE AMOUNT: NORMAL
PHENOBARBITAL TIME LAST DOSE: NORMAL
PHENOBARBITAL: 14.6 UG/ML (ref 10–30)

## 2025-06-13 PROCEDURE — 97535 SELF CARE MNGMENT TRAINING: CPT

## 2025-06-13 PROCEDURE — 71045 X-RAY EXAM CHEST 1 VIEW: CPT

## 2025-06-13 PROCEDURE — 6370000000 HC RX 637 (ALT 250 FOR IP): Performed by: STUDENT IN AN ORGANIZED HEALTH CARE EDUCATION/TRAINING PROGRAM

## 2025-06-13 PROCEDURE — 97530 THERAPEUTIC ACTIVITIES: CPT

## 2025-06-13 PROCEDURE — 6370000000 HC RX 637 (ALT 250 FOR IP): Performed by: SURGERY

## 2025-06-13 PROCEDURE — 2700000000 HC OXYGEN THERAPY PER DAY

## 2025-06-13 PROCEDURE — 80184 ASSAY OF PHENOBARBITAL: CPT

## 2025-06-13 PROCEDURE — 36415 COLL VENOUS BLD VENIPUNCTURE: CPT

## 2025-06-13 PROCEDURE — 97110 THERAPEUTIC EXERCISES: CPT

## 2025-06-13 PROCEDURE — 6370000000 HC RX 637 (ALT 250 FOR IP)

## 2025-06-13 RX ORDER — POLYETHYLENE GLYCOL 3350 17 G/17G
17 POWDER, FOR SOLUTION ORAL DAILY
DISCHARGE
Start: 2025-06-14 | End: 2025-07-14

## 2025-06-13 RX ORDER — MECLIZINE HCL 12.5 MG 12.5 MG/1
25 TABLET ORAL 2 TIMES DAILY
Status: DISCONTINUED | OUTPATIENT
Start: 2025-06-13 | End: 2025-06-13 | Stop reason: HOSPADM

## 2025-06-13 RX ORDER — ENOXAPARIN SODIUM 100 MG/ML
30 INJECTION SUBCUTANEOUS 2 TIMES DAILY
DISCHARGE
Start: 2025-06-13

## 2025-06-13 RX ORDER — OXYCODONE HYDROCHLORIDE 5 MG/1
5 TABLET ORAL EVERY 6 HOURS PRN
Qty: 28 TABLET | Refills: 0 | Status: SHIPPED | DISCHARGE
Start: 2025-06-13 | End: 2025-06-20

## 2025-06-13 RX ORDER — PHENOBARBITAL 32.4 MG/1
97.2 TABLET ORAL 2 TIMES DAILY
Status: DISCONTINUED | OUTPATIENT
Start: 2025-06-13 | End: 2025-06-13 | Stop reason: HOSPADM

## 2025-06-13 RX ORDER — NICOTINE 21 MG/24HR
1 PATCH, TRANSDERMAL 24 HOURS TRANSDERMAL DAILY
DISCHARGE
Start: 2025-06-14

## 2025-06-13 RX ORDER — ONDANSETRON 2 MG/ML
4 INJECTION INTRAMUSCULAR; INTRAVENOUS EVERY 6 HOURS PRN
DISCHARGE
Start: 2025-06-13

## 2025-06-13 RX ORDER — FERROUS GLUCONATE 324(38)MG
324 TABLET ORAL
Status: DISCONTINUED | OUTPATIENT
Start: 2025-06-14 | End: 2025-06-13 | Stop reason: HOSPADM

## 2025-06-13 RX ORDER — ATORVASTATIN CALCIUM 40 MG/1
40 TABLET, FILM COATED ORAL DAILY
Status: DISCONTINUED | OUTPATIENT
Start: 2025-06-13 | End: 2025-06-13 | Stop reason: HOSPADM

## 2025-06-13 RX ORDER — METHOCARBAMOL 750 MG/1
1500 TABLET, FILM COATED ORAL 4 TIMES DAILY
DISCHARGE
Start: 2025-06-13 | End: 2025-06-23

## 2025-06-13 RX ORDER — SENNA AND DOCUSATE SODIUM 50; 8.6 MG/1; MG/1
2 TABLET, FILM COATED ORAL 2 TIMES DAILY
DISCHARGE
Start: 2025-06-13

## 2025-06-13 RX ORDER — SENNA AND DOCUSATE SODIUM 50; 8.6 MG/1; MG/1
2 TABLET, FILM COATED ORAL 2 TIMES DAILY
Status: DISCONTINUED | OUTPATIENT
Start: 2025-06-13 | End: 2025-06-13 | Stop reason: HOSPADM

## 2025-06-13 RX ORDER — ONDANSETRON 4 MG/1
4 TABLET, ORALLY DISINTEGRATING ORAL EVERY 8 HOURS PRN
DISCHARGE
Start: 2025-06-13

## 2025-06-13 RX ORDER — PHENOBARBITAL 97.2 MG/1
97.2 TABLET ORAL 2 TIMES DAILY
Status: SHIPPED | DISCHARGE
Start: 2025-06-13 | End: 2025-06-20

## 2025-06-13 RX ADMIN — METHOCARBAMOL 1500 MG: 500 TABLET ORAL at 09:51

## 2025-06-13 RX ADMIN — ACETAMINOPHEN 650 MG: 325 TABLET ORAL at 14:55

## 2025-06-13 RX ADMIN — OXYCODONE HYDROCHLORIDE 10 MG: 10 TABLET ORAL at 14:55

## 2025-06-13 RX ADMIN — DIVALPROEX SODIUM 500 MG: 500 TABLET, DELAYED RELEASE ORAL at 09:50

## 2025-06-13 RX ADMIN — ACETAMINOPHEN 650 MG: 325 TABLET ORAL at 03:40

## 2025-06-13 RX ADMIN — Medication 100 MG: at 09:51

## 2025-06-13 RX ADMIN — MECLIZINE 25 MG: 12.5 TABLET ORAL at 14:55

## 2025-06-13 RX ADMIN — ACETAMINOPHEN 650 MG: 325 TABLET ORAL at 09:51

## 2025-06-13 RX ADMIN — FOLIC ACID 1000 MCG: 1 TABLET ORAL at 09:51

## 2025-06-13 RX ADMIN — DULOXETINE 20 MG: 20 CAPSULE, DELAYED RELEASE ORAL at 09:51

## 2025-06-13 RX ADMIN — SENNOSIDES AND DOCUSATE SODIUM 2 TABLET: 8.6; 5 TABLET ORAL at 09:50

## 2025-06-13 RX ADMIN — OXYCODONE HYDROCHLORIDE 10 MG: 10 TABLET ORAL at 03:41

## 2025-06-13 RX ADMIN — METHOCARBAMOL 1500 MG: 500 TABLET ORAL at 14:55

## 2025-06-13 RX ADMIN — AMLODIPINE BESYLATE 10 MG: 10 TABLET ORAL at 09:51

## 2025-06-13 RX ADMIN — PHENOBARBITAL 64.8 MG: 32.4 TABLET ORAL at 03:41

## 2025-06-13 RX ADMIN — ATORVASTATIN CALCIUM 40 MG: 40 TABLET, FILM COATED ORAL at 14:55

## 2025-06-13 ASSESSMENT — PAIN DESCRIPTION - PAIN TYPE: TYPE: ACUTE PAIN

## 2025-06-13 ASSESSMENT — PAIN SCALES - GENERAL
PAINLEVEL_OUTOF10: 7
PAINLEVEL_OUTOF10: 7
PAINLEVEL_OUTOF10: 10
PAINLEVEL_OUTOF10: 10

## 2025-06-13 ASSESSMENT — PAIN DESCRIPTION - ORIENTATION
ORIENTATION: MID;LOWER
ORIENTATION: LEFT;MID

## 2025-06-13 ASSESSMENT — PAIN DESCRIPTION - LOCATION
LOCATION: BACK
LOCATION: BACK;RIB CAGE
LOCATION: BACK

## 2025-06-13 ASSESSMENT — PAIN DESCRIPTION - DESCRIPTORS
DESCRIPTORS: SORE;ACHING;SHARP
DESCRIPTORS: ACHING;DISCOMFORT
DESCRIPTORS: ACHING;DISCOMFORT

## 2025-06-13 ASSESSMENT — PAIN - FUNCTIONAL ASSESSMENT
PAIN_FUNCTIONAL_ASSESSMENT: PREVENTS OR INTERFERES SOME ACTIVE ACTIVITIES AND ADLS
PAIN_FUNCTIONAL_ASSESSMENT: PREVENTS OR INTERFERES SOME ACTIVE ACTIVITIES AND ADLS

## 2025-06-13 ASSESSMENT — PAIN DESCRIPTION - FREQUENCY: FREQUENCY: CONTINUOUS

## 2025-06-13 ASSESSMENT — PAIN DESCRIPTION - ONSET: ONSET: ON-GOING

## 2025-06-13 NOTE — CARE COORDINATION
Transition of care Chart reviewed here after fall  left rib 3 through 7 fractures, L2 burst fracture with small hematoma. Pt had a neurosurgery consult and they are recommending a custom clamshell TLSO brace for pt upon discharge PT 14 OT 14  continues on phenobarb  Message form Christina begum assistant -approved to go to [facility] auth good thru  6/23  7000 completed and envelope and ambulette form in soft chart. Message to [facility] thru [facility].Electronically signed by Judith Alcantara RN on 6/13/2025 at 9:25 AM    Francheska from [facility]  here- aware of approval/auth possible discharge today.Electronically signed by Judith Alcantara RN on 6/13/2025 at 10:21 AM    Message to trauma resident  Dr Betty Johnson - that snf has auth and to check discharge.Electronically signed by Judith Alcantara RN on 6/13/2025 at 11:06 AM    Bryan ramirez ( contracted with facility) set up for 4 pm.Facility ntfd thru [facility]port and bedside RN and  patient all ntfd he states he will notify his family. .Electronically signed by Judith Alcantara RN on 6/13/2025 at 11:46 AM

## 2025-06-13 NOTE — PROGRESS NOTES
3TRAUMA SURGERY  DAILY PROGRESS NOTE  6/13/2025    Subjective:  No new complaints or overnight events.  Patient was able to sleep overnight.  Pain is relatively controlled    Objective:  BP (!) 124/92   Pulse (!) 104   Temp 98.1 °F (36.7 °C) (Oral)   Resp 20   Ht 1.702 m (5' 7\")   Wt 86.2 kg (190 lb)   SpO2 94%   BMI 29.76 kg/m²     General Appearance:  awake, alert, oriented, in no acute distress  Skin:  Skin color, texture, turgor normal  Head/face:  NCAT  Eyes:  No gross abnormalities. Sclera nonicteric  Lungs/Chest:  Normal expansion. No respiratory distress.   Heart: Warm throughout. Regular rate   Abdomen:  Soft, no tenderness, non distended.    Extremities: Extremities warm to touch, pink      I have personally reviewed all relevant labs and imaging.    Assessment/Plan:  54 y.o. male with left rib 3 through 7 fractures, L2 burst fracture with small hematoma.  Daily alcohol use    -Neurosurgery consult: TLSO  -Pulmonary hygiene, incentive spirometry as able  -multimodal pain control: Tylenol, Robaxin, oxycodone  -PT/OT: [facility] 11/24 on 6/12  - Continue phenobarbital protocol, serum level to be checked 6/13  -thiamine/folate  -daily labs discontinued  -home Cymbalta, Norvasc  -DVT prophylaxis: Lovenox  -Discharge plan: Pre-CERT process started for [facility]    Electronically signed by Shekhar Woodall DO on 6/13/2025 at 5:14 AM     [facility]  SURGICAL INTENSIVE CARE UNIT (SICU)  ATTENDING PHYSICIAN CRITICAL CARE PROGRESS NOTE     I have examined the patient, reviewed the record,and discussed the case with the APN/  Resident. I have reviewed all relevant labs and imaging data. Please refer to the  APN/ resident's note. I agree with the  assessment and plan with the following corrections/ additions made above    Chest Xray personally reviewed and stable   OK for DC to [facility] will stop Phenobarb on DC   Reorder home statin , Iron , Mg     Refer to DC summary   Alfredo JAIN

## 2025-06-13 NOTE — PROGRESS NOTES
Physical Therapy  Treatment Note      Name: Kirsty Thurston  : 1970  MRN: [id number]      Date of Service: 2025    Evaluating PT: Amador Ewing, PT, DPT, [id number]      Room #:  8514/8514-A  Diagnosis:  Myalgia [M79.10]  Alcohol abuse [F10.10]  Frequent falls [R29.6]  Closed head injury, initial encounter [S09.90XA]  Lumbar burst fracture, closed, initial encounter ([facility]) [S32.001A]  Acute alcoholic intoxication without complication [F10.920]  Closed compression fracture of L2 lumbar vertebra, initial encounter ([facility]) [S32.020A]  Closed fracture of multiple ribs of left side, initial encounter [S22.42XA]  PMHx/PSHx:   has a past medical history of Alcohol use, Clavicle fracture, Headache, Hypertension, ICH (intracerebral hemorrhage) ([facility]), Kidney stone, Memory disorder, Pseudocholinesterase deficiency, SAH (subarachnoid hemorrhage) ([facility]), SDH (subdural hematoma) ([facility]), and Syncope and collapse.  Procedure/Surgery:  N/A  Precautions:  Fall risk, spinal precautions, clamshell TLSO brace, alarm, O2  Equipment Needs:  FWW    SUBJECTIVE:  Pt lives alone in a basement apartment with 1 stairs to enter building and 7 steps and 1 rail down to apartment level.  Bed is on 1 floor and bath is on 1 floor.  Pt ambulated with SPC PTA.     OBJECTIVE:   Initial Evaluation  Date: 25 Treatment Date:  25 Short Term/ Long Term   Goals   AM-PAC 6 Clicks     Was pt agreeable to Eval/treatment? yes Yes     Does pt have pain? 7/10 LBP 7/10 low back pain     Bed Mobility  Rolling: MaxA  Supine to sit: MaxA  Sit to supine: MaxA  Scooting: maxA Rolling mod A  Supine to sit mod A  Scooting mod A Rolling: Independent  Supine to sit: Independent  Sit to supine: Independent  Scooting: Independent   Transfers Sit to stand: Elbert  Stand to sit: Elbert  Stand pivot: Elbert with WW Sit to stand min A  Stand to sit min A   Stand pivot with ww with mod A Sit to stand: Independent  Stand to sit: Independent  Stand pivot: Independent  Gait training 77121  minutes  [] Manual therapy 74739  minutes  [x] Therapeutic activities 43150 30 minutes  [x] Therapeutic exercises 83035  10 minutes  [] Neuromuscular reeducation 75174  minutes    Beth Lombardo [id number]

## 2025-06-13 NOTE — PROGRESS NOTES
OCCUPATIONAL THERAPY TREATMENT NOTE  [facility] [address] [place], [place]      Date:2025  Patient Name: Kirsty Thurston  MRN: [id number]  : 1970  Room: [facility]-A     Per OT Eval:   Evaluating OT: Justino Tolentino OTR/L [id number]     Referring Provider: Betty Johnson MD                             Specific Provider Orders/Date: OT evaluation and treatment 6/10/25 0900     Diagnosis:  Myalgia [M79.10]  Alcohol abuse [F10.10]  Frequent falls [R29.6]  Closed head injury, initial encounter [S09.90XA]  Lumbar burst fracture, closed, initial encounter ([facility]) [S32.001A]  Acute alcoholic intoxication without complication [F10.920]  Closed compression fracture of L2 lumbar vertebra, initial encounter ([facility]) [S32.020A]  Closed fracture of multiple ribs of left side, initial encounter [S22.42XA]       Pertinent Medical History:  has a past medical history of Alcohol use, Clavicle fracture, Headache, Hypertension, ICH (intracerebral hemorrhage) (HCC), Kidney stone, Memory disorder, Pseudocholinesterase deficiency, SAH (subarachnoid hemorrhage) (HCC), SDH (subdural hematoma) (HCC), and Syncope and collapse.   Past Surgical History         Past Surgical History:   Procedure Laterality Date    COLONOSCOPY N/A 1/10/2025     COLORECTAL CANCER SCREENING, NOT HIGH RISK performed [place] Ulises [place] [facility] [place]    GASTRIC BYPASS SURGERY        [facility]        Pt presented to the hospital on 6/10 s/p fall at home      Orders received, chart reviewed, eval complete.      Precautions:  Fall Risk, hard shell TLSO, spinal precautions, O2      Assessment of current deficits   [x] Functional mobility             [x]ADLs           [x] Strength                  []Cognition   [x] Functional transfers           [x] IADLs         [x] Safety Awareness   [x]Endurance   [] Fine Motor Coordination    [x] Balance      [] Vision/perception       Stand to sit: Min A    Stand pivot: Min A  EOB>chair with arms to the R with FWW   Sit<>Stand: Min A  Stand Pivot Transfer: Mod A  Min cues for hand placement/safety required. SPT completed from bed>chair with w/w with increased assist required for balance/safety secondary to unsteadiness with directional changes.      Stand by assist    Functional Mobility Min A  <household distance in room with FWW In order to increase balance, activity tolerance, and safety during functional mobility for increased independence in functional mobility, ADLs, and IADLs.       Min A  Completed within pt's room with w/w with assist required for balance/safety and to safety maneuver around environmental obstacles.  Stand by assist   with AAD    Balance Sitting - static: Stand by assist    Sitting - dynamic: CGA  during functional activity      Standing- static: Min A    Standing- dynamic: Min A  during functional activity  Sitting   Static: SBA/S  Dynamic: Min/CGA     Standing  Static: Min A    Dynamic: Mod/Min A    Pt tolerated sitting at EOB x15 minutes with focus on increasing trunk control/core strength, dynamic balance and sitting activity tolerance.  Sitting: Independent      Standing: Stand by assist    Activity Tolerance Fair  Pt on 3L o2 via nc throughout session; SpO2 90-95%   Fair  For light activity. Limited by pain. Good   Visual/  Perceptual wears glasses                   BUE  ROM/Strength/  Fine motor Coordination Hand dominance: right      RUE: ROM WFL      Strength: grossly functional      Strength: WFL      Coordination:  WFL      LUE: ROM WFL      Strength: grossly functional      Strength: WFL      Coordination: WFL        Safety   Fair -  Fair-  Min cues for sequencing/safety required. P Good during functional activity while maintaining spinal precautions    Comments: Cleared by RN to see pt. Upon arrival patient in supine and agreeable to OT session. At end of session, patient left sitting in bedside

## 2025-06-23 ENCOUNTER — TELEPHONE (OUTPATIENT)
Dept: FAMILY MEDICINE CLINIC | Age: 55
End: 2025-06-23

## 2025-06-23 NOTE — TELEPHONE ENCOUNTER
Patient called into office to report he is doing well at [facility]. He will call for follow up after release from rehab. States he has no needs at this time, just wanted you to be aware

## 2025-07-10 DIAGNOSIS — S32.001A LUMBAR BURST FRACTURE, CLOSED, INITIAL ENCOUNTER (HCC): Primary | ICD-10-CM

## 2025-07-17 ENCOUNTER — TELEPHONE (OUTPATIENT)
Age: 55
End: 2025-07-17

## 2025-07-17 ENCOUNTER — HOSPITAL ENCOUNTER (OUTPATIENT)
Age: 55
Discharge: HOME | End: 2025-07-19
Payer: COMMERCIAL

## 2025-07-17 ENCOUNTER — OFFICE VISIT (OUTPATIENT)
Age: 55
End: 2025-07-17
Payer: COMMERCIAL

## 2025-07-17 ENCOUNTER — HOSPITAL ENCOUNTER (OUTPATIENT)
Dept: GENERAL RADIOLOGY | Age: 55
Discharge: HOME | End: 2025-07-19
Payer: COMMERCIAL

## 2025-07-17 DIAGNOSIS — S32.020D CLOSED COMPRESSION FRACTURE OF L2 LUMBAR VERTEBRA WITH ROUTINE HEALING, SUBSEQUENT ENCOUNTER: Primary | ICD-10-CM

## 2025-07-17 DIAGNOSIS — S32.001A LUMBAR BURST FRACTURE, CLOSED, INITIAL ENCOUNTER (HCC): ICD-10-CM

## 2025-07-17 PROCEDURE — G8427 DOCREV CUR MEDS BY ELIG CLIN: HCPCS | Performed by: STUDENT IN AN ORGANIZED HEALTH CARE EDUCATION/TRAINING PROGRAM

## 2025-07-17 PROCEDURE — 4004F PT TOBACCO SCREEN RCVD TLK: CPT | Performed by: STUDENT IN AN ORGANIZED HEALTH CARE EDUCATION/TRAINING PROGRAM

## 2025-07-17 PROCEDURE — 99212 OFFICE O/P EST SF 10 MIN: CPT | Performed by: STUDENT IN AN ORGANIZED HEALTH CARE EDUCATION/TRAINING PROGRAM

## 2025-07-17 PROCEDURE — 99213 OFFICE O/P EST LOW 20 MIN: CPT | Performed by: STUDENT IN AN ORGANIZED HEALTH CARE EDUCATION/TRAINING PROGRAM

## 2025-07-17 PROCEDURE — G8417 CALC BMI ABV UP PARAM F/U: HCPCS | Performed by: STUDENT IN AN ORGANIZED HEALTH CARE EDUCATION/TRAINING PROGRAM

## 2025-07-17 PROCEDURE — 72100 X-RAY EXAM L-S SPINE 2/3 VWS: CPT

## 2025-07-17 PROCEDURE — 3017F COLORECTAL CA SCREEN DOC REV: CPT | Performed by: STUDENT IN AN ORGANIZED HEALTH CARE EDUCATION/TRAINING PROGRAM

## 2025-07-17 RX ORDER — METHOCARBAMOL 750 MG/1
TABLET, FILM COATED ORAL
COMMUNITY
Start: 2025-07-06

## 2025-07-17 RX ORDER — OXYCODONE HYDROCHLORIDE 5 MG/1
TABLET ORAL
COMMUNITY
Start: 2025-07-08

## 2025-07-17 NOTE — PROGRESS NOTES
Hospital Follow-up     Subjective: Kirsty Thurston is a 55 y.o.  male who was originally seen on 6/10/2025 after falling. He was found to have suffered a L2 burst fracture. He was placed in a clamshell TLSO. He presents today for 1 month follow up.    Patient  states he is doing well. He does admit to back pain but medication helps with pain. No pain down the legs. No numbness or weakness. Brace complaint. XR reviewed.      Physical Exam:              WDWN, no apparent distress              Non-labored breathing               Vitals Stable              Alert and oriented x3              CN 3-12 intact              PERRL              EOMI              MICHAEL well              Motor strength symmetric              Sensation to LT intact bilaterally   In TLSO                  Imagin2025 XR Lumbar Spine  L2 burst fracture seen,stable-final read pending     Assessment: This is a 55 y.o.  male presenting for a 1 month follow-up s/p L2 burst fracture.      Plan:  -Pain control and expectations discussed  -Continue brace and restrictions   -OARRS report reviewed   -Follow-up in neurosurgery clinic in 2 months with repeat XR  -Call or return to neurosurgery office sooner if symptoms worsen or if new issues arise in the interim.    Electronically signed by Hazel Swartz PA-C on 2025 at 11:31 AM

## 2025-07-17 NOTE — TELEPHONE ENCOUNTER
Patient missed his follow up appt with Geena. Per Geena, please call Cora govea with x-ray results. 2 month follow up with x-rays scheduled 9/17.

## 2025-07-20 ENCOUNTER — HOSPITAL ENCOUNTER (EMERGENCY)
Age: 55
Discharge: HOME | End: 2025-07-20
Attending: STUDENT IN AN ORGANIZED HEALTH CARE EDUCATION/TRAINING PROGRAM
Payer: COMMERCIAL

## 2025-07-20 ENCOUNTER — APPOINTMENT (OUTPATIENT)
Dept: CT IMAGING | Age: 55
End: 2025-07-20
Attending: STUDENT IN AN ORGANIZED HEALTH CARE EDUCATION/TRAINING PROGRAM
Payer: COMMERCIAL

## 2025-07-20 VITALS
SYSTOLIC BLOOD PRESSURE: 153 MMHG | DIASTOLIC BLOOD PRESSURE: 86 MMHG | TEMPERATURE: 98.6 F | OXYGEN SATURATION: 97 % | RESPIRATION RATE: 18 BRPM | HEART RATE: 90 BPM

## 2025-07-20 DIAGNOSIS — G89.29 CHRONIC MIDLINE LOW BACK PAIN WITHOUT SCIATICA: ICD-10-CM

## 2025-07-20 DIAGNOSIS — M54.50 CHRONIC MIDLINE LOW BACK PAIN WITHOUT SCIATICA: ICD-10-CM
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
## 2025-07-20 DIAGNOSIS — N30.00 ACUTE CYSTITIS WITHOUT HEMATURIA: ICD-10-CM

## 2025-07-20 LAB
BACTERIA URNS QL MICRO: ABNORMAL
BILIRUB UR QL STRIP: NEGATIVE
CLARITY UR: ABNORMAL
COLOR UR: YELLOW
GLUCOSE UR STRIP-MCNC: NEGATIVE MG/DL
HGB UR QL STRIP.AUTO: ABNORMAL
KETONES UR STRIP-MCNC: 15 MG/DL
LEUKOCYTE ESTERASE UR QL STRIP: ABNORMAL
NITRITE UR QL STRIP: NEGATIVE
PH UR STRIP: 6 [PH] (ref 5–8)
PROT UR STRIP-MCNC: 30 MG/DL
RBC #/AREA URNS HPF: ABNORMAL /HPF
SP GR UR STRIP: 1.02 (ref 1–1.03)
UROBILINOGEN UR STRIP-ACNC: 0.2 EU/DL (ref 0–1)
WBC #/AREA URNS HPF: ABNORMAL /HPF

## 2025-07-20 PROCEDURE — 72131 CT LUMBAR SPINE W/O DYE: CPT

## 2025-07-20 PROCEDURE — 6370000000 HC RX 637 (ALT 250 FOR IP): Performed by: STUDENT IN AN ORGANIZED HEALTH CARE EDUCATION/TRAINING PROGRAM

## 2025-07-20 PROCEDURE — 87086 URINE CULTURE/COLONY COUNT: CPT

## 2025-07-20 PROCEDURE — 81001 URINALYSIS AUTO W/SCOPE: CPT

## 2025-07-20 PROCEDURE — 72128 CT CHEST SPINE W/O DYE: CPT

## 2025-07-20 PROCEDURE — 99284 EMERGENCY DEPT VISIT MOD MDM: CPT

## 2025-07-20 RX ORDER — HYDROCODONE BITARTRATE AND ACETAMINOPHEN 5; 325 MG/1; MG/1
1 TABLET ORAL ONCE
Status: COMPLETED | OUTPATIENT
Start: 2025-07-20 | End: 2025-07-20

## 2025-07-20 RX ORDER — CEFDINIR 300 MG/1
300 CAPSULE ORAL ONCE
Status: COMPLETED | OUTPATIENT
Start: 2025-07-20 | End: 2025-07-20

## 2025-07-20 RX ORDER — CEFDINIR 300 MG/1
300 CAPSULE ORAL 2 TIMES DAILY
Qty: 14 CAPSULE | Refills: 0 | Status: SHIPPED | OUTPATIENT
Start: 2025-07-20 | End: 2025-07-27

## 2025-07-20 RX ADMIN — CEFDINIR 300 MG: 300 CAPSULE ORAL at 15:42

## 2025-07-20 RX ADMIN — HYDROCODONE BITARTRATE AND ACETAMINOPHEN 1 TABLET: 5; 325 TABLET ORAL at 13:18

## 2025-07-20 ASSESSMENT — PAIN DESCRIPTION - ORIENTATION
ORIENTATION: LOWER
ORIENTATION: LOWER

## 2025-07-20 ASSESSMENT — PAIN DESCRIPTION - LOCATION
LOCATION: BACK
LOCATION: BACK

## 2025-07-20 ASSESSMENT — PAIN DESCRIPTION - DESCRIPTORS
DESCRIPTORS: ACHING;DULL;DISCOMFORT
DESCRIPTORS: ACHING;DULL;DISCOMFORT

## 2025-07-20 ASSESSMENT — PAIN DESCRIPTION - PAIN TYPE
TYPE: ACUTE PAIN
TYPE: ACUTE PAIN;CHRONIC PAIN

## 2025-07-20 ASSESSMENT — PAIN SCALES - GENERAL
PAINLEVEL_OUTOF10: 8
PAINLEVEL_OUTOF10: 8

## 2025-07-21 LAB
MICROORGANISM SPEC CULT: NO GROWTH
SERVICE CMNT-IMP: NORMAL
SPECIMEN DESCRIPTION: NORMAL
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
## 2025-07-25 ENCOUNTER — APPOINTMENT (OUTPATIENT)
Dept: CT IMAGING | Age: 55
End: 2025-07-25
Payer: COMMERCIAL

## 2025-07-25 ENCOUNTER — APPOINTMENT (OUTPATIENT)
Dept: GENERAL RADIOLOGY | Age: 55
End: 2025-07-25
Payer: COMMERCIAL

## 2025-07-25 ENCOUNTER — HOSPITAL ENCOUNTER (EMERGENCY)
Age: 55
Discharge: NURSING, REHAB OR LONG-TERM CARE | End: 2025-07-25
Attending: STUDENT IN AN ORGANIZED HEALTH CARE EDUCATION/TRAINING PROGRAM
Payer: COMMERCIAL

## 2025-07-25 VITALS
DIASTOLIC BLOOD PRESSURE: 79 MMHG | OXYGEN SATURATION: 93 % | RESPIRATION RATE: 16 BRPM | BODY MASS INDEX: 29.82 KG/M2 | TEMPERATURE: 98.4 F | SYSTOLIC BLOOD PRESSURE: 127 MMHG | WEIGHT: 190 LBS | HEIGHT: 67 IN | HEART RATE: 92 BPM
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
## 2025-07-25 DIAGNOSIS — M25.511 ACUTE PAIN OF RIGHT SHOULDER: Primary | ICD-10-CM

## 2025-07-25 PROCEDURE — 6370000000 HC RX 637 (ALT 250 FOR IP)

## 2025-07-25 PROCEDURE — 99284 EMERGENCY DEPT VISIT MOD MDM: CPT

## 2025-07-25 PROCEDURE — 72125 CT NECK SPINE W/O DYE: CPT

## 2025-07-25 PROCEDURE — 72128 CT CHEST SPINE W/O DYE: CPT

## 2025-07-25 PROCEDURE — 70450 CT HEAD/BRAIN W/O DYE: CPT

## 2025-07-25 PROCEDURE — 73030 X-RAY EXAM OF SHOULDER: CPT

## 2025-07-25 PROCEDURE — 72131 CT LUMBAR SPINE W/O DYE: CPT

## 2025-07-25 RX ORDER — HYDROCODONE BITARTRATE AND ACETAMINOPHEN 5; 325 MG/1; MG/1
1 TABLET ORAL ONCE
Status: COMPLETED | OUTPATIENT
Start: 2025-07-25 | End: 2025-07-25

## 2025-07-25 RX ADMIN — HYDROCODONE BITARTRATE AND ACETAMINOPHEN 1 TABLET: 5; 325 TABLET ORAL at 15:29

## 2025-07-25 ASSESSMENT — PAIN DESCRIPTION - DESCRIPTORS
DESCRIPTORS: ACHING
DESCRIPTORS: DISCOMFORT;THROBBING;SQUEEZING

## 2025-07-25 ASSESSMENT — PAIN - FUNCTIONAL ASSESSMENT: PAIN_FUNCTIONAL_ASSESSMENT: 0-10

## 2025-07-25 ASSESSMENT — PAIN SCALES - GENERAL
PAINLEVEL_OUTOF10: 8
PAINLEVEL_OUTOF10: 7

## 2025-07-25 ASSESSMENT — PAIN DESCRIPTION - LOCATION
LOCATION: SHOULDER
LOCATION: GENERALIZED

## 2025-07-25 ASSESSMENT — LIFESTYLE VARIABLES
HOW OFTEN DO YOU HAVE A DRINK CONTAINING ALCOHOL: 2-3 TIMES A WEEK
HOW MANY STANDARD DRINKS CONTAINING ALCOHOL DO YOU HAVE ON A TYPICAL DAY: 3 OR 4

## 2025-07-25 ASSESSMENT — PAIN DESCRIPTION - ORIENTATION
ORIENTATION: RIGHT
ORIENTATION: RIGHT

## 2025-07-25 NOTE — ED NOTES
[facility] called for transportation back to [facility]. ETA 9AM. Asked for them to outsource.

## 2025-07-25 NOTE — ED PROVIDER NOTES
[facility] EMERGENCY DEPARTMENT  EMERGENCY DEPARTMENT ENCOUNTER        Pt Name: Kirsty Thurston  MRN: [id number]  Birthdate 1970  Date of evaluation: 7/25/2025  Provider: Leila Schneider MD  PCP: Pierce Andrew MD  Note Started: 4:21 PM EDT 7/25/25    CHIEF COMPLAINT       Chief Complaint   Patient presents with    Fall     Slipped on wet floor in bathroom, denies hitting head +Lovenox; c/o right shoulder pain        HISTORY OF PRESENT ILLNESS: 1 or more Elements   History From: Patient    Limitations to history : None    Kirsty Thurston is a 55 y.o. male who presents for fall occurring prior to arrival.  Patient reports he slipped on the wet floor in his bathroom.  He states he was holding onto a bar with his right arm to catch himself from falling.  He currently complains of right shoulder pain.  Patient does not believe he hit his head.  No LOC.  Patient is on Lovenox.  No other injuries or complaints at this time.  Patient denies any numbness or weakness.  Patient currently has a clamshell TLSO in place due to previous spinal fractures.      Patient denies headache, shortness of breath, chest pain, abdominal pain, nausea, lightheadedness.    Nursing Notes were all reviewed and agreed with or any disagreements were addressed in the HPI.        REVIEW OF EXTERNAL NOTES :         Upon chart review, patient has lumbar burst fracture.  Patient follows with Hazel Swartz from neurosurgery.      REVIEW OF SYSTEMS :        Positives and Pertinent negatives as per HPI.     SURGICAL HISTORY     Past Surgical History:   Procedure Laterality Date    COLONOSCOPY N/A 1/10/2025    COLORECTAL CANCER SCREENING, NOT HIGH RISK performed by Ulises Pate MD at [facility]COPY    GASTRIC BYPASS SURGERY  2001    [facility]       CURRENTMEDICATIONS       Discharge Medication List as of 7/25/2025  7:16 PM        CONTINUE these medications which have NOT CHANGED    Details   cefdinir

## 2025-07-26 ENCOUNTER — APPOINTMENT (OUTPATIENT)
Dept: GENERAL RADIOLOGY | Age: 55
End: 2025-07-26
Payer: COMMERCIAL

## 2025-07-26 ENCOUNTER — APPOINTMENT (OUTPATIENT)
Dept: CT IMAGING | Age: 55
End: 2025-07-26
Attending: EMERGENCY MEDICINE
Payer: COMMERCIAL

## 2025-07-26 ENCOUNTER — APPOINTMENT (OUTPATIENT)
Dept: CT IMAGING | Age: 55
End: 2025-07-26
Payer: COMMERCIAL

## 2025-07-26 ENCOUNTER — HOSPITAL ENCOUNTER (EMERGENCY)
Age: 55
Discharge: HOME | End: 2025-07-26
Attending: EMERGENCY MEDICINE
Payer: COMMERCIAL

## 2025-07-26 VITALS
OXYGEN SATURATION: 98 % | HEIGHT: 67 IN | TEMPERATURE: 98.3 F | RESPIRATION RATE: 18 BRPM | SYSTOLIC BLOOD PRESSURE: 139 MMHG | DIASTOLIC BLOOD PRESSURE: 85 MMHG | BODY MASS INDEX: 29.82 KG/M2 | WEIGHT: 190 LBS | HEART RATE: 102 BPM

## 2025-07-26 DIAGNOSIS — S01.01XA LACERATION OF SCALP, INITIAL ENCOUNTER: ICD-10-CM

## 2025-07-26 DIAGNOSIS — R07.9 RIGHT-SIDED CHEST PAIN: ICD-10-CM

## 2025-07-26 DIAGNOSIS — S09.90XA INJURY OF HEAD, INITIAL ENCOUNTER: ICD-10-CM

## 2025-07-26 DIAGNOSIS — F39 MOOD DISORDER: ICD-10-CM

## 2025-07-26 DIAGNOSIS — F10.929 ACUTE ALCOHOLIC INTOXICATION WITH COMPLICATION: Primary | ICD-10-CM

## 2025-07-26 LAB
ALBUMIN SERPL-MCNC: 3.4 G/DL (ref 3.5–5.2)
ALP SERPL-CCNC: 82 U/L (ref 40–129)
ALT SERPL-CCNC: 8 U/L (ref 0–50)
AMPHET UR QL SCN: NEGATIVE
ANION GAP SERPL CALCULATED.3IONS-SCNC: 17 MMOL/L (ref 7–16)
APAP SERPL-MCNC: <5 UG/ML (ref 10–30)
AST SERPL-CCNC: 22 U/L (ref 0–50)
BACTERIA URNS QL MICRO: ABNORMAL
BARBITURATES UR QL SCN: POSITIVE
BASOPHILS # BLD: 0.03 K/UL (ref 0–0.2)
BASOPHILS NFR BLD: 0 % (ref 0–2)
BENZODIAZ UR QL: NEGATIVE
BILIRUB SERPL-MCNC: <0.2 MG/DL (ref 0–1.2)
BILIRUB UR QL STRIP: NEGATIVE
BUN SERPL-MCNC: 6 MG/DL (ref 6–20)
BUPRENORPHINE UR QL: NEGATIVE
CALCIUM SERPL-MCNC: 8 MG/DL (ref 8.6–10)
CANNABINOIDS UR QL SCN: NEGATIVE
CHLORIDE SERPL-SCNC: 98 MMOL/L (ref 98–107)
CLARITY UR: CLEAR
CO2 SERPL-SCNC: 20 MMOL/L (ref 22–29)
COCAINE UR QL SCN: NEGATIVE
COLOR UR: YELLOW
CREAT SERPL-MCNC: 0.7 MG/DL (ref 0.7–1.2)
D-DIMER QUANTITATIVE: 321 NG/ML DDU (ref 0–230)
EOSINOPHIL # BLD: 0.17 K/UL (ref 0.05–0.5)
EOSINOPHILS RELATIVE PERCENT: 2 % (ref 0–6)
ERYTHROCYTE [DISTWIDTH] IN BLOOD BY AUTOMATED COUNT: 17.6 % (ref 11.5–15)
ETHANOLAMINE SERPL-MCNC: 244 MG/DL (ref 0–0.08)
ETHANOLAMINE SERPL-MCNC: 75 MG/DL (ref 0–0.08)
FENTANYL UR QL: NEGATIVE
GFR, ESTIMATED: >90 ML/MIN/1.73M2
GLUCOSE SERPL-MCNC: 111 MG/DL (ref 74–99)
GLUCOSE UR STRIP-MCNC: NEGATIVE MG/DL
HCT VFR BLD AUTO: 35 % (ref 37–54)
HGB BLD-MCNC: 11.7 G/DL (ref 12.5–16.5)
HGB UR QL STRIP.AUTO: ABNORMAL
IMM GRANULOCYTES # BLD AUTO: 0.03 K/UL (ref 0–0.58)
IMM GRANULOCYTES NFR BLD: 0 % (ref 0–5)
KETONES UR STRIP-MCNC: NEGATIVE MG/DL
LEUKOCYTE ESTERASE UR QL STRIP: ABNORMAL
LYMPHOCYTES NFR BLD: 2.25 K/UL (ref 1.5–4)
LYMPHOCYTES RELATIVE PERCENT: 28 % (ref 20–42)
MCH RBC QN AUTO: 29.1 PG (ref 26–35)
MCHC RBC AUTO-ENTMCNC: 33.4 G/DL (ref 32–34.5)
MCV RBC AUTO: 87.1 FL (ref 80–99.9)
METHADONE UR QL: NEGATIVE
MONOCYTES NFR BLD: 0.89 K/UL (ref 0.1–0.95)
MONOCYTES NFR BLD: 11 % (ref 2–12)
NEUTROPHILS NFR BLD: 58 % (ref 43–80)
NEUTS SEG NFR BLD: 4.75 K/UL (ref 1.8–7.3)
NITRITE UR QL STRIP: NEGATIVE
OPIATES UR QL SCN: NEGATIVE
OXYCODONE UR QL SCN: NEGATIVE
PCP UR QL SCN: NEGATIVE
PH UR STRIP: 6 [PH] (ref 5–8)
PLATELET # BLD AUTO: 256 K/UL (ref 130–450)
PMV BLD AUTO: 9 FL (ref 7–12)
POTASSIUM SERPL-SCNC: 3.3 MMOL/L (ref 3.5–5.1)
PROT SERPL-MCNC: 5.8 G/DL (ref 6.4–8.3)
PROT UR STRIP-MCNC: NEGATIVE MG/DL
RBC # BLD AUTO: 4.02 M/UL (ref 3.8–5.8)
RBC #/AREA URNS HPF: ABNORMAL /HPF
SALICYLATES SERPL-MCNC: <0.5 MG/DL (ref 0–30)
SODIUM SERPL-SCNC: 135 MMOL/L (ref 136–145)
SP GR UR STRIP: <1.005 (ref 1–1.03)
TEST INFORMATION: ABNORMAL
TOXIC TRICYCLIC SC,BLOOD: NEGATIVE
TROPONIN I SERPL HS-MCNC: 25 NG/L (ref 0–22)
TROPONIN I SERPL HS-MCNC: 29 NG/L (ref 0–22)
UROBILINOGEN UR STRIP-ACNC: 0.2 EU/DL (ref 0–1)
WBC #/AREA URNS HPF: ABNORMAL /HPF
WBC OTHER # BLD: 8.1 K/UL (ref 4.5–11.5)

## 2025-07-26 PROCEDURE — 71275 CT ANGIOGRAPHY CHEST: CPT

## 2025-07-26 PROCEDURE — 80053 COMPREHEN METABOLIC PANEL: CPT

## 2025-07-26 PROCEDURE — 85379 FIBRIN DEGRADATION QUANT: CPT

## 2025-07-26 PROCEDURE — 80179 DRUG ASSAY SALICYLATE: CPT

## 2025-07-26 PROCEDURE — 72125 CT NECK SPINE W/O DYE: CPT

## 2025-07-26 PROCEDURE — 85025 COMPLETE CBC W/AUTO DIFF WBC: CPT

## 2025-07-26 PROCEDURE — 90792 PSYCH DIAG EVAL W/MED SRVCS: CPT | Performed by: NURSE PRACTITIONER

## 2025-07-26 PROCEDURE — 6370000000 HC RX 637 (ALT 250 FOR IP)

## 2025-07-26 PROCEDURE — G0480 DRUG TEST DEF 1-7 CLASSES: HCPCS

## 2025-07-26 PROCEDURE — 84484 ASSAY OF TROPONIN QUANT: CPT

## 2025-07-26 PROCEDURE — 80143 DRUG ASSAY ACETAMINOPHEN: CPT

## 2025-07-26 PROCEDURE — 2580000003 HC RX 258: Performed by: EMERGENCY MEDICINE

## 2025-07-26 PROCEDURE — 99285 EMERGENCY DEPT VISIT HI MDM: CPT

## 2025-07-26 PROCEDURE — 71045 X-RAY EXAM CHEST 1 VIEW: CPT

## 2025-07-26 PROCEDURE — 80307 DRUG TEST PRSMV CHEM ANLYZR: CPT

## 2025-07-26 PROCEDURE — 6360000004 HC RX CONTRAST MEDICATION: Performed by: RADIOLOGY

## 2025-07-26 PROCEDURE — 81001 URINALYSIS AUTO W/SCOPE: CPT

## 2025-07-26 PROCEDURE — 70450 CT HEAD/BRAIN W/O DYE: CPT

## 2025-07-26 PROCEDURE — 93005 ELECTROCARDIOGRAM TRACING: CPT | Performed by: EMERGENCY MEDICINE

## 2025-07-26 RX ORDER — IOPAMIDOL 755 MG/ML
75 INJECTION, SOLUTION INTRAVASCULAR
Status: COMPLETED | OUTPATIENT
Start: 2025-07-26 | End: 2025-07-26

## 2025-07-26 RX ORDER — 0.9 % SODIUM CHLORIDE 0.9 %
1000 INTRAVENOUS SOLUTION INTRAVENOUS ONCE
Status: COMPLETED | OUTPATIENT
Start: 2025-07-26 | End: 2025-07-26

## 2025-07-26 RX ORDER — OXYCODONE AND ACETAMINOPHEN 5; 325 MG/1; MG/1
1 TABLET ORAL ONCE
Refills: 0 | Status: COMPLETED | OUTPATIENT
Start: 2025-07-26 | End: 2025-07-26

## 2025-07-26 RX ORDER — SODIUM CHLORIDE 0.9 % (FLUSH) 0.9 %
10 SYRINGE (ML) INJECTION
Status: DISCONTINUED | OUTPATIENT
Start: 2025-07-26 | End: 2025-07-26 | Stop reason: HOSPADM

## 2025-07-26 RX ADMIN — SODIUM CHLORIDE 1000 ML: 9 INJECTION, SOLUTION INTRAVENOUS at 07:16

## 2025-07-26 RX ADMIN — IOPAMIDOL 75 ML: 755 INJECTION, SOLUTION INTRAVENOUS at 06:28

## 2025-07-26 RX ADMIN — OXYCODONE AND ACETAMINOPHEN 1 TABLET: 5; 325 TABLET ORAL at 14:14

## 2025-07-26 ASSESSMENT — PAIN SCALES - GENERAL
PAINLEVEL_OUTOF10: 7
PAINLEVEL_OUTOF10: 10
PAINLEVEL_OUTOF10: 8

## 2025-07-26 ASSESSMENT — PAIN DESCRIPTION - ORIENTATION
ORIENTATION: MID
ORIENTATION: LOWER;MID
ORIENTATION: POSTERIOR

## 2025-07-26 ASSESSMENT — PAIN DESCRIPTION - DESCRIPTORS
DESCRIPTORS: ACHING
DESCRIPTORS: ACHING;DISCOMFORT
DESCRIPTORS: DISCOMFORT;ACHING;SHARP

## 2025-07-26 ASSESSMENT — PAIN DESCRIPTION - PAIN TYPE: TYPE: ACUTE PAIN

## 2025-07-26 ASSESSMENT — PAIN - FUNCTIONAL ASSESSMENT
PAIN_FUNCTIONAL_ASSESSMENT: ACTIVITIES ARE NOT PREVENTED
PAIN_FUNCTIONAL_ASSESSMENT: 0-10
PAIN_FUNCTIONAL_ASSESSMENT: PREVENTS OR INTERFERES WITH ALL ACTIVE AND SOME PASSIVE ACTIVITIES
PAIN_FUNCTIONAL_ASSESSMENT: 0-10

## 2025-07-26 ASSESSMENT — PAIN DESCRIPTION - LOCATION
LOCATION: HEAD
LOCATION: GENERALIZED
LOCATION: BACK

## 2025-07-26 ASSESSMENT — PAIN DESCRIPTION - ONSET: ONSET: SUDDEN

## 2025-07-26 ASSESSMENT — PAIN DESCRIPTION - FREQUENCY: FREQUENCY: CONTINUOUS

## 2025-07-26 NOTE — ED NOTES
Complete linen change performed. Patient was cleaned up with warm wipes. Patient placed in hospital attire. New brief applied

## 2025-07-26 NOTE — VIRTUAL HEALTH
Received request for Telepsychiatry evaluation.  Medical screening labs are still pending.  Our team will continue to follow and will initiate evaluation when more information is available    Per chart review, patient has a hx of alcohol abuse. Ethanol is pending. CLEMENTINE will check back for lab results prior to completing evaluation.     --CLEMENTINE Bach on 7/26/2025 at 1:02 AM    An electronic signature was used to authenticate this note.

## 2025-07-26 NOTE — VIRTUAL HEALTH
Reason for Cancel: TelePsych Reason for Cancel: Patient impaired    Patient's ethanol is 244. CLEMENTINE discussed case with pt's RN. Patient is too intoxicated at this time to complete full evaluation. Pt's RN stated that she will cancel the order and re consult once patient is appropriate for telepsych evaluation. Please notify telepsych team in the interim for any new changes or concerns.     --CLEMENTINE Bach on 7/26/2025 at 3:22 AM    An electronic signature was used to authenticate this note.

## 2025-07-26 NOTE — ED NOTES
Received call from tele psych. Due to ethanal being elevated,  canceling the tele psych order and will place a new order when patient's ethanol is below 80.

## 2025-07-26 NOTE — VIRTUAL HEALTH
Kirsty Thurston, was evaluated through a synchronous (real-time) audio-video encounter. The patient (and/or guardian if applicable) is aware that this is a billable service, which includes applicable co-pays. This virtual visit was conducted with patient's (and/or legal guardian's) consent. Patient identification was verified, and a caregiver was present when appropriate.  The patient was located at Facility (Appt Department): [facility] EMERGENCY DEPARTMENT  [address] [facility] [address] [facility] [address]  Loc: [phone]  The provider was located at Home (City/State): [place]   Confirm you are appropriately licensed, registered, or certified to deliver care in the state where the patient is located as indicated above. If you are not or unsure, please re-schedule the visit: Yes, I confirm.   [place] Consult to Tele-Psych  Consult performed by: Desiree Tong APRN - CNP  Consult ordered by: Ezequiel Cueva MD    Kirsty Thurston  [id number]  1970     EMERGENCY DEPARTMENT TELEPSYCHIATRY EVALUATION    07/26/25    Chief Complaint:  “I slipped and fell”  HPI: Patient is a 55 y.o.  male who presents for psychiatric evaluation. Patient presented to the ED. The patient lives at [facility]. He was intoxicated and fell in the bathroom. The patient was evaluated and plans to discharge were made but staff at the [facility] reported the patient had ivone making suicidal statements. Upon evaluation the patient is alert and oriented. He is now sober. The patient denies any thoughts of wanting to harm himself or anyone else. He denies any previous suicide attempts. He admits to being upset with [facility] as they would not give him his medication due to his alcohol use. He states he thought that would help with his pain but he realizes it does not. The patient admits to getting angry at times and states he will apologize if needed. He is wanting to  - 0.58 k/uL   D-Dimer, Quantitative    Collection Time: 07/26/25  1:07 AM   Result Value Ref Range    D-Dimer, Quant 321 (H) 0 - 230 ng/mL DDU   EKG 12 Lead    Collection Time: 07/26/25  1:17 AM   Result Value Ref Range    Ventricular Rate 78 BPM    Atrial Rate 78 BPM    P-R Interval 166 ms    QRS Duration 102 ms    Q-T Interval 406 ms    QTc Calculation (Bazett) 462 ms    P Axis 25 degrees    R Axis -9 degrees    T Axis 27 degrees   Comprehensive Metabolic Panel    Collection Time: 07/26/25  1:44 AM   Result Value Ref Range    Sodium 135 (L) 136 - 145 mmol/L    Potassium 3.3 (L) 3.5 - 5.1 mmol/L    Chloride 98 98 - 107 mmol/L    CO2 20 (L) 22 - 29 mmol/L    Anion Gap 17 (H) 7 - 16 mmol/L    Glucose 111 (H) 74 - 99 mg/dL    BUN 6 6 - 20 mg/dL    Creatinine 0.7 0.7 - 1.2 mg/dL    Est, Glom Filt Rate >90 >60 mL/min/1.73m2    Calcium 8.0 (L) 8.6 - 10.0 mg/dL    Total Protein 5.8 (L) 6.4 - 8.3 g/dL    Albumin 3.4 (L) 3.5 - 5.2 g/dL    Total Bilirubin <0.2 0.0 - 1.2 mg/dL    Alkaline Phosphatase 82 40 - 129 U/L    ALT 8 0 - 50 U/L    AST 22 0 - 50 U/L   Troponin    Collection Time: 07/26/25  1:44 AM   Result Value Ref Range    Troponin, High Sensitivity 29 (H) 0 - 22 ng/L   SERUM DRUG SCREEN    Collection Time: 07/26/25  2:13 AM   Result Value Ref Range    Acetaminophen Level <5 (L) 10 - 30 ug/mL    Ethanol Lvl 244 (H) <10 mg/dL    Salicylate Lvl <0.5 0.0 - 30.0 mg/dL    Toxic Tricyclic Sc,Blood NEGATIVE NEGATIVE   URINE DRUG SCREEN    Collection Time: 07/26/25  5:17 AM   Result Value Ref Range    Amphetamine Screen, Ur NEGATIVE NEGATIVE    Barbiturate Screen, Ur POSITIVE (A) NEGATIVE    Benzodiazepine Screen, Urine NEGATIVE NEGATIVE    Cocaine Metabolite, Urine NEGATIVE NEGATIVE    Methadone Screen, Urine NEGATIVE NEGATIVE    Opiates, Urine NEGATIVE NEGATIVE    Phencyclidine, Urine NEGATIVE NEGATIVE    Cannabinoid Scrn, Ur NEGATIVE NEGATIVE    Oxycodone Screen, Ur NEGATIVE NEGATIVE    Fentanyl, Ur NEGATIVE NEGATIVE
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
## 2025-07-26 NOTE — PROGRESS NOTES
Patient's jewelry removed and placed in an envelope. Envelope given to patient. Patient left CT department with jewelry.2 necklaces and 1 watch

## 2025-07-27 LAB
EKG ATRIAL RATE: 78 BPM
EKG P AXIS: 25 DEGREES
EKG P-R INTERVAL: 166 MS
EKG Q-T INTERVAL: 406 MS
EKG QRS DURATION: 102 MS
EKG QTC CALCULATION (BAZETT): 462 MS
EKG R AXIS: -9 DEGREES
EKG T AXIS: 27 DEGREES
EKG VENTRICULAR RATE: 78 BPM

## 2025-07-27 PROCEDURE — 93010 ELECTROCARDIOGRAM REPORT: CPT | Performed by: INTERNAL MEDICINE

## 2025-08-14 ENCOUNTER — HOSPITAL ENCOUNTER (EMERGENCY)
Age: 55
Discharge: HOME | End: 2025-08-14
Attending: EMERGENCY MEDICINE
Payer: MEDICAID

## 2025-08-14 ENCOUNTER — APPOINTMENT (OUTPATIENT)
Dept: GENERAL RADIOLOGY | Age: 55
End: 2025-08-14
Payer: MEDICAID

## 2025-08-14 VITALS
RESPIRATION RATE: 16 BRPM | TEMPERATURE: 98.2 F | DIASTOLIC BLOOD PRESSURE: 77 MMHG | SYSTOLIC BLOOD PRESSURE: 118 MMHG | OXYGEN SATURATION: 92 % | HEART RATE: 96 BPM
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
## 2025-08-14 DIAGNOSIS — S40.012A CONTUSION OF LEFT SHOULDER, INITIAL ENCOUNTER: ICD-10-CM

## 2025-08-14 DIAGNOSIS — S70.02XA CONTUSION OF LEFT HIP, INITIAL ENCOUNTER: ICD-10-CM

## 2025-08-14 PROCEDURE — 73030 X-RAY EXAM OF SHOULDER: CPT

## 2025-08-14 PROCEDURE — 99284 EMERGENCY DEPT VISIT MOD MDM: CPT

## 2025-08-14 PROCEDURE — 6370000000 HC RX 637 (ALT 250 FOR IP): Performed by: EMERGENCY MEDICINE

## 2025-08-14 PROCEDURE — 73502 X-RAY EXAM HIP UNI 2-3 VIEWS: CPT

## 2025-08-14 RX ORDER — OXYCODONE AND ACETAMINOPHEN 5; 325 MG/1; MG/1
2 TABLET ORAL ONCE
Refills: 0 | Status: COMPLETED | OUTPATIENT
Start: 2025-08-14 | End: 2025-08-14

## 2025-08-14 RX ORDER — OXYCODONE AND ACETAMINOPHEN 5; 325 MG/1; MG/1
1 TABLET ORAL EVERY 6 HOURS PRN
Qty: 12 TABLET | Refills: 0 | Status: SHIPPED | OUTPATIENT
Start: 2025-08-14 | End: 2025-08-17

## 2025-08-14 RX ADMIN — OXYCODONE AND ACETAMINOPHEN 2 TABLET: 5; 325 TABLET ORAL at 17:37

## 2025-08-14 ASSESSMENT — PAIN DESCRIPTION - ORIENTATION: ORIENTATION: LOWER;LEFT

## 2025-08-14 ASSESSMENT — PAIN - FUNCTIONAL ASSESSMENT
PAIN_FUNCTIONAL_ASSESSMENT: PREVENTS OR INTERFERES SOME ACTIVE ACTIVITIES AND ADLS
PAIN_FUNCTIONAL_ASSESSMENT: 0-10

## 2025-08-14 ASSESSMENT — PAIN DESCRIPTION - LOCATION: LOCATION: BACK;SHOULDER

## 2025-08-14 ASSESSMENT — PAIN SCALES - GENERAL: PAINLEVEL_OUTOF10: 7

## 2025-08-22 ENCOUNTER — APPOINTMENT (OUTPATIENT)
Dept: GENERAL RADIOLOGY | Age: 55
DRG: 055 | End: 2025-08-22
Payer: MEDICAID

## 2025-08-22 ENCOUNTER — APPOINTMENT (OUTPATIENT)
Dept: CT IMAGING | Age: 55
DRG: 055 | End: 2025-08-22
Payer: MEDICAID

## 2025-08-22 ENCOUNTER — HOSPITAL ENCOUNTER (INPATIENT)
Age: 55
LOS: 4 days | Discharge: NURSING, REHAB OR LONG-TERM CARE | DRG: 055 | End: 2025-08-26
Attending: EMERGENCY MEDICINE | Admitting: NEUROLOGICAL SURGERY
Payer: MEDICAID

## 2025-08-22 DIAGNOSIS — S06.4XAA INTRACRANIAL EPIDURAL HEMATOMA (HCC): ICD-10-CM

## 2025-08-22 DIAGNOSIS — S32.021D CLOSED STABLE BURST FRACTURE OF SECOND LUMBAR VERTEBRA WITH ROUTINE HEALING, SUBSEQUENT ENCOUNTER: ICD-10-CM

## 2025-08-22 DIAGNOSIS — S22.42XA CLOSED FRACTURE OF MULTIPLE RIBS OF LEFT SIDE, INITIAL ENCOUNTER: ICD-10-CM

## 2025-08-22 DIAGNOSIS — I62.9 INTRACRANIAL HEMORRHAGE (HCC): Primary | ICD-10-CM

## 2025-08-22 DIAGNOSIS — F10.920 ACUTE ALCOHOLIC INTOXICATION WITHOUT COMPLICATION: ICD-10-CM
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
## 2025-08-22 PROCEDURE — G0480 DRUG TEST DEF 1-7 CLASSES: HCPCS

## 2025-08-22 PROCEDURE — 2000000000 HC ICU R&B

## 2025-08-22 PROCEDURE — 85576 BLOOD PLATELET AGGREGATION: CPT

## 2025-08-22 PROCEDURE — 72131 CT LUMBAR SPINE W/O DYE: CPT

## 2025-08-22 PROCEDURE — 71045 X-RAY EXAM CHEST 1 VIEW: CPT

## 2025-08-22 PROCEDURE — 6360000002 HC RX W HCPCS: Performed by: EMERGENCY MEDICINE

## 2025-08-22 PROCEDURE — 72125 CT NECK SPINE W/O DYE: CPT

## 2025-08-22 PROCEDURE — 85347 COAGULATION TIME ACTIVATED: CPT

## 2025-08-22 PROCEDURE — 73502 X-RAY EXAM HIP UNI 2-3 VIEWS: CPT

## 2025-08-22 PROCEDURE — 99285 EMERGENCY DEPT VISIT HI MDM: CPT

## 2025-08-22 PROCEDURE — 80053 COMPREHEN METABOLIC PANEL: CPT

## 2025-08-22 PROCEDURE — 70450 CT HEAD/BRAIN W/O DYE: CPT

## 2025-08-22 PROCEDURE — 85025 COMPLETE CBC W/AUTO DIFF WBC: CPT

## 2025-08-22 PROCEDURE — 85390 FIBRINOLYSINS SCREEN I&R: CPT

## 2025-08-22 PROCEDURE — 85610 PROTHROMBIN TIME: CPT

## 2025-08-22 PROCEDURE — 85384 FIBRINOGEN ACTIVITY: CPT

## 2025-08-22 PROCEDURE — 73552 X-RAY EXAM OF FEMUR 2/>: CPT

## 2025-08-22 RX ORDER — ONDANSETRON 2 MG/ML
4 INJECTION INTRAMUSCULAR; INTRAVENOUS EVERY 6 HOURS PRN
Status: DISCONTINUED | OUTPATIENT
Start: 2025-08-22 | End: 2025-08-26 | Stop reason: HOSPADM

## 2025-08-22 RX ORDER — LEVETIRACETAM 500 MG/1
500 TABLET ORAL 2 TIMES DAILY
Status: DISCONTINUED | OUTPATIENT
Start: 2025-08-22 | End: 2025-08-23

## 2025-08-22 RX ORDER — ACETAMINOPHEN 325 MG/1
650 TABLET ORAL EVERY 4 HOURS PRN
Status: DISCONTINUED | OUTPATIENT
Start: 2025-08-22 | End: 2025-08-26 | Stop reason: HOSPADM

## 2025-08-22 RX ORDER — SODIUM CHLORIDE 0.9 % (FLUSH) 0.9 %
10 SYRINGE (ML) INJECTION PRN
Status: DISCONTINUED | OUTPATIENT
Start: 2025-08-22 | End: 2025-08-26 | Stop reason: HOSPADM

## 2025-08-22 RX ORDER — POLYETHYLENE GLYCOL 3350 17 G/17G
17 POWDER, FOR SOLUTION ORAL DAILY PRN
Status: DISCONTINUED | OUTPATIENT
Start: 2025-08-22 | End: 2025-08-26 | Stop reason: HOSPADM

## 2025-08-22 RX ORDER — ONDANSETRON 4 MG/1
4 TABLET, ORALLY DISINTEGRATING ORAL EVERY 8 HOURS PRN
Status: DISCONTINUED | OUTPATIENT
Start: 2025-08-22 | End: 2025-08-26 | Stop reason: HOSPADM

## 2025-08-22 RX ORDER — SODIUM CHLORIDE 9 MG/ML
INJECTION, SOLUTION INTRAVENOUS CONTINUOUS
Status: DISCONTINUED | OUTPATIENT
Start: 2025-08-22 | End: 2025-08-23

## 2025-08-22 RX ORDER — LEVETIRACETAM 500 MG/5ML
1000 INJECTION, SOLUTION, CONCENTRATE INTRAVENOUS ONCE
Status: COMPLETED | OUTPATIENT
Start: 2025-08-22 | End: 2025-08-22

## 2025-08-22 RX ORDER — SODIUM CHLORIDE 0.9 % (FLUSH) 0.9 %
10 SYRINGE (ML) INJECTION EVERY 12 HOURS SCHEDULED
Status: DISCONTINUED | OUTPATIENT
Start: 2025-08-22 | End: 2025-08-26 | Stop reason: HOSPADM

## 2025-08-22 RX ORDER — SODIUM CHLORIDE 9 MG/ML
INJECTION, SOLUTION INTRAVENOUS PRN
Status: DISCONTINUED | OUTPATIENT
Start: 2025-08-22 | End: 2025-08-26 | Stop reason: HOSPADM

## 2025-08-22 RX ADMIN — LEVETIRACETAM 1000 MG: 100 INJECTION INTRAVENOUS at 23:33

## 2025-08-22 ASSESSMENT — PAIN DESCRIPTION - LOCATION: LOCATION: BACK

## 2025-08-22 ASSESSMENT — PAIN DESCRIPTION - DESCRIPTORS: DESCRIPTORS: ACHING;DULL;TENDER

## 2025-08-22 ASSESSMENT — PAIN - FUNCTIONAL ASSESSMENT
PAIN_FUNCTIONAL_ASSESSMENT: 0-10
PAIN_FUNCTIONAL_ASSESSMENT: ACTIVITIES ARE NOT PREVENTED

## 2025-08-22 ASSESSMENT — PAIN DESCRIPTION - ONSET: ONSET: ON-GOING

## 2025-08-22 ASSESSMENT — PAIN DESCRIPTION - FREQUENCY: FREQUENCY: CONTINUOUS

## 2025-08-22 ASSESSMENT — PAIN DESCRIPTION - PAIN TYPE: TYPE: ACUTE PAIN

## 2025-08-22 ASSESSMENT — PAIN SCALES - GENERAL: PAINLEVEL_OUTOF10: 4

## 2025-08-22 ASSESSMENT — PAIN DESCRIPTION - ORIENTATION: ORIENTATION: LOWER

## 2025-08-23 ENCOUNTER — APPOINTMENT (OUTPATIENT)
Dept: CT IMAGING | Age: 55
DRG: 055 | End: 2025-08-23
Payer: MEDICAID

## 2025-08-23 PROBLEM — F10.920 ACUTE ALCOHOLIC INTOXICATION WITHOUT COMPLICATION: Status: ACTIVE | Noted: 2025-08-23

## 2025-08-23 PROBLEM — S32.021A CLOSED STABLE BURST FRACTURE OF SECOND LUMBAR VERTEBRA (HCC): Status: ACTIVE | Noted: 2025-06-10

## 2025-08-23 LAB
ALBUMIN SERPL-MCNC: 3.3 G/DL (ref 3.5–5.2)
ALBUMIN SERPL-MCNC: 4 G/DL (ref 3.5–5.2)
ALP SERPL-CCNC: 115 U/L (ref 40–129)
ALP SERPL-CCNC: 92 U/L (ref 40–129)
ALT SERPL-CCNC: 15 U/L (ref 0–50)
ALT SERPL-CCNC: 19 U/L (ref 0–50)
AMPHET UR QL SCN: NEGATIVE
ANION GAP SERPL CALCULATED.3IONS-SCNC: 18 MMOL/L (ref 7–16)
ANION GAP SERPL CALCULATED.3IONS-SCNC: 19 MMOL/L (ref 7–16)
ANION GAP SERPL CALCULATED.3IONS-SCNC: 20 MMOL/L (ref 7–16)
AST SERPL-CCNC: 25 U/L (ref 0–50)
AST SERPL-CCNC: 32 U/L (ref 0–50)
ATYPICAL LYMPHOCYTE ABSOLUTE COUNT: 0.31 K/UL (ref 0–0.46)
ATYPICAL LYMPHOCYTES: 4 % (ref 0–4)
BARBITURATES UR QL SCN: NEGATIVE
BASOPHILS # BLD: 0 K/UL (ref 0–0.2)
BASOPHILS # BLD: 0.03 K/UL (ref 0–0.2)
BASOPHILS NFR BLD: 0 % (ref 0–2)
BASOPHILS NFR BLD: 0 % (ref 0–2)
BENZODIAZ UR QL: NEGATIVE
BILIRUB SERPL-MCNC: 0.5 MG/DL (ref 0–1.2)
BILIRUB SERPL-MCNC: 0.5 MG/DL (ref 0–1.2)
BUN SERPL-MCNC: 10 MG/DL (ref 6–20)
BUN SERPL-MCNC: 7 MG/DL (ref 6–20)
BUN SERPL-MCNC: 9 MG/DL (ref 6–20)
BUPRENORPHINE UR QL: NEGATIVE
CA-I BLD-SCNC: 1.12 MMOL/L (ref 1.15–1.33)
CALCIUM SERPL-MCNC: 8 MG/DL (ref 8.6–10)
CALCIUM SERPL-MCNC: 8.7 MG/DL (ref 8.6–10)
CALCIUM SERPL-MCNC: 8.8 MG/DL (ref 8.6–10)
CANNABINOIDS UR QL SCN: NEGATIVE
CHLORIDE SERPL-SCNC: 91 MMOL/L (ref 98–107)
CHLORIDE SERPL-SCNC: 94 MMOL/L (ref 98–107)
CHLORIDE SERPL-SCNC: 95 MMOL/L (ref 98–107)
CLOT ANGLE.KAOLIN INDUCED BLD RES TEG: 73 DEG (ref 53–70)
CO2 SERPL-SCNC: 21 MMOL/L (ref 22–29)
CO2 SERPL-SCNC: 23 MMOL/L (ref 22–29)
CO2 SERPL-SCNC: 23 MMOL/L (ref 22–29)
COCAINE UR QL SCN: NEGATIVE
CREAT SERPL-MCNC: 0.5 MG/DL (ref 0.7–1.2)
CREAT SERPL-MCNC: 0.6 MG/DL (ref 0.7–1.2)
CREAT SERPL-MCNC: 0.6 MG/DL (ref 0.7–1.2)
EOSINOPHIL # BLD: 0.05 K/UL (ref 0.05–0.5)
EOSINOPHIL # BLD: 0.06 K/UL (ref 0.05–0.5)
EOSINOPHILS RELATIVE PERCENT: 1 % (ref 0–6)
EOSINOPHILS RELATIVE PERCENT: 1 % (ref 0–6)
EPL-TEG: 0.6 % (ref 0–15)
ERYTHROCYTE [DISTWIDTH] IN BLOOD BY AUTOMATED COUNT: 20.1 % (ref 11.5–15)
ERYTHROCYTE [DISTWIDTH] IN BLOOD BY AUTOMATED COUNT: 20.1 % (ref 11.5–15)
ETHANOLAMINE SERPL-MCNC: 205 MG/DL (ref 0–0.08)
FENTANYL UR QL: NEGATIVE
G-TEG: 11.5 KDYN/CM2 (ref 4.5–11)
GFR, ESTIMATED: >90 ML/MIN/1.73M2
GLUCOSE SERPL-MCNC: 76 MG/DL (ref 74–99)
GLUCOSE SERPL-MCNC: 87 MG/DL (ref 74–99)
GLUCOSE SERPL-MCNC: 89 MG/DL (ref 74–99)
HCT VFR BLD AUTO: 35.2 % (ref 37–54)
HCT VFR BLD AUTO: 39.2 % (ref 37–54)
HGB BLD-MCNC: 12 G/DL (ref 12.5–16.5)
HGB BLD-MCNC: 13.4 G/DL (ref 12.5–16.5)
IMM GRANULOCYTES # BLD AUTO: 0.06 K/UL (ref 0–0.58)
IMM GRANULOCYTES NFR BLD: 1 % (ref 0–5)
INR PPP: 0.9
KINETICS TEG: 1.2 MIN (ref 1–3)
LY30 (LYSIS) TEG: 0.6 % (ref 0–8)
LYMPHOCYTES NFR BLD: 0.94 K/UL (ref 1.5–4)
LYMPHOCYTES NFR BLD: 1.95 K/UL (ref 1.5–4)
LYMPHOCYTES RELATIVE PERCENT: 13 % (ref 20–42)
LYMPHOCYTES RELATIVE PERCENT: 23 % (ref 20–42)
MA (MAX CLOT) TEG: 69.7 MM (ref 50–70)
MAGNESIUM SERPL-MCNC: 2.1 MG/DL (ref 1.6–2.6)
MAGNESIUM SERPL-MCNC: 2.3 MG/DL (ref 1.6–2.6)
MCH RBC QN AUTO: 30.9 PG (ref 26–35)
MCH RBC QN AUTO: 31 PG (ref 26–35)
MCHC RBC AUTO-ENTMCNC: 34.1 G/DL (ref 32–34.5)
MCHC RBC AUTO-ENTMCNC: 34.2 G/DL (ref 32–34.5)
MCV RBC AUTO: 90.7 FL (ref 80–99.9)
MCV RBC AUTO: 90.7 FL (ref 80–99.9)
METHADONE UR QL: NEGATIVE
MONOCYTES NFR BLD: 0.75 K/UL (ref 0.1–0.95)
MONOCYTES NFR BLD: 0.81 K/UL (ref 0.1–0.95)
MONOCYTES NFR BLD: 10 % (ref 2–12)
MONOCYTES NFR BLD: 10 % (ref 2–12)
NEUTROPHILS NFR BLD: 66 % (ref 43–80)
NEUTROPHILS NFR BLD: 71 % (ref 43–80)
NEUTS SEG NFR BLD: 5.13 K/UL (ref 1.8–7.3)
NEUTS SEG NFR BLD: 5.53 K/UL (ref 1.8–7.3)
OPIATES UR QL SCN: NEGATIVE
OXYCODONE UR QL SCN: NEGATIVE
PCP UR QL SCN: NEGATIVE
PHOSPHATE SERPL-MCNC: 3.1 MG/DL (ref 2.5–4.5)
PLATELET # BLD AUTO: 236 K/UL (ref 130–450)
PLATELET # BLD AUTO: 266 K/UL (ref 130–450)
PMV BLD AUTO: 8.9 FL (ref 7–12)
PMV BLD AUTO: 8.9 FL (ref 7–12)
POTASSIUM SERPL-SCNC: 2.7 MMOL/L (ref 3.5–5.1)
POTASSIUM SERPL-SCNC: 3 MMOL/L (ref 3.5–5.1)
POTASSIUM SERPL-SCNC: 3.4 MMOL/L (ref 3.5–5.1)
PROT SERPL-MCNC: 5.5 G/DL (ref 6.4–8.3)
PROT SERPL-MCNC: 6.7 G/DL (ref 6.4–8.3)
PROTHROMBIN TIME: 9.3 SEC (ref 9.3–12.4)
RBC # BLD AUTO: 3.88 M/UL (ref 3.8–5.8)
RBC # BLD AUTO: 4.32 M/UL (ref 3.8–5.8)
RBC # BLD: ABNORMAL 10*6/UL
REACTION TIME TEG: 5.5 MIN (ref 5–10)
SODIUM SERPL-SCNC: 133 MMOL/L (ref 136–145)
SODIUM SERPL-SCNC: 134 MMOL/L (ref 136–145)
SODIUM SERPL-SCNC: 135 MMOL/L (ref 136–145)
TEST INFORMATION: NORMAL
WBC OTHER # BLD: 7.2 K/UL (ref 4.5–11.5)
WBC OTHER # BLD: 8.4 K/UL (ref 4.5–11.5)

## 2025-08-23 PROCEDURE — 99291 CRITICAL CARE FIRST HOUR: CPT | Performed by: SURGERY

## 2025-08-23 PROCEDURE — 84100 ASSAY OF PHOSPHORUS: CPT

## 2025-08-23 PROCEDURE — 87081 CULTURE SCREEN ONLY: CPT

## 2025-08-23 PROCEDURE — 2580000003 HC RX 258

## 2025-08-23 PROCEDURE — 2000000000 HC ICU R&B

## 2025-08-23 PROCEDURE — 6360000002 HC RX W HCPCS

## 2025-08-23 PROCEDURE — 36415 COLL VENOUS BLD VENIPUNCTURE: CPT

## 2025-08-23 PROCEDURE — 82330 ASSAY OF CALCIUM: CPT

## 2025-08-23 PROCEDURE — 80307 DRUG TEST PRSMV CHEM ANLYZR: CPT

## 2025-08-23 PROCEDURE — 6370000000 HC RX 637 (ALT 250 FOR IP)

## 2025-08-23 PROCEDURE — 6360000002 HC RX W HCPCS: Performed by: SURGERY

## 2025-08-23 PROCEDURE — 2500000003 HC RX 250 WO HCPCS

## 2025-08-23 PROCEDURE — 85025 COMPLETE CBC W/AUTO DIFF WBC: CPT

## 2025-08-23 PROCEDURE — 74177 CT ABD & PELVIS W/CONTRAST: CPT

## 2025-08-23 PROCEDURE — 80053 COMPREHEN METABOLIC PANEL: CPT

## 2025-08-23 PROCEDURE — 70450 CT HEAD/BRAIN W/O DYE: CPT

## 2025-08-23 PROCEDURE — L0627 LO SAG RI AN/POS PNL PRE CST: HCPCS

## 2025-08-23 PROCEDURE — 83735 ASSAY OF MAGNESIUM: CPT

## 2025-08-23 PROCEDURE — 6360000004 HC RX CONTRAST MEDICATION: Performed by: RADIOLOGY

## 2025-08-23 PROCEDURE — 80048 BASIC METABOLIC PNL TOTAL CA: CPT

## 2025-08-23 RX ORDER — FOLIC ACID 1 MG/1
1 TABLET ORAL DAILY
Status: DISCONTINUED | OUTPATIENT
Start: 2025-08-23 | End: 2025-08-26 | Stop reason: HOSPADM

## 2025-08-23 RX ORDER — CALCIUM GLUCONATE 20 MG/ML
2000 INJECTION, SOLUTION INTRAVENOUS ONCE
Status: COMPLETED | OUTPATIENT
Start: 2025-08-23 | End: 2025-08-23

## 2025-08-23 RX ORDER — POTASSIUM CHLORIDE 7.45 MG/ML
10 INJECTION INTRAVENOUS
Status: DISCONTINUED | OUTPATIENT
Start: 2025-08-23 | End: 2025-08-23 | Stop reason: SDUPTHER

## 2025-08-23 RX ORDER — AMLODIPINE BESYLATE 10 MG/1
10 TABLET ORAL DAILY
Status: DISCONTINUED | OUTPATIENT
Start: 2025-08-23 | End: 2025-08-26 | Stop reason: HOSPADM

## 2025-08-23 RX ORDER — LABETALOL HYDROCHLORIDE 5 MG/ML
10 INJECTION, SOLUTION INTRAVENOUS EVERY 10 MIN PRN
Status: DISCONTINUED | OUTPATIENT
Start: 2025-08-23 | End: 2025-08-26 | Stop reason: HOSPADM

## 2025-08-23 RX ORDER — METHOCARBAMOL 750 MG/1
750 TABLET, FILM COATED ORAL 4 TIMES DAILY
Status: DISCONTINUED | OUTPATIENT
Start: 2025-08-23 | End: 2025-08-23

## 2025-08-23 RX ORDER — DULOXETIN HYDROCHLORIDE 20 MG/1
20 CAPSULE, DELAYED RELEASE ORAL DAILY
Status: DISCONTINUED | OUTPATIENT
Start: 2025-08-23 | End: 2025-08-26 | Stop reason: HOSPADM

## 2025-08-23 RX ORDER — IOPAMIDOL 755 MG/ML
75 INJECTION, SOLUTION INTRAVASCULAR
Status: COMPLETED | OUTPATIENT
Start: 2025-08-23 | End: 2025-08-23

## 2025-08-23 RX ORDER — ATORVASTATIN CALCIUM 40 MG/1
40 TABLET, FILM COATED ORAL DAILY
Status: DISCONTINUED | OUTPATIENT
Start: 2025-08-23 | End: 2025-08-26 | Stop reason: HOSPADM

## 2025-08-23 RX ORDER — POTASSIUM CHLORIDE 7.45 MG/ML
10 INJECTION INTRAVENOUS
Status: DISCONTINUED | OUTPATIENT
Start: 2025-08-23 | End: 2025-08-23

## 2025-08-23 RX ORDER — LANOLIN ALCOHOL/MO/W.PET/CERES
100 CREAM (GRAM) TOPICAL DAILY
Status: DISCONTINUED | OUTPATIENT
Start: 2025-08-23 | End: 2025-08-26 | Stop reason: HOSPADM

## 2025-08-23 RX ORDER — HYDRALAZINE HYDROCHLORIDE 20 MG/ML
10 INJECTION INTRAMUSCULAR; INTRAVENOUS EVERY 10 MIN PRN
Status: DISCONTINUED | OUTPATIENT
Start: 2025-08-23 | End: 2025-08-26 | Stop reason: HOSPADM

## 2025-08-23 RX ORDER — DIVALPROEX SODIUM 250 MG/1
500 TABLET, DELAYED RELEASE ORAL 2 TIMES DAILY
Status: DISCONTINUED | OUTPATIENT
Start: 2025-08-23 | End: 2025-08-26 | Stop reason: HOSPADM

## 2025-08-23 RX ORDER — LEVETIRACETAM 500 MG/1
500 TABLET ORAL 2 TIMES DAILY
Status: DISCONTINUED | OUTPATIENT
Start: 2025-08-23 | End: 2025-08-26 | Stop reason: HOSPADM

## 2025-08-23 RX ORDER — PHENOBARBITAL 32.4 MG/1
64.8 TABLET ORAL EVERY 6 HOURS
Status: DISCONTINUED | OUTPATIENT
Start: 2025-08-23 | End: 2025-08-26

## 2025-08-23 RX ADMIN — SODIUM CHLORIDE, PRESERVATIVE FREE 10 ML: 5 INJECTION INTRAVENOUS at 19:59

## 2025-08-23 RX ADMIN — LEVETIRACETAM 500 MG: 500 TABLET, FILM COATED ORAL at 19:58

## 2025-08-23 RX ADMIN — SODIUM CHLORIDE, PRESERVATIVE FREE 10 ML: 5 INJECTION INTRAVENOUS at 09:13

## 2025-08-23 RX ADMIN — DIVALPROEX SODIUM 500 MG: 250 TABLET, DELAYED RELEASE ORAL at 09:02

## 2025-08-23 RX ADMIN — ACETAMINOPHEN 650 MG: 325 TABLET ORAL at 15:55

## 2025-08-23 RX ADMIN — PHENOBARBITAL 64.8 MG: 32.4 TABLET ORAL at 11:56

## 2025-08-23 RX ADMIN — ATORVASTATIN CALCIUM 40 MG: 40 TABLET, FILM COATED ORAL at 09:02

## 2025-08-23 RX ADMIN — POTASSIUM CHLORIDE 10 MEQ: 7.46 INJECTION, SOLUTION INTRAVENOUS at 03:49

## 2025-08-23 RX ADMIN — IOPAMIDOL 75 ML: 755 INJECTION, SOLUTION INTRAVENOUS at 08:24

## 2025-08-23 RX ADMIN — Medication 100 MG: at 09:00

## 2025-08-23 RX ADMIN — PHENOBARBITAL 64.8 MG: 32.4 TABLET ORAL at 01:36

## 2025-08-23 RX ADMIN — CALCIUM GLUCONATE 2000 MG: 20 INJECTION, SOLUTION INTRAVENOUS at 09:13

## 2025-08-23 RX ADMIN — PHENOBARBITAL 64.8 MG: 32.4 TABLET ORAL at 18:16

## 2025-08-23 RX ADMIN — SODIUM CHLORIDE, PRESERVATIVE FREE 10 ML: 5 INJECTION INTRAVENOUS at 01:32

## 2025-08-23 RX ADMIN — DULOXETINE 20 MG: 20 CAPSULE, DELAYED RELEASE ORAL at 09:01

## 2025-08-23 RX ADMIN — POTASSIUM BICARBONATE 40 MEQ: 782 TABLET, EFFERVESCENT ORAL at 04:55

## 2025-08-23 RX ADMIN — ACETAMINOPHEN 650 MG: 325 TABLET ORAL at 09:07

## 2025-08-23 RX ADMIN — POTASSIUM CHLORIDE 10 MEQ: 7.46 INJECTION, SOLUTION INTRAVENOUS at 01:32

## 2025-08-23 RX ADMIN — METHOCARBAMOL 750 MG: 750 TABLET ORAL at 09:01

## 2025-08-23 RX ADMIN — PHENOBARBITAL 64.8 MG: 32.4 TABLET ORAL at 05:33

## 2025-08-23 RX ADMIN — ACETAMINOPHEN 650 MG: 325 TABLET ORAL at 22:59

## 2025-08-23 RX ADMIN — PHENOBARBITAL 64.8 MG: 32.4 TABLET ORAL at 23:00

## 2025-08-23 RX ADMIN — AMLODIPINE BESYLATE 10 MG: 10 TABLET ORAL at 09:02

## 2025-08-23 RX ADMIN — POTASSIUM CHLORIDE 10 MEQ: 7.46 INJECTION, SOLUTION INTRAVENOUS at 04:55

## 2025-08-23 RX ADMIN — LEVETIRACETAM 500 MG: 500 TABLET, FILM COATED ORAL at 09:01

## 2025-08-23 RX ADMIN — FOLIC ACID 1 MG: 1 TABLET ORAL at 09:05

## 2025-08-23 RX ADMIN — SODIUM CHLORIDE: 0.9 INJECTION, SOLUTION INTRAVENOUS at 01:31

## 2025-08-23 RX ADMIN — DIVALPROEX SODIUM 500 MG: 250 TABLET, DELAYED RELEASE ORAL at 19:58

## 2025-08-23 ASSESSMENT — PAIN DESCRIPTION - ORIENTATION
ORIENTATION: LEFT;MID
ORIENTATION: POSTERIOR
ORIENTATION: POSTERIOR

## 2025-08-23 ASSESSMENT — PAIN SCALES - GENERAL
PAINLEVEL_OUTOF10: 7
PAINLEVEL_OUTOF10: 0
PAINLEVEL_OUTOF10: 5
PAINLEVEL_OUTOF10: 5
PAINLEVEL_OUTOF10: 4
PAINLEVEL_OUTOF10: 3
PAINLEVEL_OUTOF10: 7
PAINLEVEL_OUTOF10: 3
PAINLEVEL_OUTOF10: 2
PAINLEVEL_OUTOF10: 8
PAINLEVEL_OUTOF10: 0

## 2025-08-23 ASSESSMENT — PAIN DESCRIPTION - LOCATION
LOCATION: HEAD
LOCATION: HEAD;HIP
LOCATION: HEAD

## 2025-08-23 ASSESSMENT — PAIN - FUNCTIONAL ASSESSMENT
PAIN_FUNCTIONAL_ASSESSMENT: 0-10
PAIN_FUNCTIONAL_ASSESSMENT: ACTIVITIES ARE NOT PREVENTED
PAIN_FUNCTIONAL_ASSESSMENT: 0-10
PAIN_FUNCTIONAL_ASSESSMENT: 0-10

## 2025-08-23 ASSESSMENT — PAIN DESCRIPTION - DESCRIPTORS
DESCRIPTORS: DISCOMFORT;HEAVINESS;PRESSURE
DESCRIPTORS: ACHING;DISCOMFORT;SORE
DESCRIPTORS: SORE;PRESSURE;DISCOMFORT

## 2025-08-24 LAB
ALBUMIN SERPL-MCNC: 3.1 G/DL (ref 3.5–5.2)
ALP SERPL-CCNC: 92 U/L (ref 40–129)
ALT SERPL-CCNC: 14 U/L (ref 0–50)
ANION GAP SERPL CALCULATED.3IONS-SCNC: 16 MMOL/L (ref 7–16)
AST SERPL-CCNC: 19 U/L (ref 0–50)
BASOPHILS # BLD: 0.03 K/UL (ref 0–0.2)
BASOPHILS NFR BLD: 0 % (ref 0–2)
BILIRUB SERPL-MCNC: 0.4 MG/DL (ref 0–1.2)
BUN SERPL-MCNC: 12 MG/DL (ref 6–20)
CA-I BLD-SCNC: 1.15 MMOL/L (ref 1.15–1.33)
CALCIUM SERPL-MCNC: 8.6 MG/DL (ref 8.6–10)
CHLORIDE SERPL-SCNC: 98 MMOL/L (ref 98–107)
CO2 SERPL-SCNC: 23 MMOL/L (ref 22–29)
CREAT SERPL-MCNC: 0.6 MG/DL (ref 0.7–1.2)
DATE LAST DOSE: ABNORMAL
EOSINOPHIL # BLD: 0.1 K/UL (ref 0.05–0.5)
EOSINOPHILS RELATIVE PERCENT: 2 % (ref 0–6)
ERYTHROCYTE [DISTWIDTH] IN BLOOD BY AUTOMATED COUNT: 20.8 % (ref 11.5–15)
GFR, ESTIMATED: >90 ML/MIN/1.73M2
GLUCOSE SERPL-MCNC: 81 MG/DL (ref 74–99)
HCT VFR BLD AUTO: 35.7 % (ref 37–54)
HGB BLD-MCNC: 11.9 G/DL (ref 12.5–16.5)
IMM GRANULOCYTES # BLD AUTO: 0.05 K/UL (ref 0–0.58)
IMM GRANULOCYTES NFR BLD: 1 % (ref 0–5)
LYMPHOCYTES NFR BLD: 1.78 K/UL (ref 1.5–4)
LYMPHOCYTES RELATIVE PERCENT: 27 % (ref 20–42)
MAGNESIUM SERPL-MCNC: 2.3 MG/DL (ref 1.6–2.6)
MCH RBC QN AUTO: 30.7 PG (ref 26–35)
MCHC RBC AUTO-ENTMCNC: 33.3 G/DL (ref 32–34.5)
MCV RBC AUTO: 92.2 FL (ref 80–99.9)
MICROORGANISM SPEC CULT: NORMAL
MONOCYTES NFR BLD: 0.72 K/UL (ref 0.1–0.95)
MONOCYTES NFR BLD: 11 % (ref 2–12)
NEUTROPHILS NFR BLD: 60 % (ref 43–80)
NEUTS SEG NFR BLD: 4 K/UL (ref 1.8–7.3)
PHOSPHATE SERPL-MCNC: 3.6 MG/DL (ref 2.5–4.5)
PLATELET # BLD AUTO: 221 K/UL (ref 130–450)
PMV BLD AUTO: 9.4 FL (ref 7–12)
POTASSIUM SERPL-SCNC: 3 MMOL/L (ref 3.5–5.1)
PROT SERPL-MCNC: 5.5 G/DL (ref 6.4–8.3)
RBC # BLD AUTO: 3.87 M/UL (ref 3.8–5.8)
RBC # BLD: ABNORMAL 10*6/UL
SODIUM SERPL-SCNC: 137 MMOL/L (ref 136–145)
SPECIMEN DESCRIPTION: NORMAL
TME LAST DOSE: ABNORMAL H
VALPROATE SERPL-MCNC: 28 UG/ML (ref 50–100)
VANCOMYCIN DOSE: ABNORMAL MG
WBC OTHER # BLD: 6.7 K/UL (ref 4.5–11.5)

## 2025-08-24 PROCEDURE — 80164 ASSAY DIPROPYLACETIC ACD TOT: CPT

## 2025-08-24 PROCEDURE — 80053 COMPREHEN METABOLIC PANEL: CPT

## 2025-08-24 PROCEDURE — 36415 COLL VENOUS BLD VENIPUNCTURE: CPT

## 2025-08-24 PROCEDURE — 82330 ASSAY OF CALCIUM: CPT

## 2025-08-24 PROCEDURE — 83735 ASSAY OF MAGNESIUM: CPT

## 2025-08-24 PROCEDURE — 2000000000 HC ICU R&B

## 2025-08-24 PROCEDURE — 2500000003 HC RX 250 WO HCPCS

## 2025-08-24 PROCEDURE — 84100 ASSAY OF PHOSPHORUS: CPT

## 2025-08-24 PROCEDURE — 85025 COMPLETE CBC W/AUTO DIFF WBC: CPT

## 2025-08-24 PROCEDURE — 6370000000 HC RX 637 (ALT 250 FOR IP)

## 2025-08-24 PROCEDURE — 99233 SBSQ HOSP IP/OBS HIGH 50: CPT | Performed by: SURGERY

## 2025-08-24 RX ORDER — CETIRIZINE HYDROCHLORIDE 10 MG/1
10 TABLET ORAL DAILY
Status: DISCONTINUED | OUTPATIENT
Start: 2025-08-24 | End: 2025-08-26 | Stop reason: HOSPADM

## 2025-08-24 RX ORDER — LANOLIN ALCOHOL/MO/W.PET/CERES
1000 CREAM (GRAM) TOPICAL DAILY
Status: DISCONTINUED | OUTPATIENT
Start: 2025-08-24 | End: 2025-08-26 | Stop reason: HOSPADM

## 2025-08-24 RX ORDER — BUTALBITAL, ACETAMINOPHEN AND CAFFEINE 50; 325; 40 MG/1; MG/1; MG/1
1 TABLET ORAL EVERY 4 HOURS PRN
Status: DISCONTINUED | OUTPATIENT
Start: 2025-08-24 | End: 2025-08-26 | Stop reason: HOSPADM

## 2025-08-24 RX ORDER — FERROUS GLUCONATE 324(38)MG
324 TABLET ORAL
Status: DISCONTINUED | OUTPATIENT
Start: 2025-08-24 | End: 2025-08-26 | Stop reason: HOSPADM

## 2025-08-24 RX ORDER — HYDROCHLOROTHIAZIDE 12.5 MG/1
12.5 TABLET ORAL EVERY MORNING
Status: DISCONTINUED | OUTPATIENT
Start: 2025-08-24 | End: 2025-08-26 | Stop reason: HOSPADM

## 2025-08-24 RX ORDER — SENNA AND DOCUSATE SODIUM 50; 8.6 MG/1; MG/1
2 TABLET, FILM COATED ORAL 2 TIMES DAILY
Status: DISCONTINUED | OUTPATIENT
Start: 2025-08-24 | End: 2025-08-26 | Stop reason: HOSPADM

## 2025-08-24 RX ORDER — MECLIZINE HCL 12.5 MG 12.5 MG/1
25 TABLET ORAL 2 TIMES DAILY
Status: DISCONTINUED | OUTPATIENT
Start: 2025-08-24 | End: 2025-08-26 | Stop reason: HOSPADM

## 2025-08-24 RX ADMIN — ACETAMINOPHEN 650 MG: 325 TABLET ORAL at 15:50

## 2025-08-24 RX ADMIN — LEVETIRACETAM 500 MG: 500 TABLET, FILM COATED ORAL at 08:49

## 2025-08-24 RX ADMIN — MECLIZINE 25 MG: 12.5 TABLET ORAL at 08:51

## 2025-08-24 RX ADMIN — FERROUS GLUCONATE 324 MG: 324 TABLET ORAL at 10:11

## 2025-08-24 RX ADMIN — DULOXETINE 20 MG: 20 CAPSULE, DELAYED RELEASE ORAL at 08:53

## 2025-08-24 RX ADMIN — ACETAMINOPHEN 650 MG: 325 TABLET ORAL at 06:55

## 2025-08-24 RX ADMIN — LEVETIRACETAM 500 MG: 500 TABLET, FILM COATED ORAL at 20:00

## 2025-08-24 RX ADMIN — FOLIC ACID 1 MG: 1 TABLET ORAL at 08:50

## 2025-08-24 RX ADMIN — CYANOCOBALAMIN TAB 1000 MCG 1000 MCG: 1000 TAB at 10:11

## 2025-08-24 RX ADMIN — PHENOBARBITAL 64.8 MG: 32.4 TABLET ORAL at 23:06

## 2025-08-24 RX ADMIN — DIVALPROEX SODIUM 500 MG: 250 TABLET, DELAYED RELEASE ORAL at 20:01

## 2025-08-24 RX ADMIN — MECLIZINE 25 MG: 12.5 TABLET ORAL at 20:01

## 2025-08-24 RX ADMIN — POTASSIUM BICARBONATE 40 MEQ: 782 TABLET, EFFERVESCENT ORAL at 20:00

## 2025-08-24 RX ADMIN — CETIRIZINE HYDROCHLORIDE 10 MG: 10 TABLET, FILM COATED ORAL at 10:11

## 2025-08-24 RX ADMIN — SENNOSIDES, DOCUSATE SODIUM 2 TABLET: 50; 8.6 TABLET, FILM COATED ORAL at 20:01

## 2025-08-24 RX ADMIN — SODIUM CHLORIDE, PRESERVATIVE FREE 10 ML: 5 INJECTION INTRAVENOUS at 20:03

## 2025-08-24 RX ADMIN — Medication 100 MG: at 08:50

## 2025-08-24 RX ADMIN — ATORVASTATIN CALCIUM 40 MG: 40 TABLET, FILM COATED ORAL at 08:49

## 2025-08-24 RX ADMIN — PHENOBARBITAL 64.8 MG: 32.4 TABLET ORAL at 06:07

## 2025-08-24 RX ADMIN — DIVALPROEX SODIUM 500 MG: 250 TABLET, DELAYED RELEASE ORAL at 08:51

## 2025-08-24 RX ADMIN — ACETAMINOPHEN 650 MG: 325 TABLET ORAL at 23:06

## 2025-08-24 RX ADMIN — POTASSIUM BICARBONATE 40 MEQ: 782 TABLET, EFFERVESCENT ORAL at 08:52

## 2025-08-24 RX ADMIN — SENNOSIDES, DOCUSATE SODIUM 2 TABLET: 50; 8.6 TABLET, FILM COATED ORAL at 08:50

## 2025-08-24 RX ADMIN — AMLODIPINE BESYLATE 10 MG: 10 TABLET ORAL at 08:51

## 2025-08-24 RX ADMIN — PHENOBARBITAL 64.8 MG: 32.4 TABLET ORAL at 18:36

## 2025-08-24 RX ADMIN — SODIUM CHLORIDE, PRESERVATIVE FREE 10 ML: 5 INJECTION INTRAVENOUS at 08:52

## 2025-08-24 RX ADMIN — PHENOBARBITAL 64.8 MG: 32.4 TABLET ORAL at 11:51

## 2025-08-24 ASSESSMENT — PAIN DESCRIPTION - DESCRIPTORS
DESCRIPTORS: ACHING;DISCOMFORT;TENDER
DESCRIPTORS: ACHING;DISCOMFORT;DULL
DESCRIPTORS: DISCOMFORT;ACHING;PRESSURE

## 2025-08-24 ASSESSMENT — PAIN SCALES - GENERAL
PAINLEVEL_OUTOF10: 0
PAINLEVEL_OUTOF10: 6
PAINLEVEL_OUTOF10: 6
PAINLEVEL_OUTOF10: 7
PAINLEVEL_OUTOF10: 6

## 2025-08-24 ASSESSMENT — PAIN DESCRIPTION - ORIENTATION
ORIENTATION: LEFT
ORIENTATION: MID
ORIENTATION: POSTERIOR

## 2025-08-24 ASSESSMENT — PAIN DESCRIPTION - LOCATION
LOCATION: HIP
LOCATION: HEAD
LOCATION: NECK

## 2025-08-24 ASSESSMENT — PAIN - FUNCTIONAL ASSESSMENT
PAIN_FUNCTIONAL_ASSESSMENT: 0-10
PAIN_FUNCTIONAL_ASSESSMENT: 0-10
PAIN_FUNCTIONAL_ASSESSMENT: ACTIVITIES ARE NOT PREVENTED
PAIN_FUNCTIONAL_ASSESSMENT: 0-10
PAIN_FUNCTIONAL_ASSESSMENT: ACTIVITIES ARE NOT PREVENTED

## 2025-08-25 LAB
ALBUMIN SERPL-MCNC: 2.9 G/DL (ref 3.5–5.2)
ALP SERPL-CCNC: 84 U/L (ref 40–129)
ALT SERPL-CCNC: 10 U/L (ref 0–50)
ANION GAP SERPL CALCULATED.3IONS-SCNC: 12 MMOL/L (ref 7–16)
AST SERPL-CCNC: 23 U/L (ref 0–50)
BASOPHILS # BLD: 0 K/UL (ref 0–0.2)
BASOPHILS NFR BLD: 0 % (ref 0–2)
BILIRUB SERPL-MCNC: 0.3 MG/DL (ref 0–1.2)
BUN SERPL-MCNC: 12 MG/DL (ref 6–20)
CA-I BLD-SCNC: 1.16 MMOL/L (ref 1.15–1.33)
CALCIUM SERPL-MCNC: 8.5 MG/DL (ref 8.6–10)
CHLORIDE SERPL-SCNC: 102 MMOL/L (ref 98–107)
CO2 SERPL-SCNC: 23 MMOL/L (ref 22–29)
CREAT SERPL-MCNC: 0.5 MG/DL (ref 0.7–1.2)
EOSINOPHIL # BLD: 0.19 K/UL (ref 0.05–0.5)
EOSINOPHILS RELATIVE PERCENT: 3 % (ref 0–6)
ERYTHROCYTE [DISTWIDTH] IN BLOOD BY AUTOMATED COUNT: 20.9 % (ref 11.5–15)
GFR, ESTIMATED: >90 ML/MIN/1.73M2
GLUCOSE SERPL-MCNC: 82 MG/DL (ref 74–99)
HCT VFR BLD AUTO: 35.5 % (ref 37–54)
HGB BLD-MCNC: 11.6 G/DL (ref 12.5–16.5)
LYMPHOCYTES NFR BLD: 1.87 K/UL (ref 1.5–4)
LYMPHOCYTES RELATIVE PERCENT: 25 % (ref 20–42)
MAGNESIUM SERPL-MCNC: 2.3 MG/DL (ref 1.6–2.6)
MCH RBC QN AUTO: 31.1 PG (ref 26–35)
MCHC RBC AUTO-ENTMCNC: 32.7 G/DL (ref 32–34.5)
MCV RBC AUTO: 95.2 FL (ref 80–99.9)
MONOCYTES NFR BLD: 0.19 K/UL (ref 0.1–0.95)
MONOCYTES NFR BLD: 3 % (ref 2–12)
NEUTROPHILS NFR BLD: 70 % (ref 43–80)
NEUTS SEG NFR BLD: 5.15 K/UL (ref 1.8–7.3)
PHOSPHATE SERPL-MCNC: 3.1 MG/DL (ref 2.5–4.5)
PLATELET # BLD AUTO: 194 K/UL (ref 130–450)
PMV BLD AUTO: 9.2 FL (ref 7–12)
POTASSIUM SERPL-SCNC: 4 MMOL/L (ref 3.5–5.1)
PROT SERPL-MCNC: 5.2 G/DL (ref 6.4–8.3)
RBC # BLD AUTO: 3.73 M/UL (ref 3.8–5.8)
RBC # BLD: ABNORMAL 10*6/UL
SODIUM SERPL-SCNC: 137 MMOL/L (ref 136–145)
WBC OTHER # BLD: 7.4 K/UL (ref 4.5–11.5)

## 2025-08-25 PROCEDURE — 6370000000 HC RX 637 (ALT 250 FOR IP)

## 2025-08-25 PROCEDURE — 99233 SBSQ HOSP IP/OBS HIGH 50: CPT | Performed by: SURGERY

## 2025-08-25 PROCEDURE — 36415 COLL VENOUS BLD VENIPUNCTURE: CPT

## 2025-08-25 PROCEDURE — 97166 OT EVAL MOD COMPLEX 45 MIN: CPT

## 2025-08-25 PROCEDURE — 97535 SELF CARE MNGMENT TRAINING: CPT

## 2025-08-25 PROCEDURE — 97161 PT EVAL LOW COMPLEX 20 MIN: CPT

## 2025-08-25 PROCEDURE — 6360000002 HC RX W HCPCS

## 2025-08-25 PROCEDURE — 97530 THERAPEUTIC ACTIVITIES: CPT

## 2025-08-25 PROCEDURE — 83735 ASSAY OF MAGNESIUM: CPT

## 2025-08-25 PROCEDURE — 2500000003 HC RX 250 WO HCPCS

## 2025-08-25 PROCEDURE — 80053 COMPREHEN METABOLIC PANEL: CPT

## 2025-08-25 PROCEDURE — 85025 COMPLETE CBC W/AUTO DIFF WBC: CPT

## 2025-08-25 PROCEDURE — 84100 ASSAY OF PHOSPHORUS: CPT

## 2025-08-25 PROCEDURE — 82330 ASSAY OF CALCIUM: CPT

## 2025-08-25 PROCEDURE — 2000000000 HC ICU R&B

## 2025-08-25 RX ORDER — ENOXAPARIN SODIUM 100 MG/ML
30 INJECTION SUBCUTANEOUS 2 TIMES DAILY
Status: DISCONTINUED | OUTPATIENT
Start: 2025-08-25 | End: 2025-08-26 | Stop reason: HOSPADM

## 2025-08-25 RX ADMIN — CETIRIZINE HYDROCHLORIDE 10 MG: 10 TABLET, FILM COATED ORAL at 07:26

## 2025-08-25 RX ADMIN — POLYETHYLENE GLYCOL 3350 17 G: 17 POWDER, FOR SOLUTION ORAL at 07:25

## 2025-08-25 RX ADMIN — CYANOCOBALAMIN TAB 1000 MCG 1000 MCG: 1000 TAB at 07:27

## 2025-08-25 RX ADMIN — ENOXAPARIN SODIUM 30 MG: 100 INJECTION SUBCUTANEOUS at 19:33

## 2025-08-25 RX ADMIN — SODIUM CHLORIDE, PRESERVATIVE FREE 10 ML: 5 INJECTION INTRAVENOUS at 07:25

## 2025-08-25 RX ADMIN — ACETAMINOPHEN 650 MG: 325 TABLET ORAL at 19:37

## 2025-08-25 RX ADMIN — SENNOSIDES, DOCUSATE SODIUM 2 TABLET: 50; 8.6 TABLET, FILM COATED ORAL at 19:33

## 2025-08-25 RX ADMIN — FOLIC ACID 1 MG: 1 TABLET ORAL at 07:31

## 2025-08-25 RX ADMIN — MECLIZINE 25 MG: 12.5 TABLET ORAL at 19:33

## 2025-08-25 RX ADMIN — PHENOBARBITAL 64.8 MG: 32.4 TABLET ORAL at 06:07

## 2025-08-25 RX ADMIN — ATORVASTATIN CALCIUM 40 MG: 40 TABLET, FILM COATED ORAL at 07:31

## 2025-08-25 RX ADMIN — AMLODIPINE BESYLATE 10 MG: 10 TABLET ORAL at 07:31

## 2025-08-25 RX ADMIN — SODIUM CHLORIDE, PRESERVATIVE FREE 10 ML: 5 INJECTION INTRAVENOUS at 19:34

## 2025-08-25 RX ADMIN — SENNOSIDES, DOCUSATE SODIUM 2 TABLET: 50; 8.6 TABLET, FILM COATED ORAL at 07:31

## 2025-08-25 RX ADMIN — FERROUS GLUCONATE 324 MG: 324 TABLET ORAL at 07:26

## 2025-08-25 RX ADMIN — DULOXETINE 20 MG: 20 CAPSULE, DELAYED RELEASE ORAL at 07:26

## 2025-08-25 RX ADMIN — Medication 100 MG: at 07:31

## 2025-08-25 RX ADMIN — MECLIZINE 25 MG: 12.5 TABLET ORAL at 07:31

## 2025-08-25 RX ADMIN — LEVETIRACETAM 500 MG: 500 TABLET, FILM COATED ORAL at 07:31

## 2025-08-25 RX ADMIN — PHENOBARBITAL 64.8 MG: 32.4 TABLET ORAL at 16:50

## 2025-08-25 RX ADMIN — DIVALPROEX SODIUM 500 MG: 250 TABLET, DELAYED RELEASE ORAL at 19:33

## 2025-08-25 RX ADMIN — DIVALPROEX SODIUM 500 MG: 250 TABLET, DELAYED RELEASE ORAL at 07:31

## 2025-08-25 RX ADMIN — ENOXAPARIN SODIUM 30 MG: 100 INJECTION SUBCUTANEOUS at 11:13

## 2025-08-25 RX ADMIN — LEVETIRACETAM 500 MG: 500 TABLET, FILM COATED ORAL at 19:33

## 2025-08-25 RX ADMIN — PHENOBARBITAL 64.8 MG: 32.4 TABLET ORAL at 11:13

## 2025-08-25 ASSESSMENT — PAIN SCALES - GENERAL
PAINLEVEL_OUTOF10: 0
PAINLEVEL_OUTOF10: 7
PAINLEVEL_OUTOF10: 0
PAINLEVEL_OUTOF10: 2

## 2025-08-25 ASSESSMENT — LIFESTYLE VARIABLES
HOW OFTEN DO YOU HAVE A DRINK CONTAINING ALCOHOL: PATIENT DECLINED
HOW MANY STANDARD DRINKS CONTAINING ALCOHOL DO YOU HAVE ON A TYPICAL DAY: PATIENT DECLINED

## 2025-08-25 ASSESSMENT — PAIN DESCRIPTION - DESCRIPTORS: DESCRIPTORS: ACHING;DISCOMFORT

## 2025-08-25 ASSESSMENT — PAIN - FUNCTIONAL ASSESSMENT
PAIN_FUNCTIONAL_ASSESSMENT: 0-10
PAIN_FUNCTIONAL_ASSESSMENT: 0-10

## 2025-08-25 ASSESSMENT — PAIN DESCRIPTION - LOCATION: LOCATION: HEAD

## 2025-08-26 ENCOUNTER — APPOINTMENT (OUTPATIENT)
Dept: CT IMAGING | Age: 55
DRG: 055 | End: 2025-08-26
Payer: MEDICAID

## 2025-08-26 ENCOUNTER — HOSPITAL ENCOUNTER (INPATIENT)
Age: 55
LOS: 1 days | Discharge: NURSING, REHAB OR LONG-TERM CARE | DRG: 055 | End: 2025-08-28
Admitting: SURGERY
Payer: MEDICAID

## 2025-08-26 ENCOUNTER — APPOINTMENT (OUTPATIENT)
Dept: GENERAL RADIOLOGY | Age: 55
DRG: 055 | End: 2025-08-26
Payer: MEDICAID

## 2025-08-26 VITALS
HEIGHT: 67 IN | WEIGHT: 167.99 LBS | DIASTOLIC BLOOD PRESSURE: 93 MMHG | SYSTOLIC BLOOD PRESSURE: 124 MMHG | HEART RATE: 77 BPM | OXYGEN SATURATION: 92 % | TEMPERATURE: 96.8 F | BODY MASS INDEX: 26.37 KG/M2 | RESPIRATION RATE: 20 BRPM

## 2025-08-26 DIAGNOSIS — S06.5XAA BILATERAL SUBDURAL HEMATOMAS (HCC): Primary | ICD-10-CM

## 2025-08-26 DIAGNOSIS — N30.00 ACUTE CYSTITIS WITHOUT HEMATURIA: ICD-10-CM

## 2025-08-26 DIAGNOSIS — S09.90XA CLOSED HEAD INJURY, INITIAL ENCOUNTER: ICD-10-CM

## 2025-08-26 DIAGNOSIS — F10.929 ACUTE ALCOHOLIC INTOXICATION WITH COMPLICATION: ICD-10-CM

## 2025-08-26 DIAGNOSIS — S22.49XA MULTIPLE RIB FRACTURES INVOLVING FOUR OR MORE RIBS: ICD-10-CM
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
## 2025-08-26 LAB
ALBUMIN SERPL-MCNC: 2.8 G/DL (ref 3.5–5.2)
ALP SERPL-CCNC: 88 U/L (ref 40–129)
ALT SERPL-CCNC: 9 U/L (ref 0–50)
AMPHET UR QL SCN: NEGATIVE
ANION GAP SERPL CALCULATED.3IONS-SCNC: 12 MMOL/L (ref 7–16)
ANION GAP SERPL CALCULATED.3IONS-SCNC: 15 MMOL/L (ref 7–16)
ANION GAP SERPL CALCULATED.3IONS-SCNC: NORMAL MMOL/L (ref 9–17)
APAP SERPL-MCNC: <5 UG/ML (ref 10–30)
APAP SERPL-MCNC: NORMAL UG/ML (ref 10–30)
AST SERPL-CCNC: 14 U/L (ref 0–50)
BACTERIA URNS QL MICRO: ABNORMAL
BARBITURATES UR QL SCN: POSITIVE
BASOPHILS # BLD: 0.03 K/UL (ref 0–0.2)
BASOPHILS # BLD: 0.04 K/UL (ref 0–0.2)
BASOPHILS NFR BLD: 0 % (ref 0–2)
BASOPHILS NFR BLD: 1 % (ref 0–2)
BENZODIAZ UR QL: NEGATIVE
BILIRUB SERPL-MCNC: 0.3 MG/DL (ref 0–1.2)
BILIRUB UR QL STRIP: NEGATIVE
BUN SERPL-MCNC: 13 MG/DL (ref 6–20)
BUN SERPL-MCNC: 13 MG/DL (ref 6–20)
BUN SERPL-MCNC: NORMAL MG/DL (ref 6–20)
BUN/CREAT SERPL: NORMAL (ref 9–20)
BUPRENORPHINE UR QL: NEGATIVE
CA-I BLD-SCNC: 1.18 MMOL/L (ref 1.15–1.33)
CALCIUM SERPL-MCNC: 8.3 MG/DL (ref 8.6–10)
CALCIUM SERPL-MCNC: 8.7 MG/DL (ref 8.6–10)
CALCIUM SERPL-MCNC: NORMAL MG/DL (ref 8.6–10.4)
CANNABINOIDS UR QL SCN: NEGATIVE
CHLORIDE SERPL-SCNC: 100 MMOL/L (ref 98–107)
CHLORIDE SERPL-SCNC: 103 MMOL/L (ref 98–107)
CHLORIDE SERPL-SCNC: NORMAL MMOL/L (ref 98–107)
CLARITY UR: ABNORMAL
CO2 SERPL-SCNC: 21 MMOL/L (ref 22–29)
CO2 SERPL-SCNC: 22 MMOL/L (ref 22–29)
CO2 SERPL-SCNC: NORMAL MMOL/L (ref 20–31)
COCAINE UR QL SCN: NEGATIVE
COLOR UR: YELLOW
CREAT SERPL-MCNC: 0.6 MG/DL (ref 0.7–1.2)
CREAT SERPL-MCNC: 0.7 MG/DL (ref 0.7–1.2)
CREAT SERPL-MCNC: NORMAL MG/DL (ref 0.7–1.2)
CRYSTALS URNS MICRO: ABNORMAL /HPF
EOSINOPHIL # BLD: 0.03 K/UL (ref 0.05–0.5)
EOSINOPHIL # BLD: 0.1 K/UL (ref 0.05–0.5)
EOSINOPHILS RELATIVE PERCENT: 0 % (ref 0–6)
EOSINOPHILS RELATIVE PERCENT: 2 % (ref 0–6)
ERYTHROCYTE [DISTWIDTH] IN BLOOD BY AUTOMATED COUNT: 21 % (ref 11.5–15)
ERYTHROCYTE [DISTWIDTH] IN BLOOD BY AUTOMATED COUNT: 21 % (ref 11.5–15)
ETHANOL PERCENT: NORMAL %
ETHANOLAMINE SERPL-MCNC: 99 MG/DL (ref 0–0.08)
ETHANOLAMINE SERPL-MCNC: NORMAL MG/DL (ref 0–0.08)
FENTANYL UR QL: NEGATIVE
GFR, ESTIMATED: >90 ML/MIN/1.73M2
GFR, ESTIMATED: >90 ML/MIN/1.73M2
GFR, ESTIMATED: NORMAL ML/MIN/1.73M2
GLUCOSE SERPL-MCNC: 79 MG/DL (ref 74–99)
GLUCOSE SERPL-MCNC: 94 MG/DL (ref 74–99)
GLUCOSE SERPL-MCNC: NORMAL MG/DL (ref 70–99)
GLUCOSE UR STRIP-MCNC: NEGATIVE MG/DL
HCT VFR BLD AUTO: 35.6 % (ref 37–54)
HCT VFR BLD AUTO: 41.5 % (ref 37–54)
HGB BLD-MCNC: 11.5 G/DL (ref 12.5–16.5)
HGB BLD-MCNC: 13.6 G/DL (ref 12.5–16.5)
HGB UR QL STRIP.AUTO: ABNORMAL
IMM GRANULOCYTES # BLD AUTO: 0.08 K/UL (ref 0–0.58)
IMM GRANULOCYTES # BLD AUTO: 0.14 K/UL (ref 0–0.58)
IMM GRANULOCYTES NFR BLD: 1 % (ref 0–5)
IMM GRANULOCYTES NFR BLD: 1 % (ref 0–5)
KETONES UR STRIP-MCNC: 15 MG/DL
LEUKOCYTE ESTERASE UR QL STRIP: ABNORMAL
LYMPHOCYTES NFR BLD: 1.46 K/UL (ref 1.5–4)
LYMPHOCYTES NFR BLD: 2.1 K/UL (ref 1.5–4)
LYMPHOCYTES RELATIVE PERCENT: 14 % (ref 20–42)
LYMPHOCYTES RELATIVE PERCENT: 32 % (ref 20–42)
MAGNESIUM SERPL-MCNC: 2.3 MG/DL (ref 1.6–2.6)
MCH RBC QN AUTO: 30.9 PG (ref 26–35)
MCH RBC QN AUTO: 31.1 PG (ref 26–35)
MCHC RBC AUTO-ENTMCNC: 32.3 G/DL (ref 32–34.5)
MCHC RBC AUTO-ENTMCNC: 32.8 G/DL (ref 32–34.5)
MCV RBC AUTO: 95 FL (ref 80–99.9)
MCV RBC AUTO: 95.7 FL (ref 80–99.9)
METHADONE UR QL: NEGATIVE
MONOCYTES NFR BLD: 0.66 K/UL (ref 0.1–0.95)
MONOCYTES NFR BLD: 0.74 K/UL (ref 0.1–0.95)
MONOCYTES NFR BLD: 10 % (ref 2–12)
MONOCYTES NFR BLD: 7 % (ref 2–12)
NEUTROPHILS NFR BLD: 55 % (ref 43–80)
NEUTROPHILS NFR BLD: 78 % (ref 43–80)
NEUTS SEG NFR BLD: 3.57 K/UL (ref 1.8–7.3)
NEUTS SEG NFR BLD: 8.36 K/UL (ref 1.8–7.3)
NITRITE UR QL STRIP: NEGATIVE
OPIATES UR QL SCN: NEGATIVE
OXYCODONE UR QL SCN: NEGATIVE
PCP UR QL SCN: NEGATIVE
PH UR STRIP: 6 [PH] (ref 5–8)
PHENOBARBITAL: 16.3 UG/ML (ref 10–30)
PHOSPHATE SERPL-MCNC: 3.8 MG/DL (ref 2.5–4.5)
PLATELET # BLD AUTO: 199 K/UL (ref 130–450)
PLATELET # BLD AUTO: 237 K/UL (ref 130–450)
PMV BLD AUTO: 9.2 FL (ref 7–12)
PMV BLD AUTO: 9.4 FL (ref 7–12)
POTASSIUM SERPL-SCNC: 3.7 MMOL/L (ref 3.5–5.1)
POTASSIUM SERPL-SCNC: 4 MMOL/L (ref 3.5–5.1)
POTASSIUM SERPL-SCNC: NORMAL MMOL/L (ref 3.7–5.3)
PROT SERPL-MCNC: 5 G/DL (ref 6.4–8.3)
PROT UR STRIP-MCNC: NEGATIVE MG/DL
RBC # BLD AUTO: 3.72 M/UL (ref 3.8–5.8)
RBC # BLD AUTO: 4.37 M/UL (ref 3.8–5.8)
RBC # BLD: ABNORMAL 10*6/UL
RBC #/AREA URNS HPF: ABNORMAL /HPF
SALICYLATES SERPL-MCNC: <0.5 MG/DL (ref 0–30)
SALICYLATES SERPL-MCNC: NORMAL MG/DL (ref 3–10)
SODIUM SERPL-SCNC: 137 MMOL/L (ref 136–145)
SODIUM SERPL-SCNC: 137 MMOL/L (ref 136–145)
SODIUM SERPL-SCNC: NORMAL MMOL/L (ref 135–144)
SP GR UR STRIP: 1.01 (ref 1–1.03)
TEST INFORMATION: ABNORMAL
TOXIC TRICYCLIC SC,BLOOD: NEGATIVE
TOXIC TRICYCLIC SC,BLOOD: NORMAL
TROPONIN I SERPL HS-MCNC: 18 NG/L (ref 0–22)
TROPONIN I SERPL HS-MCNC: NORMAL NG/L (ref 0–22)
UROBILINOGEN UR STRIP-ACNC: 0.2 EU/DL (ref 0–1)
WBC #/AREA URNS HPF: ABNORMAL /HPF
WBC OTHER # BLD: 10.8 K/UL (ref 4.5–11.5)
WBC OTHER # BLD: 6.5 K/UL (ref 4.5–11.5)

## 2025-08-26 PROCEDURE — 36415 COLL VENOUS BLD VENIPUNCTURE: CPT

## 2025-08-26 PROCEDURE — 80184 ASSAY OF PHENOBARBITAL: CPT

## 2025-08-26 PROCEDURE — 84100 ASSAY OF PHOSPHORUS: CPT

## 2025-08-26 PROCEDURE — 6370000000 HC RX 637 (ALT 250 FOR IP)

## 2025-08-26 PROCEDURE — 71045 X-RAY EXAM CHEST 1 VIEW: CPT

## 2025-08-26 PROCEDURE — 80048 BASIC METABOLIC PNL TOTAL CA: CPT

## 2025-08-26 PROCEDURE — 80143 DRUG ASSAY ACETAMINOPHEN: CPT

## 2025-08-26 PROCEDURE — 99285 EMERGENCY DEPT VISIT HI MDM: CPT

## 2025-08-26 PROCEDURE — 70450 CT HEAD/BRAIN W/O DYE: CPT

## 2025-08-26 PROCEDURE — L0627 LO SAG RI AN/POS PNL PRE CST: HCPCS

## 2025-08-26 PROCEDURE — 72125 CT NECK SPINE W/O DYE: CPT

## 2025-08-26 PROCEDURE — 99238 HOSP IP/OBS DSCHRG MGMT 30/<: CPT | Performed by: SURGERY

## 2025-08-26 PROCEDURE — 6360000002 HC RX W HCPCS

## 2025-08-26 PROCEDURE — 2500000003 HC RX 250 WO HCPCS

## 2025-08-26 PROCEDURE — 82330 ASSAY OF CALCIUM: CPT

## 2025-08-26 PROCEDURE — 80307 DRUG TEST PRSMV CHEM ANLYZR: CPT

## 2025-08-26 PROCEDURE — 83735 ASSAY OF MAGNESIUM: CPT

## 2025-08-26 PROCEDURE — 80053 COMPREHEN METABOLIC PANEL: CPT

## 2025-08-26 PROCEDURE — 84484 ASSAY OF TROPONIN QUANT: CPT

## 2025-08-26 PROCEDURE — G0480 DRUG TEST DEF 1-7 CLASSES: HCPCS

## 2025-08-26 PROCEDURE — 85025 COMPLETE CBC W/AUTO DIFF WBC: CPT

## 2025-08-26 PROCEDURE — 96374 THER/PROPH/DIAG INJ IV PUSH: CPT

## 2025-08-26 PROCEDURE — 87086 URINE CULTURE/COLONY COUNT: CPT

## 2025-08-26 PROCEDURE — 81001 URINALYSIS AUTO W/SCOPE: CPT

## 2025-08-26 PROCEDURE — 93005 ELECTROCARDIOGRAM TRACING: CPT

## 2025-08-26 PROCEDURE — 80179 DRUG ASSAY SALICYLATE: CPT

## 2025-08-26 RX ORDER — DIVALPROEX SODIUM 500 MG/1
500 TABLET, DELAYED RELEASE ORAL 2 TIMES DAILY
DISCHARGE
Start: 2025-08-26

## 2025-08-26 RX ORDER — LEVETIRACETAM 500 MG/1
500 TABLET ORAL 2 TIMES DAILY
Status: DISCONTINUED | OUTPATIENT
Start: 2025-08-26 | End: 2025-08-28 | Stop reason: HOSPADM

## 2025-08-26 RX ORDER — FOLIC ACID 1 MG/1
1 TABLET ORAL DAILY
Status: DISCONTINUED | OUTPATIENT
Start: 2025-08-27 | End: 2025-08-28 | Stop reason: HOSPADM

## 2025-08-26 RX ORDER — BUTALBITAL, ACETAMINOPHEN AND CAFFEINE 50; 325; 40 MG/1; MG/1; MG/1
1 TABLET ORAL EVERY 4 HOURS PRN
Status: SHIPPED | DISCHARGE
Start: 2025-08-26

## 2025-08-26 RX ORDER — LANOLIN ALCOHOL/MO/W.PET/CERES
100 CREAM (GRAM) TOPICAL DAILY
Status: DISCONTINUED | OUTPATIENT
Start: 2025-08-27 | End: 2025-08-28 | Stop reason: HOSPADM

## 2025-08-26 RX ORDER — PHENOBARBITAL 32.4 MG/1
64.8 TABLET ORAL EVERY 6 HOURS
Status: DISCONTINUED | OUTPATIENT
Start: 2025-08-27 | End: 2025-08-28 | Stop reason: HOSPADM

## 2025-08-26 RX ORDER — LEVETIRACETAM 500 MG/1
500 TABLET ORAL 2 TIMES DAILY
Status: ON HOLD | DISCHARGE
Start: 2025-08-26 | End: 2025-08-28 | Stop reason: HOSPADM

## 2025-08-26 RX ORDER — ACETAMINOPHEN 325 MG/1
650 TABLET ORAL EVERY 6 HOURS PRN
DISCHARGE
Start: 2025-08-26

## 2025-08-26 RX ORDER — POLYETHYLENE GLYCOL 3350 17 G/17G
17 POWDER, FOR SOLUTION ORAL DAILY PRN
DISCHARGE
Start: 2025-08-26 | End: 2025-09-25

## 2025-08-26 RX ORDER — LEVETIRACETAM 500 MG/5ML
1000 INJECTION, SOLUTION, CONCENTRATE INTRAVENOUS ONCE
Status: COMPLETED | OUTPATIENT
Start: 2025-08-26 | End: 2025-08-26

## 2025-08-26 RX ORDER — PHENOBARBITAL 32.4 MG/1
97.2 TABLET ORAL 2 TIMES DAILY
Status: DISCONTINUED | OUTPATIENT
Start: 2025-08-26 | End: 2025-08-26 | Stop reason: HOSPADM

## 2025-08-26 RX ADMIN — ATORVASTATIN CALCIUM 40 MG: 40 TABLET, FILM COATED ORAL at 08:31

## 2025-08-26 RX ADMIN — DULOXETINE 20 MG: 20 CAPSULE, DELAYED RELEASE ORAL at 11:38

## 2025-08-26 RX ADMIN — FOLIC ACID 1 MG: 1 TABLET ORAL at 08:31

## 2025-08-26 RX ADMIN — CYANOCOBALAMIN TAB 1000 MCG 1000 MCG: 1000 TAB at 11:38

## 2025-08-26 RX ADMIN — CETIRIZINE HYDROCHLORIDE 10 MG: 10 TABLET, FILM COATED ORAL at 11:38

## 2025-08-26 RX ADMIN — SODIUM CHLORIDE, PRESERVATIVE FREE 10 ML: 5 INJECTION INTRAVENOUS at 08:33

## 2025-08-26 RX ADMIN — LEVETIRACETAM 1000 MG: 100 INJECTION INTRAVENOUS at 21:56

## 2025-08-26 RX ADMIN — AMLODIPINE BESYLATE 10 MG: 10 TABLET ORAL at 08:32

## 2025-08-26 RX ADMIN — Medication 100 MG: at 08:32

## 2025-08-26 RX ADMIN — PHENOBARBITAL 64.8 MG: 32.4 TABLET ORAL at 00:24

## 2025-08-26 RX ADMIN — ACETAMINOPHEN 650 MG: 325 TABLET ORAL at 00:21

## 2025-08-26 RX ADMIN — LEVETIRACETAM 500 MG: 500 TABLET, FILM COATED ORAL at 08:32

## 2025-08-26 RX ADMIN — PHENOBARBITAL 64.8 MG: 32.4 TABLET ORAL at 05:53

## 2025-08-26 RX ADMIN — ENOXAPARIN SODIUM 30 MG: 100 INJECTION SUBCUTANEOUS at 08:32

## 2025-08-26 RX ADMIN — SENNOSIDES, DOCUSATE SODIUM 2 TABLET: 50; 8.6 TABLET, FILM COATED ORAL at 08:32

## 2025-08-26 RX ADMIN — FERROUS GLUCONATE 324 MG: 324 TABLET ORAL at 10:03

## 2025-08-26 RX ADMIN — MECLIZINE 25 MG: 12.5 TABLET ORAL at 08:31

## 2025-08-26 RX ADMIN — DIVALPROEX SODIUM 500 MG: 250 TABLET, DELAYED RELEASE ORAL at 08:31

## 2025-08-26 ASSESSMENT — PAIN - FUNCTIONAL ASSESSMENT
PAIN_FUNCTIONAL_ASSESSMENT: 0-10
PAIN_FUNCTIONAL_ASSESSMENT: 0-10
PAIN_FUNCTIONAL_ASSESSMENT: ACTIVITIES ARE NOT PREVENTED
PAIN_FUNCTIONAL_ASSESSMENT: 0-10

## 2025-08-26 ASSESSMENT — PAIN DESCRIPTION - DESCRIPTORS: DESCRIPTORS: ACHING;DISCOMFORT

## 2025-08-26 ASSESSMENT — PAIN DESCRIPTION - ORIENTATION: ORIENTATION: LEFT

## 2025-08-26 ASSESSMENT — PAIN SCALES - GENERAL
PAINLEVEL_OUTOF10: 0
PAINLEVEL_OUTOF10: 0
PAINLEVEL_OUTOF10: 9
PAINLEVEL_OUTOF10: 0

## 2025-08-26 ASSESSMENT — PAIN DESCRIPTION - LOCATION
LOCATION: HEAD;BACK
LOCATION: HIP

## 2025-08-27 ENCOUNTER — APPOINTMENT (OUTPATIENT)
Dept: CT IMAGING | Age: 55
DRG: 055 | End: 2025-08-27
Payer: MEDICAID

## 2025-08-27 DIAGNOSIS — S06.4XAA INTRACRANIAL EPIDURAL HEMATOMA (HCC): Primary | ICD-10-CM

## 2025-08-27 DIAGNOSIS — S06.2XAA: ICD-10-CM

## 2025-08-27 DIAGNOSIS — S06.5XAA SUBDURAL HEMATOMA (HCC): ICD-10-CM

## 2025-08-27 DIAGNOSIS — S06.A0XA: ICD-10-CM

## 2025-08-27 LAB
AMPHET UR QL SCN: NEGATIVE
ANION GAP SERPL CALCULATED.3IONS-SCNC: 15 MMOL/L (ref 7–16)
BARBITURATES UR QL SCN: POSITIVE
BENZODIAZ UR QL: NEGATIVE
BUN SERPL-MCNC: 12 MG/DL (ref 6–20)
BUPRENORPHINE UR QL: NEGATIVE
CALCIUM SERPL-MCNC: 8.7 MG/DL (ref 8.6–10)
CANNABINOIDS UR QL SCN: NEGATIVE
CHLORIDE SERPL-SCNC: 101 MMOL/L (ref 98–107)
CO2 SERPL-SCNC: 21 MMOL/L (ref 22–29)
COCAINE UR QL SCN: NEGATIVE
CREAT SERPL-MCNC: 0.5 MG/DL (ref 0.7–1.2)
EKG ATRIAL RATE: 95 BPM
EKG P AXIS: 36 DEGREES
EKG P-R INTERVAL: 154 MS
EKG Q-T INTERVAL: 360 MS
EKG QRS DURATION: 88 MS
EKG QTC CALCULATION (BAZETT): 452 MS
EKG R AXIS: 8 DEGREES
EKG T AXIS: 49 DEGREES
EKG VENTRICULAR RATE: 95 BPM
ETHANOLAMINE SERPL-MCNC: <10 MG/DL (ref 0–0.08)
FENTANYL UR QL: NEGATIVE
GFR, ESTIMATED: >90 ML/MIN/1.73M2
GLUCOSE SERPL-MCNC: 80 MG/DL (ref 74–99)
METHADONE UR QL: NEGATIVE
OPIATES UR QL SCN: NEGATIVE
OXYCODONE UR QL SCN: NEGATIVE
PCP UR QL SCN: NEGATIVE
POTASSIUM SERPL-SCNC: 4.1 MMOL/L (ref 3.5–5.1)
SODIUM SERPL-SCNC: 137 MMOL/L (ref 136–145)
TEST INFORMATION: ABNORMAL

## 2025-08-27 PROCEDURE — 6370000000 HC RX 637 (ALT 250 FOR IP)

## 2025-08-27 PROCEDURE — 6360000002 HC RX W HCPCS

## 2025-08-27 PROCEDURE — 99222 1ST HOSP IP/OBS MODERATE 55: CPT | Performed by: NEUROLOGICAL SURGERY

## 2025-08-27 PROCEDURE — 2500000003 HC RX 250 WO HCPCS

## 2025-08-27 PROCEDURE — 2060000000 HC ICU INTERMEDIATE R&B

## 2025-08-27 PROCEDURE — G0480 DRUG TEST DEF 1-7 CLASSES: HCPCS

## 2025-08-27 PROCEDURE — 99222 1ST HOSP IP/OBS MODERATE 55: CPT | Performed by: SURGERY

## 2025-08-27 PROCEDURE — 80307 DRUG TEST PRSMV CHEM ANLYZR: CPT

## 2025-08-27 PROCEDURE — 70450 CT HEAD/BRAIN W/O DYE: CPT

## 2025-08-27 PROCEDURE — 93010 ELECTROCARDIOGRAM REPORT: CPT | Performed by: INTERNAL MEDICINE

## 2025-08-27 PROCEDURE — 80048 BASIC METABOLIC PNL TOTAL CA: CPT

## 2025-08-27 PROCEDURE — 36415 COLL VENOUS BLD VENIPUNCTURE: CPT

## 2025-08-27 RX ORDER — SENNA AND DOCUSATE SODIUM 50; 8.6 MG/1; MG/1
1 TABLET, FILM COATED ORAL 2 TIMES DAILY
Status: DISCONTINUED | OUTPATIENT
Start: 2025-08-27 | End: 2025-08-28 | Stop reason: HOSPADM

## 2025-08-27 RX ORDER — ONDANSETRON 4 MG/1
4 TABLET, ORALLY DISINTEGRATING ORAL EVERY 8 HOURS PRN
Status: DISCONTINUED | OUTPATIENT
Start: 2025-08-27 | End: 2025-08-28 | Stop reason: HOSPADM

## 2025-08-27 RX ORDER — HYDRALAZINE HYDROCHLORIDE 20 MG/ML
10 INJECTION INTRAMUSCULAR; INTRAVENOUS EVERY 30 MIN PRN
Status: DISCONTINUED | OUTPATIENT
Start: 2025-08-27 | End: 2025-08-28 | Stop reason: HOSPADM

## 2025-08-27 RX ORDER — POLYETHYLENE GLYCOL 3350 17 G/17G
17 POWDER, FOR SOLUTION ORAL DAILY
Status: DISCONTINUED | OUTPATIENT
Start: 2025-08-27 | End: 2025-08-28 | Stop reason: HOSPADM

## 2025-08-27 RX ORDER — SODIUM CHLORIDE 0.9 % (FLUSH) 0.9 %
10 SYRINGE (ML) INJECTION EVERY 12 HOURS SCHEDULED
Status: DISCONTINUED | OUTPATIENT
Start: 2025-08-27 | End: 2025-08-28 | Stop reason: HOSPADM

## 2025-08-27 RX ORDER — SODIUM CHLORIDE 9 MG/ML
INJECTION, SOLUTION INTRAVENOUS PRN
Status: DISCONTINUED | OUTPATIENT
Start: 2025-08-27 | End: 2025-08-28 | Stop reason: HOSPADM

## 2025-08-27 RX ORDER — SODIUM CHLORIDE 0.9 % (FLUSH) 0.9 %
10 SYRINGE (ML) INJECTION PRN
Status: DISCONTINUED | OUTPATIENT
Start: 2025-08-27 | End: 2025-08-28 | Stop reason: HOSPADM

## 2025-08-27 RX ORDER — LABETALOL HYDROCHLORIDE 5 MG/ML
10 INJECTION, SOLUTION INTRAVENOUS EVERY 30 MIN PRN
Status: DISCONTINUED | OUTPATIENT
Start: 2025-08-27 | End: 2025-08-28 | Stop reason: HOSPADM

## 2025-08-27 RX ORDER — ONDANSETRON 2 MG/ML
4 INJECTION INTRAMUSCULAR; INTRAVENOUS EVERY 6 HOURS PRN
Status: DISCONTINUED | OUTPATIENT
Start: 2025-08-27 | End: 2025-08-28 | Stop reason: HOSPADM

## 2025-08-27 RX ORDER — ACETAMINOPHEN 325 MG/1
650 TABLET ORAL EVERY 6 HOURS SCHEDULED
Status: DISCONTINUED | OUTPATIENT
Start: 2025-08-27 | End: 2025-08-28 | Stop reason: HOSPADM

## 2025-08-27 RX ADMIN — WATER 1000 MG: 1 INJECTION INTRAMUSCULAR; INTRAVENOUS; SUBCUTANEOUS at 00:02

## 2025-08-27 RX ADMIN — PHENOBARBITAL 64.8 MG: 32.4 TABLET ORAL at 06:07

## 2025-08-27 RX ADMIN — LEVETIRACETAM 500 MG: 500 TABLET, FILM COATED ORAL at 08:30

## 2025-08-27 RX ADMIN — SENNOSIDES, DOCUSATE SODIUM 1 TABLET: 50; 8.6 TABLET, FILM COATED ORAL at 20:31

## 2025-08-27 RX ADMIN — FOLIC ACID 1 MG: 1 TABLET ORAL at 08:30

## 2025-08-27 RX ADMIN — PHENOBARBITAL 64.8 MG: 32.4 TABLET ORAL at 00:04

## 2025-08-27 RX ADMIN — SODIUM CHLORIDE, PRESERVATIVE FREE 10 ML: 5 INJECTION INTRAVENOUS at 20:31

## 2025-08-27 RX ADMIN — SENNOSIDES, DOCUSATE SODIUM 1 TABLET: 50; 8.6 TABLET, FILM COATED ORAL at 08:30

## 2025-08-27 RX ADMIN — LEVETIRACETAM 500 MG: 500 TABLET, FILM COATED ORAL at 20:31

## 2025-08-27 RX ADMIN — ACETAMINOPHEN 650 MG: 325 TABLET ORAL at 17:01

## 2025-08-27 RX ADMIN — Medication 100 MG: at 08:30

## 2025-08-27 RX ADMIN — ACETAMINOPHEN 650 MG: 325 TABLET ORAL at 11:40

## 2025-08-27 RX ADMIN — PHENOBARBITAL 64.8 MG: 32.4 TABLET ORAL at 11:40

## 2025-08-27 RX ADMIN — SODIUM CHLORIDE, PRESERVATIVE FREE 10 ML: 5 INJECTION INTRAVENOUS at 11:45

## 2025-08-27 RX ADMIN — LABETALOL HYDROCHLORIDE 10 MG: 5 INJECTION INTRAVENOUS at 07:08

## 2025-08-27 RX ADMIN — PHENOBARBITAL 64.8 MG: 32.4 TABLET ORAL at 17:01

## 2025-08-27 ASSESSMENT — PAIN DESCRIPTION - LOCATION
LOCATION: HEAD
LOCATION: HEAD

## 2025-08-27 ASSESSMENT — PAIN DESCRIPTION - DESCRIPTORS
DESCRIPTORS: CRUSHING;DISCOMFORT;DULL
DESCRIPTORS: ACHING;DISCOMFORT;DULL

## 2025-08-27 ASSESSMENT — PAIN SCALES - GENERAL
PAINLEVEL_OUTOF10: 8
PAINLEVEL_OUTOF10: 8

## 2025-08-27 ASSESSMENT — PAIN - FUNCTIONAL ASSESSMENT
PAIN_FUNCTIONAL_ASSESSMENT: 0-10
PAIN_FUNCTIONAL_ASSESSMENT: 0-10

## 2025-08-27 ASSESSMENT — PAIN DESCRIPTION - ORIENTATION
ORIENTATION: MID;DISTAL
ORIENTATION: MID;DISTAL

## 2025-08-28 ENCOUNTER — APPOINTMENT (OUTPATIENT)
Dept: GENERAL RADIOLOGY | Age: 55
DRG: 055 | End: 2025-08-28
Payer: MEDICAID

## 2025-08-28 ENCOUNTER — APPOINTMENT (OUTPATIENT)
Dept: CT IMAGING | Age: 55
DRG: 055 | End: 2025-08-28
Payer: MEDICAID

## 2025-08-28 ENCOUNTER — HOSPITAL ENCOUNTER (INPATIENT)
Age: 55
LOS: 1 days | Discharge: LEFT BEFORE DISCHARGE | DRG: 055 | End: 2025-08-29
Attending: EMERGENCY MEDICINE | Admitting: SURGERY
Payer: MEDICAID

## 2025-08-28 VITALS
SYSTOLIC BLOOD PRESSURE: 142 MMHG | WEIGHT: 187 LBS | DIASTOLIC BLOOD PRESSURE: 86 MMHG | TEMPERATURE: 98.1 F | HEIGHT: 68 IN | RESPIRATION RATE: 18 BRPM | BODY MASS INDEX: 28.34 KG/M2 | HEART RATE: 82 BPM | OXYGEN SATURATION: 96 %

## 2025-08-28 DIAGNOSIS — S06.5XAA BILATERAL SUBDURAL HEMATOMAS (HCC): Primary | ICD-10-CM

## 2025-08-28 PROBLEM — I16.1 HYPERTENSIVE EMERGENCY: Status: ACTIVE | Noted: 2025-08-28

## 2025-08-28 LAB
ALBUMIN SERPL-MCNC: 3.3 G/DL (ref 3.5–5.2)
ALP SERPL-CCNC: 86 U/L (ref 40–129)
ALT SERPL-CCNC: 11 U/L (ref 0–50)
ANION GAP SERPL CALCULATED.3IONS-SCNC: 12 MMOL/L (ref 7–16)
ANION GAP SERPL CALCULATED.3IONS-SCNC: 16 MMOL/L (ref 7–16)
APAP SERPL-MCNC: <5 UG/ML (ref 10–30)
AST SERPL-CCNC: 24 U/L (ref 0–50)
BASOPHILS # BLD: 0 K/UL (ref 0–0.2)
BASOPHILS # BLD: 0.04 K/UL (ref 0–0.2)
BASOPHILS NFR BLD: 0 % (ref 0–2)
BASOPHILS NFR BLD: 1 % (ref 0–2)
BILIRUB SERPL-MCNC: 0.3 MG/DL (ref 0–1.2)
BUN SERPL-MCNC: 11 MG/DL (ref 6–20)
BUN SERPL-MCNC: 7 MG/DL (ref 6–20)
CALCIUM SERPL-MCNC: 8.4 MG/DL (ref 8.6–10)
CALCIUM SERPL-MCNC: 8.5 MG/DL (ref 8.6–10)
CHLORIDE SERPL-SCNC: 103 MMOL/L (ref 98–107)
CHLORIDE SERPL-SCNC: 105 MMOL/L (ref 98–107)
CO2 SERPL-SCNC: 17 MMOL/L (ref 22–29)
CO2 SERPL-SCNC: 21 MMOL/L (ref 22–29)
CREAT SERPL-MCNC: 0.5 MG/DL (ref 0.7–1.2)
CREAT SERPL-MCNC: 0.6 MG/DL (ref 0.7–1.2)
EOSINOPHIL # BLD: 0.06 K/UL (ref 0.05–0.5)
EOSINOPHIL # BLD: 0.11 K/UL (ref 0.05–0.5)
EOSINOPHILS RELATIVE PERCENT: 1 % (ref 0–6)
EOSINOPHILS RELATIVE PERCENT: 1 % (ref 0–6)
ERYTHROCYTE [DISTWIDTH] IN BLOOD BY AUTOMATED COUNT: 21.1 % (ref 11.5–15)
ERYTHROCYTE [DISTWIDTH] IN BLOOD BY AUTOMATED COUNT: 21.2 % (ref 11.5–15)
ETHANOLAMINE SERPL-MCNC: 36 MG/DL (ref 0–0.08)
GFR, ESTIMATED: >90 ML/MIN/1.73M2
GFR, ESTIMATED: >90 ML/MIN/1.73M2
GLUCOSE SERPL-MCNC: 119 MG/DL (ref 74–99)
GLUCOSE SERPL-MCNC: 87 MG/DL (ref 74–99)
HCT VFR BLD AUTO: 36.9 % (ref 37–54)
HCT VFR BLD AUTO: 37.2 % (ref 37–54)
HGB BLD-MCNC: 12 G/DL (ref 12.5–16.5)
HGB BLD-MCNC: 12.2 G/DL (ref 12.5–16.5)
IMM GRANULOCYTES # BLD AUTO: 0.05 K/UL (ref 0–0.58)
IMM GRANULOCYTES NFR BLD: 1 % (ref 0–5)
INR PPP: 0.9
LYMPHOCYTES NFR BLD: 1.89 K/UL (ref 1.5–4)
LYMPHOCYTES NFR BLD: 2.41 K/UL (ref 1.5–4)
LYMPHOCYTES RELATIVE PERCENT: 19 % (ref 20–42)
LYMPHOCYTES RELATIVE PERCENT: 28 % (ref 20–42)
MCH RBC QN AUTO: 31.2 PG (ref 26–35)
MCH RBC QN AUTO: 31.3 PG (ref 26–35)
MCHC RBC AUTO-ENTMCNC: 32.5 G/DL (ref 32–34.5)
MCHC RBC AUTO-ENTMCNC: 32.8 G/DL (ref 32–34.5)
MCV RBC AUTO: 95.1 FL (ref 80–99.9)
MCV RBC AUTO: 96.3 FL (ref 80–99.9)
METAMYELOCYTES ABSOLUTE COUNT: 0.66 K/UL (ref 0–0.12)
METAMYELOCYTES: 5 % (ref 0–1)
MICROORGANISM SPEC CULT: ABNORMAL
MICROORGANISM SPEC CULT: ABNORMAL
MONOCYTES NFR BLD: 0.9 K/UL (ref 0.1–0.95)
MONOCYTES NFR BLD: 1.42 K/UL (ref 0.1–0.95)
MONOCYTES NFR BLD: 11 % (ref 2–12)
MONOCYTES NFR BLD: 13 % (ref 2–12)
MYELOCYTES ABSOLUTE COUNT: 0.22 K/UL
MYELOCYTES: 2 %
NEUTROPHILS NFR BLD: 57 % (ref 43–80)
NEUTROPHILS NFR BLD: 62 % (ref 43–80)
NEUTS SEG NFR BLD: 3.9 K/UL (ref 1.8–7.3)
NEUTS SEG NFR BLD: 7.88 K/UL (ref 1.8–7.3)
PLATELET # BLD AUTO: 239 K/UL (ref 130–450)
PLATELET # BLD AUTO: 303 K/UL (ref 130–450)
PMV BLD AUTO: 9.4 FL (ref 7–12)
PMV BLD AUTO: 9.8 FL (ref 7–12)
POTASSIUM SERPL-SCNC: 3.8 MMOL/L (ref 3.5–5.1)
POTASSIUM SERPL-SCNC: 3.9 MMOL/L (ref 3.5–5.1)
PROT SERPL-MCNC: 5.9 G/DL (ref 6.4–8.3)
PROTHROMBIN TIME: 10.2 SEC (ref 9.3–12.4)
RBC # BLD AUTO: 3.83 M/UL (ref 3.8–5.8)
RBC # BLD AUTO: 3.91 M/UL (ref 3.8–5.8)
RBC # BLD: ABNORMAL 10*6/UL
SALICYLATES SERPL-MCNC: <0.5 MG/DL (ref 0–30)
SERVICE CMNT-IMP: ABNORMAL
SODIUM SERPL-SCNC: 137 MMOL/L (ref 136–145)
SODIUM SERPL-SCNC: 137 MMOL/L (ref 136–145)
SPECIMEN DESCRIPTION: ABNORMAL
TOXIC TRICYCLIC SC,BLOOD: NEGATIVE
WBC OTHER # BLD: 12.7 K/UL (ref 4.5–11.5)
WBC OTHER # BLD: 6.8 K/UL (ref 4.5–11.5)

## 2025-08-28 PROCEDURE — G0480 DRUG TEST DEF 1-7 CLASSES: HCPCS

## 2025-08-28 PROCEDURE — 2580000003 HC RX 258

## 2025-08-28 PROCEDURE — 97161 PT EVAL LOW COMPLEX 20 MIN: CPT

## 2025-08-28 PROCEDURE — 80053 COMPREHEN METABOLIC PANEL: CPT

## 2025-08-28 PROCEDURE — 6360000002 HC RX W HCPCS

## 2025-08-28 PROCEDURE — 85384 FIBRINOGEN ACTIVITY: CPT

## 2025-08-28 PROCEDURE — 80048 BASIC METABOLIC PNL TOTAL CA: CPT

## 2025-08-28 PROCEDURE — 2500000003 HC RX 250 WO HCPCS

## 2025-08-28 PROCEDURE — 80179 DRUG ASSAY SALICYLATE: CPT

## 2025-08-28 PROCEDURE — 80143 DRUG ASSAY ACETAMINOPHEN: CPT

## 2025-08-28 PROCEDURE — 97166 OT EVAL MOD COMPLEX 45 MIN: CPT

## 2025-08-28 PROCEDURE — 36415 COLL VENOUS BLD VENIPUNCTURE: CPT

## 2025-08-28 PROCEDURE — 99291 CRITICAL CARE FIRST HOUR: CPT | Performed by: SURGERY

## 2025-08-28 PROCEDURE — 02HV33Z INSERTION OF INFUSION DEVICE INTO SUPERIOR VENA CAVA, PERCUTANEOUS APPROACH: ICD-10-PCS

## 2025-08-28 PROCEDURE — 99238 HOSP IP/OBS DSCHRG MGMT 30/<: CPT | Performed by: SURGERY

## 2025-08-28 PROCEDURE — 71045 X-RAY EXAM CHEST 1 VIEW: CPT

## 2025-08-28 PROCEDURE — 36592 COLLECT BLOOD FROM PICC: CPT

## 2025-08-28 PROCEDURE — 97535 SELF CARE MNGMENT TRAINING: CPT

## 2025-08-28 PROCEDURE — 85576 BLOOD PLATELET AGGREGATION: CPT

## 2025-08-28 PROCEDURE — 87081 CULTURE SCREEN ONLY: CPT

## 2025-08-28 PROCEDURE — 70450 CT HEAD/BRAIN W/O DYE: CPT

## 2025-08-28 PROCEDURE — 2000000000 HC ICU R&B

## 2025-08-28 PROCEDURE — 85390 FIBRINOLYSINS SCREEN I&R: CPT

## 2025-08-28 PROCEDURE — 99285 EMERGENCY DEPT VISIT HI MDM: CPT

## 2025-08-28 PROCEDURE — 85610 PROTHROMBIN TIME: CPT

## 2025-08-28 PROCEDURE — 6370000000 HC RX 637 (ALT 250 FOR IP)

## 2025-08-28 PROCEDURE — 85025 COMPLETE CBC W/AUTO DIFF WBC: CPT

## 2025-08-28 PROCEDURE — 72125 CT NECK SPINE W/O DYE: CPT

## 2025-08-28 PROCEDURE — 97530 THERAPEUTIC ACTIVITIES: CPT

## 2025-08-28 PROCEDURE — 85347 COAGULATION TIME ACTIVATED: CPT

## 2025-08-28 PROCEDURE — 80307 DRUG TEST PRSMV CHEM ANLYZR: CPT

## 2025-08-28 RX ORDER — SODIUM CHLORIDE 0.9 % (FLUSH) 0.9 %
10 SYRINGE (ML) INJECTION PRN
Status: DISCONTINUED | OUTPATIENT
Start: 2025-08-28 | End: 2025-08-29 | Stop reason: HOSPADM

## 2025-08-28 RX ORDER — ATORVASTATIN CALCIUM 40 MG/1
40 TABLET, FILM COATED ORAL DAILY
Status: DISCONTINUED | OUTPATIENT
Start: 2025-08-29 | End: 2025-08-29 | Stop reason: HOSPADM

## 2025-08-28 RX ORDER — RIZATRIPTAN BENZOATE 10 MG/1
10 TABLET ORAL
COMMUNITY

## 2025-08-28 RX ORDER — ACETAMINOPHEN 500 MG
500 TABLET ORAL EVERY 4 HOURS PRN
COMMUNITY

## 2025-08-28 RX ORDER — DIVALPROEX SODIUM 250 MG/1
500 TABLET, DELAYED RELEASE ORAL 2 TIMES DAILY
Status: DISCONTINUED | OUTPATIENT
Start: 2025-08-28 | End: 2025-08-29 | Stop reason: HOSPADM

## 2025-08-28 RX ORDER — SODIUM CHLORIDE 9 MG/ML
INJECTION, SOLUTION INTRAVENOUS CONTINUOUS
Status: DISCONTINUED | OUTPATIENT
Start: 2025-08-28 | End: 2025-08-29 | Stop reason: HOSPADM

## 2025-08-28 RX ORDER — ACETAMINOPHEN 325 MG/1
650 TABLET ORAL EVERY 6 HOURS
Status: DISCONTINUED | OUTPATIENT
Start: 2025-08-28 | End: 2025-08-28

## 2025-08-28 RX ORDER — ACETAMINOPHEN 325 MG/1
650 TABLET ORAL EVERY 6 HOURS SCHEDULED
Status: DISCONTINUED | OUTPATIENT
Start: 2025-08-29 | End: 2025-08-29

## 2025-08-28 RX ORDER — HYDROCHLOROTHIAZIDE 12.5 MG/1
12.5 TABLET ORAL DAILY
COMMUNITY

## 2025-08-28 RX ORDER — HYDROCHLOROTHIAZIDE 12.5 MG/1
12.5 TABLET ORAL DAILY
Status: DISCONTINUED | OUTPATIENT
Start: 2025-08-29 | End: 2025-08-29 | Stop reason: HOSPADM

## 2025-08-28 RX ORDER — AMLODIPINE BESYLATE 10 MG/1
10 TABLET ORAL DAILY
Status: DISCONTINUED | OUTPATIENT
Start: 2025-08-29 | End: 2025-08-29 | Stop reason: HOSPADM

## 2025-08-28 RX ORDER — DULOXETIN HYDROCHLORIDE 20 MG/1
20 CAPSULE, DELAYED RELEASE ORAL DAILY
Status: DISCONTINUED | OUTPATIENT
Start: 2025-08-29 | End: 2025-08-29 | Stop reason: HOSPADM

## 2025-08-28 RX ORDER — ONDANSETRON 2 MG/ML
4 INJECTION INTRAMUSCULAR; INTRAVENOUS EVERY 6 HOURS PRN
Status: DISCONTINUED | OUTPATIENT
Start: 2025-08-28 | End: 2025-08-29 | Stop reason: HOSPADM

## 2025-08-28 RX ORDER — SODIUM CHLORIDE 0.9 % (FLUSH) 0.9 %
10 SYRINGE (ML) INJECTION EVERY 12 HOURS SCHEDULED
Status: DISCONTINUED | OUTPATIENT
Start: 2025-08-28 | End: 2025-08-29 | Stop reason: HOSPADM

## 2025-08-28 RX ORDER — LEVETIRACETAM 500 MG/5ML
500 INJECTION, SOLUTION, CONCENTRATE INTRAVENOUS EVERY 12 HOURS
Status: DISCONTINUED | OUTPATIENT
Start: 2025-08-28 | End: 2025-08-28 | Stop reason: SDUPTHER

## 2025-08-28 RX ORDER — LABETALOL HYDROCHLORIDE 5 MG/ML
10 INJECTION, SOLUTION INTRAVENOUS EVERY 10 MIN PRN
Status: DISCONTINUED | OUTPATIENT
Start: 2025-08-28 | End: 2025-08-29 | Stop reason: HOSPADM

## 2025-08-28 RX ORDER — 3% SODIUM CHLORIDE 3 G/100ML
25 INJECTION, SOLUTION INTRAVENOUS CONTINUOUS
Status: DISCONTINUED | OUTPATIENT
Start: 2025-08-28 | End: 2025-08-29 | Stop reason: HOSPADM

## 2025-08-28 RX ORDER — FOLIC ACID 1 MG/1
1000 TABLET ORAL DAILY
Status: DISCONTINUED | OUTPATIENT
Start: 2025-08-29 | End: 2025-08-29 | Stop reason: HOSPADM

## 2025-08-28 RX ORDER — 3% SODIUM CHLORIDE 3 G/100ML
INJECTION, SOLUTION INTRAVENOUS
Status: COMPLETED
Start: 2025-08-28 | End: 2025-08-28

## 2025-08-28 RX ORDER — LEVETIRACETAM 500 MG/5ML
500 INJECTION, SOLUTION, CONCENTRATE INTRAVENOUS EVERY 12 HOURS
Status: DISCONTINUED | OUTPATIENT
Start: 2025-08-28 | End: 2025-08-29 | Stop reason: HOSPADM

## 2025-08-28 RX ORDER — POLYETHYLENE GLYCOL 3350 17 G/17G
17 POWDER, FOR SOLUTION ORAL DAILY
Status: DISCONTINUED | OUTPATIENT
Start: 2025-08-29 | End: 2025-08-29 | Stop reason: HOSPADM

## 2025-08-28 RX ORDER — ONDANSETRON 4 MG/1
4 TABLET, ORALLY DISINTEGRATING ORAL EVERY 8 HOURS PRN
Status: DISCONTINUED | OUTPATIENT
Start: 2025-08-28 | End: 2025-08-29 | Stop reason: HOSPADM

## 2025-08-28 RX ORDER — METHOCARBAMOL 750 MG/1
2 TABLET, FILM COATED ORAL 4 TIMES DAILY
COMMUNITY

## 2025-08-28 RX ORDER — BISACODYL 10 MG
10 SUPPOSITORY, RECTAL RECTAL
COMMUNITY

## 2025-08-28 RX ORDER — SODIUM CHLORIDE 9 MG/ML
INJECTION, SOLUTION INTRAVENOUS PRN
Status: DISCONTINUED | OUTPATIENT
Start: 2025-08-28 | End: 2025-08-29 | Stop reason: HOSPADM

## 2025-08-28 RX ORDER — LANOLIN ALCOHOL/MO/W.PET/CERES
100 CREAM (GRAM) TOPICAL DAILY
Status: DISCONTINUED | OUTPATIENT
Start: 2025-08-29 | End: 2025-08-29 | Stop reason: HOSPADM

## 2025-08-28 RX ORDER — LEVETIRACETAM 500 MG/1
500 TABLET ORAL 2 TIMES DAILY
DISCHARGE
Start: 2025-08-28 | End: 2025-09-03

## 2025-08-28 RX ORDER — HYDRALAZINE HYDROCHLORIDE 20 MG/ML
10 INJECTION INTRAMUSCULAR; INTRAVENOUS EVERY 10 MIN PRN
Status: DISCONTINUED | OUTPATIENT
Start: 2025-08-28 | End: 2025-08-29 | Stop reason: HOSPADM

## 2025-08-28 RX ORDER — MINERAL OIL/HYDROPHIL PETROLAT
OINTMENT (GRAM) TOPICAL AS NEEDED
COMMUNITY

## 2025-08-28 RX ORDER — SENNA AND DOCUSATE SODIUM 50; 8.6 MG/1; MG/1
1 TABLET, FILM COATED ORAL 2 TIMES DAILY
Status: DISCONTINUED | OUTPATIENT
Start: 2025-08-28 | End: 2025-08-29 | Stop reason: HOSPADM

## 2025-08-28 RX ADMIN — HYDRALAZINE HYDROCHLORIDE 10 MG: 20 INJECTION, SOLUTION INTRAMUSCULAR; INTRAVENOUS at 21:48

## 2025-08-28 RX ADMIN — LEVETIRACETAM 500 MG: 100 INJECTION INTRAVENOUS at 22:52

## 2025-08-28 RX ADMIN — HYDRALAZINE HYDROCHLORIDE 10 MG: 20 INJECTION, SOLUTION INTRAMUSCULAR; INTRAVENOUS at 21:26

## 2025-08-28 RX ADMIN — ONDANSETRON 4 MG: 2 INJECTION, SOLUTION INTRAMUSCULAR; INTRAVENOUS at 22:21

## 2025-08-28 RX ADMIN — PHENOBARBITAL 64.8 MG: 32.4 TABLET ORAL at 00:11

## 2025-08-28 RX ADMIN — 3% SODIUM CHLORIDE 25 ML/HR: 3 INJECTION, SOLUTION INTRAVENOUS at 22:42

## 2025-08-28 RX ADMIN — LEVETIRACETAM 500 MG: 500 TABLET, FILM COATED ORAL at 08:12

## 2025-08-28 RX ADMIN — FOLIC ACID 1 MG: 1 TABLET ORAL at 08:12

## 2025-08-28 RX ADMIN — ACETAMINOPHEN 650 MG: 325 TABLET ORAL at 00:11

## 2025-08-28 RX ADMIN — SODIUM CHLORIDE 25 ML/HR: 3 INJECTION, SOLUTION INTRAVENOUS at 22:42

## 2025-08-28 RX ADMIN — ACETAMINOPHEN 650 MG: 325 TABLET ORAL at 05:31

## 2025-08-28 RX ADMIN — Medication 100 MG: at 08:12

## 2025-08-28 RX ADMIN — LABETALOL HYDROCHLORIDE 10 MG: 5 INJECTION INTRAVENOUS at 22:21

## 2025-08-28 RX ADMIN — SODIUM CHLORIDE, PRESERVATIVE FREE 10 ML: 5 INJECTION INTRAVENOUS at 08:14

## 2025-08-28 RX ADMIN — SODIUM CHLORIDE: 0.9 INJECTION, SOLUTION INTRAVENOUS at 23:55

## 2025-08-28 RX ADMIN — PHENOBARBITAL 64.8 MG: 32.4 TABLET ORAL at 05:31

## 2025-08-28 ASSESSMENT — PAIN DESCRIPTION - PAIN TYPE: TYPE: ACUTE PAIN

## 2025-08-28 ASSESSMENT — PAIN - FUNCTIONAL ASSESSMENT
PAIN_FUNCTIONAL_ASSESSMENT: 0-10

## 2025-08-28 ASSESSMENT — PAIN SCALES - GENERAL
PAINLEVEL_OUTOF10: 0
PAINLEVEL_OUTOF10: 7
PAINLEVEL_OUTOF10: 0
PAINLEVEL_OUTOF10: 4
PAINLEVEL_OUTOF10: 6
PAINLEVEL_OUTOF10: 0

## 2025-08-28 ASSESSMENT — PAIN DESCRIPTION - DESCRIPTORS
DESCRIPTORS: ACHING;DISCOMFORT;THROBBING
DESCRIPTORS: ACHING;DISCOMFORT;SORE;THROBBING

## 2025-08-28 ASSESSMENT — PAIN DESCRIPTION - LOCATION
LOCATION: HEAD
LOCATION: HEAD

## 2025-08-29 ENCOUNTER — APPOINTMENT (OUTPATIENT)
Dept: CT IMAGING | Age: 55
DRG: 055 | End: 2025-08-29
Payer: MEDICAID

## 2025-08-29 ENCOUNTER — ANESTHESIA EVENT (OUTPATIENT)
Dept: OPERATING ROOM | Age: 55
DRG: 055 | End: 2025-08-29
Payer: MEDICAID

## 2025-08-29 ENCOUNTER — ANESTHESIA (OUTPATIENT)
Dept: OPERATING ROOM | Age: 55
DRG: 055 | End: 2025-08-29
Payer: MEDICAID

## 2025-08-29 VITALS
TEMPERATURE: 99.4 F | HEIGHT: 68 IN | RESPIRATION RATE: 25 BRPM | OXYGEN SATURATION: 96 % | BODY MASS INDEX: 26.53 KG/M2 | DIASTOLIC BLOOD PRESSURE: 81 MMHG | SYSTOLIC BLOOD PRESSURE: 141 MMHG | HEART RATE: 94 BPM | WEIGHT: 175.04 LBS

## 2025-08-29 LAB
AMPHET UR QL SCN: NEGATIVE
ANION GAP SERPL CALCULATED.3IONS-SCNC: 11 MMOL/L (ref 7–16)
ANION GAP SERPL CALCULATED.3IONS-SCNC: 13 MMOL/L (ref 7–16)
BACTERIA URNS QL MICRO: ABNORMAL
BARBITURATES UR QL SCN: POSITIVE
BASOPHILS # BLD: 0.03 K/UL (ref 0–0.2)
BASOPHILS NFR BLD: 0 % (ref 0–2)
BENZODIAZ UR QL: NEGATIVE
BILIRUB UR QL STRIP: NEGATIVE
BUN SERPL-MCNC: 5 MG/DL (ref 6–20)
BUN SERPL-MCNC: 6 MG/DL (ref 6–20)
BUPRENORPHINE UR QL: NEGATIVE
CA-I BLD-SCNC: 1.16 MMOL/L (ref 1.15–1.33)
CALCIUM SERPL-MCNC: 8.2 MG/DL (ref 8.6–10)
CALCIUM SERPL-MCNC: 8.4 MG/DL (ref 8.6–10)
CANNABINOIDS UR QL SCN: NEGATIVE
CHLORIDE SERPL-SCNC: 106 MMOL/L (ref 98–107)
CHLORIDE SERPL-SCNC: 107 MMOL/L (ref 98–107)
CLARITY UR: CLEAR
CLOT ANGLE.KAOLIN INDUCED BLD RES TEG: 75 DEG (ref 53–70)
CO2 SERPL-SCNC: 20 MMOL/L (ref 22–29)
CO2 SERPL-SCNC: 22 MMOL/L (ref 22–29)
COCAINE UR QL SCN: NEGATIVE
COLOR UR: YELLOW
CREAT SERPL-MCNC: 0.5 MG/DL (ref 0.7–1.2)
CREAT SERPL-MCNC: 0.5 MG/DL (ref 0.7–1.2)
EOSINOPHIL # BLD: 0 K/UL (ref 0.05–0.5)
EOSINOPHILS RELATIVE PERCENT: 0 % (ref 0–6)
EPI CELLS #/AREA URNS HPF: ABNORMAL /HPF
EPL-TEG: 1.2 % (ref 0–15)
ERYTHROCYTE [DISTWIDTH] IN BLOOD BY AUTOMATED COUNT: 21 % (ref 11.5–15)
FENTANYL UR QL: NEGATIVE
G-TEG: 12.2 KDYN/CM2 (ref 4.5–11)
GFR, ESTIMATED: >90 ML/MIN/1.73M2
GFR, ESTIMATED: >90 ML/MIN/1.73M2
GLUCOSE SERPL-MCNC: 104 MG/DL (ref 74–99)
GLUCOSE SERPL-MCNC: 89 MG/DL (ref 74–99)
GLUCOSE UR STRIP-MCNC: NEGATIVE MG/DL
HCT VFR BLD AUTO: 32.6 % (ref 37–54)
HGB BLD-MCNC: 10.9 G/DL (ref 12.5–16.5)
HGB UR QL STRIP.AUTO: ABNORMAL
IMM GRANULOCYTES # BLD AUTO: 0.1 K/UL (ref 0–0.58)
IMM GRANULOCYTES NFR BLD: 1 % (ref 0–5)
KETONES UR STRIP-MCNC: 15 MG/DL
KINETICS TEG: 1 MIN (ref 1–3)
LEUKOCYTE ESTERASE UR QL STRIP: ABNORMAL
LY30 (LYSIS) TEG: 1.2 % (ref 0–8)
LYMPHOCYTES NFR BLD: 1.67 K/UL (ref 1.5–4)
LYMPHOCYTES RELATIVE PERCENT: 17 % (ref 20–42)
MA (MAX CLOT) TEG: 70.9 MM (ref 50–70)
MAGNESIUM SERPL-MCNC: 2.1 MG/DL (ref 1.6–2.6)
MCH RBC QN AUTO: 31.4 PG (ref 26–35)
MCHC RBC AUTO-ENTMCNC: 33.4 G/DL (ref 32–34.5)
MCV RBC AUTO: 93.9 FL (ref 80–99.9)
METHADONE UR QL: NEGATIVE
MONOCYTES NFR BLD: 1.32 K/UL (ref 0.1–0.95)
MONOCYTES NFR BLD: 14 % (ref 2–12)
NEUTROPHILS NFR BLD: 68 % (ref 43–80)
NEUTS SEG NFR BLD: 6.69 K/UL (ref 1.8–7.3)
NITRITE UR QL STRIP: NEGATIVE
OPIATES UR QL SCN: NEGATIVE
OXYCODONE UR QL SCN: NEGATIVE
PCP UR QL SCN: NEGATIVE
PH UR STRIP: 6 [PH] (ref 5–8)
PHOSPHATE SERPL-MCNC: 2.7 MG/DL (ref 2.5–4.5)
PLATELET # BLD AUTO: 257 K/UL (ref 130–450)
PMV BLD AUTO: 9.5 FL (ref 7–12)
POTASSIUM SERPL-SCNC: 3.9 MMOL/L (ref 3.5–5.1)
POTASSIUM SERPL-SCNC: 4 MMOL/L (ref 3.5–5.1)
PROT UR STRIP-MCNC: NEGATIVE MG/DL
RBC # BLD AUTO: 3.47 M/UL (ref 3.8–5.8)
RBC #/AREA URNS HPF: ABNORMAL /HPF
REACTION TIME TEG: 2.5 MIN (ref 5–10)
SODIUM SERPL-SCNC: 138 MMOL/L (ref 136–145)
SODIUM SERPL-SCNC: 140 MMOL/L (ref 136–145)
SP GR UR STRIP: 1.01 (ref 1–1.03)
TEST INFORMATION: ABNORMAL
UROBILINOGEN UR STRIP-ACNC: 1 EU/DL (ref 0–1)
WBC #/AREA URNS HPF: ABNORMAL /HPF
WBC OTHER # BLD: 9.8 K/UL (ref 4.5–11.5)

## 2025-08-29 PROCEDURE — 84100 ASSAY OF PHOSPHORUS: CPT

## 2025-08-29 PROCEDURE — 2500000003 HC RX 250 WO HCPCS

## 2025-08-29 PROCEDURE — 81001 URINALYSIS AUTO W/SCOPE: CPT

## 2025-08-29 PROCEDURE — 85025 COMPLETE CBC W/AUTO DIFF WBC: CPT

## 2025-08-29 PROCEDURE — 80307 DRUG TEST PRSMV CHEM ANLYZR: CPT

## 2025-08-29 PROCEDURE — 6370000000 HC RX 637 (ALT 250 FOR IP)

## 2025-08-29 PROCEDURE — 36592 COLLECT BLOOD FROM PICC: CPT

## 2025-08-29 PROCEDURE — 6360000002 HC RX W HCPCS

## 2025-08-29 PROCEDURE — 80048 BASIC METABOLIC PNL TOTAL CA: CPT

## 2025-08-29 PROCEDURE — 82330 ASSAY OF CALCIUM: CPT

## 2025-08-29 PROCEDURE — 70450 CT HEAD/BRAIN W/O DYE: CPT

## 2025-08-29 PROCEDURE — 99233 SBSQ HOSP IP/OBS HIGH 50: CPT | Performed by: SURGERY

## 2025-08-29 PROCEDURE — 2580000003 HC RX 258

## 2025-08-29 PROCEDURE — 87086 URINE CULTURE/COLONY COUNT: CPT

## 2025-08-29 PROCEDURE — 83735 ASSAY OF MAGNESIUM: CPT

## 2025-08-29 RX ORDER — HYDROMORPHONE HYDROCHLORIDE 1 MG/ML
0.25 INJECTION, SOLUTION INTRAMUSCULAR; INTRAVENOUS; SUBCUTANEOUS EVERY 5 MIN PRN
Refills: 0 | Status: CANCELLED | OUTPATIENT
Start: 2025-08-29

## 2025-08-29 RX ORDER — SODIUM CHLORIDE 9 MG/ML
INJECTION, SOLUTION INTRAVENOUS PRN
Status: CANCELLED | OUTPATIENT
Start: 2025-08-29

## 2025-08-29 RX ORDER — LEVETIRACETAM 500 MG/5ML
500 INJECTION, SOLUTION, CONCENTRATE INTRAVENOUS EVERY 12 HOURS
Qty: 60 ML | Refills: 0 | Status: SHIPPED | OUTPATIENT
Start: 2025-08-29 | End: 2025-09-04

## 2025-08-29 RX ORDER — IPRATROPIUM BROMIDE AND ALBUTEROL SULFATE 2.5; .5 MG/3ML; MG/3ML
1 SOLUTION RESPIRATORY (INHALATION)
Status: CANCELLED | OUTPATIENT
Start: 2025-08-29

## 2025-08-29 RX ORDER — LABETALOL HYDROCHLORIDE 5 MG/ML
5 INJECTION, SOLUTION INTRAVENOUS
Status: CANCELLED | OUTPATIENT
Start: 2025-08-29

## 2025-08-29 RX ORDER — PROCHLORPERAZINE EDISYLATE 5 MG/ML
5 INJECTION INTRAMUSCULAR; INTRAVENOUS
Status: CANCELLED | OUTPATIENT
Start: 2025-08-29

## 2025-08-29 RX ORDER — HYDROMORPHONE HYDROCHLORIDE 1 MG/ML
0.5 INJECTION, SOLUTION INTRAMUSCULAR; INTRAVENOUS; SUBCUTANEOUS EVERY 5 MIN PRN
Refills: 0 | Status: CANCELLED | OUTPATIENT
Start: 2025-08-29

## 2025-08-29 RX ORDER — SODIUM CHLORIDE 0.9 % (FLUSH) 0.9 %
5-40 SYRINGE (ML) INJECTION EVERY 12 HOURS SCHEDULED
Status: CANCELLED | OUTPATIENT
Start: 2025-08-29

## 2025-08-29 RX ORDER — DIPHENHYDRAMINE HYDROCHLORIDE 50 MG/ML
12.5 INJECTION, SOLUTION INTRAMUSCULAR; INTRAVENOUS
Status: CANCELLED | OUTPATIENT
Start: 2025-08-29

## 2025-08-29 RX ORDER — ONDANSETRON 2 MG/ML
4 INJECTION INTRAMUSCULAR; INTRAVENOUS
Status: CANCELLED | OUTPATIENT
Start: 2025-08-29

## 2025-08-29 RX ORDER — LEVOFLOXACIN 750 MG/1
750 TABLET, FILM COATED ORAL DAILY
Qty: 5 TABLET | Refills: 0 | Status: SHIPPED | OUTPATIENT
Start: 2025-08-29 | End: 2025-09-03

## 2025-08-29 RX ORDER — MEPERIDINE HYDROCHLORIDE 25 MG/ML
12.5 INJECTION INTRAMUSCULAR; INTRAVENOUS; SUBCUTANEOUS
Refills: 0 | Status: CANCELLED | OUTPATIENT
Start: 2025-08-29

## 2025-08-29 RX ORDER — ACETAMINOPHEN 325 MG/1
650 TABLET ORAL EVERY 6 HOURS PRN
Status: DISCONTINUED | OUTPATIENT
Start: 2025-08-29 | End: 2025-08-29 | Stop reason: HOSPADM

## 2025-08-29 RX ORDER — SODIUM CHLORIDE 0.9 % (FLUSH) 0.9 %
5-40 SYRINGE (ML) INJECTION PRN
Status: CANCELLED | OUTPATIENT
Start: 2025-08-29

## 2025-08-29 RX ORDER — BUTALBITAL, ACETAMINOPHEN AND CAFFEINE 50; 325; 40 MG/1; MG/1; MG/1
1 TABLET ORAL EVERY 4 HOURS PRN
Status: DISCONTINUED | OUTPATIENT
Start: 2025-08-29 | End: 2025-08-29

## 2025-08-29 RX ORDER — PHENOBARBITAL 32.4 MG/1
64.8 TABLET ORAL EVERY 6 HOURS
Status: DISCONTINUED | OUTPATIENT
Start: 2025-08-29 | End: 2025-08-29 | Stop reason: HOSPADM

## 2025-08-29 RX ORDER — BUTALBITAL, ACETAMINOPHEN AND CAFFEINE 50; 325; 40 MG/1; MG/1; MG/1
1 TABLET ORAL EVERY 4 HOURS
Status: DISCONTINUED | OUTPATIENT
Start: 2025-08-29 | End: 2025-08-29 | Stop reason: HOSPADM

## 2025-08-29 RX ADMIN — ACETAMINOPHEN 650 MG: 325 TABLET ORAL at 04:07

## 2025-08-29 RX ADMIN — DULOXETINE 20 MG: 20 CAPSULE, DELAYED RELEASE ORAL at 08:23

## 2025-08-29 RX ADMIN — DIVALPROEX SODIUM 500 MG: 250 TABLET, DELAYED RELEASE ORAL at 08:21

## 2025-08-29 RX ADMIN — ATORVASTATIN CALCIUM 40 MG: 40 TABLET, FILM COATED ORAL at 08:22

## 2025-08-29 RX ADMIN — DIBASIC SODIUM PHOSPHATE, MONOBASIC POTASSIUM PHOSPHATE AND MONOBASIC SODIUM PHOSPHATE 1 TABLET: 852; 155; 130 TABLET ORAL at 08:22

## 2025-08-29 RX ADMIN — FOLIC ACID 1000 MCG: 1 TABLET ORAL at 08:21

## 2025-08-29 RX ADMIN — PHENOBARBITAL 64.8 MG: 32.4 TABLET ORAL at 12:34

## 2025-08-29 RX ADMIN — BUTALBITAL, ACETAMINOPHEN, AND CAFFEINE 1 TABLET: 50; 325; 40 TABLET ORAL at 00:10

## 2025-08-29 RX ADMIN — LEVETIRACETAM 500 MG: 100 INJECTION INTRAVENOUS at 09:58

## 2025-08-29 RX ADMIN — SENNOSIDES, DOCUSATE SODIUM 1 TABLET: 50; 8.6 TABLET, FILM COATED ORAL at 08:22

## 2025-08-29 RX ADMIN — HYDRALAZINE HYDROCHLORIDE 10 MG: 20 INJECTION, SOLUTION INTRAMUSCULAR; INTRAVENOUS at 02:10

## 2025-08-29 RX ADMIN — SODIUM CHLORIDE, PRESERVATIVE FREE 10 ML: 5 INJECTION INTRAVENOUS at 08:21

## 2025-08-29 RX ADMIN — BUTALBITAL, ACETAMINOPHEN, AND CAFFEINE 1 TABLET: 50; 325; 40 TABLET ORAL at 12:34

## 2025-08-29 RX ADMIN — AMLODIPINE BESYLATE 10 MG: 10 TABLET ORAL at 08:22

## 2025-08-29 RX ADMIN — PHENOBARBITAL 64.8 MG: 32.4 TABLET ORAL at 08:22

## 2025-08-29 RX ADMIN — WATER 1000 MG: 1 INJECTION INTRAMUSCULAR; INTRAVENOUS; SUBCUTANEOUS at 03:52

## 2025-08-29 RX ADMIN — PIPERACILLIN AND TAZOBACTAM 4500 MG: 4; .5 INJECTION, POWDER, FOR SOLUTION INTRAVENOUS at 08:43

## 2025-08-29 RX ADMIN — BUTALBITAL, ACETAMINOPHEN, AND CAFFEINE 1 TABLET: 50; 325; 40 TABLET ORAL at 08:22

## 2025-08-29 RX ADMIN — POLYETHYLENE GLYCOL 3350 17 G: 17 POWDER, FOR SOLUTION ORAL at 08:18

## 2025-08-29 RX ADMIN — Medication 100 MG: at 08:22

## 2025-08-29 ASSESSMENT — PAIN SCALES - GENERAL
PAINLEVEL_OUTOF10: 0
PAINLEVEL_OUTOF10: 7
PAINLEVEL_OUTOF10: 6
PAINLEVEL_OUTOF10: 0
PAINLEVEL_OUTOF10: 4
PAINLEVEL_OUTOF10: 7
PAINLEVEL_OUTOF10: 6
PAINLEVEL_OUTOF10: 7

## 2025-08-29 ASSESSMENT — PAIN DESCRIPTION - ORIENTATION
ORIENTATION: LEFT;POSTERIOR
ORIENTATION: POSTERIOR
ORIENTATION: RIGHT;LEFT;MID
ORIENTATION: RIGHT;LEFT;MID
ORIENTATION: OUTER

## 2025-08-29 ASSESSMENT — PAIN - FUNCTIONAL ASSESSMENT
PAIN_FUNCTIONAL_ASSESSMENT: ACTIVITIES ARE NOT PREVENTED
PAIN_FUNCTIONAL_ASSESSMENT: 0-10
PAIN_FUNCTIONAL_ASSESSMENT: ACTIVITIES ARE NOT PREVENTED
PAIN_FUNCTIONAL_ASSESSMENT: 0-10
PAIN_FUNCTIONAL_ASSESSMENT: 0-10

## 2025-08-29 ASSESSMENT — PAIN DESCRIPTION - LOCATION
LOCATION: HEAD
LOCATION: HIP
LOCATION: HEAD
LOCATION: HEAD;HIP
LOCATION: HEAD

## 2025-08-29 ASSESSMENT — PAIN DESCRIPTION - DESCRIPTORS
DESCRIPTORS: ACHING;DISCOMFORT;THROBBING
DESCRIPTORS: ACHING;DISCOMFORT;TENDER;THROBBING

## 2025-08-29 ASSESSMENT — LIFESTYLE VARIABLES: SMOKING_STATUS: 1

## 2025-08-30 LAB
MICROORGANISM SPEC CULT: NO GROWTH
MICROORGANISM SPEC CULT: NORMAL
SERVICE CMNT-IMP: NORMAL
SPECIMEN DESCRIPTION: NORMAL
SPECIMEN DESCRIPTION: NORMAL

## (undated) DEVICE — ROUTER TAPR 23MM BLUE RED BND FOOTED ATTCH

## (undated) DEVICE — WIPES SKIN CLOTH READYPREP 9 X 10.5 IN 2% CHLORHEX GLUCONATE CHG PREOP

## (undated) DEVICE — FOLEY TRAY 1 LAYR 16 FR 10 CC URIMTR SNAPSECURE URO175816S

## (undated) DEVICE — DRESSING PETRO W1XL8IN 3% BISMUTH TRIBROMOPHENATE XRFRM

## (undated) DEVICE — Device

## (undated) DEVICE — PAD N ADH W3XL8IN POLY COT SFT PERF FLM EASILY CUT ABSRB

## (undated) DEVICE — DEFENDO AIR WATER SUCTION AND BIOPSY VALVE KIT FOR  OLYMPUS: Brand: DEFENDO AIR/WATER/SUCTION AND BIOPSY VALVE

## (undated) DEVICE — SYRINGE MED 10ML LUERLOCK TIP W/O SFTY DISP

## (undated) DEVICE — SYRINGE MED 20ML STD CLR PLAS LUERLOCK TIP N CTRL DISP

## (undated) DEVICE — SOLUTION IRRIG 1000ML 09% SOD CHL USP PIC PLAS CONTAINER

## (undated) DEVICE — SOLUTION SURG PREP 26 CC PURPREP

## (undated) DEVICE — BANDAGE GZ W4XL75IN ST RAYON POLY 1 PLY WV FINISHED EDGE

## (undated) DEVICE — GAUZE,SPONGE,4"X4",8PLY,STRL,LF,10/TRAY: Brand: MEDLINE

## (undated) DEVICE — NEEDLE HYPO 25GA L2IN ALUM LUERLOCK HUB RIG PK MJCT

## (undated) DEVICE — CONNECTOR IRRIGATION AUXILIARY H2O JET W/ PRT MTL THRD HYDR

## (undated) DEVICE — TUBING SUCT L12FT DIA0.1875IN CONN UNIV W/ STR RIB CONN

## (undated) DEVICE — ENDOSCOPIC KIT 1.1+ OP4 NO CP DE

## (undated) DEVICE — PERFORATOR CRAN AD PED 14/11MM DGR O ROUNDED CUT EDGE DISP

## (undated) DEVICE — DRAPE SURG W13XL13IN PLAS ANTIMIC INCIS LNR FULL W HNDL

## (undated) DEVICE — SOLUTION IRRIG 1000ML H2O PIC PLAS SHATTERPROOF CONTAINER

## (undated) DEVICE — BLADE SURG 11 SS STRL CISION LF DISP

## (undated) DEVICE — SPONGE NEURO XRAY DETECT STRL 1X1IN 10PK

## (undated) DEVICE — NEEDLE HYPO 18GA L1.5IN ULT SHRP TRIBEVELED INTUITIVE 1 HND

## (undated) DEVICE — AIR/WATER CLEANING ADAPTER FOR OLYMPUS® GI ENDOSCOPE: Brand: BULLDOG®